# Patient Record
Sex: FEMALE | Race: WHITE | NOT HISPANIC OR LATINO | Employment: OTHER | URBAN - METROPOLITAN AREA
[De-identification: names, ages, dates, MRNs, and addresses within clinical notes are randomized per-mention and may not be internally consistent; named-entity substitution may affect disease eponyms.]

---

## 2017-08-25 DIAGNOSIS — I12.9 PARENCHYMAL RENAL HYPERTENSION: Primary | ICD-10-CM

## 2017-08-31 ENCOUNTER — HOSPITAL ENCOUNTER (OUTPATIENT)
Dept: RADIOLOGY | Facility: HOSPITAL | Age: 67
Discharge: HOME | End: 2017-08-31
Attending: INTERNAL MEDICINE
Payer: MEDICARE

## 2017-08-31 DIAGNOSIS — I12.9 PARENCHYMAL RENAL HYPERTENSION: ICD-10-CM

## 2017-08-31 PROCEDURE — 76770 US EXAM ABDO BACK WALL COMP: CPT | Mod: TC

## 2017-08-31 PROCEDURE — 76770 US EXAM ABDO BACK WALL COMP: CPT | Mod: 26,,, | Performed by: RADIOLOGY

## 2019-04-11 ENCOUNTER — HOSPITAL ENCOUNTER (INPATIENT)
Facility: HOSPITAL | Age: 69
LOS: 7 days | Discharge: NURSING, REHAB OR LONG-TERM CARE | DRG: 305 | End: 2019-04-18
Attending: EMERGENCY MEDICINE | Admitting: HOSPITALIST
Payer: MEDICARE

## 2019-04-11 DIAGNOSIS — N18.3 ACUTE RENAL FAILURE SUPERIMPOSED ON STAGE 3 CHRONIC KIDNEY DISEASE, UNSPECIFIED ACUTE RENAL FAILURE TYPE: Primary | ICD-10-CM

## 2019-04-11 DIAGNOSIS — I16.1 HYPERTENSIVE EMERGENCY: ICD-10-CM

## 2019-04-11 DIAGNOSIS — R06.02 SHORTNESS OF BREATH: ICD-10-CM

## 2019-04-11 DIAGNOSIS — N17.9 ACUTE RENAL FAILURE SUPERIMPOSED ON STAGE 3 CHRONIC KIDNEY DISEASE, UNSPECIFIED ACUTE RENAL FAILURE TYPE: Primary | ICD-10-CM

## 2019-04-11 DIAGNOSIS — N04.9 NEPHROTIC SYNDROME: ICD-10-CM

## 2019-04-11 PROBLEM — F32.A DEPRESSION: Chronic | Status: ACTIVE | Noted: 2019-04-11

## 2019-04-11 PROBLEM — E87.6 HYPOKALEMIA: Status: ACTIVE | Noted: 2019-04-11

## 2019-04-11 PROBLEM — E78.5 HYPERLIPIDEMIA: Chronic | Status: ACTIVE | Noted: 2019-04-11

## 2019-04-11 PROBLEM — I10 ESSENTIAL HYPERTENSION: Chronic | Status: ACTIVE | Noted: 2019-04-11

## 2019-04-11 PROBLEM — D63.8 ANEMIA OF CHRONIC DISEASE: Chronic | Status: ACTIVE | Noted: 2019-04-11

## 2019-04-11 PROBLEM — E11.9 TYPE 2 DIABETES MELLITUS: Chronic | Status: ACTIVE | Noted: 2019-04-11

## 2019-04-11 LAB
ALBUMIN SERPL BCP-MCNC: 2.4 G/DL (ref 3.5–5.2)
ALBUMIN SERPL BCP-MCNC: 3.1 G/DL (ref 3.5–5.2)
ALP SERPL-CCNC: 84 U/L (ref 55–135)
ALT SERPL W/O P-5'-P-CCNC: 27 U/L (ref 10–44)
ANION GAP SERPL CALC-SCNC: 7 MMOL/L (ref 8–16)
ANION GAP SERPL CALC-SCNC: 9 MMOL/L (ref 8–16)
APTT BLDCRRT: 31 SEC (ref 21–32)
AST SERPL-CCNC: 32 U/L (ref 10–40)
BACTERIA #/AREA URNS HPF: NORMAL /HPF
BASOPHILS # BLD AUTO: 0.01 K/UL (ref 0–0.2)
BASOPHILS NFR BLD: 0.2 % (ref 0–1.9)
BILIRUB SERPL-MCNC: 0.3 MG/DL (ref 0.1–1)
BILIRUB UR QL STRIP: NEGATIVE
BNP SERPL-MCNC: 59 PG/ML (ref 0–99)
BUN SERPL-MCNC: 36 MG/DL (ref 8–23)
BUN SERPL-MCNC: 42 MG/DL (ref 8–23)
CALCIUM SERPL-MCNC: 8.3 MG/DL (ref 8.7–10.5)
CALCIUM SERPL-MCNC: 8.8 MG/DL (ref 8.7–10.5)
CHLORIDE SERPL-SCNC: 93 MMOL/L (ref 95–110)
CHLORIDE SERPL-SCNC: 97 MMOL/L (ref 95–110)
CLARITY UR: CLEAR
CO2 SERPL-SCNC: 24 MMOL/L (ref 23–29)
CO2 SERPL-SCNC: 27 MMOL/L (ref 23–29)
COLOR UR: ABNORMAL
CREAT SERPL-MCNC: 1.8 MG/DL (ref 0.5–1.4)
CREAT SERPL-MCNC: 1.9 MG/DL (ref 0.5–1.4)
CREAT UR-MCNC: 20.7 MG/DL (ref 15–325)
CREAT UR-MCNC: 21.5 MG/DL (ref 15–325)
CREAT UR-MCNC: 21.5 MG/DL (ref 15–325)
DIFFERENTIAL METHOD: ABNORMAL
EOSINOPHIL # BLD AUTO: 0.1 K/UL (ref 0–0.5)
EOSINOPHIL NFR BLD: 0.9 % (ref 0–8)
EOSINOPHIL URNS QL WRIGHT STN: NORMAL
ERYTHROCYTE [DISTWIDTH] IN BLOOD BY AUTOMATED COUNT: 12.9 % (ref 11.5–14.5)
EST. GFR  (AFRICAN AMERICAN): 31 ML/MIN/1.73 M^2
EST. GFR  (AFRICAN AMERICAN): 33 ML/MIN/1.73 M^2
EST. GFR  (NON AFRICAN AMERICAN): 27 ML/MIN/1.73 M^2
EST. GFR  (NON AFRICAN AMERICAN): 28 ML/MIN/1.73 M^2
GLUCOSE SERPL-MCNC: 165 MG/DL (ref 70–110)
GLUCOSE SERPL-MCNC: 201 MG/DL (ref 70–110)
GLUCOSE UR QL STRIP: ABNORMAL
HCT VFR BLD AUTO: 29.5 % (ref 37–48.5)
HGB BLD-MCNC: 10.3 G/DL (ref 12–16)
HGB UR QL STRIP: ABNORMAL
HYALINE CASTS #/AREA URNS LPF: 0 /LPF
INR PPP: 1 (ref 0.8–1.2)
INR PPP: 1 (ref 0.8–1.2)
KETONES UR QL STRIP: NEGATIVE
LEUKOCYTE ESTERASE UR QL STRIP: NEGATIVE
LYMPHOCYTES # BLD AUTO: 1 K/UL (ref 1–4.8)
LYMPHOCYTES NFR BLD: 15.4 % (ref 18–48)
MCH RBC QN AUTO: 30.9 PG (ref 27–31)
MCHC RBC AUTO-ENTMCNC: 34.9 G/DL (ref 32–36)
MCV RBC AUTO: 89 FL (ref 82–98)
MICROSCOPIC COMMENT: NORMAL
MONOCYTES # BLD AUTO: 0.6 K/UL (ref 0.3–1)
MONOCYTES NFR BLD: 8.5 % (ref 4–15)
NEUTROPHILS # BLD AUTO: 5 K/UL (ref 1.8–7.7)
NEUTROPHILS NFR BLD: 75 % (ref 38–73)
NITRITE UR QL STRIP: NEGATIVE
PH UR STRIP: 7 [PH] (ref 5–8)
PHOSPHATE SERPL-MCNC: 3.4 MG/DL (ref 2.7–4.5)
PLATELET # BLD AUTO: 246 K/UL (ref 150–350)
PMV BLD AUTO: 9.9 FL (ref 9.2–12.9)
POCT GLUCOSE: 149 MG/DL (ref 70–110)
POCT GLUCOSE: 160 MG/DL (ref 70–110)
POCT GLUCOSE: 207 MG/DL (ref 70–110)
POTASSIUM SERPL-SCNC: 3.1 MMOL/L (ref 3.5–5.1)
POTASSIUM SERPL-SCNC: 3.4 MMOL/L (ref 3.5–5.1)
PROT SERPL-MCNC: 6.3 G/DL (ref 6–8.4)
PROT UR QL STRIP: ABNORMAL
PROT UR-MCNC: 371 MG/DL
PROT UR-MCNC: 376 MG/DL
PROT/CREAT UR: 17.49 MG/G{CREAT} (ref 0–0.2)
PROT/CREAT UR: 17.92 MG/G{CREAT} (ref 0–0.2)
PROTHROMBIN TIME: 10 SEC (ref 9–12.5)
PROTHROMBIN TIME: 10 SEC (ref 9–12.5)
RBC # BLD AUTO: 3.33 M/UL (ref 4–5.4)
RBC #/AREA URNS HPF: 2 /HPF (ref 0–4)
SODIUM SERPL-SCNC: 126 MMOL/L (ref 136–145)
SODIUM SERPL-SCNC: 131 MMOL/L (ref 136–145)
SODIUM UR-SCNC: 56 MMOL/L (ref 20–250)
SP GR UR STRIP: 1 (ref 1–1.03)
TROPONIN I SERPL DL<=0.01 NG/ML-MCNC: 0.02 NG/ML (ref 0–0.03)
URN SPEC COLLECT METH UR: ABNORMAL
UROBILINOGEN UR STRIP-ACNC: NEGATIVE EU/DL
UUN UR-MCNC: 199 MG/DL (ref 140–1050)
WBC # BLD AUTO: 6.62 K/UL (ref 3.9–12.7)
WBC #/AREA URNS HPF: 1 /HPF (ref 0–5)

## 2019-04-11 PROCEDURE — 63600175 PHARM REV CODE 636 W HCPCS: Performed by: EMERGENCY MEDICINE

## 2019-04-11 PROCEDURE — 84156 ASSAY OF PROTEIN URINE: CPT | Mod: 91

## 2019-04-11 PROCEDURE — 80069 RENAL FUNCTION PANEL: CPT

## 2019-04-11 PROCEDURE — 83880 ASSAY OF NATRIURETIC PEPTIDE: CPT

## 2019-04-11 PROCEDURE — 96374 THER/PROPH/DIAG INJ IV PUSH: CPT

## 2019-04-11 PROCEDURE — S5571 INSULIN DISPOS PEN 3 ML: HCPCS | Performed by: INTERNAL MEDICINE

## 2019-04-11 PROCEDURE — 84300 ASSAY OF URINE SODIUM: CPT

## 2019-04-11 PROCEDURE — 84540 ASSAY OF URINE/UREA-N: CPT

## 2019-04-11 PROCEDURE — 93010 ELECTROCARDIOGRAM REPORT: CPT | Mod: ,,, | Performed by: INTERNAL MEDICINE

## 2019-04-11 PROCEDURE — 85610 PROTHROMBIN TIME: CPT

## 2019-04-11 PROCEDURE — 21400001 HC TELEMETRY ROOM

## 2019-04-11 PROCEDURE — 93010 EKG 12-LEAD: ICD-10-PCS | Mod: ,,, | Performed by: INTERNAL MEDICINE

## 2019-04-11 PROCEDURE — 63600175 PHARM REV CODE 636 W HCPCS: Performed by: INTERNAL MEDICINE

## 2019-04-11 PROCEDURE — 25000003 PHARM REV CODE 250: Performed by: INTERNAL MEDICINE

## 2019-04-11 PROCEDURE — 25000003 PHARM REV CODE 250: Performed by: EMERGENCY MEDICINE

## 2019-04-11 PROCEDURE — 81000 URINALYSIS NONAUTO W/SCOPE: CPT

## 2019-04-11 PROCEDURE — 85730 THROMBOPLASTIN TIME PARTIAL: CPT

## 2019-04-11 PROCEDURE — 27000221 HC OXYGEN, UP TO 24 HOURS

## 2019-04-11 PROCEDURE — 80053 COMPREHEN METABOLIC PANEL: CPT

## 2019-04-11 PROCEDURE — 84156 ASSAY OF PROTEIN URINE: CPT

## 2019-04-11 PROCEDURE — 96375 TX/PRO/DX INJ NEW DRUG ADDON: CPT

## 2019-04-11 PROCEDURE — 93005 ELECTROCARDIOGRAM TRACING: CPT

## 2019-04-11 PROCEDURE — 83036 HEMOGLOBIN GLYCOSYLATED A1C: CPT

## 2019-04-11 PROCEDURE — 84484 ASSAY OF TROPONIN QUANT: CPT

## 2019-04-11 PROCEDURE — 85025 COMPLETE CBC W/AUTO DIFF WBC: CPT

## 2019-04-11 PROCEDURE — 87205 SMEAR GRAM STAIN: CPT

## 2019-04-11 PROCEDURE — 99285 EMERGENCY DEPT VISIT HI MDM: CPT | Mod: 25

## 2019-04-11 RX ORDER — HEPARIN SODIUM 5000 [USP'U]/ML
5000 INJECTION, SOLUTION INTRAVENOUS; SUBCUTANEOUS EVERY 12 HOURS
Status: DISCONTINUED | OUTPATIENT
Start: 2019-04-11 | End: 2019-04-16

## 2019-04-11 RX ORDER — HYDRALAZINE HYDROCHLORIDE 25 MG/1
25 TABLET, FILM COATED ORAL EVERY 8 HOURS
Status: DISCONTINUED | OUTPATIENT
Start: 2019-04-11 | End: 2019-04-11

## 2019-04-11 RX ORDER — PRAVASTATIN SODIUM 10 MG/1
10 TABLET ORAL DAILY
Status: DISCONTINUED | OUTPATIENT
Start: 2019-04-11 | End: 2019-04-18 | Stop reason: HOSPADM

## 2019-04-11 RX ORDER — POTASSIUM CHLORIDE 20 MEQ/1
40 TABLET, EXTENDED RELEASE ORAL ONCE
Status: COMPLETED | OUTPATIENT
Start: 2019-04-11 | End: 2019-04-11

## 2019-04-11 RX ORDER — SODIUM CHLORIDE 0.9 % (FLUSH) 0.9 %
10 SYRINGE (ML) INJECTION
Status: CANCELLED | OUTPATIENT
Start: 2019-04-11

## 2019-04-11 RX ORDER — GLUCAGON 1 MG
1 KIT INJECTION
Status: DISCONTINUED | OUTPATIENT
Start: 2019-04-11 | End: 2019-04-18 | Stop reason: HOSPADM

## 2019-04-11 RX ORDER — ESCITALOPRAM OXALATE 10 MG/1
10 TABLET ORAL DAILY
COMMUNITY
End: 2022-12-31

## 2019-04-11 RX ORDER — HYDRALAZINE HYDROCHLORIDE 25 MG/1
25 TABLET, FILM COATED ORAL EVERY 8 HOURS
Status: DISCONTINUED | OUTPATIENT
Start: 2019-04-11 | End: 2019-04-15

## 2019-04-11 RX ORDER — ESCITALOPRAM OXALATE 10 MG/1
10 TABLET ORAL DAILY
Status: DISCONTINUED | OUTPATIENT
Start: 2019-04-11 | End: 2019-04-18 | Stop reason: HOSPADM

## 2019-04-11 RX ORDER — IBUPROFEN 200 MG
16 TABLET ORAL
Status: DISCONTINUED | OUTPATIENT
Start: 2019-04-11 | End: 2019-04-18 | Stop reason: HOSPADM

## 2019-04-11 RX ORDER — FUROSEMIDE 10 MG/ML
40 INJECTION INTRAMUSCULAR; INTRAVENOUS
Status: COMPLETED | OUTPATIENT
Start: 2019-04-11 | End: 2019-04-11

## 2019-04-11 RX ORDER — FUROSEMIDE 10 MG/ML
40 INJECTION INTRAMUSCULAR; INTRAVENOUS ONCE
Status: COMPLETED | OUTPATIENT
Start: 2019-04-11 | End: 2019-04-11

## 2019-04-11 RX ORDER — IBUPROFEN 200 MG
24 TABLET ORAL
Status: DISCONTINUED | OUTPATIENT
Start: 2019-04-11 | End: 2019-04-18 | Stop reason: HOSPADM

## 2019-04-11 RX ORDER — NICARDIPINE HYDROCHLORIDE 0.2 MG/ML
5 INJECTION INTRAVENOUS CONTINUOUS
Status: DISCONTINUED | OUTPATIENT
Start: 2019-04-11 | End: 2019-04-11

## 2019-04-11 RX ORDER — NICARDIPINE HYDROCHLORIDE 0.2 MG/ML
2.5 INJECTION INTRAVENOUS CONTINUOUS
Status: DISCONTINUED | OUTPATIENT
Start: 2019-04-11 | End: 2019-04-11

## 2019-04-11 RX ORDER — RAMELTEON 8 MG/1
8 TABLET ORAL NIGHTLY PRN
Status: DISCONTINUED | OUTPATIENT
Start: 2019-04-11 | End: 2019-04-18 | Stop reason: HOSPADM

## 2019-04-11 RX ORDER — INSULIN ASPART 100 [IU]/ML
0-5 INJECTION, SOLUTION INTRAVENOUS; SUBCUTANEOUS
Status: DISCONTINUED | OUTPATIENT
Start: 2019-04-11 | End: 2019-04-18 | Stop reason: HOSPADM

## 2019-04-11 RX ORDER — LORAZEPAM 0.5 MG/1
1 TABLET ORAL
Status: COMPLETED | OUTPATIENT
Start: 2019-04-11 | End: 2019-04-11

## 2019-04-11 RX ORDER — AMLODIPINE BESYLATE 5 MG/1
5 TABLET ORAL DAILY
Status: DISCONTINUED | OUTPATIENT
Start: 2019-04-11 | End: 2019-04-15

## 2019-04-11 RX ORDER — AMOXICILLIN 250 MG
1 CAPSULE ORAL 2 TIMES DAILY PRN
Status: DISCONTINUED | OUTPATIENT
Start: 2019-04-11 | End: 2019-04-18 | Stop reason: HOSPADM

## 2019-04-11 RX ORDER — ALPRAZOLAM 0.5 MG/1
1 TABLET ORAL EVERY 6 HOURS PRN
Status: DISCONTINUED | OUTPATIENT
Start: 2019-04-11 | End: 2019-04-18 | Stop reason: HOSPADM

## 2019-04-11 RX ORDER — INSULIN ASPART 100 [IU]/ML
3 INJECTION, SOLUTION INTRAVENOUS; SUBCUTANEOUS
Status: DISCONTINUED | OUTPATIENT
Start: 2019-04-11 | End: 2019-04-18 | Stop reason: HOSPADM

## 2019-04-11 RX ORDER — ACETAMINOPHEN 500 MG
500 TABLET ORAL EVERY 6 HOURS PRN
Status: DISCONTINUED | OUTPATIENT
Start: 2019-04-11 | End: 2019-04-18 | Stop reason: HOSPADM

## 2019-04-11 RX ADMIN — HYDRALAZINE HYDROCHLORIDE 25 MG: 25 TABLET, FILM COATED ORAL at 09:04

## 2019-04-11 RX ADMIN — INSULIN ASPART 3 UNITS: 100 INJECTION, SOLUTION INTRAVENOUS; SUBCUTANEOUS at 07:04

## 2019-04-11 RX ADMIN — LORAZEPAM 1 MG: 0.5 TABLET ORAL at 01:04

## 2019-04-11 RX ADMIN — FUROSEMIDE 40 MG: 10 INJECTION, SOLUTION INTRAVENOUS at 06:04

## 2019-04-11 RX ADMIN — HEPARIN SODIUM 5000 UNITS: 5000 INJECTION, SOLUTION INTRAVENOUS; SUBCUTANEOUS at 08:04

## 2019-04-11 RX ADMIN — INSULIN ASPART 3 UNITS: 100 INJECTION, SOLUTION INTRAVENOUS; SUBCUTANEOUS at 05:04

## 2019-04-11 RX ADMIN — FUROSEMIDE 40 MG: 10 INJECTION, SOLUTION INTRAVENOUS at 01:04

## 2019-04-11 RX ADMIN — HYDRALAZINE HYDROCHLORIDE 25 MG: 25 TABLET, FILM COATED ORAL at 08:04

## 2019-04-11 RX ADMIN — HYDRALAZINE HYDROCHLORIDE 25 MG: 25 TABLET, FILM COATED ORAL at 03:04

## 2019-04-11 RX ADMIN — PRAVASTATIN SODIUM 10 MG: 10 TABLET ORAL at 08:04

## 2019-04-11 RX ADMIN — POTASSIUM CHLORIDE 40 MEQ: 1500 TABLET, EXTENDED RELEASE ORAL at 06:04

## 2019-04-11 RX ADMIN — ALPRAZOLAM 1 MG: 0.5 TABLET ORAL at 08:04

## 2019-04-11 RX ADMIN — INSULIN ASPART 2 UNITS: 100 INJECTION, SOLUTION INTRAVENOUS; SUBCUTANEOUS at 07:04

## 2019-04-11 RX ADMIN — ALPRAZOLAM 1 MG: 0.5 TABLET ORAL at 10:04

## 2019-04-11 RX ADMIN — NICARDIPINE HYDROCHLORIDE 5 MG/HR: 0.2 INJECTION, SOLUTION INTRAVENOUS at 02:04

## 2019-04-11 RX ADMIN — INSULIN DETEMIR 10 UNITS: 100 INJECTION, SOLUTION SUBCUTANEOUS at 09:04

## 2019-04-11 RX ADMIN — NICARDIPINE HYDROCHLORIDE 5 MG/HR: 0.2 INJECTION, SOLUTION INTRAVENOUS at 05:04

## 2019-04-11 RX ADMIN — AMLODIPINE BESYLATE 5 MG: 5 TABLET ORAL at 03:04

## 2019-04-11 RX ADMIN — HEPARIN SODIUM 5000 UNITS: 5000 INJECTION, SOLUTION INTRAVENOUS; SUBCUTANEOUS at 09:04

## 2019-04-11 RX ADMIN — ESCITALOPRAM OXALATE 10 MG: 10 TABLET ORAL at 08:04

## 2019-04-11 RX ADMIN — INSULIN ASPART 3 UNITS: 100 INJECTION, SOLUTION INTRAVENOUS; SUBCUTANEOUS at 11:04

## 2019-04-11 NOTE — ED PROVIDER NOTES
Encounter Date: 4/11/2019    SCRIBE #1 NOTE: I, Nicolasa Kerr, am scribing for, and in the presence of,  Lior Jimenes MD. I have scribed the following portions of the note - Other sections scribed: ROS and HPI.       History     Chief Complaint   Patient presents with    Anxiety     pt c/o anxiety from home and + 3 pedal edema      CC: Anxiety      HPI: This 68 y.o. female with a past medical history of Arthritis and Diabetes mellitus, presents to the ED complaining of BLE swelling and feeling anxious the started acutely today. Denies any new or worsening abdominal distension. Denies /GI sx. She reports subjective fever. Denies any worsening SOB than she she gets with Denies chills, cough, dizziness, headache or any other related sx. She also thinks her BP is elevated. She lives alone at home. She has a f/u appointment with her PCP scheduled for tomorrow. She takes Lexapro 10 MG once daily. Reports compliance with her prescribed insulin. Former smoker for 1 yr (10-15 yrs ago). Denies alcohol and/or recreational drug use. Takes Tylenol for her arthritis.     The history is provided by the patient. No  was used.     Review of patient's allergies indicates:   Allergen Reactions    Ciprofloxacin Anaphylaxis    Codeine Anaphylaxis    Neosporin [benzalkonium chloride] Anaphylaxis     Past Medical History:   Diagnosis Date    Arthritis     Diabetes mellitus      Past Surgical History:   Procedure Laterality Date    APPENDECTOMY       History reviewed. No pertinent family history.  Social History     Tobacco Use    Smoking status: Former Smoker   Substance Use Topics    Alcohol use: Yes    Drug use: No     Review of Systems   Constitutional: Negative for appetite change and fever.   HENT: Negative for rhinorrhea and sore throat.    Eyes: Negative for visual disturbance.   Respiratory: Negative for cough and shortness of breath.    Cardiovascular: Positive for leg swelling (BLE). Negative for  chest pain.   Gastrointestinal: Negative for abdominal pain.   Genitourinary: Negative for dysuria.   Musculoskeletal: Negative for gait problem.   Skin: Negative for rash.   Neurological: Negative for syncope.   Psychiatric/Behavioral: The patient is nervous/anxious.        Physical Exam     Initial Vitals [04/11/19 0056]   BP Pulse Resp Temp SpO2   (!) 180/69 71 18 97.8 °F (36.6 °C) 95 %      MAP       --         Physical Exam    Nursing note and vitals reviewed.  Constitutional: She appears well-developed and well-nourished. She is not diaphoretic.   HENT:   Head: Normocephalic and atraumatic.   Mouth/Throat: Oropharynx is clear and moist.   Eyes: Conjunctivae and EOM are normal. Pupils are equal, round, and reactive to light.   Neck: Normal range of motion. Neck supple.   Cardiovascular: Normal rate, regular rhythm, normal heart sounds and intact distal pulses.   No murmur heard.  Pulmonary/Chest: Breath sounds normal. No respiratory distress. She has no wheezes. She has no rhonchi. She has no rales.   Abdominal: Soft. Bowel sounds are normal. She exhibits no distension. There is no tenderness. There is no rebound and no guarding.   Musculoskeletal: Normal range of motion. She exhibits edema. She exhibits no tenderness.   2+ pitting edema to BLE   Neurological: She is alert and oriented to person, place, and time. She has normal strength.   Moving all extremities.   Skin: Skin is warm and dry. No erythema. No pallor.   Psychiatric: Her mood appears anxious.         ED Course   Procedures  Labs Reviewed   CBC W/ AUTO DIFFERENTIAL - Abnormal; Notable for the following components:       Result Value    RBC 3.33 (*)     Hemoglobin 10.3 (*)     Hematocrit 29.5 (*)     Gran% 75.0 (*)     Lymph% 15.4 (*)     All other components within normal limits   COMPREHENSIVE METABOLIC PANEL - Abnormal; Notable for the following components:    Sodium 126 (*)     Potassium 3.4 (*)     Chloride 93 (*)     Glucose 165 (*)     BUN,  Bld 42 (*)     Creatinine 1.9 (*)     Albumin 3.1 (*)     eGFR if  31 (*)     eGFR if non  27 (*)     All other components within normal limits   URINALYSIS, REFLEX TO URINE CULTURE - Abnormal; Notable for the following components:    Protein, UA 2+ (*)     Glucose, UA 1+ (*)     Occult Blood UA 1+ (*)     All other components within normal limits    Narrative:     Preferred Collection Type->Urine, Clean Catch   TROPONIN I   B-TYPE NATRIURETIC PEPTIDE   PROTIME-INR   APTT   PROTIME-INR   URINALYSIS MICROSCOPIC    Narrative:     Preferred Collection Type->Urine, Clean Catch     EKG Readings: (Independently Interpreted)   Rhythm: Normal Sinus Rhythm. Heart Rate: 68.   Non specific ST abnormality. Flat T-waves. Prolonged QT.       Imaging Results          X-Ray Chest AP Portable (Final result)  Result time 04/11/19 02:03:25    Final result by Deloris Boone MD (04/11/19 02:03:25)                 Impression:      No acute cardiopulmonary process identified.      Electronically signed by: Deloris Boone MD  Date:    04/11/2019  Time:    02:03             Narrative:    EXAMINATION:  XR CHEST AP PORTABLE    CLINICAL HISTORY:  CHF;    TECHNIQUE:  Single frontal view of the chest was performed.    COMPARISON:  October 2008.    FINDINGS:  Cardiac silhouette is normal in size.  Lungs are hypoinflated.  No evidence of focal consolidative process, pneumothorax, or significant effusion.  No acute osseous abnormality identified.                                 Medical Decision Making:   Initial Assessment:   68-year-old female presenting secondary to acute leg swelling and patient is markedly hypertensive.  If will get labs evaluating for hypertensive emergency.  This all could be peripheral pitting edema. CHF is also consideration in the patient.  Patient is very anxious while she is here and gave patient a p.o. Ativan to help calm her.  I was going to evaluate to see whether not this lowered  her blood pressure.  Her blood pressure could just be related to her anxiety. Will look for any major electrolyte abnormalities or signs of infection.    Even after Ativan patient's blood pressure is markedly elevated with him at 1:45 a.m..  Her creatinine has increased from 0.7-1.9.  However there has been a 10 year different some unsure if this is her baseline or acutely new.  Due the fact that she says this bilateral leg swelling is acutely new last 24 hr with elevated creatinine and protein in her urine chief I must treat this as hypertensive emergency at this time.  I started patient on nicardipine drip.  Met goals not drop below 108 on the 1st 24 hr.  I instructed this to the nursing staff placed in a nursing communication.  I spoke to hospitalist admitted the patient to the ICU.  Patient was updated is agreement plan.    Please put in 35 minutes of critical care due to patient having a high risk of renal failure.   Separate from teaching and exclusive of procedure and ekg time  Includes:  Time at bedside  Time reviewing test results  Time discussing case with staff  Time documenting the medical record  Time spent with family members  Time spent with consults  Management    Clinical Tests:   Lab Tests: Ordered and Reviewed  Radiological Study: Reviewed and Ordered  Medical Tests: Ordered and Reviewed            Scribe Attestation:   Scribe #1: I performed the above scribed service and the documentation accurately describes the services I performed. I attest to the accuracy of the note.    Attending Attestation:           Physician Attestation for Scribe:  Physician Attestation Statement for Scribe #1: I, Lior Jimenes MD, reviewed documentation, as scribed by Nicolasa Kerr in my presence, and it is both accurate and complete.                    Clinical Impression:       ICD-10-CM ICD-9-CM   1. Hypertensive emergency I16.1 401.9   2. Shortness of breath R06.02 786.05                                Lior Jimenes,  MD  04/11/19 0304

## 2019-04-11 NOTE — ED TRIAGE NOTES
"Pt presents to ED with c/o bilateral leg swelling, anxiety and abdominal pain that started today. Pt states her legs were not swollen yesterday but that she has had leg swelling in the past. Pt states she lives alone and felt her BP was elevated and didn't think she'd "make it through the night." Pt in no acute distress at this time. MD at bedside.  "

## 2019-04-11 NOTE — ASSESSMENT & PLAN NOTE
She presented with initial blood pressure 180/69 () but went up as high as 213/100 ().  She is only on benazepril at home for high blood pressure control.  Of note, her creatinine is 1.9 though it is unclear whether this is chronic or acute--the last lab work we have for her was back in 2008.  Her troponins are normal.  I have personally reviewed the EKG and it was significant for normal sinus rhythm without any evidence of acute ischemia.  I also reviewed the chest radiograph which was also benign.  She was started on a nicardipine infusion with goal MAP reduction of 25% of highest MAP within the first 6 hours.  Will hold her home regimen of benazepril due to renal failure of unclear chronicity.

## 2019-04-11 NOTE — PLAN OF CARE
Problem: Adult Inpatient Plan of Care  Goal: Plan of Care Review  Outcome: Ongoing (interventions implemented as appropriate)  Pt admitted to ICU this morning for blood pressure management on a Cardene gtt. Goal MAP less than 120mmHg. Moderate BLLE swelling. Pt reporting pain to abdomen with palpation upon physical exam. Improved with urination. Nephrology consulted. Plan for renal ultrasound today.

## 2019-04-11 NOTE — H&P
[facility] Medicine  History & Physical    Patient Name: Magaly Epstein  MRN: [id number]  Admission Date: 4/11/2019  Attending Physician: Pal Ulloa MD   Primary Care Provider: Tong Jones MD         Patient information was obtained from patient.     Subjective:     Principal Problem:Hypertensive emergency    Chief Complaint: Leg swelling today.    HPI: Ms. Magaly Epstein is a 68 y.o. female with essential hypertension, type 2 diabetes mellitus (HbA1c unknown), hyperlipidemia (LDL under), anemia of chronic disease, and depression who presents to [facility] ED with complaints of bilateral lower extremity edema today.  She had a sense that her blood pressure was high for the past five days which she describes as a sensation of anxiety--she has had similar symptoms in the past.  Today however she noticed that her legs were swollen decided to come to the ED for further evaluation.  She has been feeling ill and has had some nausea with occasional nonbilious, nonbloody vomiting.  She denies any fevers, chills, chest pain, shortness of breath, palpitations, diaphoresis, nor hemoptysis.  She denies any lightheaded nor any dizziness, and did not have any falls or loss of consciousness.  Her appetite has been very poor but she denies any hematochezia, melena, vaginal bleeding, hematuria, hemoptysis, hematemesis, nor any coffee-ground emesis.  She has not been able to take her medications in the last five days due to nausea.  She does say that she is otherwise compliant with her medications and follows up regularly with her PCP.  She has not been started on any new medications.  She does not have a regular kidney doctor.    Chart Review:  Patient has not had any recent hospitalizations or outpatient clinic visits within the system.    Past Medical History:   Diagnosis Date    Arthritis     Diabetes mellitus        Past Surgical History:   Procedure Laterality Date    APPENDECTOMY          Review of patient's allergies indicates:   Allergen Reactions    Ciprofloxacin Anaphylaxis    Codeine Anaphylaxis    Neosporin [benzalkonium chloride] Anaphylaxis       No current facility-administered medications on file prior to encounter.      Current Outpatient Medications on File Prior to Encounter   Medication Sig    escitalopram oxalate (LEXAPRO) 10 MG tablet Take 10 mg by mouth once daily.    insulin NPH-insulin regular, 70/30, (NOVOLIN 70/30) 100 unit/mL (70-30) injection Inject into the skin 2 (two) times daily.    benazepril (LOTENSIN) 20 MG tablet Take 20 mg by mouth once daily.    pravastatin (PRAVACHOL) 10 MG tablet Take 10 mg by mouth once daily.     Family History     None        Tobacco Use    Smoking status: Former Smoker   Substance and Sexual Activity    Alcohol use: Yes    Drug use: No    Sexual activity: Not on file     Review of Systems   Constitutional: Positive for activity change, appetite change and fatigue. Negative for chills, diaphoresis, fever and unexpected weight change.   HENT: Negative.    Eyes: Negative.    Respiratory: Negative for cough, chest tightness, shortness of breath and wheezing.    Cardiovascular: Positive for leg swelling. Negative for chest pain and palpitations.   Gastrointestinal: Positive for nausea and vomiting. Negative for abdominal distention, abdominal pain, blood in stool, constipation and diarrhea.   Genitourinary: Negative for dysuria, hematuria and vaginal bleeding.   Musculoskeletal: Negative.    Skin: Negative.    Neurological: Positive for weakness. Negative for dizziness, seizures, syncope and light-headedness.   Psychiatric/Behavioral: Negative.      Objective:     Vital Signs (Most Recent):  Temp: 97.8 °F (36.6 °C) (04/11/19 0530)  Pulse: 83 (04/11/19 0530)  Resp: (!) 30 (04/11/19 0530)  BP: (!) 176/72 (04/11/19 0530)  SpO2: 97 % (04/11/19 0530) Vital Signs (24h Range):  Temp:  [97.8 °F (36.6 °C)-98 °F (36.7 °C)] 97.8 °F (36.6  °C)  Pulse:  [66-86] 83  Resp:  [15-30] 30  SpO2:  [90 %-100 %] 97 %  BP: (133-215)/() 176/72     Weight: 114.3 kg (251 lb 15.8 oz)  Body mass index is 35.14 kg/m².    Physical Exam   Constitutional: She is oriented to person, place, and time. She appears well-developed and well-nourished. No distress.   HENT:   Head: Normocephalic and atraumatic.   Right Ear: External ear normal.   Left Ear: External ear normal.   Nose: Nose normal.   Eyes: Left eye exhibits no discharge.   Neck: Normal range of motion.   Cardiovascular:   Regular rate and rhythm with a Grade IV/VI holosystolic murmur with an S4 gallop.   Pulmonary/Chest:   Mildly increased respiratory effort with bibasilar crackles bilaterally.   Abdominal: Soft. Bowel sounds are normal. She exhibits no distension. There is no tenderness. There is no rebound and no guarding.   Musculoskeletal: Normal range of motion. She exhibits edema (There is significant 2+ pitting edema to the legs bilaterally to the level of the knees.).   Neurological: She is alert and oriented to person, place, and time.   Skin: Skin is warm and dry. She is not diaphoretic. No erythema.   Psychiatric: She has a normal mood and affect. Her behavior is normal. Judgment and thought content normal.   Nursing note and vitals reviewed.          Significant Labs: All pertinent labs within the past 24 hours have been reviewed.    Significant Imaging: I have reviewed and interpreted all pertinent imaging results/findings within the past 24 hours.    Assessment/Plan:     * Hypertensive emergency  She presented with initial blood pressure 180/69 () but went up as high as 213/100 ().  She is only on benazepril at home for high blood pressure control.  Of note, her creatinine is 1.9 though it is unclear whether this is chronic or acute--the last lab work we have for her was back in 2008.  Her troponins are normal.  I have personally reviewed the EKG and it was significant for normal  sinus rhythm without any evidence of acute ischemia.  I also reviewed the chest radiograph which was also benign.  She was started on a nicardipine infusion with goal MAP reduction of 25% of highest MAP within the first 6 hours.  Will hold her home regimen of benazepril due to renal failure of unclear chronicity.    Acute renal failure  We do not have any recent lab work to which to compare the chronicity of her renal failure.  Her creatinine today was 1.9.  Patient's urinalysis is significant for a specific gravity of 1.005, 2+ protein, 1+ glucose, and 1+ occult blood.  Urine output has been fair.  Will obtain additional urine studies; provide aggressive IV fluid hydration; monitor the urine output; recheck the renal function in the morning; and avoid nephrotoxins.  Will also obtain an iPTH, vitamin-D, and renal ultrasound.  Will obtain a urine protein to creatinine ratio.  Will consult Nephrology for further recommendations.    Hypokalemia  Given her renal function, will closely monitor her potassium level.  Will deferred repletion at this time.    Essential hypertension  As addressed above.    Type 2 diabetes mellitus  She is on home regimen of mixed insulin therapy; will provide basal-prandial insulin therapy along with insulin sliding scale.    Hyperlipidemia  Will continue her home regimen pravastatin.    Anemia of chronic disease  Unfortunately, we do not recent lab work to which to compare the chronicity of her anemia.  She is without any acute bleeding; there is no indication for transfusion.  Will continue to monitor.    Depression  Stable; will continue her home regimen of escitalopram.    VTE Risk Mitigation (From admission, onward)        Ordered     heparin (porcine) injection 5,000 Units  Every 12 hours      04/11/19 0523     IP VTE HIGH RISK PATIENT  Once      04/11/19 0523             Critical care time spent on the evaluation and treatment of severe organ dysfunction, review of pertinent labs and  imaging studies, discussions with consulting providers and discussions with patient/family: 60 minutes.            Shelby De La Paz M.D.  Staff [facility]  Department of [facility]  [facility]  Pager: [phone]              N.B.: Portions of this note was dictated using M*Modal Fluency Direct--there may be phonetic errors occasionally missed on review.

## 2019-04-11 NOTE — ASSESSMENT & PLAN NOTE
Given her renal function, will closely monitor her potassium level.  Will deferred repletion at this time.

## 2019-04-11 NOTE — ASSESSMENT & PLAN NOTE
She is on home regimen of mixed insulin therapy; will provide basal-prandial insulin therapy along with insulin sliding scale.

## 2019-04-11 NOTE — ASSESSMENT & PLAN NOTE
We do not have any recent lab work to which to compare the chronicity of her renal failure.  Her creatinine today was 1.9.  Patient's urinalysis is significant for a specific gravity of 1.005, 2+ protein, 1+ glucose, and 1+ occult blood.  Urine output has been fair.  Will obtain additional urine studies; provide aggressive IV fluid hydration; monitor the urine output; recheck the renal function in the morning; and avoid nephrotoxins.  Will also obtain an iPTH, vitamin-D, and renal ultrasound.  Will obtain a urine protein to creatinine ratio.  Will consult Nephrology for further recommendations.

## 2019-04-11 NOTE — CARE UPDATE
Reviewed H and P by my colleague and agree with A and P. Patient presenting with hypertensive emergency with SBP of 213 at one point. Started on Cardene drip and sent to the ICU. Hydralazine po ordered. Will attempt to wean off Cardene.     Addendum 1:41pm. Patient weaned of Cardene and started on BP meds. Transferred to floor same day of admission.

## 2019-04-11 NOTE — HPI
Ms. Magaly Epstein is a 68 y.o. female with essential hypertension, type 2 diabetes mellitus (HbA1c unknown), hyperlipidemia (LDL under), anemia of chronic disease, and depression who presents to [facility] ED with complaints of bilateral lower extremity edema today.  She had a sense that her blood pressure was high for the past five days which she describes as a sensation of anxiety--she has had similar symptoms in the past.  Today however she noticed that her legs were swollen decided to come to the ED for further evaluation.  She has been feeling ill and has had some nausea with occasional nonbilious, nonbloody vomiting.  She denies any fevers, chills, chest pain, shortness of breath, palpitations, diaphoresis, nor hemoptysis.  She denies any lightheaded nor any dizziness, and did not have any falls or loss of consciousness.  Her appetite has been very poor but she denies any hematochezia, melena, vaginal bleeding, hematuria, hemoptysis, hematemesis, nor any coffee-ground emesis.  She has not been able to take her medications in the last five days due to nausea.  She does say that she is otherwise compliant with her medications and follows up regularly with her PCP.  She has not been started on any new medications.  She does not have a regular kidney doctor.

## 2019-04-11 NOTE — ASSESSMENT & PLAN NOTE
Unfortunately, we do not recent lab work to which to compare the chronicity of her anemia.  She is without any acute bleeding; there is no indication for transfusion.  Will continue to monitor.

## 2019-04-11 NOTE — SUBJECTIVE & OBJECTIVE
Past Medical History:   Diagnosis Date    Arthritis     Diabetes mellitus        Past Surgical History:   Procedure Laterality Date    APPENDECTOMY         Review of patient's allergies indicates:   Allergen Reactions    Ciprofloxacin Anaphylaxis    Codeine Anaphylaxis    Neosporin [benzalkonium chloride] Anaphylaxis       No current facility-administered medications on file prior to encounter.      Current Outpatient Medications on File Prior to Encounter   Medication Sig    escitalopram oxalate (LEXAPRO) 10 MG tablet Take 10 mg by mouth once daily.    insulin NPH-insulin regular, 70/30, (NOVOLIN 70/30) 100 unit/mL (70-30) injection Inject into the skin 2 (two) times daily.    benazepril (LOTENSIN) 20 MG tablet Take 20 mg by mouth once daily.    pravastatin (PRAVACHOL) 10 MG tablet Take 10 mg by mouth once daily.     Family History     None        Tobacco Use    Smoking status: Former Smoker   Substance and Sexual Activity    Alcohol use: Yes    Drug use: No    Sexual activity: Not on file     Review of Systems   Constitutional: Positive for activity change, appetite change and fatigue. Negative for chills, diaphoresis, fever and unexpected weight change.   HENT: Negative.    Eyes: Negative.    Respiratory: Negative for cough, chest tightness, shortness of breath and wheezing.    Cardiovascular: Positive for leg swelling. Negative for chest pain and palpitations.   Gastrointestinal: Positive for nausea and vomiting. Negative for abdominal distention, abdominal pain, blood in stool, constipation and diarrhea.   Genitourinary: Negative for dysuria, hematuria and vaginal bleeding.   Musculoskeletal: Negative.    Skin: Negative.    Neurological: Positive for weakness. Negative for dizziness, seizures, syncope and light-headedness.   Psychiatric/Behavioral: Negative.      Objective:     Vital Signs (Most Recent):  Temp: 97.8 °F (36.6 °C) (04/11/19 0530)  Pulse: 83 (04/11/19 0530)  Resp: (!) 30 (04/11/19  0530)  BP: (!) 176/72 (04/11/19 0530)  SpO2: 97 % (04/11/19 0530) Vital Signs (24h Range):  Temp:  [97.8 °F (36.6 °C)-98 °F (36.7 °C)] 97.8 °F (36.6 °C)  Pulse:  [66-86] 83  Resp:  [15-30] 30  SpO2:  [90 %-100 %] 97 %  BP: (133-215)/() 176/72     Weight: 114.3 kg (251 lb 15.8 oz)  Body mass index is 35.14 kg/m².    Physical Exam   Constitutional: She is oriented to person, place, and time. She appears well-developed and well-nourished. No distress.   HENT:   Head: Normocephalic and atraumatic.   Right Ear: External ear normal.   Left Ear: External ear normal.   Nose: Nose normal.   Eyes: Left eye exhibits no discharge.   Neck: Normal range of motion.   Cardiovascular:   Regular rate and rhythm with a Grade IV/VI holosystolic murmur with an S4 gallop.   Pulmonary/Chest:   Mildly increased respiratory effort with bibasilar crackles bilaterally.   Abdominal: Soft. Bowel sounds are normal. She exhibits no distension. There is no tenderness. There is no rebound and no guarding.   Musculoskeletal: Normal range of motion. She exhibits edema (There is significant 2+ pitting edema to the legs bilaterally to the level of the knees.).   Neurological: She is alert and oriented to person, place, and time.   Skin: Skin is warm and dry. She is not diaphoretic. No erythema.   Psychiatric: She has a normal mood and affect. Her behavior is normal. Judgment and thought content normal.   Nursing note and vitals reviewed.          Significant Labs: All pertinent labs within the past 24 hours have been reviewed.    Significant Imaging: I have reviewed and interpreted all pertinent imaging results/findings within the past 24 hours.

## 2019-04-11 NOTE — CONSULTS
"[facility]  Nephrology  Consult Note    Patient Name: Magaly Epstein  MRN: [id number]  Admission Date: 4/11/2019  Hospital Length of Stay: 0 days  Attending Provider: Pal Ulloa MD   Primary Care Physician: Tong Jones MD  Principal Problem:Hypertensive emergency  Date of service 4/11/2019    Inpatient consult to Nephrology  Consult performed by: Aixa Narvaez MD  Consult ordered by: Pal Ulloa MD  Reason for consult: renal insuficiency        Subjective:     HPI: 67yo F with PMH DM, HTN, CKD2-3 followed by Dr Vela who presented to our facility with BLE edema that began yesterday, suddenly and rapidly progressive, large in size, doesn't notice significant improvement since admission, receivied IV lasix dose x1.  She reports high anxiety level which caused her appetite to worsen with associated nausea and poor PO intake.  She denies any nausea or vomiting now since admission, appetite improving.  She reports suddenly having high BP at home that began a few days ago, associated with HAs and vision changes.  She is always sob at baseline and reports this is unchanged.  She has had DM >14 yrs and HTN "for a long time" and she reports that they have not always been well controlled.  She denies any difficulty urinating, dysuria, hematuria.  She denies h/o renal biopsy.  Denies h/o cardiac disease, fevers, rash.  She bruises easy.    Past Medical History:   Diagnosis Date    Arthritis     Diabetes mellitus        Past Surgical History:   Procedure Laterality Date    APPENDECTOMY         Review of patient's allergies indicates:   Allergen Reactions    Ciprofloxacin Anaphylaxis    Codeine Anaphylaxis    Neosporin [benzalkonium chloride] Anaphylaxis     Current Facility-Administered Medications   Medication Frequency    acetaminophen tablet 500 mg Q6H PRN    ALPRAZolam tablet 1 mg Q6H PRN    amLODIPine tablet 5 mg Daily    dextrose 50% injection 12.5 g PRN    " dextrose 50% injection 25 g PRN    escitalopram oxalate tablet 10 mg Daily    glucagon (human recombinant) injection 1 mg PRN    glucose chewable tablet 16 g PRN    glucose chewable tablet 24 g PRN    heparin (porcine) injection 5,000 Units Q12H    hydrALAZINE tablet 25 mg Q8H    insulin aspart U-100 pen 0-5 Units PRN    insulin aspart U-100 pen 3 Units TIDWM    insulin detemir U-100 pen 10 Units QHS    pravastatin tablet 10 mg Daily    promethazine (PHENERGAN) 6.25 mg in dextrose 5 % 50 mL IVPB Q6H PRN    ramelteon tablet 8 mg Nightly PRN    senna-docusate 8.6-50 mg per tablet 1 tablet BID PRN     Family History     None        Tobacco Use    Smoking status: Former Smoker   Substance and Sexual Activity    Alcohol use: Yes    Drug use: No    Sexual activity: Not on file     Review of Systems   Constitutional: Negative for chills and fever.   Eyes: Negative for visual disturbance.   Respiratory: Positive for shortness of breath. Negative for cough.    Cardiovascular: Positive for leg swelling. Negative for chest pain.   Gastrointestinal: Negative for diarrhea, nausea and vomiting.   Endocrine: Negative for polyuria.   Genitourinary: Negative for decreased urine volume, difficulty urinating, dysuria and hematuria.   Skin: Positive for wound. Negative for rash.   Neurological: Positive for tremors. Negative for headaches.   Hematological: Bruises/bleeds easily.   Psychiatric/Behavioral: Negative for confusion.   All other systems reviewed and are negative.    Objective:     Vital Signs (Most Recent):  Temp: 97.4 °F (36.3 °C) (04/11/19 1610)  Pulse: 64 (04/11/19 1610)  Resp: 17 (04/11/19 1610)  BP: (!) 187/85 (04/11/19 1610)  SpO2: 100 % (04/11/19 1610)  O2 Device (Oxygen Therapy): nasal cannula (04/11/19 0838) Vital Signs (24h Range):  Temp:  [97.4 °F (36.3 °C)-98.3 °F (36.8 °C)] 97.4 °F (36.3 °C)  Pulse:  [64-88] 64  Resp:  [10-40] 17  SpO2:  [90 %-100 %] 100 %  BP: (133-215)/() 187/85      Weight: 114.3 kg (251 lb 15.8 oz) (04/11/19 0530)  Body mass index is 35.14 kg/m².  Body surface area is 2.39 meters squared.    I/O last 3 completed shifts:  In: 77.1 [I.V.:77.1]  Out: 250 [Urine:250]    Physical Exam   Constitutional: She is oriented to person, place, and time. She appears well-developed and well-nourished. No distress.   HENT:   Head: Normocephalic and atraumatic.   Eyes: EOM are normal. No scleral icterus.   Cardiovascular: Normal rate, regular rhythm and normal heart sounds. Exam reveals no friction rub.   No murmur heard.  Pulmonary/Chest: She has no wheezes. She has rales.   Dyspneic with speech  O2 NC   Abdominal: Soft. She exhibits no distension. There is no tenderness.   Musculoskeletal: She exhibits edema (2-3+ BLE edema). She exhibits no deformity.   Neurological: She is alert and oriented to person, place, and time.   Skin: Skin is warm and dry. Rash (erythema to BLE calves) noted. She is not diaphoretic.   Psychiatric: She has a normal mood and affect. Her behavior is normal.   Nursing note and vitals reviewed.      Significant Labs:  CBC:   Recent Labs   Lab 04/11/19  0110   WBC 6.62   RBC 3.33*   HGB 10.3*   HCT 29.5*      MCV 89   MCH 30.9   MCHC 34.9     CMP:   Recent Labs   Lab 04/11/19  0110   *   CALCIUM 8.8   ALBUMIN 3.1*   PROT 6.3   *   K 3.4*   CO2 24   CL 93*   BUN 42*   CREATININE 1.9*   ALKPHOS 84   ALT 27   AST 32   BILITOT 0.3     Recent Labs   Lab 04/11/19  0140   COLORU Straw   SPECGRAV 1.005   PHUR 7.0   PROTEINUA 2+*   BACTERIA None   NITRITE Negative   LEUKOCYTESUR Negative   UROBILINOGEN Negative   HYALINECASTS 0     All labs within the past 24 hours have been reviewed.    Significant Imaging:  X-Ray: Reviewed  US: Reviewed     U/S: Right: Measures 13.0 cm.  Increased cortical echogenicity.  No focal lesions.  No hydronephrosis.  Left: Measures 12.9 cm.  Increased cortical echogenicity.  No focal lesions.  No hydronephrosis.  Urinary  bladder: Unremarkable.    CXR: Cardiac silhouette is normal in size.  Lungs are hypoinflated.  No evidence of focal consolidative process, pneumothorax, or significant effusion.  No acute osseous abnormality identified.    Assessment/Plan:     Active Diagnoses:    Diagnosis Date Noted POA    PRINCIPAL PROBLEM:  Hypertensive emergency [I16.1] 04/11/2019 Yes    Acute renal failure [N17.9] 04/11/2019 Yes    Hypokalemia [E87.6] 04/11/2019 Yes    Essential hypertension [I10] 04/11/2019 Yes     Chronic    Type 2 diabetes mellitus [E11.9] 04/11/2019 Yes     Chronic    Hyperlipidemia [E78.5] 04/11/2019 Yes     Chronic    Anemia of chronic disease [D63.8] 04/11/2019 Yes     Chronic    Depression [F32.9] 04/11/2019 Yes     Chronic      Problems Resolved During this Admission:       ANIA on CKD3  - unknown baseline Cr, last Cr 0.7 in 2008 for our system although she reports having h/o CKD2-3  - she reports outpatient Nephrologist is Dr Nye?? with [facility] although no encounters in our system   - she has nephrotic range proteinuria which is significant - 17.5gm  - will check serologies  - continue diuresis as tolerated by renal function  - repeat BMP this PM  - monitor daily weights, strict I&Os  - avoid nephrotoxic agents including NSAIDs, renally dose medications  - suspect underlying CKD to be related to DM and HTN, pending A1c  - avoid ACE/ARB for now until renal function returns to baseline or stabilizes for new baseline    Hyponatremia, hypervolemic  - continue diuresis as tolerated by renal function - will give additional IV lasix this PM  - recommend to convert IVPB to NS when possible  - consider echo    Nephrotic range Proteinuria, nephrotic syndrome  - UP/C 17.5gm, check 24hr urine protein  - serologies as above  - continue diuresis as tolerated  - avoid ACE/ARB for now until renal function returns to baseline or stabilizes for new baseline    Edema  - re-dose IV lasix this PM, monitor I&Os, daily weights,  BMP    Anemia of CKD  - check iron studies, monitor CBC    Hypokalemia  - replace PO, monitor levels with IV lasix    Hypertensive emergency - s/p cardene gtt.  Consider check secondary HTN w/u  DM  HLD  Depression    Thank you for allowing me to participate in the care of your patient.  Please call with any questions.    Date of service: 4/11/2019  Aixa Narvaez MD   Nephrology  [facility]  Office [phone]   [facility]

## 2019-04-12 LAB
ALBUMIN SERPL BCP-MCNC: 2.5 G/DL (ref 3.5–5.2)
ALBUMIN SERPL BCP-MCNC: 2.6 G/DL (ref 3.5–5.2)
ALP SERPL-CCNC: 78 U/L (ref 55–135)
ALT SERPL W/O P-5'-P-CCNC: 22 U/L (ref 10–44)
ANION GAP SERPL CALC-SCNC: 5 MMOL/L (ref 8–16)
ANION GAP SERPL CALC-SCNC: 7 MMOL/L (ref 8–16)
AST SERPL-CCNC: 23 U/L (ref 10–40)
BASOPHILS # BLD AUTO: 0.02 K/UL (ref 0–0.2)
BASOPHILS NFR BLD: 0.3 % (ref 0–1.9)
BILIRUB SERPL-MCNC: 0.2 MG/DL (ref 0.1–1)
BUN SERPL-MCNC: 36 MG/DL (ref 8–23)
BUN SERPL-MCNC: 36 MG/DL (ref 8–23)
C3 SERPL-MCNC: 117 MG/DL (ref 50–180)
C4 SERPL-MCNC: 30 MG/DL (ref 11–44)
CALCIUM SERPL-MCNC: 8.2 MG/DL (ref 8.7–10.5)
CALCIUM SERPL-MCNC: 8.4 MG/DL (ref 8.7–10.5)
CHLORIDE SERPL-SCNC: 99 MMOL/L (ref 95–110)
CHLORIDE SERPL-SCNC: 99 MMOL/L (ref 95–110)
CO2 SERPL-SCNC: 27 MMOL/L (ref 23–29)
CO2 SERPL-SCNC: 28 MMOL/L (ref 23–29)
CREAT SERPL-MCNC: 1.8 MG/DL (ref 0.5–1.4)
CREAT SERPL-MCNC: 1.8 MG/DL (ref 0.5–1.4)
DIFFERENTIAL METHOD: ABNORMAL
EOSINOPHIL # BLD AUTO: 0.2 K/UL (ref 0–0.5)
EOSINOPHIL NFR BLD: 2.8 % (ref 0–8)
ERYTHROCYTE [DISTWIDTH] IN BLOOD BY AUTOMATED COUNT: 13.1 % (ref 11.5–14.5)
EST. GFR  (AFRICAN AMERICAN): 33 ML/MIN/1.73 M^2
EST. GFR  (AFRICAN AMERICAN): 33 ML/MIN/1.73 M^2
EST. GFR  (NON AFRICAN AMERICAN): 28 ML/MIN/1.73 M^2
EST. GFR  (NON AFRICAN AMERICAN): 28 ML/MIN/1.73 M^2
ESTIMATED AVG GLUCOSE: 157 MG/DL (ref 68–131)
FERRITIN SERPL-MCNC: 216 NG/ML (ref 20–300)
GLUCOSE SERPL-MCNC: 141 MG/DL (ref 70–110)
GLUCOSE SERPL-MCNC: 142 MG/DL (ref 70–110)
HBA1C MFR BLD HPLC: 7.1 % (ref 4–5.6)
HCT VFR BLD AUTO: 30.6 % (ref 37–48.5)
HGB BLD-MCNC: 10.1 G/DL (ref 12–16)
HIV 1+2 AB+HIV1 P24 AG SERPL QL IA: NEGATIVE
IRON SERPL-MCNC: 34 UG/DL (ref 30–160)
LYMPHOCYTES # BLD AUTO: 1.3 K/UL (ref 1–4.8)
LYMPHOCYTES NFR BLD: 18.8 % (ref 18–48)
MAGNESIUM SERPL-MCNC: 2.1 MG/DL (ref 1.6–2.6)
MCH RBC QN AUTO: 30.7 PG (ref 27–31)
MCHC RBC AUTO-ENTMCNC: 33 G/DL (ref 32–36)
MCV RBC AUTO: 93 FL (ref 82–98)
MONOCYTES # BLD AUTO: 0.4 K/UL (ref 0.3–1)
MONOCYTES NFR BLD: 5.8 % (ref 4–15)
NEUTROPHILS # BLD AUTO: 5.1 K/UL (ref 1.8–7.7)
NEUTROPHILS NFR BLD: 72.3 % (ref 38–73)
PHOSPHATE SERPL-MCNC: 3.5 MG/DL (ref 2.7–4.5)
PHOSPHATE SERPL-MCNC: 3.7 MG/DL (ref 2.7–4.5)
PLATELET # BLD AUTO: 274 K/UL (ref 150–350)
PMV BLD AUTO: 9.6 FL (ref 9.2–12.9)
POCT GLUCOSE: 143 MG/DL (ref 70–110)
POCT GLUCOSE: 159 MG/DL (ref 70–110)
POCT GLUCOSE: 178 MG/DL (ref 70–110)
POCT GLUCOSE: 219 MG/DL (ref 70–110)
POTASSIUM SERPL-SCNC: 3.4 MMOL/L (ref 3.5–5.1)
POTASSIUM SERPL-SCNC: 3.6 MMOL/L (ref 3.5–5.1)
PROCALCITONIN SERPL IA-MCNC: 0.07 NG/ML
PROT SERPL-MCNC: 5.3 G/DL (ref 6–8.4)
RBC # BLD AUTO: 3.29 M/UL (ref 4–5.4)
RHEUMATOID FACT SERPL-ACNC: <10 IU/ML (ref 0–15)
RPR SER QL: NORMAL
SATURATED IRON: 14 % (ref 20–50)
SODIUM SERPL-SCNC: 132 MMOL/L (ref 136–145)
SODIUM SERPL-SCNC: 133 MMOL/L (ref 136–145)
TOTAL IRON BINDING CAPACITY: 238 UG/DL (ref 250–450)
TRANSFERRIN SERPL-MCNC: 161 MG/DL (ref 200–375)
URATE SERPL-MCNC: 8.3 MG/DL (ref 2.4–5.7)
WBC # BLD AUTO: 7.08 K/UL (ref 3.9–12.7)

## 2019-04-12 PROCEDURE — 84145 PROCALCITONIN (PCT): CPT

## 2019-04-12 PROCEDURE — 84550 ASSAY OF BLOOD/URIC ACID: CPT

## 2019-04-12 PROCEDURE — 97161 PT EVAL LOW COMPLEX 20 MIN: CPT

## 2019-04-12 PROCEDURE — 36415 COLL VENOUS BLD VENIPUNCTURE: CPT

## 2019-04-12 PROCEDURE — 84165 PATHOLOGIST INTERPRETATION SPE: ICD-10-PCS | Mod: 26,,, | Performed by: PATHOLOGY

## 2019-04-12 PROCEDURE — 85025 COMPLETE CBC W/AUTO DIFF WBC: CPT

## 2019-04-12 PROCEDURE — 83520 IMMUNOASSAY QUANT NOS NONAB: CPT

## 2019-04-12 PROCEDURE — 63600175 PHARM REV CODE 636 W HCPCS: Performed by: HOSPITALIST

## 2019-04-12 PROCEDURE — 97165 OT EVAL LOW COMPLEX 30 MIN: CPT

## 2019-04-12 PROCEDURE — 94761 N-INVAS EAR/PLS OXIMETRY MLT: CPT

## 2019-04-12 PROCEDURE — 83735 ASSAY OF MAGNESIUM: CPT

## 2019-04-12 PROCEDURE — 86038 ANTINUCLEAR ANTIBODIES: CPT

## 2019-04-12 PROCEDURE — 86160 COMPLEMENT ANTIGEN: CPT | Mod: 59

## 2019-04-12 PROCEDURE — 25000003 PHARM REV CODE 250: Performed by: HOSPITALIST

## 2019-04-12 PROCEDURE — 27000221 HC OXYGEN, UP TO 24 HOURS

## 2019-04-12 PROCEDURE — 63600175 PHARM REV CODE 636 W HCPCS: Performed by: INTERNAL MEDICINE

## 2019-04-12 PROCEDURE — 80053 COMPREHEN METABOLIC PANEL: CPT

## 2019-04-12 PROCEDURE — 86592 SYPHILIS TEST NON-TREP QUAL: CPT

## 2019-04-12 PROCEDURE — 82728 ASSAY OF FERRITIN: CPT

## 2019-04-12 PROCEDURE — 80069 RENAL FUNCTION PANEL: CPT

## 2019-04-12 PROCEDURE — 86225 DNA ANTIBODY NATIVE: CPT

## 2019-04-12 PROCEDURE — 84165 PROTEIN E-PHORESIS SERUM: CPT

## 2019-04-12 PROCEDURE — 86060 ANTISTREPTOLYSIN O TITER: CPT

## 2019-04-12 PROCEDURE — 84165 PROTEIN E-PHORESIS SERUM: CPT | Mod: 26,,, | Performed by: PATHOLOGY

## 2019-04-12 PROCEDURE — 82595 ASSAY OF CRYOGLOBULIN: CPT

## 2019-04-12 PROCEDURE — 25000003 PHARM REV CODE 250: Performed by: INTERNAL MEDICINE

## 2019-04-12 PROCEDURE — 86160 COMPLEMENT ANTIGEN: CPT

## 2019-04-12 PROCEDURE — 83540 ASSAY OF IRON: CPT

## 2019-04-12 PROCEDURE — 86703 HIV-1/HIV-2 1 RESULT ANTBDY: CPT

## 2019-04-12 PROCEDURE — 86255 FLUORESCENT ANTIBODY SCREEN: CPT | Mod: 91

## 2019-04-12 PROCEDURE — 87040 BLOOD CULTURE FOR BACTERIA: CPT

## 2019-04-12 PROCEDURE — 84100 ASSAY OF PHOSPHORUS: CPT

## 2019-04-12 PROCEDURE — 83520 IMMUNOASSAY QUANT NOS NONAB: CPT | Mod: 59

## 2019-04-12 PROCEDURE — 21400001 HC TELEMETRY ROOM

## 2019-04-12 PROCEDURE — 86431 RHEUMATOID FACTOR QUANT: CPT

## 2019-04-12 RX ORDER — FUROSEMIDE 10 MG/ML
40 INJECTION INTRAMUSCULAR; INTRAVENOUS ONCE
Status: COMPLETED | OUTPATIENT
Start: 2019-04-12 | End: 2019-04-12

## 2019-04-12 RX ORDER — POTASSIUM CHLORIDE 20 MEQ/1
20 TABLET, EXTENDED RELEASE ORAL ONCE
Status: COMPLETED | OUTPATIENT
Start: 2019-04-12 | End: 2019-04-12

## 2019-04-12 RX ORDER — FUROSEMIDE 10 MG/ML
40 INJECTION INTRAMUSCULAR; INTRAVENOUS 2 TIMES DAILY
Status: DISCONTINUED | OUTPATIENT
Start: 2019-04-12 | End: 2019-04-15

## 2019-04-12 RX ADMIN — AMLODIPINE BESYLATE 5 MG: 5 TABLET ORAL at 09:04

## 2019-04-12 RX ADMIN — PRAVASTATIN SODIUM 10 MG: 10 TABLET ORAL at 09:04

## 2019-04-12 RX ADMIN — POTASSIUM CHLORIDE 20 MEQ: 1500 TABLET, EXTENDED RELEASE ORAL at 09:04

## 2019-04-12 RX ADMIN — INSULIN ASPART 3 UNITS: 100 INJECTION, SOLUTION INTRAVENOUS; SUBCUTANEOUS at 09:04

## 2019-04-12 RX ADMIN — INSULIN ASPART 1 UNITS: 100 INJECTION, SOLUTION INTRAVENOUS; SUBCUTANEOUS at 09:04

## 2019-04-12 RX ADMIN — ALPRAZOLAM 1 MG: 0.5 TABLET ORAL at 10:04

## 2019-04-12 RX ADMIN — HYDRALAZINE HYDROCHLORIDE 25 MG: 25 TABLET, FILM COATED ORAL at 09:04

## 2019-04-12 RX ADMIN — INSULIN DETEMIR 10 UNITS: 100 INJECTION, SOLUTION SUBCUTANEOUS at 09:04

## 2019-04-12 RX ADMIN — ALPRAZOLAM 1 MG: 0.5 TABLET ORAL at 09:04

## 2019-04-12 RX ADMIN — HYDRALAZINE HYDROCHLORIDE 25 MG: 25 TABLET, FILM COATED ORAL at 01:04

## 2019-04-12 RX ADMIN — INSULIN ASPART 3 UNITS: 100 INJECTION, SOLUTION INTRAVENOUS; SUBCUTANEOUS at 01:04

## 2019-04-12 RX ADMIN — HYDRALAZINE HYDROCHLORIDE 25 MG: 25 TABLET, FILM COATED ORAL at 05:04

## 2019-04-12 RX ADMIN — FUROSEMIDE 40 MG: 10 INJECTION, SOLUTION INTRAVENOUS at 09:04

## 2019-04-12 RX ADMIN — HEPARIN SODIUM 5000 UNITS: 5000 INJECTION, SOLUTION INTRAVENOUS; SUBCUTANEOUS at 09:04

## 2019-04-12 RX ADMIN — FUROSEMIDE 40 MG: 10 INJECTION, SOLUTION INTRAVENOUS at 05:04

## 2019-04-12 RX ADMIN — ESCITALOPRAM OXALATE 10 MG: 10 TABLET ORAL at 09:04

## 2019-04-12 RX ADMIN — INSULIN ASPART 3 UNITS: 100 INJECTION, SOLUTION INTRAVENOUS; SUBCUTANEOUS at 05:04

## 2019-04-12 NOTE — PLAN OF CARE
Problem: Occupational Therapy Goal  Goal: Occupational Therapy Goal  Goals to be met by: 4/26/2019     Patient will increase functional independence with ADLs by performing:    UE Dressing with Modified [place].  LE Dressing with Modified [place].  Grooming while standing at sink with Contact Guard Assistance.  Toileting from toilet with Contact Guard Assistance for hygiene and clothing management.   Supine to sit with Stand-by Assistance.  Step transfer with Stand-by Assistance  Toilet transfer to toilet with Contact Guard Assistance.  Upper extremity exercise program with assistance as needed.    Outcome: Ongoing (interventions implemented as appropriate)  Patient tolerated evaluation fair, good participation.  Patient will benefit from skilled OT services to address the above mentioned goals. ALEAH Gan, MS

## 2019-04-12 NOTE — PLAN OF CARE
Problem: Physical Therapy Goal  Goal: Physical Therapy Goal  Goals to be met by: 2019     Patient will increase functional independence with mobility by performin. Supine to sit with Modified [place]  2. Sit to stand transfer with Modified [place]  3. Gait  x 100 feet with Modified [place] using Rolling Walker.   4. Lower extremity exercise program x10 reps per handout, with independence    Outcome: Ongoing (interventions implemented as appropriate)  Initial PT evaluation performed.  Pt could benefit from skilled PT services 6x/wk in order to maximize function prior to D/C.  SNFrecommended

## 2019-04-12 NOTE — PLAN OF CARE
Problem: Hypertension Acute  Goal: Blood Pressure Within Desired Range    Intervention: Normalize Blood Pressure     04/12/19 0349   Manage Acute Allergic Reaction   Medication Review/Management medications reviewed   Prevent or Manage Pain   Sensory Stimulation Regulation quiet environment promoted;music/television provided for relaxation;care clustered;lighting decreased     Intervention: Provide Multisystem Surveillance and Support     04/12/19 0349   Manage Acute Allergic Reaction   Stabilization Measures legs elevated         Comments: Blood pressure elevated. Scheduled hydralazine administered. Will continue to follow up.

## 2019-04-12 NOTE — NURSING
Report given to RN. Pt resting comfortably. 24-hr urine collection in progress. No acute distress noted. Denies pain at this time. Safety precautions maintained.     Chart check completed.

## 2019-04-12 NOTE — PLAN OF CARE
Problem: Fall Injury Risk  Goal: Absence of Fall and Fall-Related Injury    Intervention: Promote Injury-Free Environment     04/11/19 1942   Optimize [place] and Functional Mobility   Environmental Safety Modification assistive device/personal items within reach;clutter free environment maintained;lighting adjusted   Optimize Balance and Safe Activity   Safety Promotion/Fall Prevention assistive device/personal item within reach;bed alarm set;side rails raised x 2;instructed to call staff for mobility         Problem: Adult Inpatient Plan of Care  Goal: Plan of Care Review  Outcome: Ongoing (interventions implemented as appropriate)     04/11/19 1942   Plan of Care Review   Plan of Care Reviewed With patient   Progress improving     Goal: Optimal Comfort and Wellbeing    Intervention: Provide Person-Centered Care     04/11/19 1942   Support Dyspnea Relief   Trust Relationship/Rapport care explained;choices provided;empathic listening provided;emotional support provided;reassurance provided;thoughts/feelings acknowledged;questions encouraged;questions answered

## 2019-04-12 NOTE — PT/OT/SLP EVAL
Physical Therapy Evaluation    Patient Name:  Magaly Epstein   MRN:  [id number]    Recommendations:     Discharge Recommendations:  nursing facility, skilled   Discharge Equipment Recommendations: (ongoing assessement Pending pt progress)   Barriers to discharge: Decreased caregiver support    Assessment:     Magaly Epstein is a 68 y.o. female admitted with a medical diagnosis of Hypertensive emergency.  She presents with the following impairments/functional limitations:  weakness, impaired functional mobilty, decreased upper extremity function, decreased safety awareness, impaired coordination, impaired cardiopulmonary response to activity, impaired endurance, impaired self care skills, gait instability, decreased coordination, decreased lower extremity function .    Rehab Prognosis: Good; patient would benefit from acute skilled PT services to address these deficits and reach maximum level of function.    Recent Surgery: * No surgery found *      Plan:     During this hospitalization, patient to be seen 6 x/week to address the identified rehab impairments via gait training, therapeutic activities, therapeutic exercises and progress toward the following goals:    · Plan of Care Expires:  04/26/19    Subjective     Chief Complaint: weakness, fatigue  Patient/Family Comments/goals: none stated  Pain/Comfort:  · Pain Rating 1: 0/10    Patients cultural, spiritual, Gnosticist conflicts given the current situation: no    Living Environment:  Pt lives alone in an apartment  Prior to admission, patients level of function was Mod I with ambulation and ADL's.  Equipment used at home: rollator.  DME owned (not currently used): none.  Upon discharge, patient will have assistance from ?.    Objective:     Communicated with nsg prior to session.  Patient found HOB elevated with peripheral IV, telemetry, oxygen  upon PT entry to room.    General Precautions: Standard, fall   Orthopedic Precautions:N/A   Braces: N/A      Exams:  · Cognitive Exam:  Patient is oriented to Person, Place, Time and Situation  · Gross Motor Coordination:  mildl;y impaired 2/2 weakness and deconditoning  · Postural Exam:  Patient presented with the following abnormalities:    · -       Rounded shoulders  · -       Forward head  · -       Abnormal trunk flexion  · -       Kyphosis  · Sensation:    · -       Intact  light/touch B LE's  · Skin Integrity/Edema:      · -       Skin integrity: Visible skin intact  · RLE ROM: WFL  · RLE Strength: WFL  · LLE ROM: WFL  · LLE Strength: WFL    Functional Mobility:  · Bed Mobility:     · Scooting: stand by assistance  · Supine to Sit: stand by assistance  · Sit to Supine: stand by assistance  · Transfers:     · Sit to Stand:  contact guard assistance with rollator  · Gait: 50' with Rollator and CGA  · Balance: Fair+ sit, fair stand      Therapeutic Activities and Exercises:   eval only    AM-PAC 6 CLICK MOBILITY  Total Score:18     Patient left HOB elevated with all lines intact, call button in reach, bed alarm on and nsg notified.    GOALS:   Multidisciplinary Problems     Physical Therapy Goals        Problem: Physical Therapy Goal    Goal Priority Disciplines Outcome Goal Variances Interventions   Physical Therapy Goal     PT, PT/OT Ongoing (interventions implemented as appropriate)     Description:  Goals to be met by: 2019     Patient will increase functional independence with mobility by performin. Supine to sit with Modified [place]  2. Sit to stand transfer with Modified [place]  3. Gait  x 100 feet with Modified [place] using Rolling Walker.   4. Lower extremity exercise program x10 reps per handout, with independence                      History:     Past Medical History:   Diagnosis Date    Arthritis     Diabetes mellitus        Past Surgical History:   Procedure Laterality Date    APPENDECTOMY         Time Tracking:     PT Received On: 19  PT Start Time: 1446     PT  Stop Time: 1505  PT Total Time (min): 19 min     Billable Minutes: Evaluation 19 OT present      Raquel Taylor, PT  04/12/2019

## 2019-04-12 NOTE — PROGRESS NOTES
[facility]  Nephrology  Progress Note    Patient Name: Magaly Epstein  MRN: [id number]  Admission Date: 4/11/2019  Hospital Length of Stay: 1 days  Attending Provider: Shaniat Pettit MD   Primary Care Physician: Tong Jones MD  Principal Problem:Hypertensive emergency  Date of service 4/12/2019  Consults   Inpatient consult to Nephrology  Consult performed by: Aixa Narvaez MD  Consult ordered by: Pal Ulloa MD  Reason for consult: renal insuficiency    Subjective:     Interval History: She feels like sob is improving, edema slightly improved.  UOP 1550+4 unmeasured per 24 hrs.  Weight is down    Review of patient's allergies indicates:   Allergen Reactions    Ciprofloxacin Anaphylaxis    Codeine Anaphylaxis    Neosporin [benzalkonium chloride] Anaphylaxis     Current Facility-Administered Medications   Medication Frequency    acetaminophen tablet 500 mg Q6H PRN    ALPRAZolam tablet 1 mg Q6H PRN    amLODIPine tablet 5 mg Daily    dextrose 50% injection 12.5 g PRN    dextrose 50% injection 25 g PRN    escitalopram oxalate tablet 10 mg Daily    glucagon (human recombinant) injection 1 mg PRN    glucose chewable tablet 16 g PRN    glucose chewable tablet 24 g PRN    heparin (porcine) injection 5,000 Units Q12H    hydrALAZINE tablet 25 mg Q8H    insulin aspart U-100 pen 0-5 Units PRN    insulin aspart U-100 pen 3 Units TIDWM    insulin detemir U-100 pen 10 Units QHS    pravastatin tablet 10 mg Daily    promethazine (PHENERGAN) 6.25 mg in dextrose 5 % 50 mL IVPB Q6H PRN    ramelteon tablet 8 mg Nightly PRN    senna-docusate 8.6-50 mg per tablet 1 tablet BID PRN       Objective:     Vital Signs (Most Recent):  Temp: 98 °F (36.7 °C) (04/12/19 1151)  Pulse: 63 (04/12/19 1151)  Resp: 18 (04/12/19 1151)  BP: (!) 183/77 (04/12/19 1151)  SpO2: 100 % (04/12/19 1151)  O2 Device (Oxygen Therapy): nasal cannula (04/12/19 0740) Vital Signs (24h Range):  Temp:  [97.4 °F  (36.3 °C)-98 °F (36.7 °C)] 98 °F (36.7 °C)  Pulse:  [61-70] 63  Resp:  [17-20] 18  SpO2:  [98 %-100 %] 100 %  BP: (141-187)/(63-85) 183/77     Weight: 110 kg (242 lb 8.1 oz) (04/12/19 0508)  Body mass index is 33.82 kg/m².  Body surface area is 2.35 meters squared.    I/O last 3 completed shifts:  In: 197.1 [P.O.:120; I.V.:77.1]  Out: 1800 [Urine:1800]    Physical Exam   Constitutional: She is oriented to person, place, and time. She appears well-developed and well-nourished. No distress.   HENT:   Head: Normocephalic and atraumatic.   Eyes: EOM are normal. No scleral icterus.   Cardiovascular: Normal rate, regular rhythm and normal heart sounds. Exam reveals no friction rub.   No murmur heard.  Pulmonary/Chest: Effort normal. No respiratory distress. She has no wheezes. She has rales.   O2 NC   Abdominal: Soft. She exhibits no distension. There is no tenderness.   Musculoskeletal: She exhibits edema (1+). She exhibits no deformity.   Neurological: She is alert and oriented to person, place, and time.   Skin: Skin is warm and dry. Rash (erythema to BLE calves) noted. She is not diaphoretic.   Psychiatric: She has a normal mood and affect. Her behavior is normal.   Nursing note and vitals reviewed.      Significant Labs:  CBC:   Recent Labs   Lab 04/11/19  0110   WBC 6.62   RBC 3.33*   HGB 10.3*   HCT 29.5*      MCV 89   MCH 30.9   MCHC 34.9     CMP:   Recent Labs   Lab 04/12/19  0610   *   CALCIUM 8.4*   ALBUMIN 2.5*   PROT 5.3*   *   K 3.4*   CO2 28   CL 99   BUN 36*   CREATININE 1.8*   ALKPHOS 78   ALT 22   AST 23   BILITOT 0.2       Recent Labs   Lab 04/11/19  0140   COLORU Straw   SPECGRAV 1.005   PHUR 7.0   PROTEINUA 2+*   BACTERIA None   NITRITE Negative   LEUKOCYTESUR Negative   UROBILINOGEN Negative   HYALINECASTS 0     All labs within the past 24 hours have been reviewed.    Significant Imaging:  X-Ray: Reviewed  US: Reviewed    Assessment/Plan:     Active Diagnoses:    Diagnosis Date  Noted POA    PRINCIPAL PROBLEM:  Hypertensive emergency [I16.1] 04/11/2019 Yes    Acute renal failure [N17.9] 04/11/2019 Yes    Hypokalemia [E87.6] 04/11/2019 Yes    Essential hypertension [I10] 04/11/2019 Yes     Chronic    Type 2 diabetes mellitus [E11.9] 04/11/2019 Yes     Chronic    Hyperlipidemia [E78.5] 04/11/2019 Yes     Chronic    Anemia of chronic disease [D63.8] 04/11/2019 Yes     Chronic    Depression [F32.9] 04/11/2019 Yes     Chronic      Problems Resolved During this Admission:         ANIA on CKD3  - unknown baseline Cr, last Cr 0.7 in 2008 for our system although she reports having h/o CKD2-3  - she reports outpatient Nephrologist is Dr Nye?? with [facility] although no encounters in our system - upon further clarification she states that it is Dr Walsh  - will consult Dr Walsh to transfer care  - she has nephrotic range proteinuria which is significant - 17.5gm  - pending serologies  - continue diuresis as tolerated by renal function - schedule BID Lasix IV and titrate PRN, likely can reduce dose tomorrow  - repeat BMP this PM  - monitor daily weights, strict I&Os  - avoid nephrotoxic agents including NSAIDs, renally dose medications  - suspect underlying CKD to be related to DM and HTN, pending A1c  - avoid ACE/ARB for now until renal function returns to baseline or stabilizes for new baseline     Hyponatremia, hypervolemic  - Na trending up  - continue diuresis as tolerated by renal function -  schedule BID Lasix IV and titrate PRN as tolerated by renal function  - recommend to convert IVPB to NS when possible  - consider echo     Nephrotic range Proteinuria, nephrotic syndrome  - UP/C 17.5gm, 24hr urine protein  - serologies as above  - continue diuresis as tolerated  - avoid ACE/ARB for now until renal function returns to baseline or stabilizes for new baseline     Edema  -  schedule BID Lasix IV and titrate PRN  - monitor I&Os, daily weights, BMP     Anemia of CKD  - check iron  studies, monitor CBC     Hypokalemia  - replace PO, monitor levels BID with IV lasix     Hypertensive emergency - s/p cardene gtt.  Consider check secondary HTN w/u  DM  HLD  Depression    Thank you for allowing me to participate in the care of your patient.  Please call with any questions.    Date of service: 4/12/2019  Aixa Narvaez MD   Nephrology  [facility]  Office [phone]   [facility]

## 2019-04-12 NOTE — NURSING
Basin ordered from [employer]. 24-hr Urine collection started at 9pm. Will continue to follow up.

## 2019-04-12 NOTE — NURSING
Bedside shift report received from CHATO Naylor. Communication board has been updated. NAD noted. Will continue to monitor.

## 2019-04-12 NOTE — PLAN OF CARE
"To patient's room to discuss patient managing her care at home.      TN Role Explained.  Patient identified by using 2 identifiers:  Name and date of birth    Patient stated that she does not have anyone to HELP AT HOME WITH her RECOVERY.  She states she has no family or friends only a few neighbors that will provide minimal support.     TN reviewed with patient contents of "[employer]".      TN name and contact info placed on the communication board           04/12/19 0615   Discharge Assessment   Assessment Type Discharge Planning Assessment   Confirmed/corrected address and phone number on facesheet? Yes   Assessment information obtained from? Patient   Expected Length of Stay (days) 3   Communicated expected length of stay with patient/caregiver yes   Prior to hospitilization cognitive status: Alert/Oriented   Prior to hospitalization functional status: Assistive Equipment   Current cognitive status: Alert/Oriented   Current Functional Status: Assistive Equipment;Needs Assistance   Lives With alone   Able to Return to Prior Arrangements other (see comments)   Is patient able to care for self after discharge? Unable to determine at this time (comments)   Who are your caregiver(s) and their phone number(s)? none   Patient's perception of discharge disposition home or selfcare   Readmission Within the Last 30 Days no previous admission in last 30 days   Patient currently being followed by outpatient case management? No   Patient currently receives any other outside agency services? No   Equipment Currently Used at Home rollator   Do you have any problems affording any of your prescribed medications? No   Is the patient taking medications as prescribed? yes   Does the patient have transportation home?   (unsure at this time)   Does the patient receive services at the [facility]? No   Discharge Plan A Home Health   Discharge Plan B   (tbd)   DME Needed Upon Discharge    (tbd)   Patient/Family in " Agreement with Plan yes   Does the patient have transportation to healthcare appointments? Yes

## 2019-04-12 NOTE — PT/OT/SLP EVAL
"Occupational Therapy   Evaluation    Name: Magaly Epstein  MRN: [id number]  Admitting Diagnosis:  Hypertensive emergency      Recommendations:     Discharge Recommendations: nursing facility, skilled  Discharge Equipment Recommendations:  other (see comments)(TBD)  Barriers to discharge:  Decreased caregiver support    Assessment:     Magaly Epstein is a 68 y.o. female with a medical diagnosis of Hypertensive emergency.  She presents with  independence for self-care and functional transfers. Performance deficits affecting function: weakness, impaired endurance, impaired self care skills, impaired functional mobilty, impaired balance, decreased upper extremity function, decreased lower extremity function, decreased safety awareness, pain, impaired cardiopulmonary response to activity.      Rehab Prognosis: Good; patient would benefit from acute skilled OT services to address these deficits and reach maximum level of function.       Plan:     Patient to be seen 5 x/week(M-F) to address the above listed problems via self-care/home management, therapeutic activities, therapeutic exercises  · Plan of Care Expires: 19  · Plan of Care Reviewed with: patient    Subjective     Chief Complaint: "I need to use the toilet."  Patient/Family Comments/goals: to get better and return home    Occupational Profile:  Living Environment: lives alone in an apartment with no steps/stairs  Previous level of function: modified independent  Roles and Routines: doesn't drive  Equipment Used at Home:  walker, rolling  Assistance upon Discharge: from friends (if discharged home will need home health) OT recs SNF    Pain/Comfort:  · Pain Rating 1: other (see comments)(Patient stated that she wasn't in pain but moaned with movement)    Patients cultural, spiritual, Mandaen conflicts given the current situation: no    Objective:     Communicated with: nurse Sung prior to session.  Patient found supine with peripheral IV, " telemetry, oxygen upon OT entry to room.    General Precautions: Standard, fall   Orthopedic Precautions:N/A   Braces: N/A     Occupational Performance:    Bed Mobility:    · Patient completed Rolling/Turning to Left with  minimum assistance  · Patient completed Rolling/Turning to Right with minimum assistance  · Patient completed Scooting/Bridging with stand by assistance  · Patient completed Supine to Sit with contact guard assistance  · Patient completed Sit to Supine with contact guard assistance    Functional Mobility/Transfers:  · Patient completed Sit <> Stand Transfer with minimum assistance  with  4 wheeled walker   · Patient completed Toilet Transfer Squat Pivot technique with minimum assistance with  bedside commode  · Functional Mobility: ambulated into the [place] with her rollator and minimal assistance    Activities of Daily Living:  · Feeding:  independence    · Grooming: stand by assistance    · Upper Body Dressing: minimum assistance    · Lower Body Dressing: maximal assistance    · Toileting: maximal assistance      Cognitive/Visual Perceptual:  Cognitive/Psychosocial Skills:     -       Oriented to: Person, Place and Situation   -       Follows Commands/attention:Follows one-step commands  -       Communication: clear/fluent and delayed answers  -       Memory: Poor immediate recall  -       Safety awareness/insight to disability: impaired   -       Mood/Affect/Coping skills/emotional control: Blunted, Flat affect and Lethargic    Physical Exam:  Balance:    -       sit balance fair plus; standing balanace fair   Postural examination/scapula alignment:    -       Rounded shoulders  Skin integrity: Visible skin intact  Upper Extremity Range of Motion:     -       Right Upper Extremity: WFL  -       Left Upper Extremity: WFL  Upper Extremity Strength:    -       Right Upper Extremity: WFL  -       Left Upper Extremity: WFL    AMPAC 6 Click ADL:  AMPAC Total Score: 16    Treatment &  Education:  Evaluation   Education:    Patient left supine with all lines intact, call button in reach and nurse Ana Lilia notified    GOALS:   Multidisciplinary Problems     Occupational Therapy Goals        Problem: Occupational Therapy Goal    Goal Priority Disciplines Outcome Interventions   Occupational Therapy Goal     OT, PT/OT Ongoing (interventions implemented as appropriate)    Description:  Goals to be met by: 4/26/2019     Patient will increase functional independence with ADLs by performing:    UE Dressing with Modified [place].  LE Dressing with Modified [place].  Grooming while standing at sink with Contact Guard Assistance.  Toileting from toilet with Contact Guard Assistance for hygiene and clothing management.   Supine to sit with Stand-by Assistance.  Step transfer with Stand-by Assistance  Toilet transfer to toilet with Contact Guard Assistance.  Upper extremity exercise program with assistance as needed.                      History:     Past Medical History:   Diagnosis Date    Arthritis     Diabetes mellitus        Past Surgical History:   Procedure Laterality Date    APPENDECTOMY         Time Tracking:     OT Date of Treatment: 04/12/19  OT Start Time: 1450  OT Stop Time: 1507  OT Total Time (min): 17 min    Billable Minutes:Evaluation 17 minutes with PT    ALEAH Gan, MS  4/12/2019

## 2019-04-13 LAB
ANION GAP SERPL CALC-SCNC: 8 MMOL/L (ref 8–16)
ASO AB SERPL-ACNC: <14 IU/ML
BM IGG SER-ACNC: <0.2 U
BUN SERPL-MCNC: 41 MG/DL (ref 8–23)
CALCIUM SERPL-MCNC: 8.3 MG/DL (ref 8.7–10.5)
CHLORIDE SERPL-SCNC: 102 MMOL/L (ref 95–110)
CO2 SERPL-SCNC: 27 MMOL/L (ref 23–29)
CREAT 24H UR-MRATE: 44.3 MG/HR (ref 40–75)
CREAT SERPL-MCNC: 1.9 MG/DL (ref 0.5–1.4)
CREAT UR-MCNC: 48.3 MG/DL (ref 15–325)
CREATININE, URINE (MG/SPEC): 1062.6 MG/SPEC
EST. GFR  (AFRICAN AMERICAN): 31 ML/MIN/1.73 M^2
EST. GFR  (NON AFRICAN AMERICAN): 27 ML/MIN/1.73 M^2
GLUCOSE SERPL-MCNC: 140 MG/DL (ref 70–110)
MAGNESIUM SERPL-MCNC: 2 MG/DL (ref 1.6–2.6)
PHOSPHATE SERPL-MCNC: 3.5 MG/DL (ref 2.7–4.5)
POCT GLUCOSE: 148 MG/DL (ref 70–110)
POCT GLUCOSE: 176 MG/DL (ref 70–110)
POCT GLUCOSE: 183 MG/DL (ref 70–110)
POCT GLUCOSE: 187 MG/DL (ref 70–110)
POCT GLUCOSE: 191 MG/DL (ref 70–110)
POTASSIUM SERPL-SCNC: 3.5 MMOL/L (ref 3.5–5.1)
SODIUM SERPL-SCNC: 137 MMOL/L (ref 136–145)
URINE COLLECTION DURATION: 24 HR
URINE VOLUME: 2200 ML

## 2019-04-13 PROCEDURE — 86335 IMMUNFIX E-PHORSIS/URINE/CSF: CPT | Mod: 26,,, | Performed by: PATHOLOGY

## 2019-04-13 PROCEDURE — 80074 ACUTE HEPATITIS PANEL: CPT

## 2019-04-13 PROCEDURE — 82570 ASSAY OF URINE CREATININE: CPT

## 2019-04-13 PROCEDURE — 86335 PATHOLOGIST INTERPRETATION UIFE: ICD-10-PCS | Mod: 26,,, | Performed by: PATHOLOGY

## 2019-04-13 PROCEDURE — 86335 IMMUNFIX E-PHORSIS/URINE/CSF: CPT

## 2019-04-13 PROCEDURE — 21400001 HC TELEMETRY ROOM

## 2019-04-13 PROCEDURE — 25000003 PHARM REV CODE 250: Performed by: INTERNAL MEDICINE

## 2019-04-13 PROCEDURE — 83735 ASSAY OF MAGNESIUM: CPT

## 2019-04-13 PROCEDURE — 63600175 PHARM REV CODE 636 W HCPCS: Performed by: INTERNAL MEDICINE

## 2019-04-13 PROCEDURE — 84100 ASSAY OF PHOSPHORUS: CPT

## 2019-04-13 PROCEDURE — 94761 N-INVAS EAR/PLS OXIMETRY MLT: CPT

## 2019-04-13 PROCEDURE — 97530 THERAPEUTIC ACTIVITIES: CPT

## 2019-04-13 PROCEDURE — 36415 COLL VENOUS BLD VENIPUNCTURE: CPT

## 2019-04-13 PROCEDURE — 80048 BASIC METABOLIC PNL TOTAL CA: CPT

## 2019-04-13 PROCEDURE — 27000221 HC OXYGEN, UP TO 24 HOURS

## 2019-04-13 PROCEDURE — 84156 ASSAY OF PROTEIN URINE: CPT | Mod: 91

## 2019-04-13 PROCEDURE — 84156 ASSAY OF PROTEIN URINE: CPT

## 2019-04-13 RX ADMIN — HYDRALAZINE HYDROCHLORIDE 25 MG: 25 TABLET, FILM COATED ORAL at 09:04

## 2019-04-13 RX ADMIN — HYDRALAZINE HYDROCHLORIDE 25 MG: 25 TABLET, FILM COATED ORAL at 01:04

## 2019-04-13 RX ADMIN — FUROSEMIDE 40 MG: 10 INJECTION, SOLUTION INTRAVENOUS at 05:04

## 2019-04-13 RX ADMIN — INSULIN ASPART 3 UNITS: 100 INJECTION, SOLUTION INTRAVENOUS; SUBCUTANEOUS at 06:04

## 2019-04-13 RX ADMIN — RAMELTEON 8 MG: 8 TABLET, FILM COATED ORAL at 02:04

## 2019-04-13 RX ADMIN — PRAVASTATIN SODIUM 10 MG: 10 TABLET ORAL at 08:04

## 2019-04-13 RX ADMIN — INSULIN ASPART 3 UNITS: 100 INJECTION, SOLUTION INTRAVENOUS; SUBCUTANEOUS at 08:04

## 2019-04-13 RX ADMIN — ESCITALOPRAM OXALATE 10 MG: 10 TABLET ORAL at 08:04

## 2019-04-13 RX ADMIN — HEPARIN SODIUM 5000 UNITS: 5000 INJECTION, SOLUTION INTRAVENOUS; SUBCUTANEOUS at 08:04

## 2019-04-13 RX ADMIN — HEPARIN SODIUM 5000 UNITS: 5000 INJECTION, SOLUTION INTRAVENOUS; SUBCUTANEOUS at 09:04

## 2019-04-13 RX ADMIN — INSULIN ASPART 3 UNITS: 100 INJECTION, SOLUTION INTRAVENOUS; SUBCUTANEOUS at 01:04

## 2019-04-13 RX ADMIN — HYDRALAZINE HYDROCHLORIDE 25 MG: 25 TABLET, FILM COATED ORAL at 06:04

## 2019-04-13 RX ADMIN — FUROSEMIDE 40 MG: 10 INJECTION, SOLUTION INTRAVENOUS at 08:04

## 2019-04-13 RX ADMIN — INSULIN DETEMIR 10 UNITS: 100 INJECTION, SOLUTION SUBCUTANEOUS at 09:04

## 2019-04-13 RX ADMIN — AMLODIPINE BESYLATE 5 MG: 5 TABLET ORAL at 08:04

## 2019-04-13 RX ADMIN — ACETAMINOPHEN 500 MG: 500 TABLET, FILM COATED ORAL at 02:04

## 2019-04-13 NOTE — PLAN OF CARE
Problem: Fall Injury Risk  Goal: Absence of Fall and Fall-Related Injury  Outcome: Ongoing (interventions implemented as appropriate)  Intervention: Identify and Manage Contributors to Fall Injury Risk     04/13/19 1845   Manage Acute Allergic Reaction   Medication Review/Management medications reviewed   Identify and Manage Contributors to Fall Injury Risk   Self-Care Promotion independence encouraged;BADL personal objects within reach;BADL personal routines maintained         Problem: Adult Inpatient Plan of Care  Goal: Plan of Care Review  Outcome: Ongoing (interventions implemented as appropriate)     04/13/19 1845   Plan of Care Review   Plan of Care Reviewed With patient       Problem: Hypertension Acute  Goal: Blood Pressure Within Desired Range  Outcome: Ongoing (interventions implemented as appropriate)  Intervention: Normalize Blood Pressure     04/13/19 1845   Manage Acute Allergic Reaction   Medication Review/Management medications reviewed   Prevent or Manage Pain   Sensory Stimulation Regulation quiet environment promoted

## 2019-04-13 NOTE — PT/OT/SLP PROGRESS
"Physical Therapy Treatment    Patient Name:  Magaly Epstein   MRN:  [id number]    Recommendations:     Discharge Recommendations:  nursing facility, skilled   Discharge Equipment Recommendations: (ongoing pending pt progress.)   Barriers to discharge: Decreased caregiver support    Assessment:     Magaly Epstein is a 68 y.o. female admitted with a medical diagnosis of Hypertensive emergency.  She presents with the following impairments/functional limitations:  weakness, impaired functional mobilty, decreased upper extremity function, decreased safety awareness, decreased coordination, impaired cardiopulmonary response to activity, impaired endurance, impaired self care skills, gait instability, decreased lower extremity function, impaired coordination.  Pt ambulated 10 ft., with rollator, CGA requiring verbal cueing on proper hand placement when pushing up from the bed to stand. Ambulation was cut short per patient request due to her feeling fatigued.     Rehab Prognosis: Good; patient would benefit from acute skilled PT services to address these deficits and reach maximum level of function.    Recent Surgery: * No surgery found *      Plan:     During this hospitalization, patient to be seen 6 x/week to address the identified rehab impairments via gait training, therapeutic activities, therapeutic exercises and progress toward the following goals:    · Plan of Care Expires:  04/26/19    Subjective     Chief Complaint: Pt with no complaints at this time.   Patient/Family Comments/goals: Pt state's " I'd like to get up and move a little bit".   Pain/Comfort:  · Pain Rating 1: 0/10      Objective:     Communicated with MICAH Munoz prior to session.  Patient found left sidelying with peripheral IV, telemetry, oxygen upon PT entry to room.     General Precautions: Standard, fall   Orthopedic Precautions:N/A   Braces: N/A     Functional Mobility:  · Bed Mobility:     · Supine to Sit: stand by assistance  · Sit to Supine: " stand by assistance  · Transfers:     · Sit to Stand:  contact guard assistance with rollator  · Gait:  Pt ambulated 10 ft., with rollator, CGA requiring verbal cueing on proper hand placement when pushing up from the bed to stand. Ambulation was cut short per patient request due to her feeling fatigued.  · Balance: Pt with good sitting and fair standing balance.       AM-PAC 6 CLICK MOBILITY  Turning over in bed (including adjusting bedclothes, sheets and blankets)?: 3  Sitting down on and standing up from a chair with arms (e.g., wheelchair, bedside commode, etc.): 3  Moving from lying on back to sitting on the side of the bed?: 3  Moving to and from a bed to a chair (including a wheelchair)?: 3  Need to walk in hospital room?: 3  Climbing 3-5 steps with a railing?: 3  Basic Mobility Total Score: 18           Patient left supine with all lines intact and call button in reach..    GOALS:   Multidisciplinary Problems     Physical Therapy Goals        Problem: Physical Therapy Goal    Goal Priority Disciplines Outcome Goal Variances Interventions   Physical Therapy Goal     PT, PT/OT Ongoing (interventions implemented as appropriate)     Description:  Goals to be met by: 2019     Patient will increase functional independence with mobility by performin. Supine to sit with Modified [place]  2. Sit to stand transfer with Modified [place]  3. Gait  x 100 feet with Modified [place] using Rolling Walker.   4. Lower extremity exercise program x10 reps per handout, with independence                      Time Tracking:     PT Received On: 19  PT Start Time: 0945     PT Stop Time: 1001  PT Total Time (min): 16 min     Billable Minutes: Therapeutic Activity 16    Treatment Type: Treatment  PT/PTA: PTA     PTA Visit Number: 1     Mayank Montilla PTA  2019

## 2019-04-13 NOTE — PLAN OF CARE
Problem: Physical Therapy Goal  Goal: Physical Therapy Goal  Goals to be met by: 2019     Patient will increase functional independence with mobility by performin. Supine to sit with Modified [place]  2. Sit to stand transfer with Modified [place]  3. Gait  x 100 feet with Modified [place] using Rolling Walker.   4. Lower extremity exercise program x10 reps per handout, with independence     Outcome: Ongoing (interventions implemented as appropriate)   Pt ambulated 10 ft., with rollator, CGA requiring verbal cueing on proper hand placement when pushing up from the bed to stand. Ambulation was cut short per patient request due to her feeling fatigued.

## 2019-04-13 NOTE — PLAN OF CARE
Problem: Fall Injury Risk  Goal: Absence of Fall and Fall-Related Injury    Intervention: Identify and Manage Contributors to Fall Injury Risk     04/12/19 1946   Manage Acute Allergic Reaction   Medication Review/Management medications reviewed   Identify and Manage Contributors to Fall Injury Risk   Self-Care Promotion independence encouraged;BADL personal objects within reach         Problem: Adult Inpatient Plan of Care  Goal: Plan of Care Review  Outcome: Ongoing (interventions implemented as appropriate)     04/12/19 1946   Plan of Care Review   Plan of Care Reviewed With patient   Progress improving       Problem: Diabetes Comorbidity  Goal: Blood Glucose Level Within Desired Range    Intervention: Maintain Glycemic Control     04/12/19 1946   Monitor and Manage Ketoacidosis   Glycemic Management blood glucose monitoring;insulin dose matched to carbohydrate intake

## 2019-04-13 NOTE — NURSING
Patient came back from [facility]. Patient alert and oriented. No pain. No sign of distress. Used commode bedside - urinated and at the same time had bowel movement. In view of bowel movement mixed with urine , lab suggested to re collect 24 hour urine collection. Fresh bottle provided.      Urine collection re started 9:30pm 4/12/2019.

## 2019-04-13 NOTE — PLAN OF CARE
Problem: Fall Injury Risk  Goal: Absence of Fall and Fall-Related Injury  Outcome: Ongoing (interventions implemented as appropriate)  Intervention: Identify and Manage Contributors to Fall Injury Risk     04/13/19 0246   Manage Acute Allergic Reaction   Medication Review/Management medications reviewed   Identify and Manage Contributors to Fall Injury Risk   Self-Care Promotion independence encouraged;BADL personal objects within reach     Intervention: Promote Injury-Free Environment     04/13/19 0246   Optimize [place] and Functional Mobility   Environmental Safety Modification assistive device/personal items within reach;clutter free environment maintained;lighting adjusted;mobility aid in reach;mattress on floor;room near unit station;room organization consistent   Optimize Balance and Safe Activity   Safety Promotion/Fall Prevention assistive device/personal item within reach;bed alarm set;commode/urinal/bedpan at bedside;nonskid shoes/socks when out of bed;side rails raised x 2;instructed to call staff for mobility

## 2019-04-14 LAB
POCT GLUCOSE: 137 MG/DL (ref 70–110)
POCT GLUCOSE: 172 MG/DL (ref 70–110)
POCT GLUCOSE: 177 MG/DL (ref 70–110)
POCT GLUCOSE: 202 MG/DL (ref 70–110)
PROT 24H UR-MRATE: ABNORMAL MG/SPEC (ref 0–100)
PROT UR-MCNC: 476 MG/DL (ref 0–15)
PROT UR-MCNC: 484 MG/DL
URINE COLLECTION DURATION: 24 HR
URINE VOLUME: 2200 ML

## 2019-04-14 PROCEDURE — 63600175 PHARM REV CODE 636 W HCPCS: Performed by: INTERNAL MEDICINE

## 2019-04-14 PROCEDURE — 25000003 PHARM REV CODE 250: Performed by: INTERNAL MEDICINE

## 2019-04-14 PROCEDURE — 27000221 HC OXYGEN, UP TO 24 HOURS

## 2019-04-14 PROCEDURE — 94761 N-INVAS EAR/PLS OXIMETRY MLT: CPT

## 2019-04-14 PROCEDURE — 21400001 HC TELEMETRY ROOM

## 2019-04-14 RX ADMIN — PROMETHAZINE HYDROCHLORIDE 6.25 MG: 25 INJECTION INTRAMUSCULAR; INTRAVENOUS at 05:04

## 2019-04-14 RX ADMIN — HYDRALAZINE HYDROCHLORIDE 25 MG: 25 TABLET, FILM COATED ORAL at 09:04

## 2019-04-14 RX ADMIN — ALPRAZOLAM 1 MG: 0.5 TABLET ORAL at 05:04

## 2019-04-14 RX ADMIN — ALPRAZOLAM 1 MG: 0.5 TABLET ORAL at 09:04

## 2019-04-14 RX ADMIN — INSULIN ASPART 3 UNITS: 100 INJECTION, SOLUTION INTRAVENOUS; SUBCUTANEOUS at 07:04

## 2019-04-14 RX ADMIN — INSULIN DETEMIR 10 UNITS: 100 INJECTION, SOLUTION SUBCUTANEOUS at 09:04

## 2019-04-14 RX ADMIN — FUROSEMIDE 40 MG: 10 INJECTION, SOLUTION INTRAVENOUS at 06:04

## 2019-04-14 RX ADMIN — HEPARIN SODIUM 5000 UNITS: 5000 INJECTION, SOLUTION INTRAVENOUS; SUBCUTANEOUS at 09:04

## 2019-04-14 RX ADMIN — PRAVASTATIN SODIUM 10 MG: 10 TABLET ORAL at 09:04

## 2019-04-14 RX ADMIN — FUROSEMIDE 40 MG: 10 INJECTION, SOLUTION INTRAVENOUS at 09:04

## 2019-04-14 RX ADMIN — AMLODIPINE BESYLATE 5 MG: 5 TABLET ORAL at 09:04

## 2019-04-14 RX ADMIN — INSULIN ASPART 3 UNITS: 100 INJECTION, SOLUTION INTRAVENOUS; SUBCUTANEOUS at 11:04

## 2019-04-14 RX ADMIN — HYDRALAZINE HYDROCHLORIDE 25 MG: 25 TABLET, FILM COATED ORAL at 02:04

## 2019-04-14 RX ADMIN — ESCITALOPRAM OXALATE 10 MG: 10 TABLET ORAL at 09:04

## 2019-04-14 RX ADMIN — HYDRALAZINE HYDROCHLORIDE 25 MG: 25 TABLET, FILM COATED ORAL at 05:04

## 2019-04-14 RX ADMIN — INSULIN ASPART 3 UNITS: 100 INJECTION, SOLUTION INTRAVENOUS; SUBCUTANEOUS at 04:04

## 2019-04-14 NOTE — NURSING
Received report from CHATO Munoz. Patient alert and oriented. Comfortably lying in bed. No pain. No sign of distress. On Oxygen at 3L. IV line intact over Rt AC- saline locked. Call bell given on her reach, instructed to call for assistance all the time. Bed alarm on. Bed on lowest position. Safety maintained.

## 2019-04-15 PROBLEM — N04.9 NEPHROTIC SYNDROME: Status: ACTIVE | Noted: 2019-04-15

## 2019-04-15 PROBLEM — R53.81 DEBILITY: Status: ACTIVE | Noted: 2019-04-15

## 2019-04-15 LAB
ALBUMIN SERPL ELPH-MCNC: 2.72 G/DL (ref 3.35–5.55)
ALPHA1 GLOB SERPL ELPH-MCNC: 0.28 G/DL (ref 0.17–0.41)
ALPHA2 GLOB SERPL ELPH-MCNC: 0.82 G/DL (ref 0.43–0.99)
ANA SER QL IF: NORMAL
ANCA AB TITR SER IF: NORMAL TITER
ANION GAP SERPL CALC-SCNC: 8 MMOL/L (ref 8–16)
B-GLOBULIN SERPL ELPH-MCNC: 0.59 G/DL (ref 0.5–1.1)
BUN SERPL-MCNC: 34 MG/DL (ref 8–23)
CALCIUM SERPL-MCNC: 9 MG/DL (ref 8.7–10.5)
CHLORIDE SERPL-SCNC: 102 MMOL/L (ref 95–110)
CO2 SERPL-SCNC: 30 MMOL/L (ref 23–29)
CREAT SERPL-MCNC: 1.7 MG/DL (ref 0.5–1.4)
DSDNA AB SER-ACNC: NORMAL [IU]/ML
EST. GFR  (AFRICAN AMERICAN): 35 ML/MIN/1.73 M^2
EST. GFR  (NON AFRICAN AMERICAN): 30 ML/MIN/1.73 M^2
GAMMA GLOB SERPL ELPH-MCNC: 0.48 G/DL (ref 0.67–1.58)
GLUCOSE SERPL-MCNC: 141 MG/DL (ref 70–110)
HAV IGM SERPL QL IA: NEGATIVE
HBV CORE IGM SERPL QL IA: NEGATIVE
HBV SURFACE AG SERPL QL IA: NEGATIVE
HCV AB SERPL QL IA: NEGATIVE
INTERPRETATION UR IFE-IMP: NORMAL
MAGNESIUM SERPL-MCNC: 1.7 MG/DL (ref 1.6–2.6)
P-ANCA TITR SER IF: NORMAL TITER
PATHOLOGIST INTERPRETATION SPE: NORMAL
PATHOLOGIST INTERPRETATION UIFE: NORMAL
PHOSPHATE SERPL-MCNC: 3.8 MG/DL (ref 2.7–4.5)
POCT GLUCOSE: 150 MG/DL (ref 70–110)
POCT GLUCOSE: 176 MG/DL (ref 70–110)
POCT GLUCOSE: 188 MG/DL (ref 70–110)
POCT GLUCOSE: 194 MG/DL (ref 70–110)
POTASSIUM SERPL-SCNC: 3.4 MMOL/L (ref 3.5–5.1)
PROT SERPL-MCNC: 4.9 G/DL (ref 6–8.4)
SODIUM SERPL-SCNC: 140 MMOL/L (ref 136–145)

## 2019-04-15 PROCEDURE — 27000221 HC OXYGEN, UP TO 24 HOURS

## 2019-04-15 PROCEDURE — 63600175 PHARM REV CODE 636 W HCPCS: Performed by: INTERNAL MEDICINE

## 2019-04-15 PROCEDURE — 86580 TB INTRADERMAL TEST: CPT | Performed by: HOSPITALIST

## 2019-04-15 PROCEDURE — 21400001 HC TELEMETRY ROOM

## 2019-04-15 PROCEDURE — 25000003 PHARM REV CODE 250: Performed by: INTERNAL MEDICINE

## 2019-04-15 PROCEDURE — 63600175 PHARM REV CODE 636 W HCPCS: Performed by: HOSPITALIST

## 2019-04-15 PROCEDURE — 36415 COLL VENOUS BLD VENIPUNCTURE: CPT

## 2019-04-15 PROCEDURE — 25000003 PHARM REV CODE 250: Performed by: HOSPITALIST

## 2019-04-15 PROCEDURE — 84100 ASSAY OF PHOSPHORUS: CPT

## 2019-04-15 PROCEDURE — 83735 ASSAY OF MAGNESIUM: CPT

## 2019-04-15 PROCEDURE — 80048 BASIC METABOLIC PNL TOTAL CA: CPT

## 2019-04-15 PROCEDURE — 97110 THERAPEUTIC EXERCISES: CPT

## 2019-04-15 PROCEDURE — 94761 N-INVAS EAR/PLS OXIMETRY MLT: CPT

## 2019-04-15 PROCEDURE — 97530 THERAPEUTIC ACTIVITIES: CPT

## 2019-04-15 RX ORDER — POTASSIUM CHLORIDE 20 MEQ/1
40 TABLET, EXTENDED RELEASE ORAL ONCE
Status: COMPLETED | OUTPATIENT
Start: 2019-04-15 | End: 2019-04-15

## 2019-04-15 RX ORDER — FUROSEMIDE 40 MG/1
40 TABLET ORAL 2 TIMES DAILY
Status: DISCONTINUED | OUTPATIENT
Start: 2019-04-15 | End: 2019-04-16

## 2019-04-15 RX ORDER — AMLODIPINE BESYLATE 5 MG/1
10 TABLET ORAL DAILY
Status: DISCONTINUED | OUTPATIENT
Start: 2019-04-15 | End: 2019-04-18 | Stop reason: HOSPADM

## 2019-04-15 RX ORDER — HYDRALAZINE HYDROCHLORIDE 25 MG/1
50 TABLET, FILM COATED ORAL EVERY 8 HOURS
Status: DISCONTINUED | OUTPATIENT
Start: 2019-04-15 | End: 2019-04-16

## 2019-04-15 RX ADMIN — INSULIN ASPART 3 UNITS: 100 INJECTION, SOLUTION INTRAVENOUS; SUBCUTANEOUS at 11:04

## 2019-04-15 RX ADMIN — HYDRALAZINE HYDROCHLORIDE 50 MG: 25 TABLET ORAL at 09:04

## 2019-04-15 RX ADMIN — HEPARIN SODIUM 5000 UNITS: 5000 INJECTION, SOLUTION INTRAVENOUS; SUBCUTANEOUS at 08:04

## 2019-04-15 RX ADMIN — PRAVASTATIN SODIUM 10 MG: 10 TABLET ORAL at 08:04

## 2019-04-15 RX ADMIN — HYDRALAZINE HYDROCHLORIDE 50 MG: 25 TABLET ORAL at 01:04

## 2019-04-15 RX ADMIN — INSULIN DETEMIR 10 UNITS: 100 INJECTION, SOLUTION SUBCUTANEOUS at 09:04

## 2019-04-15 RX ADMIN — AMLODIPINE BESYLATE 10 MG: 5 TABLET ORAL at 11:04

## 2019-04-15 RX ADMIN — FUROSEMIDE 40 MG: 10 INJECTION, SOLUTION INTRAVENOUS at 08:04

## 2019-04-15 RX ADMIN — ACETAMINOPHEN 500 MG: 500 TABLET, FILM COATED ORAL at 08:04

## 2019-04-15 RX ADMIN — POTASSIUM CHLORIDE 40 MEQ: 1500 TABLET, EXTENDED RELEASE ORAL at 11:04

## 2019-04-15 RX ADMIN — ALPRAZOLAM 1 MG: 0.5 TABLET ORAL at 11:04

## 2019-04-15 RX ADMIN — HYDRALAZINE HYDROCHLORIDE 25 MG: 25 TABLET, FILM COATED ORAL at 06:04

## 2019-04-15 RX ADMIN — INSULIN ASPART 3 UNITS: 100 INJECTION, SOLUTION INTRAVENOUS; SUBCUTANEOUS at 05:04

## 2019-04-15 RX ADMIN — FUROSEMIDE 40 MG: 40 TABLET ORAL at 05:04

## 2019-04-15 RX ADMIN — TUBERCULIN PURIFIED PROTEIN DERIVATIVE 5 UNITS: 5 INJECTION, SOLUTION INTRADERMAL at 11:04

## 2019-04-15 RX ADMIN — AMLODIPINE BESYLATE 5 MG: 5 TABLET ORAL at 08:04

## 2019-04-15 RX ADMIN — ESCITALOPRAM OXALATE 10 MG: 10 TABLET ORAL at 08:04

## 2019-04-15 RX ADMIN — INSULIN ASPART 3 UNITS: 100 INJECTION, SOLUTION INTRAVENOUS; SUBCUTANEOUS at 08:04

## 2019-04-15 RX ADMIN — RAMELTEON 8 MG: 8 TABLET, FILM COATED ORAL at 11:04

## 2019-04-15 NOTE — PLAN OF CARE
"Problem: Adult Inpatient Plan of Care  Goal: Plan of Care Review  Outcome: Ongoing (interventions implemented as appropriate)     04/15/19 7774   Plan of Care Review   Plan of Care Reviewed With patient       Comments: Plan of care reviewed with patient. She is alert and oriented, able to make needs known, remains free of injury during shift. bp elevated, medications given with good relief. Afebrile, o2 at 2l, patient wearing intermittently. She only complains of sob upon exertion. Patient up to bedside commode. She denies pain during shift. Patient does complain of feeling "restless" and anxious, prn given with good relief. Ppd was placed as ordered. Trace edema noted to bilat legs. accucheck done as ordered, insulin given per schedule and sliding scale. Bed in low locked position, call light in reach. Will continue to monitor for safety.  "

## 2019-04-15 NOTE — PLAN OF CARE
Problem: Fall Injury Risk  Goal: Absence of Fall and Fall-Related Injury  Outcome: Ongoing (interventions implemented as appropriate)  Intervention: Identify and Manage Contributors to Fall Injury Risk     04/15/19 0134   Manage Acute Allergic Reaction   Medication Review/Management medications reviewed;high risk medications identified   Identify and Manage Contributors to Fall Injury Risk   Self-Care Promotion BADL personal objects within reach     Intervention: Promote Injury-Free Environment     04/15/19 0134   Optimize [place] and Functional Mobility   Environmental Safety Modification assistive device/personal items within reach;clutter free environment maintained;lighting adjusted;mobility aid in reach;room near unit station;mattress on floor;room organization consistent   Optimize Balance and Safe Activity   Safety Promotion/Fall Prevention bed alarm set;assistive device/personal item within reach;commode/urinal/bedpan at bedside;side rails raised x 2;instructed to call staff for mobility;nonskid shoes/socks when out of bed      04/15/19 0134   Optimize [place] and Functional Mobility   Environmental Safety Modification assistive device/personal items within reach;clutter free environment maintained;lighting adjusted;mobility aid in reach;room near unit station;mattress on floor;room organization consistent   Optimize Balance and Safe Activity   Safety Promotion/Fall Prevention bed alarm set;assistive device/personal item within reach;commode/urinal/bedpan at bedside;side rails raised x 2;instructed to call staff for mobility;nonskid shoes/socks when out of bed

## 2019-04-15 NOTE — ASSESSMENT & PLAN NOTE
We do not have any recent lab work to which to compare the chronicity of her renal failure  Appreciate Nephrology input  Likely with chronic kidney disease stage 3  Will continue to monitor

## 2019-04-15 NOTE — PLAN OF CARE
04/15/19 1453   Post-Acute Status   Post-Acute Authorization Placement   Post-Acute Placement Status Referrals Sent   Consult received for SNF.  Discussed with patient who agreed.  Patient stated that she has no help at home.  She agreed to send referral to all facilities that accept her insurance within the area.  TN send referrals via [facility] to:  [facility], [facility], [facility], [facility] and [facility].    1508:  Pasrr completed.  [place]et completed at [phone].  PASSR faxed to hospitals at:  [phone].   Awaiting 142.      1518:  Fax conformation received.    1519:  Per [facility] clinicals under review at [facility]    1615:  Call received from Elly at [facility], patient accepted for SNF.  Since patient has no family to go sign paperwork, Elly will come tomorrow to have patient sign paperwork.  Elly is submitting for [employer] auth now.

## 2019-04-15 NOTE — HOSPITAL COURSE
Ms. Epstein presented with ascities, and was admitted for hypertensive emergency with acute renal failure.  Patient was initially treated in the ICU with a Cardene drip.  Nephrology consulted.  She was noted to have hypervolemic hyponatremia initiate treatment with diuresis.  Patient was able to weaned off Cardene drip and transitioned to p.o. medications. She has nephrotic syndrome ad nephrology was following, She was stepped down to the floor on 04/11.  Patient was severely deconditioned as well, and PT and OT were ordered with recommendation for SNF placement.  peripheral edema is much better,changed IV lasix to bumex.she had still elevated CRT,but stable kidney function.  Nephrology consulted IR and kidney biopsy was done,  Patient was accepted to SNF and was discharged with plan with PCP and nephrology follow up.

## 2019-04-15 NOTE — PROGRESS NOTES
[facility] Hospital Medicine  Progress Note    Patient Name: Magaly Epstein  MRN: [id number]  Patient Class: IP- Inpatient   Admission Date: 4/11/2019  Attending Physician: Shanita Pettit MD  Primary Care Provider: Tong Jones MD        Subjective:     Principal Problem:Hypertensive emergency    HPI:  Ms. Magaly Epstein is a 68 y.o. female with essential hypertension, type 2 diabetes mellitus (HbA1c unknown), hyperlipidemia (LDL under), anemia of chronic disease, and depression who presents to [facility] ED with complaints of bilateral lower extremity edema today.  She had a sense that her blood pressure was high for the past five days which she describes as a sensation of anxiety--she has had similar symptoms in the past.  Today however she noticed that her legs were swollen decided to come to the ED for further evaluation.  She has been feeling ill and has had some nausea with occasional nonbilious, nonbloody vomiting.  She denies any fevers, chills, chest pain, shortness of breath, palpitations, diaphoresis, nor hemoptysis.  She denies any lightheaded nor any dizziness, and did not have any falls or loss of consciousness.  Her appetite has been very poor but she denies any hematochezia, melena, vaginal bleeding, hematuria, hemoptysis, hematemesis, nor any coffee-ground emesis.  She has not been able to take her medications in the last five days due to nausea.  She does say that she is otherwise compliant with her medications and follows up regularly with her PCP.  She has not been started on any new medications.  She does not have a regular kidney doctor.    Hospital Course:  Ms. Epstein presented with swelling and was admitted for hypertensive emergency with acute renal failure.  Patient was initially treated in the ICU with a Cardene drip.  Nephrology consulted.  She was noted to have hypovolemic hyponatremia initiate treatment with diuresis.  Patient was able to weaned off Cardene  drip and transitioned to p.o. medications.  She was stepped down to the floor on 04/11.  Patient is severely deconditioned as well, and PT and OT were ordered with recommendation for SNF placement.    Interval History:  Patient reports feeling better with swelling down.    Review of Systems   Constitutional: Negative for chills and fever.   Respiratory: Negative for shortness of breath.    Cardiovascular: Negative for chest pain.     Objective:     Vital Signs (Most Recent):  Temp: 97.7 °F (36.5 °C) (04/14/19 2326)  Pulse: 72 (04/14/19 2326)  Resp: 18 (04/15/19 0007)  BP: (!) 156/70 (04/14/19 2326)  SpO2: 97 % (04/14/19 2326) Vital Signs (24h Range):  Temp:  [97.7 °F (36.5 °C)-98.5 °F (36.9 °C)] 97.7 °F (36.5 °C)  Pulse:  [72] 72  Resp:  [18-22] 18  SpO2:  [97 %-98 %] 97 %  BP: (156-169)/(70-74) 156/70     Weight: 110 kg (242 lb 8.1 oz)  Body mass index is 33.82 kg/m².    Intake/Output Summary (Last 24 hours) at 4/15/2019 0432  Last data filed at 4/14/2019 2000  Gross per 24 hour   Intake --   Output 300 ml   Net -300 ml      Physical Exam   Constitutional: She is oriented to person, place, and time. She appears well-developed and well-nourished. No distress.   HENT:   Head: Normocephalic and atraumatic.   Eyes: Left eye exhibits no discharge.   Neck: Normal range of motion.   Cardiovascular:   Regular rate and rhythm with a Grade IV/VI holosystolic murmur   Pulmonary/Chest:   Mildly increased respiratory effort with bibasilar crackles bilaterally.   Abdominal: Soft. Bowel sounds are normal. She exhibits no distension. There is no tenderness. There is no rebound and no guarding.   Musculoskeletal: Normal range of motion. She exhibits edema (There is significant 1+ pitting edema to the legs bilaterally to the level of the knees., improved).   Neurological: She is alert and oriented to person, place, and time.   Skin: Skin is warm and dry. She is not diaphoretic. No erythema.   Psychiatric: She has a normal mood and  affect. Her behavior is normal. Judgment and thought content normal.   Nursing note and vitals reviewed.      Significant Labs: All pertinent labs within the past 24 hours have been reviewed.    Significant Imaging: I have reviewed and interpreted all pertinent imaging results/findings within the past 24 hours.    Assessment/Plan:      * Hypertensive emergency  She presented with initial blood pressure 180/69 () but went up as high as 213/100 ()  She is only on benazepril at home for high blood pressure control  Of note, her creatinine is 1.9 though it is unclear whether this is chronic or acute  Last lab work we have for her was back in 2008.    s/p nicardipine infusion which was quickly weaned off  Blood pressure markedly improved on amlodipine and hydralazine along with diuresis  Will further titrate medications as needed    Acute renal failure  We do not have any recent lab work to which to compare the chronicity of her renal failure  Appreciate Nephrology input  Likely with chronic kidney disease stage 3  Will continue to monitor    Debility  Continue PT and OT  Will need SNF placement    Depression  Continue her home regimen of escitalopram.    Anemia of chronic disease  Unfortunately, we do not recent lab work to which to compare the chronicity of her anemia.   H&H has been stable and without bleeding  No further monitoring needed    Hyperlipidemia  Continue pravastatin.    Type 2 diabetes mellitus  Continue basal-prandial insulin therapy along with insulin sliding scale.  Goal glucose during hospital stay is 140-180  Hemoglobin A1C   Date Value Ref Range Status   04/11/2019 7.1 (H) 4.0 - 5.6 % Final       Essential hypertension  As addressed above.    Hypokalemia  Will replete as needed and continue monitor      VTE Risk Mitigation (From admission, onward)        Ordered     heparin (porcine) injection 5,000 Units  Every 12 hours      04/11/19 0523     IP VTE HIGH RISK PATIENT  Once       04/11/19 0523              Shanita Pettit MD  Department of Hospital Medicine   [facility]

## 2019-04-15 NOTE — ASSESSMENT & PLAN NOTE
Unfortunately, we do not recent lab work to which to compare the chronicity of her anemia.   H&H has been stable and without bleeding  No further monitoring needed

## 2019-04-15 NOTE — SUBJECTIVE & OBJECTIVE
Interval History:  Patient reports feeling better with swelling down.    Review of Systems   Constitutional: Negative for chills and fever.   Respiratory: Negative for shortness of breath.    Cardiovascular: Negative for chest pain.     Objective:     Vital Signs (Most Recent):  Temp: 98.2 °F (36.8 °C) (04/15/19 0747)  Pulse: 64 (04/15/19 0747)  Resp: 16 (04/15/19 0747)  BP: (!) 177/72 (04/15/19 0747)  SpO2: (!) 92 % (04/15/19 0747) Vital Signs (24h Range):  Temp:  [97.7 °F (36.5 °C)-98.5 °F (36.9 °C)] 98.2 °F (36.8 °C)  Pulse:  [64-82] 64  Resp:  [16-22] 16  SpO2:  [92 %-98 %] 92 %  BP: (156-177)/(70-82) 177/72     Weight: 110 kg (242 lb 8.1 oz)  Body mass index is 33.82 kg/m².    Intake/Output Summary (Last 24 hours) at 4/15/2019 1053  Last data filed at 4/15/2019 0600  Gross per 24 hour   Intake --   Output 700 ml   Net -700 ml      Physical Exam   Constitutional: She is oriented to person, place, and time. She appears well-developed and well-nourished. No distress.   HENT:   Head: Normocephalic and atraumatic.   Eyes: Left eye exhibits no discharge.   Neck: Normal range of motion.   Cardiovascular:   Regular rate and rhythm with a Grade IV/VI holosystolic murmur   Pulmonary/Chest:   Mildly increased respiratory effort with bibasilar crackles bilaterally.   Abdominal: Soft. Bowel sounds are normal. She exhibits no distension. There is no tenderness. There is no rebound and no guarding.   Musculoskeletal: Normal range of motion. She exhibits edema (There is significant 1+ pitting edema to the legs bilaterally to the level of the knees., improved).   Neurological: She is alert and oriented to person, place, and time.   Skin: Skin is warm and dry. She is not diaphoretic. No erythema.   Psychiatric: She has a normal mood and affect. Her behavior is normal. Judgment and thought content normal.   Nursing note and vitals reviewed.      Significant Labs: All pertinent labs within the past 24 hours have been  reviewed.    Significant Imaging: I have reviewed and interpreted all pertinent imaging results/findings within the past 24 hours.

## 2019-04-15 NOTE — PROGRESS NOTES
Patient awake, alert, oriented resting comfortably. No signs of distress observed. bed low and locked. Call light in reach. Report given to oncoming nurse, MICAH Feliciano. 12hour chart check complete.

## 2019-04-15 NOTE — ASSESSMENT & PLAN NOTE
Continue basal-prandial insulin therapy along with insulin sliding scale.  Goal glucose during hospital stay is 140-180  Hemoglobin A1C   Date Value Ref Range Status   04/11/2019 7.1 (H) 4.0 - 5.6 % Final

## 2019-04-15 NOTE — PT/OT/SLP PROGRESS
Physical Therapy Treatment    Patient Name:  Magaly Epstein   MRN:  [id number]    Recommendations:     Discharge Recommendations:  nursing facility, skilled   Discharge Equipment Recommendations: (ongoing pending pt progress.)   Barriers to discharge: Decreased caregiver support and pt with decreased mobility     Assessment:     Magaly Epstein is a 68 y.o. female admitted with a medical diagnosis of Hypertensive emergency.  She presents with the following impairments/functional limitations:  weakness, impaired endurance, impaired self care skills, gait instability, impaired balance, decreased upper extremity function, decreased lower extremity function, decreased ROM, impaired functional mobilty, decreased safety awareness, impaired cardiopulmonary response to activity, decreased coordination . Pt required frequent rest breaks throughout therapy today 2* fatigue and weakness. Pt will benefit from further skilled therapy in order to get back to PLOF.     Rehab Prognosis: Good; patient would benefit from acute skilled PT services to address these deficits and reach maximum level of function.    Recent Surgery: * No surgery found *      Plan:     During this hospitalization, patient to be seen 6 x/week to address the identified rehab impairments via gait training, therapeutic activities, therapeutic exercises and progress toward the following goals:    · Plan of Care Expires:  04/26/19    Subjective     Chief Complaint: weakness and fatigue   Patient/Family Comments/goals: to get stronger   Pain/Comfort:  · Pain Rating 1: 0/10  · Pain Rating Post-Intervention 1: 0/10      Objective:     Communicated with nurse Akins  prior to session.  Patient found HOB elevated with telemetry upon PT entry to room.     General Precautions: Standard, fall   Orthopedic Precautions:N/A   Braces: N/A     Functional Mobility:  · Bed Mobility:     · Rolling Right: stand by assistance  · Scooting: stand by assistance and contact guard  assistance  · Supine to Sit: contact guard assistance, HOB elevated, bedside rail.   · Transfers:     · Sit to Stand: x 2 trials from bed  moderate assistance ( first attempt ) , MIN A ( second attempt )  with 4 wheeled walker. V/T cues for safety technique and rollator management.   · Gait: pt ambulated ~ 3-4 side steps and ~ 8 ft with rollator, CGA/ MIN A (chair followed). Noted with decreased hi, decreased step length, decreased swing to stance phase, fwd head posture. V/T cues for safety technique and walker management,         AM-PAC 6 CLICK MOBILITY  Turning over in bed (including adjusting bedclothes, sheets and blankets)?: 4  Sitting down on and standing up from a chair with arms (e.g., wheelchair, bedside commode, etc.): 2  Moving from lying on back to sitting on the side of the bed?: 3  Moving to and from a bed to a chair (including a wheelchair)?: 3  Need to walk in hospital room?: 3  Climbing 3-5 steps with a railing?: 3  Basic Mobility Total Score: 18       Therapeutic Activities and Exercises:   pt performed bed mobility, transfer and gait training .   Pt performed seated BLE x 15 reps : ankle DF/PF, LAQ, HS, hip flexion and pillow squeezes. V/T cues for safety technique and sequence.   Pt O2 SATS at rest on RA : 98% and 95% with activities .     Patient left up in chair lunch table set up  with all lines intact, call button in reach and nurse RAGINI  notified..    GOALS:   Multidisciplinary Problems     Physical Therapy Goals        Problem: Physical Therapy Goal    Goal Priority Disciplines Outcome Goal Variances Interventions   Physical Therapy Goal     PT, PT/OT Ongoing (interventions implemented as appropriate)     Description:  Goals to be met by: 2019     Patient will increase functional independence with mobility by performin. Supine to sit with Modified [place]  2. Sit to stand transfer with Modified [place]  3. Gait  x 100 feet with Modified [place] using  Rolling Walker.   4. Lower extremity exercise program x10 reps per handout, with independence                      Time Tracking:     PT Received On: 04/15/19  PT Start Time: 1154     PT Stop Time: 1219  PT Total Time (min): 25 min     Billable Minutes: Therapeutic Activity 14 and Therapeutic Exercise 11    Treatment Type: Treatment  PT/PTA: PTA     PTA Visit Number: 2     Anastacia Roper, PTA  04/15/2019

## 2019-04-15 NOTE — PROGRESS NOTES
"[facility]  Adult Nutrition  Progress Note    SUMMARY       Recommendations    1. Current intake adequate to meet estimated needs   2. RD to monitor    Goals: Meet > 85% EEN daily  Nutrition Goal Status: new  Communication of RD Recs: (POC)    Reason for Assessment    Reason For Assessment: identified at risk by screening criteria  Diagnosis: (HTV emergency )  Relevant Medical History: CKD, DM  Interdisciplinary Rounds: did not attend    General Information Comments: Pt reports good appetite and intake with meals (100%). Denies chewing/swallowing/n/v/ weight or appetite changes. NFPE 4/15: adequate/excess fat mass, age appropriate LBM.    Nutrition Discharge Planning: Cardiac diet to meet estimated needs.    Nutrition Risk Screen    Nutrition Risk Screen: no indicators present    Nutrition/Diet History    Spiritual, Cultural Beliefs, Cheondoism Practices, Values that Affect Care: no  Factors Affecting Nutritional Intake: None identified at this time    Anthropometrics    Temp: 98.2 °F (36.8 °C)  Height Method: Stated  Height: 5' 11" (180.3 cm)  Height (inches): 71 in  Weight Method: Bed Scale  Weight: 110 kg (242 lb 8.1 oz)  Weight (lb): 242.51 lb  Ideal Body Weight (IBW), Female: 155 lb  % Ideal Body Weight, Female (lb): 156.46 lb  BMI (Calculated): 33.9  BMI Grade: 30 - 34.9- obesity - grade I       Lab/Procedures/Meds    Pertinent Labs Reviewed: reviewed  Pertinent Labs Comments: A1c 7.1%  Pertinent Medications Reviewed: reviewed  Pertinent Medications Comments: furosemide, hydralazine    Estimated/Assessed Needs    Weight Used For Calorie Calculations: 110 kg (242 lb 8.1 oz)  Energy Calorie Requirements (kcal): 1750 kcal  Energy Need Method: [place]  Protein Requirements: 88-110g (0.8-1g/kg with obesity)  Weight Used For Protein Calculations: 110 kg (242 lb 8.1 oz)     Estimated Fluid Requirement Method: RDA Method  RDA Method (mL): 1750  CHO Requirement: 200g      Nutrition " Prescription Ordered    Current Diet Order: Cardiac    Evaluation of Received Nutrient/Fluid Intake    I/O: reviewed  Energy Calories Required: meeting needs  Protein Required: meeting needs  Fluid Required: (per MD)  Tolerance: tolerating  % Intake of Estimated Energy Needs: 75 - 100 %  % Meal Intake: 75 - 100 %    Nutrition Risk    Level of Risk/Frequency of Follow-up: (1 x week)     Assessment and Plan     Nutrition Problem  Decreased need for sodium    Related to (etiology):   HTN    Signs and Symptoms (as evidenced by):   HTN emergency    Interventions  Sodium reduced Diet    Nutrition Diagnosis Status:   New        Monitor and Evaluation    Food and Nutrient Intake: energy intake, food and beverage intake  Food and Nutrient Adminstration: diet order  Anthropometric Measurements: weight, weight change  Biochemical Data, Medical Tests and Procedures: electrolyte and renal panel, glucose/endocrine profile  Nutrition-Focused Physical Findings: overall appearance     Malnutrition Assessment       Subcutaneous Fat Loss (Final Summary): well nourished  Muscle Loss Evaluation (Final Summary): well nourished         Nutrition Follow-Up    RD Follow-up?: Yes

## 2019-04-15 NOTE — ASSESSMENT & PLAN NOTE
She presented with initial blood pressure 180/69 () but went up as high as 213/100 ()  She is only on benazepril at home for high blood pressure control  Of note, her creatinine is 1.9 though it is unclear whether this is chronic or acute  Last lab work we have for her was back in 2008.    s/p nicardipine infusion which was quickly weaned off  Blood pressure markedly improved on amlodipine and hydralazine along with diuresis  Will further titrate medications as needed

## 2019-04-15 NOTE — PROGRESS NOTES
[facility] Hospital Medicine  Progress Note    Patient Name: Magaly Epstein  MRN: [id number]  Patient Class: IP- Inpatient   Admission Date: 4/11/2019  Length of Stay: 4 days  Attending Physician: Phylicia Gastelum MD  Primary Care Provider: Tong Jones MD        Subjective:     Principal Problem:Hypertensive emergency    HPI:  Ms. Magaly Epstein is a 68 y.o. female with essential hypertension, type 2 diabetes mellitus (HbA1c unknown), hyperlipidemia (LDL under), anemia of chronic disease, and depression who presents to [facility] ED with complaints of bilateral lower extremity edema today.  She had a sense that her blood pressure was high for the past five days which she describes as a sensation of anxiety--she has had similar symptoms in the past.  Today however she noticed that her legs were swollen decided to come to the ED for further evaluation.  She has been feeling ill and has had some nausea with occasional nonbilious, nonbloody vomiting.  She denies any fevers, chills, chest pain, shortness of breath, palpitations, diaphoresis, nor hemoptysis.  She denies any lightheaded nor any dizziness, and did not have any falls or loss of consciousness.  Her appetite has been very poor but she denies any hematochezia, melena, vaginal bleeding, hematuria, hemoptysis, hematemesis, nor any coffee-ground emesis.  She has not been able to take her medications in the last five days due to nausea.  She does say that she is otherwise compliant with her medications and follows up regularly with her PCP.  She has not been started on any new medications.  She does not have a regular kidney doctor.    Hospital Course:  Ms. Epstein presented with swelling and was admitted for hypertensive emergency with acute renal failure.  Patient was initially treated in the ICU with a Cardene drip.  Nephrology consulted.  She was noted to have hypovolemic hyponatremia initiate treatment with diuresis.  Patient  was able to weaned off Cardene drip and transitioned to p.o. medications.  She was stepped down to the floor on 04/11.  Patient is severely deconditioned as well, and PT and OT were ordered with recommendation for SNF placement.  peripheral edema is much better,changed IV lasix to PO lasix.    Interval History:  Patient reports feeling better with swelling down.    Review of Systems   Constitutional: Negative for chills and fever.   Respiratory: Negative for shortness of breath.    Cardiovascular: Negative for chest pain.     Objective:     Vital Signs (Most Recent):  Temp: 98.2 °F (36.8 °C) (04/15/19 0747)  Pulse: 64 (04/15/19 0747)  Resp: 16 (04/15/19 0747)  BP: (!) 177/72 (04/15/19 0747)  SpO2: (!) 92 % (04/15/19 0747) Vital Signs (24h Range):  Temp:  [97.7 °F (36.5 °C)-98.5 °F (36.9 °C)] 98.2 °F (36.8 °C)  Pulse:  [64-82] 64  Resp:  [16-22] 16  SpO2:  [92 %-98 %] 92 %  BP: (156-177)/(70-82) 177/72     Weight: 110 kg (242 lb 8.1 oz)  Body mass index is 33.82 kg/m².    Intake/Output Summary (Last 24 hours) at 4/15/2019 1053  Last data filed at 4/15/2019 0600  Gross per 24 hour   Intake --   Output 700 ml   Net -700 ml      Physical Exam   Constitutional: She is oriented to person, place, and time. She appears well-developed and well-nourished. No distress.   HENT:   Head: Normocephalic and atraumatic.   Eyes: Left eye exhibits no discharge.   Neck: Normal range of motion.   Cardiovascular:   Regular rate and rhythm with a Grade IV/VI holosystolic murmur   Pulmonary/Chest:   Mildly increased respiratory effort with bibasilar crackles bilaterally.   Abdominal: Soft. Bowel sounds are normal. She exhibits no distension. There is no tenderness. There is no rebound and no guarding.   Musculoskeletal: Normal range of motion. She exhibits edema (There is significant 1+ pitting edema to the legs bilaterally to the level of the knees., improved).   Neurological: She is alert and oriented to person, place, and time.   Skin:  Skin is warm and dry. She is not diaphoretic. No erythema.   Psychiatric: She has a normal mood and affect. Her behavior is normal. Judgment and thought content normal.   Nursing note and vitals reviewed.      Significant Labs: All pertinent labs within the past 24 hours have been reviewed.    Significant Imaging: I have reviewed and interpreted all pertinent imaging results/findings within the past 24 hours.    Assessment/Plan:      * Hypertensive emergency  She presented with initial blood pressure 180/69 () but went up as high as 213/100 ()  She is only on benazepril at home for high blood pressure control  Of note, her creatinine is 1.9 though it is unclear whether this is chronic or acute  Last lab work we have for her was back in 2008.    s/p nicardipine infusion which was quickly weaned off  Blood pressure markedly improved on amlodipine and hydralazine along with diuresis  Will further titrate medications as needed    Nephrotic syndrome  peripheral edema is much better,changed IV lasix to PO lasix.      Debility  Continue PT and OT  Will need SNF placement    Depression  Continue her home regimen of escitalopram.    Anemia of chronic disease  Unfortunately, we do not recent lab work to which to compare the chronicity of her anemia.   H&H has been stable and without bleeding  No further monitoring needed    Hyperlipidemia  Continue pravastatin.    Type 2 diabetes mellitus  Continue basal-prandial insulin therapy along with insulin sliding scale.  Goal glucose during hospital stay is 140-180  Hemoglobin A1C   Date Value Ref Range Status   04/11/2019 7.1 (H) 4.0 - 5.6 % Final       Essential hypertension  As addressed above.    Hypokalemia  Will replete as needed and continue monitor    Acute renal failure  We do not have any recent lab work to which to compare the chronicity of her renal failure  Appreciate Nephrology input  Likely with chronic kidney disease stage 3  Will continue to  monitor      VTE Risk Mitigation (From admission, onward)        Ordered     heparin (porcine) injection 5,000 Units  Every 12 hours      04/11/19 0523     IP VTE HIGH RISK PATIENT  Once      04/11/19 0523              Phylicia Gastelum MD  Department of Hospital Medicine   [facility]

## 2019-04-15 NOTE — PLAN OF CARE
Problem: Physical Therapy Goal  Goal: Physical Therapy Goal  Goals to be met by: 2019     Patient will increase functional independence with mobility by performin. Supine to sit with Modified [place]  2. Sit to stand transfer with Modified [place]  3. Gait  x 100 feet with Modified [place] using Rolling Walker.   4. Lower extremity exercise program x10 reps per handout, with independence     Outcome: Ongoing (interventions implemented as appropriate)  Pt will benefit from further skilled therapy in order to get back to PLOF.

## 2019-04-15 NOTE — SUBJECTIVE & OBJECTIVE
Interval History:  Patient reports feeling better with swelling down.    Review of Systems   Constitutional: Negative for chills and fever.   Respiratory: Negative for shortness of breath.    Cardiovascular: Negative for chest pain.     Objective:     Vital Signs (Most Recent):  Temp: 97.7 °F (36.5 °C) (04/14/19 2326)  Pulse: 72 (04/14/19 2326)  Resp: 18 (04/15/19 0007)  BP: (!) 156/70 (04/14/19 2326)  SpO2: 97 % (04/14/19 2326) Vital Signs (24h Range):  Temp:  [97.7 °F (36.5 °C)-98.5 °F (36.9 °C)] 97.7 °F (36.5 °C)  Pulse:  [72] 72  Resp:  [18-22] 18  SpO2:  [97 %-98 %] 97 %  BP: (156-169)/(70-74) 156/70     Weight: 110 kg (242 lb 8.1 oz)  Body mass index is 33.82 kg/m².    Intake/Output Summary (Last 24 hours) at 4/15/2019 0432  Last data filed at 4/14/2019 2000  Gross per 24 hour   Intake --   Output 300 ml   Net -300 ml      Physical Exam   Constitutional: She is oriented to person, place, and time. She appears well-developed and well-nourished. No distress.   HENT:   Head: Normocephalic and atraumatic.   Eyes: Left eye exhibits no discharge.   Neck: Normal range of motion.   Cardiovascular:   Regular rate and rhythm with a Grade IV/VI holosystolic murmur   Pulmonary/Chest:   Mildly increased respiratory effort with bibasilar crackles bilaterally.   Abdominal: Soft. Bowel sounds are normal. She exhibits no distension. There is no tenderness. There is no rebound and no guarding.   Musculoskeletal: Normal range of motion. She exhibits edema (There is significant 1+ pitting edema to the legs bilaterally to the level of the knees., improved).   Neurological: She is alert and oriented to person, place, and time.   Skin: Skin is warm and dry. She is not diaphoretic. No erythema.   Psychiatric: She has a normal mood and affect. Her behavior is normal. Judgment and thought content normal.   Nursing note and vitals reviewed.      Significant Labs: All pertinent labs within the past 24 hours have been  reviewed.    Significant Imaging: I have reviewed and interpreted all pertinent imaging results/findings within the past 24 hours.

## 2019-04-16 PROBLEM — N18.30 ACUTE RENAL FAILURE SUPERIMPOSED ON STAGE 3 CHRONIC KIDNEY DISEASE: Status: ACTIVE | Noted: 2019-04-11

## 2019-04-16 PROBLEM — E11.29 DM (DIABETES MELLITUS) TYPE II CONTROLLED WITH RENAL MANIFESTATION: Status: ACTIVE | Noted: 2019-04-11

## 2019-04-16 PROBLEM — E11.9 TYPE 2 DIABETES MELLITUS: Status: ACTIVE | Noted: 2019-04-11

## 2019-04-16 LAB
ANION GAP SERPL CALC-SCNC: 8 MMOL/L (ref 8–16)
BUN SERPL-MCNC: 36 MG/DL (ref 8–23)
CALCIUM SERPL-MCNC: 9.3 MG/DL (ref 8.7–10.5)
CHLORIDE SERPL-SCNC: 102 MMOL/L (ref 95–110)
CO2 SERPL-SCNC: 29 MMOL/L (ref 23–29)
CREAT SERPL-MCNC: 1.8 MG/DL (ref 0.5–1.4)
EST. GFR  (AFRICAN AMERICAN): 33 ML/MIN/1.73 M^2
EST. GFR  (NON AFRICAN AMERICAN): 28 ML/MIN/1.73 M^2
GLUCOSE SERPL-MCNC: 161 MG/DL (ref 70–110)
INR PPP: 0.9 (ref 0.8–1.2)
KAPPA LC SER QL IA: 4.22 MG/DL (ref 0.33–1.94)
KAPPA LC/LAMBDA SER IA: 1.64 (ref 0.26–1.65)
LAMBDA LC SER QL IA: 2.58 MG/DL (ref 0.57–2.63)
MAGNESIUM SERPL-MCNC: 1.7 MG/DL (ref 1.6–2.6)
PHOSPHATE SERPL-MCNC: 3.4 MG/DL (ref 2.7–4.5)
POCT GLUCOSE: 176 MG/DL (ref 70–110)
POCT GLUCOSE: 189 MG/DL (ref 70–110)
POCT GLUCOSE: 189 MG/DL (ref 70–110)
POCT GLUCOSE: 202 MG/DL (ref 70–110)
POTASSIUM SERPL-SCNC: 3.9 MMOL/L (ref 3.5–5.1)
PROTHROMBIN TIME: 9.6 SEC (ref 9–12.5)
SODIUM SERPL-SCNC: 139 MMOL/L (ref 136–145)

## 2019-04-16 PROCEDURE — 25000003 PHARM REV CODE 250: Performed by: INTERNAL MEDICINE

## 2019-04-16 PROCEDURE — 97535 SELF CARE MNGMENT TRAINING: CPT

## 2019-04-16 PROCEDURE — 25000003 PHARM REV CODE 250: Performed by: HOSPITALIST

## 2019-04-16 PROCEDURE — 97116 GAIT TRAINING THERAPY: CPT

## 2019-04-16 PROCEDURE — 85610 PROTHROMBIN TIME: CPT

## 2019-04-16 PROCEDURE — 36415 COLL VENOUS BLD VENIPUNCTURE: CPT

## 2019-04-16 PROCEDURE — 63600175 PHARM REV CODE 636 W HCPCS: Performed by: INTERNAL MEDICINE

## 2019-04-16 PROCEDURE — 84100 ASSAY OF PHOSPHORUS: CPT

## 2019-04-16 PROCEDURE — 21400001 HC TELEMETRY ROOM

## 2019-04-16 PROCEDURE — 80048 BASIC METABOLIC PNL TOTAL CA: CPT

## 2019-04-16 PROCEDURE — 83735 ASSAY OF MAGNESIUM: CPT

## 2019-04-16 RX ORDER — BUMETANIDE 1 MG/1
2 TABLET ORAL DAILY
Status: DISCONTINUED | OUTPATIENT
Start: 2019-04-16 | End: 2019-04-18 | Stop reason: HOSPADM

## 2019-04-16 RX ORDER — FERROUS GLUCONATE 324(38)MG
324 TABLET ORAL 2 TIMES DAILY WITH MEALS
Status: DISCONTINUED | OUTPATIENT
Start: 2019-04-16 | End: 2019-04-18 | Stop reason: HOSPADM

## 2019-04-16 RX ORDER — HYDRALAZINE HYDROCHLORIDE 25 MG/1
100 TABLET, FILM COATED ORAL EVERY 8 HOURS
Status: DISCONTINUED | OUTPATIENT
Start: 2019-04-16 | End: 2019-04-18 | Stop reason: HOSPADM

## 2019-04-16 RX ADMIN — ESCITALOPRAM OXALATE 10 MG: 10 TABLET ORAL at 08:04

## 2019-04-16 RX ADMIN — DOCUSATE SODIUM AND SENNOSIDES 1 TABLET: 8.6; 5 TABLET, FILM COATED ORAL at 09:04

## 2019-04-16 RX ADMIN — PRAVASTATIN SODIUM 10 MG: 10 TABLET ORAL at 08:04

## 2019-04-16 RX ADMIN — FUROSEMIDE 40 MG: 40 TABLET ORAL at 08:04

## 2019-04-16 RX ADMIN — INSULIN ASPART 3 UNITS: 100 INJECTION, SOLUTION INTRAVENOUS; SUBCUTANEOUS at 05:04

## 2019-04-16 RX ADMIN — INSULIN DETEMIR 10 UNITS: 100 INJECTION, SOLUTION SUBCUTANEOUS at 09:04

## 2019-04-16 RX ADMIN — FERROUS GLUCONATE TAB 324 MG (37.5 MG ELEMENTAL IRON) 324 MG: 324 (37.5 FE) TAB at 05:04

## 2019-04-16 RX ADMIN — INSULIN ASPART 3 UNITS: 100 INJECTION, SOLUTION INTRAVENOUS; SUBCUTANEOUS at 11:04

## 2019-04-16 RX ADMIN — INSULIN ASPART 3 UNITS: 100 INJECTION, SOLUTION INTRAVENOUS; SUBCUTANEOUS at 08:04

## 2019-04-16 RX ADMIN — HEPARIN SODIUM 5000 UNITS: 5000 INJECTION, SOLUTION INTRAVENOUS; SUBCUTANEOUS at 08:04

## 2019-04-16 RX ADMIN — RAMELTEON 8 MG: 8 TABLET, FILM COATED ORAL at 09:04

## 2019-04-16 RX ADMIN — INSULIN ASPART 2 UNITS: 100 INJECTION, SOLUTION INTRAVENOUS; SUBCUTANEOUS at 05:04

## 2019-04-16 RX ADMIN — BUMETANIDE 2 MG: 1 TABLET ORAL at 03:04

## 2019-04-16 RX ADMIN — HYDRALAZINE HYDROCHLORIDE 50 MG: 25 TABLET ORAL at 07:04

## 2019-04-16 RX ADMIN — HYDRALAZINE HYDROCHLORIDE 100 MG: 25 TABLET ORAL at 03:04

## 2019-04-16 RX ADMIN — ALPRAZOLAM 1 MG: 0.5 TABLET ORAL at 11:04

## 2019-04-16 RX ADMIN — HYDRALAZINE HYDROCHLORIDE 100 MG: 25 TABLET ORAL at 09:04

## 2019-04-16 RX ADMIN — AMLODIPINE BESYLATE 10 MG: 5 TABLET ORAL at 08:04

## 2019-04-16 NOTE — PLAN OF CARE
04/16/19 1029   Medicare Message   Important Message from Medicare regarding Discharge Appeal Rights Given to patient/caregiver;Explained to patient/caregiver;Signed/date by patient/caregiver

## 2019-04-16 NOTE — PLAN OF CARE
Problem: Mobility Impairment  Goal: Optimal Mobility    Intervention: Optimize Mobility     04/16/19 0448   Identify and Manage Contributors to Fall Injury Risk   Self-Care Promotion independence encouraged;BADL personal objects within reach;BADL personal routines maintained;meal setup provided;safe use of adaptive equipment encouraged   Optimize Functional Ability   Positioning/Transfer Devices pillows

## 2019-04-16 NOTE — PROGRESS NOTES
Received bedside report from MICAH Akins. Patient awake & resting quietly in bed, NC 2L O2 in place, no distress noted. Safety maintained, call light in reach.

## 2019-04-16 NOTE — PROGRESS NOTES
Patient up to bedside commode w/ 1-2 person assistance. Patient unable to get out of bed without moderate assistance. Transferring to bedside commode patient needs 1-2 person for moderate assistance with balance & redirection with footing for positioning to sit on bedside commode. While sitting on the bedside commode patient needs 1-2 person assistance to maintain balance.

## 2019-04-16 NOTE — PROGRESS NOTES
Per patient admit diagnosis of hypertensive emergency & noted hypokalemia- Request for cardiac monitoring initiated, MD notified. New orders placed for cardiac monitoring. Patient informed of new orders & verbalized understanding. Safety maintained, call light in reach.

## 2019-04-16 NOTE — PROGRESS NOTES
Patient verbalized concerns regarding her cardiac diet and would like to also have a diabetic diet. I explained the ordered cardiac diet & requests adding diabetic diet due to her concerns with elevated glucose levels.

## 2019-04-16 NOTE — PLAN OF CARE
Problem: Physical Therapy Goal  Goal: Physical Therapy Goal  Goals to be met by: 2019     Patient will increase functional independence with mobility by performin. Supine to sit with Modified [place]  2. Sit to stand transfer with Modified [place]  3. Gait  x 100 feet with Modified [place] using Rolling Walker.   4. Lower extremity exercise program x10 reps per handout, with independence     Outcome: Ongoing (interventions implemented as appropriate)  Pt limited by lethargy.  Continue with POC

## 2019-04-16 NOTE — PLAN OF CARE
04/16/19 1430   Discharge Reassessment   Assessment Type Discharge Planning Reassessment   TN spoke with patient at bedside.  She agreed to go to [facility] if accepted.  TN notified Elly at [facility] who is applying for auth for placement in SNF.

## 2019-04-16 NOTE — PLAN OF CARE
04/16/19 1621   Post-Acute Status   Post-Acute Authorization Placement   142 received and uploaded into [facility].  Awaiting auth for transfer.

## 2019-04-16 NOTE — PT/OT/SLP PROGRESS
Physical Therapy Treatment    Patient Name:  Magaly Epstein   MRN:  [id number]    Recommendations:     Discharge Recommendations:  nursing facility, skilled   Discharge Equipment Recommendations: (ongoing assessment)   Barriers to discharge: Decreased caregiver support    Assessment:     Magaly Epstein is a 68 y.o. female admitted with a medical diagnosis of Hypertensive emergency.  She presents with the following impairments/functional limitations:  impaired functional mobilty, weakness, decreased safety awareness, impaired endurance, gait instability, decreased coordination, impaired balance, impaired self care skills pt limited by lethargy.    Rehab Prognosis: Good; patient would benefit from acute skilled PT services to address these deficits and reach maximum level of function.    Recent Surgery: * No surgery found *      Plan:     During this hospitalization, patient to be seen 6 x/week to address the identified rehab impairments via gait training, therapeutic activities, therapeutic exercises and progress toward the following goals:    · Plan of Care Expires:  04/26/19    Subjective     Chief Complaint: pain  Patient/Family Comments/goals: none stated  Pain/Comfort:  Pain Rating 1: (not rated, back and knees)      Objective:     Communicated with nsg prior to session.  Patient found HOB elevated with telemetry, oxygen, bed alarm upon PT entry to room.     General Precautions: Standard, fall   Orthopedic Precautions:N/A   Braces: N/A     Functional Mobility:  · Bed Mobility:     · Scooting: stand by assistance  · Supine to Sit: stand by assistance  · Transfers:     · Sit to Stand:  stand by assistance with VC for hand placement/safety.  Pt required 2 attempts to perform.  · Gait: 60' with Rw and CGA with Max Vc for increased trunk ext and walker management/safety.  Decreased hi  · Balance: Fair+ sit, Fair stand      AM-PAC 6 CLICK MOBILITY  Turning over in bed (including adjusting bedclothes, sheets and  blankets)?: 4  Sitting down on and standing up from a chair with arms (e.g., wheelchair, bedside commode, etc.): 3  Moving from lying on back to sitting on the side of the bed?: 3  Moving to and from a bed to a chair (including a wheelchair)?: 3  Need to walk in hospital room?: 3  Climbing 3-5 steps with a railing?: 3  Basic Mobility Total Score: 19       Therapeutic Activities and Exercises:   T/F and gait training as above.  Pt sat at EOB and performed B AP's and FAQ's x 10 reps sitting at EOB with Supervision     Patient left up in chair with all lines intact, call button in reach and nsg notified..    GOALS:   Multidisciplinary Problems     Physical Therapy Goals        Problem: Physical Therapy Goal    Goal Priority Disciplines Outcome Goal Variances Interventions   Physical Therapy Goal     PT, PT/OT Ongoing (interventions implemented as appropriate)     Description:  Goals to be met by: 2019     Patient will increase functional independence with mobility by performin. Supine to sit with Modified [place]  2. Sit to stand transfer with Modified [place]  3. Gait  x 100 feet with Modified [place] using Rolling Walker.   4. Lower extremity exercise program x10 reps per handout, with independence                      Time Tracking:     PT Received On: 19  PT Start Time: 1110     PT Stop Time: 1134  PT Total Time (min): 24 min     Billable Minutes: Gait Training 12 OT present    Treatment Type: Treatment  PT/PTA: PT     PTA Visit Number: 0     Raquel Taylor, PT  2019

## 2019-04-16 NOTE — CONSULTS
Inpatient Radiology Pre-procedure Note    History of Present Illness:  Magaly Epstein is a 68 y.o. female with PMHx of essential HTN, type 2 DM (HbA1c unknown), HPLD (LDL under) and anemia of chronic disease, and depression who presents to [facility] ED and admitted for hypertensive emergency with renal failure of unknown acuity (Cr 1.9; last 0.7 10 years prior) and unknown etiology.    New inpatient IR consult placed to evaluate for random renal cortical biopsy.    Admission H&P reviewed.  Past Medical History:   Diagnosis Date    Arthritis     Diabetes mellitus      Past Surgical History:   Procedure Laterality Date    APPENDECTOMY         Review of Systems:   As documented in primary team H&P    Home Meds:   Prior to Admission medications    Medication Sig Start Date End Date Taking? Authorizing Provider   escitalopram oxalate (LEXAPRO) 10 MG tablet Take 10 mg by mouth once daily.   Yes Historical Provider, MD   insulin NPH-insulin regular, 70/30, (NOVOLIN 70/30) 100 unit/mL (70-30) injection Inject into the skin 2 (two) times daily.   Yes Historical Provider, MD   benazepril (LOTENSIN) 20 MG tablet Take 20 mg by mouth once daily.    Historical Provider, MD   pravastatin (PRAVACHOL) 10 MG tablet Take 10 mg by mouth once daily.    Historical Provider, MD     Scheduled Meds:    amLODIPine  10 mg Oral Daily    bumetanide  2 mg Oral Daily    escitalopram oxalate  10 mg Oral Daily    ferrous gluconate  324 mg Oral BID WM    hydrALAZINE  100 mg Oral Q8H    insulin aspart U-100  3 Units Subcutaneous TIDWM    insulin detemir U-100  10 Units Subcutaneous QHS    pravastatin  10 mg Oral Daily     Continuous Infusions:   PRN Meds:acetaminophen, ALPRAZolam, dextrose 50%, dextrose 50%, glucagon (human recombinant), glucose, glucose, insulin aspart U-100, promethazine (PHENERGAN) IVPB, ramelteon, senna-docusate 8.6-50 mg  Anticoagulants/Antiplatelets: no anticoagulation    Allergies:   Review of patient's allergies  indicates:   Allergen Reactions    Ciprofloxacin Anaphylaxis    Codeine Anaphylaxis    Neosporin [benzalkonium chloride] Anaphylaxis     Sedation Hx: have not been any systemic reactions    Labs:  Recent Labs   Lab 04/11/19  0110   INR 1.0  1.0       Recent Labs   Lab 04/12/19  1959   WBC 7.08   HGB 10.1*   HCT 30.6*   MCV 93         Recent Labs   Lab 04/12/19  0610  04/16/19  0451   *  142*   < > 161*   *  132*   < > 139   K 3.6  3.4*   < > 3.9   CL 99  99   < > 102   CO2 27  28   < > 29   BUN 36*  36*   < > 36*   CREATININE 1.8*  1.8*   < > 1.8*   CALCIUM 8.2*  8.4*   < > 9.3   MG 2.1   < > 1.7   ALT 22  --   --    AST 23  --   --    ALBUMIN 2.6*  2.5*  --   --    BILITOT 0.2  --   --     < > = values in this interval not displayed.         Vitals:  Temp: 98.2 °F (36.8 °C) (04/16/19 1138)  Pulse: 81 (04/16/19 1138)  Resp: 16 (04/16/19 1138)  BP: 120/61 (04/16/19 1138)  SpO2: 98 % (04/16/19 1138)     Physical Exam:  ASA: III  Mallampati: I    General: no acute distress  Mental Status: alert and oriented to person, place and time  HEENT: normocephalic, atraumatic  Chest: unlabored breathing  Heart: regular heart rate  Abdomen: nondistended  Extremity: moves all extremities    A/P:   67 y/o F with ANIA with nephrotic range proteinuria of unknown etiology.    1. Will attempt random renal cortical biopsy under moderate conscious sedation on 4/17/19. Please keep NPO past midnight, hold all AC and recent (within 72 hours) INR.     Risks (including, but not limited to, pain, bleeding, infection, damage to nearby structures, failure to obtain sufficient material for a diagnosis, the need for additional procedures, and death), benefits, and alternatives were discussed with the patient. All questions were answered to the best of my abilities. The patient wishes to proceed with the procedure. Written informed consent was obtained.    Thank you for considering IR for the care of your patient.      Kavon Carson MD  Interventional Radiology

## 2019-04-16 NOTE — PLAN OF CARE
Problem: Balance Impairment (Functional Deficit)  Goal: Improved Balance and Postural Control    Intervention: Optimize Balance and Safe Activity     04/16/19 0449   Optimize Balance and Safe Activity   Safety Promotion/Fall Prevention assistive device/personal item within reach;bed alarm set;bedside commode chair;commode/urinal/bedpan at bedside;Fall Risk reviewed with patient/family;high risk medications identified;lighting adjusted;medications reviewed;nonskid shoes/socks when out of bed;room near unit station;side rails raised x 3;instructed to call staff for mobility         Problem: Cognitive Impairment  Goal: Optimal Functional [place]    Intervention: Optimize Cognitive Function     04/16/19 0448 04/16/19 0449   Prevent or Manage Pain   Sensory Stimulation Regulation  --  care clustered;lighting decreased;quiet environment promoted   Identify and Manage Contributors to Fall Injury Risk   Self-Care Promotion independence encouraged;BADL personal objects within reach;BADL personal routines maintained;meal setup provided;safe use of adaptive equipment encouraged  --    Manage Environment and Stimulation   Environment Familiarity/Consistency  --  daily routine followed;familiar objects from home provided   Optimize Cognitive and Communication Skills   Reorientation Measures  --  clock in view;reorientation provided         Problem: Coordination Impairment (Functional Deficit)  Goal: Optimal Coordination    Intervention: Optimize Motor Coordination and Functional Ability     04/16/19 0448   Identify and Manage Contributors to Fall Injury Risk   Self-Care Promotion independence encouraged;BADL personal objects within reach;BADL personal routines maintained;meal setup provided;safe use of adaptive equipment encouraged         Problem: Muscle Strength Impairment  Goal: Improved Muscle Strength    Intervention: Optimize Muscle Strength     04/16/19 0448   Identify and Manage Contributors to Fall Injury Risk    Self-Care Promotion independence encouraged;BADL personal objects within reach;BADL personal routines maintained;meal setup provided;safe use of adaptive equipment encouraged         Problem: Muscle Tone Impairment  Goal: Improved Muscle Tone    Intervention: Optimize Muscle Tone     04/16/19 0449   Optimize Muscle Tone   Spasticity Management spastic muscles stretched         Problem: Range of Motion Impairment (Functional Deficit)  Goal: Optimal Range of Motion    Intervention: Maintain Functional Joint Range and Position     04/16/19 0448 04/16/19 0449   Optimize Functional Ability   Positioning/Transfer Devices pillows  --    Range of Motion  --  active ROM (range of motion) encouraged;ROM (range of motion) performed         Problem: Sensory Impairment (Functional Deficit)  Goal: Compensation for Sensory Deficit    Intervention: Prevent Injury Related to Sensory Impairment     04/16/19 0449   Prevent Additional Skin Injury   Pressure Reduction Devices positioning supports utilized;pressure-redistributing mattress utilized   Prevent Injury Related to Sensory Impairment   Sensation Impairment Protection external pressure sources minimized   Monitor and Manage Hypervolemia   Skin Protection incontinence pads utilized;skin sealant/moisture barrier applied;tubing/devices free from skin contact

## 2019-04-16 NOTE — NURSING
Bedside report received from MICAH Feliciano. Patient lying in bed with eyes open. NAD noted at this time. All safety precautions in place. Will continue to monitor

## 2019-04-16 NOTE — ASSESSMENT & PLAN NOTE
We do not have any recent lab work to which to compare the chronicity of her renal failure  Appreciate Nephrology input  Likely with chronic kidney disease stage 3  Will continue to monitor  Nephrology has been  notified

## 2019-04-16 NOTE — PLAN OF CARE
Problem: Occupational Therapy Goal  Goal: Occupational Therapy Goal  Goals to be met by: 4/26/2019     Patient will increase functional independence with ADLs by performing:    UE Dressing with Modified [place].  LE Dressing with Modified [place].  Grooming while standing at sink with Contact Guard Assistance.  Toileting from toilet with Contact Guard Assistance for hygiene and clothing management.   Supine to sit with Stand-by Assistance.  Step transfer with Stand-by Assistance  Toilet transfer to toilet with Contact Guard Assistance.  Upper extremity exercise program with assistance as needed.     Outcome: Ongoing (interventions implemented as appropriate)  Patient tolerated treatment well, good participation.  Will benefit from a stay at SNF following discharge from [facility] OT services. ALEAH Gan, MS

## 2019-04-16 NOTE — PROGRESS NOTES
[facility] Hospital Medicine  Progress Note    Patient Name: Magaly Epstein  MRN: [id number]  Patient Class: IP- Inpatient   Admission Date: 4/11/2019  Length of Stay: 5 days  Attending Physician: Phylicia Gastelum MD  Primary Care Provider: Tong Jones MD        Subjective:     Principal Problem:Hypertensive emergency    HPI:  Ms. Magaly Epstein is a 68 y.o. female with essential hypertension, type 2 diabetes mellitus (HbA1c unknown), hyperlipidemia (LDL under), anemia of chronic disease, and depression who presents to [facility] ED with complaints of bilateral lower extremity edema today.  She had a sense that her blood pressure was high for the past five days which she describes as a sensation of anxiety--she has had similar symptoms in the past.  Today however she noticed that her legs were swollen decided to come to the ED for further evaluation.  She has been feeling ill and has had some nausea with occasional nonbilious, nonbloody vomiting.  She denies any fevers, chills, chest pain, shortness of breath, palpitations, diaphoresis, nor hemoptysis.  She denies any lightheaded nor any dizziness, and did not have any falls or loss of consciousness.  Her appetite has been very poor but she denies any hematochezia, melena, vaginal bleeding, hematuria, hemoptysis, hematemesis, nor any coffee-ground emesis.  She has not been able to take her medications in the last five days due to nausea.  She does say that she is otherwise compliant with her medications and follows up regularly with her PCP.  She has not been started on any new medications.  She does not have a regular kidney doctor.    Hospital Course:  Ms. Epstein presented with swelling and was admitted for hypertensive emergency with acute renal failure.  Patient was initially treated in the ICU with a Cardene drip.  Nephrology consulted.  She was noted to have hypovolemic hyponatremia initiate treatment with diuresis.  Patient  was able to weaned off Cardene drip and transitioned to p.o. medications.  She was stepped down to the floor on 04/11.  Patient is severely deconditioned as well, and PT and OT were ordered with recommendation for SNF placement.  peripheral edema is much better,changed IV lasix to PO lasix.  Nephrology has been  notified,    Interval History:  Patient reports feeling better with swelling down.    Review of Systems   Constitutional: Negative for chills and fever.   Respiratory: Negative for shortness of breath.    Cardiovascular: Negative for chest pain.     Objective:     Vital Signs (Most Recent):  Temp: 98.1 °F (36.7 °C) (04/16/19 0742)  Pulse: 71 (04/16/19 0742)  Resp: 18 (04/16/19 0742)  BP: (!) 171/77 (04/16/19 0742)  SpO2: (!) 94 % (04/16/19 0742) Vital Signs (24h Range):  Temp:  [97.7 °F (36.5 °C)-99.2 °F (37.3 °C)] 98.1 °F (36.7 °C)  Pulse:  [71-80] 71  Resp:  [18-19] 18  SpO2:  [93 %-98 %] 94 %  BP: (131-186)/(65-84) 171/77     Weight: 110 kg (242 lb 8.1 oz)  Body mass index is 33.82 kg/m².    Intake/Output Summary (Last 24 hours) at 4/16/2019 1044  Last data filed at 4/16/2019 0300  Gross per 24 hour   Intake 300 ml   Output 625 ml   Net -325 ml      Physical Exam   Constitutional: She is oriented to person, place, and time. She appears well-developed and well-nourished. No distress.   HENT:   Head: Normocephalic and atraumatic.   Eyes: Left eye exhibits no discharge.   Neck: Normal range of motion.   Cardiovascular:   Regular rate and rhythm with a Grade IV/VI holosystolic murmur   Pulmonary/Chest:   Mildly increased respiratory effort with bibasilar crackles bilaterally.   Abdominal: Soft. Bowel sounds are normal. She exhibits no distension. There is no tenderness. There is no rebound and no guarding.   Musculoskeletal: Normal range of motion. She exhibits edema (There is significant 1+ pitting edema to the legs bilaterally to the level of the knees., improved).   Neurological: She is alert and oriented  to person, place, and time.   Skin: Skin is warm and dry. She is not diaphoretic. No erythema.   Psychiatric: She has a normal mood and affect. Her behavior is normal. Judgment and thought content normal.   Nursing note and vitals reviewed.      Significant Labs: All pertinent labs within the past 24 hours have been reviewed.    Significant Imaging: I have reviewed and interpreted all pertinent imaging results/findings within the past 24 hours.    Assessment/Plan:      * Hypertensive emergency  She presented with initial blood pressure 180/69 () but went up as high as 213/100 ()  She is only on benazepril at home for high blood pressure control  Of note, her creatinine is 1.9 though it is unclear whether this is chronic or acute  Last lab work we have for her was back in 2008.    s/p nicardipine infusion which was quickly weaned off  Blood pressure markedly improved on amlodipine and hydralazine along with diuresis  Will further titrate medications as needed    Nephrotic syndrome  peripheral edema is much better,changed IV lasix to PO lasix.      Debility  Duo to may nephrotic syndrome,Continue PT and OT  Will need SNF placement    Depression  Continue her home regimen of escitalopram.    Anemia of chronic disease  Unfortunately, we do not recent lab work to which to compare the chronicity of her anemia.   H&H has been stable and without bleeding  No further monitoring needed    Hyperlipidemia  Continue pravastatin.    DM (diabetes mellitus) type II controlled with renal manifestation  Continue basal-prandial insulin therapy along with insulin sliding scale.  Goal glucose during hospital stay is 140-180  Hemoglobin A1C   Date Value Ref Range Status   04/11/2019 7.1 (H) 4.0 - 5.6 % Final       Essential hypertension  As addressed above.    Hypokalemia  Will replete as needed and continue monitor    Acute renal failure superimposed on stage 3 chronic kidney disease  We do not have any recent lab work to  which to compare the chronicity of her renal failure  Appreciate Nephrology input  Likely with chronic kidney disease stage 3  Will continue to monitor  Nephrology has been  notified      VTE Risk Mitigation (From admission, onward)        Ordered     heparin (porcine) injection 5,000 Units  Every 12 hours      04/11/19 0523     IP VTE HIGH RISK PATIENT  Once      04/11/19 0523              Phylicia Gastelum MD  Department of Hospital Medicine   [facility]

## 2019-04-16 NOTE — SUBJECTIVE & OBJECTIVE
Interval History:  Patient reports feeling better with swelling down.    Review of Systems   Constitutional: Negative for chills and fever.   Respiratory: Negative for shortness of breath.    Cardiovascular: Negative for chest pain.     Objective:     Vital Signs (Most Recent):  Temp: 98.1 °F (36.7 °C) (04/16/19 0742)  Pulse: 71 (04/16/19 0742)  Resp: 18 (04/16/19 0742)  BP: (!) 171/77 (04/16/19 0742)  SpO2: (!) 94 % (04/16/19 0742) Vital Signs (24h Range):  Temp:  [97.7 °F (36.5 °C)-99.2 °F (37.3 °C)] 98.1 °F (36.7 °C)  Pulse:  [71-80] 71  Resp:  [18-19] 18  SpO2:  [93 %-98 %] 94 %  BP: (131-186)/(65-84) 171/77     Weight: 110 kg (242 lb 8.1 oz)  Body mass index is 33.82 kg/m².    Intake/Output Summary (Last 24 hours) at 4/16/2019 1044  Last data filed at 4/16/2019 0300  Gross per 24 hour   Intake 300 ml   Output 625 ml   Net -325 ml      Physical Exam   Constitutional: She is oriented to person, place, and time. She appears well-developed and well-nourished. No distress.   HENT:   Head: Normocephalic and atraumatic.   Eyes: Left eye exhibits no discharge.   Neck: Normal range of motion.   Cardiovascular:   Regular rate and rhythm with a Grade IV/VI holosystolic murmur   Pulmonary/Chest:   Mildly increased respiratory effort with bibasilar crackles bilaterally.   Abdominal: Soft. Bowel sounds are normal. She exhibits no distension. There is no tenderness. There is no rebound and no guarding.   Musculoskeletal: Normal range of motion. She exhibits edema (There is significant 1+ pitting edema to the legs bilaterally to the level of the knees., improved).   Neurological: She is alert and oriented to person, place, and time.   Skin: Skin is warm and dry. She is not diaphoretic. No erythema.   Psychiatric: She has a normal mood and affect. Her behavior is normal. Judgment and thought content normal.   Nursing note and vitals reviewed.      Significant Labs: All pertinent labs within the past 24 hours have been  reviewed.    Significant Imaging: I have reviewed and interpreted all pertinent imaging results/findings within the past 24 hours.

## 2019-04-16 NOTE — PT/OT/SLP PROGRESS
Occupational Therapy   Treatment    Name: Magaly Epstein  MRN: [id number]  Admitting Diagnosis:  Hypertensive emergency       Recommendations:     Discharge Recommendations: nursing facility, skilled  Discharge Equipment Recommendations:  (TBD)  Barriers to discharge:  Decreased caregiver support    Assessment:     Magaly pEstein is a 68 y.o. female with a medical diagnosis of Hypertensive emergency.  She presents with  independence for self-care and functional transfers. Performance deficits affecting function are weakness, impaired endurance, impaired self care skills, impaired functional mobilty, impaired balance, decreased coordination, decreased upper extremity function, decreased safety awareness, pain, impaired cardiopulmonary response to activity.     Rehab Prognosis:  Good; patient would benefit from acute skilled OT services to address these deficits and reach maximum level of function.       Plan:     Patient to be seen 5 x/week(M-F) to address the above listed problems via self-care/home management, therapeutic activities, therapeutic exercises  · Plan of Care Expires: 19  · Plan of Care Reviewed with: patient    Subjective     Pain/Comfort:  · Pain Rating 1: (unable to rate pain)  · Location 1: back(and knees)  · Pain Addressed 1: Reposition, Distraction, Cessation of Activity, Nurse notified    Objective:     Communicated with: nurse Reyes prior to session.  Patient found supine with telemetry, peripheral IV, oxygen, bed alarm upon OT entry to room.    General Precautions: Standard, fall   Orthopedic Precautions:N/A   Braces: N/A     Occupational Performance:     Bed Mobility:    · Patient completed Rolling/Turning to Right with stand by assistance  · Patient completed Scooting/Bridging with stand by assistance  · Patient completed Supine to Sit with contact guard assistance     Functional Mobility/Transfers:  · Patient completed Sit <> Stand Transfer with minimum assistance  with  4  wheeled walker   · Functional Mobility: ambulated to the nursing station and back to her room with the rollator and minimal assistance for balance    Activities of Daily Living:  · Upper Body Dressing: contact guard assistance seated EOB  · Lower Body Dressing: stand by assistance to adjust socks seated EOB  · Toileting: modified independence on BSC       [facility] 6 Click ADL: 19    Treatment & Education:  Self care; educated and reinforced safety with transfers    Patient left up in chair with all lines intact, call button in reach, nurse Amy notified and tray table in front of herEducation:      GOALS:   Multidisciplinary Problems     Occupational Therapy Goals        Problem: Occupational Therapy Goal    Goal Priority Disciplines Outcome Interventions   Occupational Therapy Goal     OT, PT/OT Ongoing (interventions implemented as appropriate)    Description:  Goals to be met by: 4/26/2019     Patient will increase functional independence with ADLs by performing:    UE Dressing with Modified [place].  LE Dressing with Modified [place].  Grooming while standing at sink with Contact Guard Assistance.  Toileting from toilet with Contact Guard Assistance for hygiene and clothing management.   Supine to sit with Stand-by Assistance.  Step transfer with Stand-by Assistance  Toilet transfer to toilet with Contact Guard Assistance.  Upper extremity exercise program with assistance as needed.                      Time Tracking:     OT Date of Treatment: 04/16/19  OT Start Time: 1110  OT Stop Time: 1134  OT Total Time (min): 24 min    Billable Minutes:Self Care/Home Management 12 minutes with PT    ALEAH Gan, MS  4/16/2019

## 2019-04-16 NOTE — PLAN OF CARE
Problem: Fall Injury Risk  Goal: Absence of Fall and Fall-Related Injury    Intervention: Promote Injury-Free Environment     04/16/19 1553   Optimize Avery and Functional Mobility   Environmental Safety Modification assistive device/personal items within reach;clutter free environment maintained;lighting adjusted;room near unit station;room organization consistent   Optimize Balance and Safe Activity   Safety Promotion/Fall Prevention assistive device/personal item within reach;bed alarm set;Fall Risk reviewed with patient/family;medications reviewed;lighting adjusted;nonskid shoes/socks when out of bed;room near unit station;side rails raised x 2;instructed to call staff for mobility         Problem: Adult Inpatient Plan of Care  Goal: Optimal Comfort and Wellbeing    Intervention: Provide Person-Centered Care     04/16/19 1553   Support Dyspnea Relief   Trust Relationship/Rapport care explained;choices provided;emotional support provided;empathic listening provided;questions answered;questions encouraged;reassurance provided;thoughts/feelings acknowledged

## 2019-04-17 LAB
ANION GAP SERPL CALC-SCNC: 9 MMOL/L (ref 8–16)
BACTERIA BLD CULT: NORMAL
BACTERIA BLD CULT: NORMAL
BUN SERPL-MCNC: 41 MG/DL (ref 8–23)
CALCIUM SERPL-MCNC: 9.1 MG/DL (ref 8.7–10.5)
CHLORIDE SERPL-SCNC: 102 MMOL/L (ref 95–110)
CO2 SERPL-SCNC: 28 MMOL/L (ref 23–29)
CREAT SERPL-MCNC: 1.9 MG/DL (ref 0.5–1.4)
EST. GFR  (AFRICAN AMERICAN): 31 ML/MIN/1.73 M^2
EST. GFR  (NON AFRICAN AMERICAN): 27 ML/MIN/1.73 M^2
GLUCOSE SERPL-MCNC: 177 MG/DL (ref 70–110)
MAGNESIUM SERPL-MCNC: 1.6 MG/DL (ref 1.6–2.6)
PHOSPHATE SERPL-MCNC: 4.1 MG/DL (ref 2.7–4.5)
POCT GLUCOSE: 162 MG/DL (ref 70–110)
POCT GLUCOSE: 167 MG/DL (ref 70–110)
POCT GLUCOSE: 183 MG/DL (ref 70–110)
POCT GLUCOSE: 188 MG/DL (ref 70–110)
POTASSIUM SERPL-SCNC: 3.7 MMOL/L (ref 3.5–5.1)
SODIUM SERPL-SCNC: 139 MMOL/L (ref 136–145)

## 2019-04-17 PROCEDURE — 97530 THERAPEUTIC ACTIVITIES: CPT

## 2019-04-17 PROCEDURE — 25000003 PHARM REV CODE 250: Performed by: HOSPITALIST

## 2019-04-17 PROCEDURE — 80048 BASIC METABOLIC PNL TOTAL CA: CPT

## 2019-04-17 PROCEDURE — 25000003 PHARM REV CODE 250: Performed by: INTERNAL MEDICINE

## 2019-04-17 PROCEDURE — 83735 ASSAY OF MAGNESIUM: CPT

## 2019-04-17 PROCEDURE — 94761 N-INVAS EAR/PLS OXIMETRY MLT: CPT

## 2019-04-17 PROCEDURE — 84100 ASSAY OF PHOSPHORUS: CPT

## 2019-04-17 PROCEDURE — 63600175 PHARM REV CODE 636 W HCPCS: Performed by: RADIOLOGY

## 2019-04-17 PROCEDURE — 36415 COLL VENOUS BLD VENIPUNCTURE: CPT

## 2019-04-17 PROCEDURE — 21400001 HC TELEMETRY ROOM

## 2019-04-17 RX ORDER — FENTANYL CITRATE 50 UG/ML
INJECTION, SOLUTION INTRAMUSCULAR; INTRAVENOUS CODE/TRAUMA/SEDATION MEDICATION
Status: COMPLETED | OUTPATIENT
Start: 2019-04-17 | End: 2019-04-17

## 2019-04-17 RX ORDER — MIDAZOLAM HYDROCHLORIDE 1 MG/ML
INJECTION INTRAMUSCULAR; INTRAVENOUS CODE/TRAUMA/SEDATION MEDICATION
Status: COMPLETED | OUTPATIENT
Start: 2019-04-17 | End: 2019-04-17

## 2019-04-17 RX ADMIN — RAMELTEON 8 MG: 8 TABLET, FILM COATED ORAL at 08:04

## 2019-04-17 RX ADMIN — DOCUSATE SODIUM AND SENNOSIDES 1 TABLET: 8.6; 5 TABLET, FILM COATED ORAL at 08:04

## 2019-04-17 RX ADMIN — AMLODIPINE BESYLATE 10 MG: 5 TABLET ORAL at 09:04

## 2019-04-17 RX ADMIN — MIDAZOLAM HYDROCHLORIDE 1 MG: 1 INJECTION, SOLUTION INTRAMUSCULAR; INTRAVENOUS at 11:04

## 2019-04-17 RX ADMIN — ESCITALOPRAM OXALATE 10 MG: 10 TABLET ORAL at 09:04

## 2019-04-17 RX ADMIN — PRAVASTATIN SODIUM 10 MG: 10 TABLET ORAL at 09:04

## 2019-04-17 RX ADMIN — FERROUS GLUCONATE TAB 324 MG (37.5 MG ELEMENTAL IRON) 324 MG: 324 (37.5 FE) TAB at 06:04

## 2019-04-17 RX ADMIN — FENTANYL CITRATE 50 MCG: 50 INJECTION, SOLUTION INTRAMUSCULAR; INTRAVENOUS at 11:04

## 2019-04-17 RX ADMIN — INSULIN ASPART 3 UNITS: 100 INJECTION, SOLUTION INTRAVENOUS; SUBCUTANEOUS at 09:04

## 2019-04-17 RX ADMIN — HYDRALAZINE HYDROCHLORIDE 100 MG: 25 TABLET ORAL at 06:04

## 2019-04-17 RX ADMIN — INSULIN ASPART 3 UNITS: 100 INJECTION, SOLUTION INTRAVENOUS; SUBCUTANEOUS at 06:04

## 2019-04-17 RX ADMIN — BUMETANIDE 2 MG: 1 TABLET ORAL at 09:04

## 2019-04-17 RX ADMIN — HYDRALAZINE HYDROCHLORIDE 100 MG: 25 TABLET ORAL at 03:04

## 2019-04-17 RX ADMIN — INSULIN DETEMIR 10 UNITS: 100 INJECTION, SOLUTION SUBCUTANEOUS at 08:04

## 2019-04-17 NOTE — PLAN OF CARE
Problem: Physical Therapy Goal  Goal: Physical Therapy Goal  Goals to be met by: 2019     Patient will increase functional independence with mobility by performin. Supine to sit with Modified [place]  2. Sit to stand transfer with Modified [place]  3. Gait  x 100 feet with Modified [place] using Rolling Walker.   4. Lower extremity exercise program x10 reps per handout, with independence     Outcome: Ongoing (interventions implemented as appropriate)  Pt will benefit from further skilled therapy in order to get back to PLOF.

## 2019-04-17 NOTE — ASSESSMENT & PLAN NOTE
peripheral edema is much better,changed IV lasix to PO Bumex     Bladder non-tender and non-distended. Urine clear yellow.

## 2019-04-17 NOTE — PROGRESS NOTES
Patient returned from IR procedure.  Patient laying flat in bed.  Will monitor site for pain and bleeding.

## 2019-04-17 NOTE — ASSESSMENT & PLAN NOTE
We do not have any recent lab work to which to compare the chronicity of her renal failure  Appreciate Nephrology input  Likely with chronic kidney disease stage 3  Will continue to monitor  Nephrology has been  Notified,will have kidney biopsy today.

## 2019-04-17 NOTE — PT/OT/SLP PROGRESS
Occupational Therapy      Patient Name:  Magaly Epstein   MRN:  [id number]    Patient not seen today secondary to Unavailable (Patient is off unit for procedure). Will follow-up as able.    2nd attempt 1335: Per patient's nurse, Leigh Ann, patient is being monitored post procedure and is on bedrest at this time. Will follow-up as able.     Luann Tello, RADHA  4/17/2019

## 2019-04-17 NOTE — PROGRESS NOTES
Awake alert oriented NAD    Had a K bx earlier today    Denies CNS ENT CP GI  RHEUM OR DERM SX  Past Medical History:   Diagnosis Date    Arthritis     Diabetes mellitus      Review of patient's allergies indicates:   Allergen Reactions    Ciprofloxacin Anaphylaxis    Codeine Anaphylaxis    Neosporin [benzalkonium chloride] Anaphylaxis       Current Facility-Administered Medications   Medication    acetaminophen tablet 500 mg    ALPRAZolam tablet 1 mg    amLODIPine tablet 10 mg    bumetanide tablet 2 mg    dextrose 50% injection 12.5 g    dextrose 50% injection 25 g    escitalopram oxalate tablet 10 mg    ferrous gluconate tablet 324 mg    glucagon (human recombinant) injection 1 mg    glucose chewable tablet 16 g    glucose chewable tablet 24 g    hydrALAZINE tablet 100 mg    insulin aspart U-100 pen 0-5 Units    insulin aspart U-100 pen 3 Units    insulin detemir U-100 pen 10 Units    pravastatin tablet 10 mg    promethazine (PHENERGAN) 6.25 mg in dextrose 5 % 50 mL IVPB    ramelteon tablet 8 mg    senna-docusate 8.6-50 mg per tablet 1 tablet       LABS    Recent Results (from the past 24 hour(s))   POCT glucose    Collection Time: 04/16/19  7:54 PM   Result Value Ref Range    POCT Glucose 189 (H) 70 - 110 mg/dL   Basic metabolic panel    Collection Time: 04/17/19  5:17 AM   Result Value Ref Range    Sodium 139 136 - 145 mmol/L    Potassium 3.7 3.5 - 5.1 mmol/L    Chloride 102 95 - 110 mmol/L    CO2 28 23 - 29 mmol/L    Glucose 177 (H) 70 - 110 mg/dL    BUN, Bld 41 (H) 8 - 23 mg/dL    Creatinine 1.9 (H) 0.5 - 1.4 mg/dL    Calcium 9.1 8.7 - 10.5 mg/dL    Anion Gap 9 8 - 16 mmol/L    eGFR if African American 31 (A) >60 mL/min/1.73 m^2    eGFR if non African American 27 (A) >60 mL/min/1.73 m^2   Phosphorus    Collection Time: 04/17/19  5:17 AM   Result Value Ref Range    Phosphorus 4.1 2.7 - 4.5 mg/dL   Magnesium    Collection Time: 04/17/19  5:17 AM   Result Value Ref Range    Magnesium 1.6  1.6 - 2.6 mg/dL   POCT glucose    Collection Time: 04/17/19  7:45 AM   Result Value Ref Range    POCT Glucose 188 (H) 70 - 110 mg/dL   POCT glucose    Collection Time: 04/17/19  9:11 AM   Result Value Ref Range    POCT Glucose 183 (H) 70 - 110 mg/dL   POCT glucose    Collection Time: 04/17/19  3:46 PM   Result Value Ref Range    POCT Glucose 167 (H) 70 - 110 mg/dL   ]    I/O last 3 completed shifts:  In: 1140 [P.O.:1140]  Out: 2825 [Urine:2825]    Vitals:    04/17/19 1200 04/17/19 1205 04/17/19 1210 04/17/19 1544   BP: (!) 141/63 (!) 143/64 (!) 151/68 (!) 191/92   Pulse: 78 79 79 88   Resp: 16 14 16 18   Temp:    97.4 °F (36.3 °C)   TempSrc:    Oral   SpO2: 98% 98% 98% (!) 93%   Weight:       Height:           No Jvd, Thyromegaly or Lymphadenopathy  Lungs: Fairly clear anteriorly and laterally  Cor: RRR no G or rubs  Abd: Soft benign good bowel sounds non tender  Ext: No E C C    A)    ANIA on ckd3-4  Nephrotic snd  DM  HTN  Iron def anemia  No Para Poretin in SPEP but Pos Kappa light chains     Ceex Haci Free Light Chains 0.33 - 1.94 mg/dL 4.22High     Lambda Free Light Chains 0.57 - 2.63 mg/dL 2.58    Kappa/Lambda FLC Ratio 0.26 - 1.65 1.64      P)    SP K bx today No NSAIDS or anticoag x 1 week post bx  Should be seen by Heme Onc  Renal diet  Steroid vs Calcineurins pending bx  Avoid nephrotoxic meds

## 2019-04-17 NOTE — PROGRESS NOTES
[facility] Hospital Medicine  Progress Note    Patient Name: Magaly Epstein  MRN: [id number]  Patient Class: IP- Inpatient   Admission Date: 4/11/2019  Length of Stay: 6 days  Attending Physician: Phylicia Gastelum MD  Primary Care Provider: Tong Jones MD        Subjective:     Principal Problem:Hypertensive emergency    HPI:  Ms. Magaly Epstein is a 68 y.o. female with essential hypertension, type 2 diabetes mellitus (HbA1c unknown), hyperlipidemia (LDL under), anemia of chronic disease, and depression who presents to [facility] ED with complaints of bilateral lower extremity edema today.  She had a sense that her blood pressure was high for the past five days which she describes as a sensation of anxiety--she has had similar symptoms in the past.  Today however she noticed that her legs were swollen decided to come to the ED for further evaluation.  She has been feeling ill and has had some nausea with occasional nonbilious, nonbloody vomiting.  She denies any fevers, chills, chest pain, shortness of breath, palpitations, diaphoresis, nor hemoptysis.  She denies any lightheaded nor any dizziness, and did not have any falls or loss of consciousness.  Her appetite has been very poor but she denies any hematochezia, melena, vaginal bleeding, hematuria, hemoptysis, hematemesis, nor any coffee-ground emesis.  She has not been able to take her medications in the last five days due to nausea.  She does say that she is otherwise compliant with her medications and follows up regularly with her PCP.  She has not been started on any new medications.  She does not have a regular kidney doctor.    Hospital Course:  Ms. Epstein presented with swelling and was admitted for hypertensive emergency with acute renal failure.  Patient was initially treated in the ICU with a Cardene drip.  Nephrology consulted.  She was noted to have hypovolemic hyponatremia initiate treatment with diuresis.  Patient  was able to weaned off Cardene drip and transitioned to p.o. medications.  She was stepped down to the floor on 04/11.  Patient is severely deconditioned as well, and PT and OT were ordered with recommendation for SNF placement.  peripheral edema is much better,changed IV lasix to bumex.  Nephrology has been  Notified,will have kidney biopsy today.    Interval History:  Patient reports feeling better with swelling down.    Review of Systems   Constitutional: Negative for chills and fever.   Respiratory: Negative for shortness of breath.    Cardiovascular: Negative for chest pain.     Objective:     Vital Signs (Most Recent):  Temp: 97.8 °F (36.6 °C) (04/17/19 0748)  Pulse: 78 (04/17/19 0748)  Resp: 16 (04/17/19 0748)  BP: (!) 151/66 (04/17/19 0748)  SpO2: (!) 92 % (04/17/19 0748) Vital Signs (24h Range):  Temp:  [97 °F (36.1 °C)-98.2 °F (36.8 °C)] 97.8 °F (36.6 °C)  Pulse:  [71-81] 78  Resp:  [16-18] 16  SpO2:  [91 %-98 %] 92 %  BP: (120-152)/(61-69) 151/66     Weight: 104.9 kg (231 lb 4.2 oz)  Body mass index is 32.25 kg/m².    Intake/Output Summary (Last 24 hours) at 4/17/2019 1028  Last data filed at 4/17/2019 1000  Gross per 24 hour   Intake 640 ml   Output 2200 ml   Net -1560 ml      Physical Exam   Constitutional: She is oriented to person, place, and time. She appears well-developed and well-nourished. No distress.   HENT:   Head: Normocephalic and atraumatic.   Eyes: Left eye exhibits no discharge.   Neck: Normal range of motion.   Cardiovascular:   Regular rate and rhythm with a Grade IV/VI holosystolic murmur   Pulmonary/Chest:   Mildly increased respiratory effort with bibasilar crackles bilaterally.   Abdominal: Soft. Bowel sounds are normal. She exhibits no distension. There is no tenderness. There is no rebound and no guarding.   Musculoskeletal: Normal range of motion. She exhibits edema (There is significant 1+ pitting edema to the legs bilaterally to the level of the knees., improved).    Neurological: She is alert and oriented to person, place, and time.   Skin: Skin is warm and dry. She is not diaphoretic. No erythema.   Psychiatric: She has a normal mood and affect. Her behavior is normal. Judgment and thought content normal.   Nursing note and vitals reviewed.      Significant Labs: All pertinent labs within the past 24 hours have been reviewed.    Significant Imaging: I have reviewed and interpreted all pertinent imaging results/findings within the past 24 hours.    Assessment/Plan:      * Hypertensive emergency  She presented with initial blood pressure 180/69 () but went up as high as 213/100 ()  She is only on benazepril at home for high blood pressure control  Of note, her creatinine is 1.9 though it is unclear whether this is chronic or acute  Last lab work we have for her was back in 2008.    s/p nicardipine infusion which was quickly weaned off  Blood pressure markedly improved on amlodipine and hydralazine along with diuresis  Will further titrate medications as needed    Nephrotic syndrome  peripheral edema is much better,changed IV lasix to PO Bumex      Debility  Duo to may nephrotic syndrome,Continue PT and OT  Will need SNF placement    Depression  Continue her home regimen of escitalopram.    Anemia of chronic disease  Unfortunately, we do not recent lab work to which to compare the chronicity of her anemia.   H&H has been stable and without bleeding  No further monitoring needed    Hyperlipidemia  Continue pravastatin.    DM (diabetes mellitus) type II controlled with renal manifestation  Continue basal-prandial insulin therapy along with insulin sliding scale.  Goal glucose during hospital stay is 140-180  Hemoglobin A1C   Date Value Ref Range Status   04/11/2019 7.1 (H) 4.0 - 5.6 % Final       Essential hypertension  As addressed above.    Hypokalemia  Will replete as needed and continue monitor    Acute renal failure superimposed on stage 3 chronic kidney  disease  We do not have any recent lab work to which to compare the chronicity of her renal failure  Appreciate Nephrology input  Likely with chronic kidney disease stage 3  Will continue to monitor  Nephrology has been  Notified,will have kidney biopsy today.      VTE Risk Mitigation (From admission, onward)        Ordered     IP VTE HIGH RISK PATIENT  Once      04/11/19 0523              Phylicia Gastelum MD  Department of Hospital Medicine   [facility]

## 2019-04-17 NOTE — PROCEDURES
Radiology Post-Procedure Note    Pre Op Diagnosis: CKD III with nephrotic range proteinuria of unknown etiology  Post Op Diagnosis: Same    Procedure: CT-guided random renal cortical biopsy    Procedure performed by: Kavon Carson MD    Written Informed Consent Obtained: Yes  Specimen Removed: YES, 18-gauge cores x 4  Estimated Blood Loss: Minimal    Findings:   Successful CT-guided random renal cortical core biopsy under moderate conscious sedation. Patient tolerated the procedure well.     Small right rita-nephric hematoma developed during procedure with no evidence of enlargement on 10-min post-procedural scan suggesting no further bleeding. Although unlikely, d/w pts floor nurse and made her aware to monitor VS closely and for potential development of worsening severe right flank pain or any signs of late re-bleeding.      Kavon Carson MD  Interventional Radiology

## 2019-04-17 NOTE — NURSING
In patient's room. Patient stated I just walked to  Bathroom. BP cuff off on the floor side rails still up   By 3./86 HR 81. Will continue to monitor.

## 2019-04-17 NOTE — NURSING
Bedside report given to MICAH Gaytan. Patient lying in bed with eyes open. NAD noted at this time. All safety precautions in place. Will continue to monitor.  12 hr chart check complete

## 2019-04-17 NOTE — PLAN OF CARE
04/17/19 1356   Discharge Reassessment   Assessment Type Discharge Planning Reassessment   Elly from [place] to pt's bedside, paperwork for acceptance to SNF when medically stable.

## 2019-04-17 NOTE — SUBJECTIVE & OBJECTIVE
Interval History:  Patient reports feeling better with swelling down.    Review of Systems   Constitutional: Negative for chills and fever.   Respiratory: Negative for shortness of breath.    Cardiovascular: Negative for chest pain.     Objective:     Vital Signs (Most Recent):  Temp: 97.8 °F (36.6 °C) (04/17/19 0748)  Pulse: 78 (04/17/19 0748)  Resp: 16 (04/17/19 0748)  BP: (!) 151/66 (04/17/19 0748)  SpO2: (!) 92 % (04/17/19 0748) Vital Signs (24h Range):  Temp:  [97 °F (36.1 °C)-98.2 °F (36.8 °C)] 97.8 °F (36.6 °C)  Pulse:  [71-81] 78  Resp:  [16-18] 16  SpO2:  [91 %-98 %] 92 %  BP: (120-152)/(61-69) 151/66     Weight: 104.9 kg (231 lb 4.2 oz)  Body mass index is 32.25 kg/m².    Intake/Output Summary (Last 24 hours) at 4/17/2019 1028  Last data filed at 4/17/2019 1000  Gross per 24 hour   Intake 640 ml   Output 2200 ml   Net -1560 ml      Physical Exam   Constitutional: She is oriented to person, place, and time. She appears well-developed and well-nourished. No distress.   HENT:   Head: Normocephalic and atraumatic.   Eyes: Left eye exhibits no discharge.   Neck: Normal range of motion.   Cardiovascular:   Regular rate and rhythm with a Grade IV/VI holosystolic murmur   Pulmonary/Chest:   Mildly increased respiratory effort with bibasilar crackles bilaterally.   Abdominal: Soft. Bowel sounds are normal. She exhibits no distension. There is no tenderness. There is no rebound and no guarding.   Musculoskeletal: Normal range of motion. She exhibits edema (There is significant 1+ pitting edema to the legs bilaterally to the level of the knees., improved).   Neurological: She is alert and oriented to person, place, and time.   Skin: Skin is warm and dry. She is not diaphoretic. No erythema.   Psychiatric: She has a normal mood and affect. Her behavior is normal. Judgment and thought content normal.   Nursing note and vitals reviewed.      Significant Labs: All pertinent labs within the past 24 hours have been  reviewed.    Significant Imaging: I have reviewed and interpreted all pertinent imaging results/findings within the past 24 hours.

## 2019-04-17 NOTE — PLAN OF CARE
04/17/19 1036   Post-Acute Status   Post-Acute Authorization Placement   [employer] auth obtained for pt to go to [facility].  Elly to come here today to complete paperwork

## 2019-04-17 NOTE — PT/OT/SLP PROGRESS
Physical Therapy Treatment    Patient Name:  Magaly Epstein   MRN:  [id number]    Recommendations:     Discharge Recommendations:  nursing facility, skilled   Discharge Equipment Recommendations: (ongoing assessment)   Barriers to discharge: Decreased caregiver support    Assessment:     Magaly Epstein is a 68 y.o. female admitted with a medical diagnosis of Hypertensive emergency.  She presents with the following impairments/functional limitations:  weakness, impaired endurance, impaired self care skills, decreased lower extremity function, decreased upper extremity function, gait instability, impaired balance, impaired functional mobilty, decreased safety awareness, decreased ROM .    Rehab Prognosis: Good; patient would benefit from acute skilled PT services to address these deficits and reach maximum level of function.    Recent Surgery: * No surgery found *      Plan:     During this hospitalization, patient to be seen 6 x/week to address the identified rehab impairments via gait training, therapeutic activities, therapeutic exercises and progress toward the following goals:    · Plan of Care Expires:  04/26/19    Subjective     Chief Complaint: NONE  Patient/Family Comments/goals: to get stronger   Pain/Comfort:  · Pain Rating 1: 0/10  · Pain Rating Post-Intervention 1: 0/10      Objective:     Communicated with nurse  prior to session.  Patient found HOB elevated with telemetry upon PT entry to room.     General Precautions: Standard, fall   Orthopedic Precautions:N/A   Braces: N/A     Functional Mobility:  · Bed Mobility:     · Rolling Right: stand by assistance  · Scooting: stand by assistance  · Supine to Sit: minimum assistance, HOB elevated , bedside rail   · Sit to Supine: stand by assistance  · Transfers:     · Sit to Stand:  minimum assistance with rolling walker. V/T cues for safety technique and walker management.   · Gait: pt ambulated ~ 4-5 steps to HOB with RW, CGA. Limited with gait training  today 2* transport present to take pt for CT .        AM-PAC 6 CLICK MOBILITY  Turning over in bed (including adjusting bedclothes, sheets and blankets)?: 4  Sitting down on and standing up from a chair with arms (e.g., wheelchair, bedside commode, etc.): 3  Moving from lying on back to sitting on the side of the bed?: 3  Moving to and from a bed to a chair (including a wheelchair)?: 3  Need to walk in hospital room?: 3  Climbing 3-5 steps with a railing?: 2  Basic Mobility Total Score: 18       Therapeutic Activities and Exercises:   pt performed seated BLE x 10 reps : AP     Patient left HOB elevated with all lines intact, call button in reach and nurse and transport  present..    GOALS:   Multidisciplinary Problems     Physical Therapy Goals        Problem: Physical Therapy Goal    Goal Priority Disciplines Outcome Goal Variances Interventions   Physical Therapy Goal     PT, PT/OT Ongoing (interventions implemented as appropriate)     Description:  Goals to be met by: 2019     Patient will increase functional independence with mobility by performin. Supine to sit with Modified Haakon  2. Sit to stand transfer with Modified Haakon  3. Gait  x 100 feet with Modified Haakon using Rolling Walker.   4. Lower extremity exercise program x10 reps per handout, with independence                      Time Tracking:     PT Received On: 19  PT Start Time: 1044     PT Stop Time: 1054  PT Total Time (min): 10 min     Billable Minutes: Therapeutic Activity 10    Treatment Type: Treatment  PT/PTA: PTA     PTA Visit Number: 1     Anastacia Roper PTA  2019

## 2019-04-18 VITALS
BODY MASS INDEX: 32.19 KG/M2 | HEART RATE: 81 BPM | RESPIRATION RATE: 18 BRPM | DIASTOLIC BLOOD PRESSURE: 65 MMHG | TEMPERATURE: 98 F | SYSTOLIC BLOOD PRESSURE: 139 MMHG | WEIGHT: 229.94 LBS | HEIGHT: 71 IN | OXYGEN SATURATION: 94 %

## 2019-04-18 PROBLEM — R76.8 ELEVATED SERUM IMMUNOGLOBULIN FREE LIGHT CHAINS: Status: ACTIVE | Noted: 2019-04-18

## 2019-04-18 LAB
ANION GAP SERPL CALC-SCNC: 11 MMOL/L (ref 8–16)
BUN SERPL-MCNC: 36 MG/DL (ref 8–23)
CALCIUM SERPL-MCNC: 9.7 MG/DL (ref 8.7–10.5)
CHLORIDE SERPL-SCNC: 102 MMOL/L (ref 95–110)
CO2 SERPL-SCNC: 28 MMOL/L (ref 23–29)
CREAT SERPL-MCNC: 1.8 MG/DL (ref 0.5–1.4)
EST. GFR  (AFRICAN AMERICAN): 33 ML/MIN/1.73 M^2
EST. GFR  (NON AFRICAN AMERICAN): 28 ML/MIN/1.73 M^2
GLUCOSE SERPL-MCNC: 155 MG/DL (ref 70–110)
MAGNESIUM SERPL-MCNC: 1.7 MG/DL (ref 1.6–2.6)
PHOSPHATE SERPL-MCNC: 4 MG/DL (ref 2.7–4.5)
POCT GLUCOSE: 160 MG/DL (ref 70–110)
POCT GLUCOSE: 186 MG/DL (ref 70–110)
POTASSIUM SERPL-SCNC: 3.8 MMOL/L (ref 3.5–5.1)
SODIUM SERPL-SCNC: 141 MMOL/L (ref 136–145)

## 2019-04-18 PROCEDURE — 83735 ASSAY OF MAGNESIUM: CPT

## 2019-04-18 PROCEDURE — 25000003 PHARM REV CODE 250: Performed by: INTERNAL MEDICINE

## 2019-04-18 PROCEDURE — 99222 PR INITIAL HOSPITAL CARE,LEVL II: ICD-10-PCS | Mod: ,,, | Performed by: INTERNAL MEDICINE

## 2019-04-18 PROCEDURE — 99222 1ST HOSP IP/OBS MODERATE 55: CPT | Mod: ,,, | Performed by: INTERNAL MEDICINE

## 2019-04-18 PROCEDURE — 84100 ASSAY OF PHOSPHORUS: CPT

## 2019-04-18 PROCEDURE — 36415 COLL VENOUS BLD VENIPUNCTURE: CPT

## 2019-04-18 PROCEDURE — 80048 BASIC METABOLIC PNL TOTAL CA: CPT

## 2019-04-18 PROCEDURE — 25000003 PHARM REV CODE 250: Performed by: HOSPITALIST

## 2019-04-18 RX ORDER — BUMETANIDE 2 MG/1
2 TABLET ORAL DAILY
Qty: 30 TABLET | Refills: 11
Start: 2019-04-19 | End: 2021-09-17

## 2019-04-18 RX ORDER — ACETAMINOPHEN 500 MG
500 TABLET ORAL EVERY 6 HOURS PRN
Refills: 0 | COMMUNITY
Start: 2019-04-18 | End: 2021-05-28

## 2019-04-18 RX ORDER — ALPRAZOLAM 1 MG/1
0.5 TABLET ORAL 2 TIMES DAILY PRN
Qty: 20 TABLET | Refills: 0 | Status: SHIPPED | OUTPATIENT
Start: 2019-04-18 | End: 2019-04-18

## 2019-04-18 RX ORDER — INSULIN ASPART 100 [IU]/ML
3 INJECTION, SOLUTION INTRAVENOUS; SUBCUTANEOUS 3 TIMES DAILY
Refills: 0
Start: 2019-04-18 | End: 2019-10-03

## 2019-04-18 RX ORDER — AMOXICILLIN 250 MG
1 CAPSULE ORAL 2 TIMES DAILY PRN
Start: 2019-04-18 | End: 2021-05-28

## 2019-04-18 RX ORDER — HYDRALAZINE HYDROCHLORIDE 100 MG/1
100 TABLET, FILM COATED ORAL EVERY 8 HOURS
Qty: 90 TABLET | Refills: 0 | Status: ON HOLD
Start: 2019-04-18 | End: 2019-10-17 | Stop reason: HOSPADM

## 2019-04-18 RX ORDER — AMLODIPINE BESYLATE 10 MG/1
10 TABLET ORAL DAILY
Qty: 30 TABLET | Refills: 0 | Status: ON HOLD
Start: 2019-04-19 | End: 2019-10-17 | Stop reason: HOSPADM

## 2019-04-18 RX ORDER — ALPRAZOLAM 1 MG/1
0.5 TABLET ORAL 2 TIMES DAILY PRN
Qty: 20 TABLET | Refills: 0 | Status: ON HOLD | OUTPATIENT
Start: 2019-04-18 | End: 2019-10-09 | Stop reason: HOSPADM

## 2019-04-18 RX ADMIN — ESCITALOPRAM OXALATE 10 MG: 10 TABLET ORAL at 08:04

## 2019-04-18 RX ADMIN — INSULIN ASPART 3 UNITS: 100 INJECTION, SOLUTION INTRAVENOUS; SUBCUTANEOUS at 07:04

## 2019-04-18 RX ADMIN — PRAVASTATIN SODIUM 10 MG: 10 TABLET ORAL at 08:04

## 2019-04-18 RX ADMIN — DOCUSATE SODIUM AND SENNOSIDES 1 TABLET: 8.6; 5 TABLET, FILM COATED ORAL at 08:04

## 2019-04-18 RX ADMIN — HYDRALAZINE HYDROCHLORIDE 100 MG: 25 TABLET ORAL at 12:04

## 2019-04-18 RX ADMIN — INSULIN ASPART 3 UNITS: 100 INJECTION, SOLUTION INTRAVENOUS; SUBCUTANEOUS at 12:04

## 2019-04-18 RX ADMIN — AMLODIPINE BESYLATE 10 MG: 5 TABLET ORAL at 08:04

## 2019-04-18 RX ADMIN — ALPRAZOLAM 1 MG: 0.5 TABLET ORAL at 07:04

## 2019-04-18 RX ADMIN — FERROUS GLUCONATE TAB 324 MG (37.5 MG ELEMENTAL IRON) 324 MG: 324 (37.5 FE) TAB at 07:04

## 2019-04-18 RX ADMIN — HYDRALAZINE HYDROCHLORIDE 100 MG: 25 TABLET ORAL at 06:04

## 2019-04-18 RX ADMIN — BUMETANIDE 2 MG: 1 TABLET ORAL at 08:04

## 2019-04-18 NOTE — PLAN OF CARE
Problem: Mobility Impairment  Goal: Optimal Mobility    Intervention: Optimize Mobility     04/16/19 1700 04/17/19 2039   Identify and Manage Contributors to Fall Injury Risk   Self-Care Promotion  --  independence encouraged;BADL personal objects within reach;BADL personal routines maintained;safe use of adaptive equipment encouraged   Optimize Functional Ability   Positioning/Transfer Devices in use;pillows  --          Comments: Doing well with calling for assistance to get to bedside commode. Benson encouraged. No injury during shift and non skid socks in place.

## 2019-04-18 NOTE — SUBJECTIVE & OBJECTIVE
Oncology Treatment Plan:   [No treatment plan]    Medications:  Continuous Infusions:  Scheduled Meds:   amLODIPine  10 mg Oral Daily    bumetanide  2 mg Oral Daily    escitalopram oxalate  10 mg Oral Daily    ferrous gluconate  324 mg Oral BID WM    hydrALAZINE  100 mg Oral Q8H    insulin aspart U-100  3 Units Subcutaneous TIDWM    insulin detemir U-100  10 Units Subcutaneous QHS    pravastatin  10 mg Oral Daily     PRN Meds:acetaminophen, ALPRAZolam, dextrose 50%, dextrose 50%, glucagon (human recombinant), glucose, glucose, insulin aspart U-100, promethazine (PHENERGAN) IVPB, ramelteon, senna-docusate 8.6-50 mg     Review of patient's allergies indicates:   Allergen Reactions    Ciprofloxacin Anaphylaxis    Codeine Anaphylaxis    Neosporin [benzalkonium chloride] Anaphylaxis        Past Medical History:   Diagnosis Date    Arthritis     Diabetes mellitus      Past Surgical History:   Procedure Laterality Date    APPENDECTOMY       Family History     None        Tobacco Use    Smoking status: Former Smoker   Substance and Sexual Activity    Alcohol use: Yes    Drug use: No    Sexual activity: Not on file       Review of Systems   Constitutional: Positive for appetite change and fatigue. Negative for fever and unexpected weight change.   HENT: Negative for mouth sores.    Eyes: Negative for visual disturbance.   Respiratory: Negative for cough and shortness of breath.    Cardiovascular: Positive for leg swelling. Negative for chest pain.   Gastrointestinal: Negative for abdominal pain and diarrhea.   Genitourinary: Negative for frequency.   Musculoskeletal: Negative for back pain.   Skin: Negative for rash.   Neurological: Positive for weakness. Negative for headaches.   Hematological: Negative for adenopathy.   Psychiatric/Behavioral: The patient is not nervous/anxious.      Objective:     Vital Signs (Most Recent):  Temp: 98.1 °F (36.7 °C) (04/18/19 0517)  Pulse: 80 (04/18/19 0517)  Resp: 18  (04/18/19 0517)  BP: (!) 169/71 (04/18/19 0517)  SpO2: (!) 94 % (04/18/19 0517) Vital Signs (24h Range):  Temp:  [97.4 °F (36.3 °C)-98.2 °F (36.8 °C)] 98.1 °F (36.7 °C)  Pulse:  [] 80  Resp:  [14-18] 18  SpO2:  [92 %-99 %] 94 %  BP: (140-191)/(63-92) 169/71     Weight: 104.3 kg (229 lb 15 oz)  Body mass index is 32.07 kg/m².  Body surface area is 2.29 meters squared.      Intake/Output Summary (Last 24 hours) at 4/18/2019 0858  Last data filed at 4/18/2019 0600  Gross per 24 hour   Intake 200 ml   Output 1400 ml   Net -1200 ml       Physical Exam   Constitutional: She is oriented to person, place, and time. She appears well-developed and well-nourished.   HENT:   Head: Normocephalic.   Mouth/Throat: Oropharynx is clear and moist. No oropharyngeal exudate.   Eyes: Pupils are equal, round, and reactive to light. Conjunctivae and lids are normal. No scleral icterus.   Neck: Normal range of motion. Neck supple. No thyromegaly present.   Cardiovascular: Normal rate and regular rhythm.   Murmur heard.  Pulmonary/Chest: Breath sounds normal. She has no wheezes. She has no rales.   Abdominal: Soft. Bowel sounds are normal. She exhibits no distension and no mass. There is no hepatosplenomegaly. There is no tenderness. There is no rebound and no guarding.   Musculoskeletal: Normal range of motion. She exhibits edema. She exhibits no tenderness.   Neurological: She is alert and oriented to person, place, and time. No cranial nerve deficit. Coordination normal.   Skin: Skin is warm and dry. No ecchymosis, no petechiae and no rash noted. No erythema.   Psychiatric: She has a normal mood and affect.       Significant Labs:   All pertinent labs within the past 24 hours have been reviewed    Diagnostic Results:  I have reviewed and interpreted all pertinent imaging results/findings within the past 24 hours

## 2019-04-18 NOTE — PROGRESS NOTES
The patient has the following appointments:    Follow-up Information     Veena Ivey MD On 5/1/2019.    Specialties:  Hematology and Oncology, Hematology  Why:  @1:00pm, for Hospital Follow up  Contact information:  [address]  [address]  [place] [id number] [phone]             Anibal Walsh MD On 5/9/2019.    Specialty:  Nephrology  Why:  @2:30pm for kidney biopsy results  Contact information:  [address] [place] [address] [address]  [place] [address]  [phone]

## 2019-04-18 NOTE — PLAN OF CARE
04/18/19 1357   Post-Acute Status   Post-Acute Authorization Placement   Patient to transfer to [place] [place] today.      ADT 30 order placed for  Transportation.  ETA:  If transportation does not arrive at ETA time nurse will be instructed to follow protocol for transportation below:  How can I get in touch directly with dispatch, if needed?                 Non-emergent dispatch: [phone]                                    [facility] Needs (PFC Lead): [phone]

## 2019-04-18 NOTE — PROGRESS NOTES
Call received from Dr. Carson stating has more bleeding then normal and a small right perinephric hematoma.  A scan was done and no further bleeding after 30 minutes.  Instructed to monitor patient for severe back pain and changes in vital signs.  Informed patients nurse Leigh Ann of this information and to continue to monitor site and signs of bleeding.

## 2019-04-18 NOTE — PLAN OF CARE
Problem: Hypertension Acute  Goal: Blood Pressure Within Desired Range    Intervention: Provide Multisystem Surveillance and Support     04/18/19 0634   Manage Acute Allergic Reaction   Stabilization Measures legs elevated         Comments: Blood pressure partially maintained and monitored closely.

## 2019-04-18 NOTE — ASSESSMENT & PLAN NOTE
Pt admitted with acute-on-chronic kidney disease  Pt diagnosed with Nephrotic syndrome  S/p kidney biopsy  Pathology pending    Serum FLC reveals  KFLC 4.22mg/dL  LFLC 2.58mg/cL and K/L FLCR 1.64  SPEP negative  Significance of mildly elevated KFLC unclear , ( in settiing of renal failure and nl K/L FLCR) possible inflammatory   Await bx results    Follow-up as outpatient

## 2019-04-18 NOTE — HPI
Patient is a 68 y.o. female with essential HTN, type 2 diabetes mellitus , hyperlipidemia, anemia of chronic disease, and depression who presents to [facility] ED with complaints of bilateral lower extremity edema Pt also with anxiety and nausea No fevers, CP. She was admitted for hypertensive emergency with acute renal failure. She is followed by Nephrology for nephrotic syndrome and s/p kidney biopsy. Pathology pending. Pt also with anxiety and nausea. No fevers. SPEP negative. Serum FLC reveals  KFLC 4.22mg/dL  LFLC 2.58mg/cL and K/L FLCR 1.64. Consulted for elevated kappa light chain.

## 2019-04-18 NOTE — PLAN OF CARE
04/18/19 1502   Final Note   Assessment Type Final Discharge Note   Anticipated Discharge Disposition SNF   Hospital Follow Up  Appt(s) scheduled? Yes   Discharge plans and expectations educations in teach back method with documentation complete? Yes   Right Care Referral Info   Post Acute Recommendation SNF / Sub-Acute Rehab   Referral Type SNF   Facility Name [facility]

## 2019-04-18 NOTE — NURSING
"Note that patient awake, alert and fully oriented c/o,"I need my anxiety pill, I don't know what to do about Destin."    Reviewed progress notes;  Asked patient if she recalls discussion from CM and nurse regarding transition to EB.  She reports that she vaguely remembers discussion;   Additionally reminded patient that Elly from [facility] met with her on yesterday;    PRN antianxiety med given per patient's request;   Will discuss any updates to care plan today to decrease anxiety;    Will continue to f/u;     "

## 2019-04-18 NOTE — CONSULTS
[facility]  Hematology/Oncology  Consult Note    Patient Name: Magaly Epstein  MRN: [id number]  Admission Date: 4/11/2019  Hospital Length of Stay: 7 days  Code Status: Full Code   Attending Provider: Phylicia Gastelum MD  Consulting Provider: Veena Ivey MD  Primary Care Physician: Tong Jones MD  Principal Problem:Hypertensive emergency    Inpatient consult to Hematology/Oncology - [facility]  Consult performed by: Veena Ivey MD  Consult ordered by: Phylicia Gastelum MD        Subjective:   REASON FOR CONSULTATION; Elevated kappa light chain   HPI:  Patient is a 68 y.o. female with essential HTN, type 2 diabetes mellitus , hyperlipidemia, anemia of chronic disease, and depression who presents to [facility] ED with complaints of bilateral lower extremity edema Pt also with anxiety and nausea No fevers, CP. She was admitted for hypertensive emergency with acute renal failure. She is followed by Nephrology for nephrotic syndrome and s/p kidney biopsy. Pathology pending. Pt also with anxiety and nausea. No fevers. SPEP negative. Serum FLC reveals  KFLC 4.22mg/dL  LFLC 2.58mg/cL and K/L FLCR 1.64. Consulted for elevated kappa light chain.           Oncology Treatment Plan:   [No treatment plan]    Medications:  Continuous Infusions:  Scheduled Meds:   amLODIPine  10 mg Oral Daily    bumetanide  2 mg Oral Daily    escitalopram oxalate  10 mg Oral Daily    ferrous gluconate  324 mg Oral BID WM    hydrALAZINE  100 mg Oral Q8H    insulin aspart U-100  3 Units Subcutaneous TIDWM    insulin detemir U-100  10 Units Subcutaneous QHS    pravastatin  10 mg Oral Daily     PRN Meds:acetaminophen, ALPRAZolam, dextrose 50%, dextrose 50%, glucagon (human recombinant), glucose, glucose, insulin aspart U-100, promethazine (PHENERGAN) IVPB, ramelteon, senna-docusate 8.6-50 mg     Review of patient's allergies indicates:   Allergen Reactions    Ciprofloxacin Anaphylaxis    Codeine  Anaphylaxis    Neosporin [benzalkonium chloride] Anaphylaxis        Past Medical History:   Diagnosis Date    Arthritis     Diabetes mellitus      Past Surgical History:   Procedure Laterality Date    APPENDECTOMY       Family History     None        Tobacco Use    Smoking status: Former Smoker   Substance and Sexual Activity    Alcohol use: Yes    Drug use: No    Sexual activity: Not on file       Review of Systems   Constitutional: Positive for appetite change and fatigue. Negative for fever and unexpected weight change.   HENT: Negative for mouth sores.    Eyes: Negative for visual disturbance.   Respiratory: Negative for cough and shortness of breath.    Cardiovascular: Positive for leg swelling. Negative for chest pain.   Gastrointestinal: Negative for abdominal pain and diarrhea.   Genitourinary: Negative for frequency.   Musculoskeletal: Negative for back pain.   Skin: Negative for rash.   Neurological: Positive for weakness. Negative for headaches.   Hematological: Negative for adenopathy.   Psychiatric/Behavioral: The patient is not nervous/anxious.      Objective:     Vital Signs (Most Recent):  Temp: 98.1 °F (36.7 °C) (04/18/19 0517)  Pulse: 80 (04/18/19 0517)  Resp: 18 (04/18/19 0517)  BP: (!) 169/71 (04/18/19 0517)  SpO2: (!) 94 % (04/18/19 0517) Vital Signs (24h Range):  Temp:  [97.4 °F (36.3 °C)-98.2 °F (36.8 °C)] 98.1 °F (36.7 °C)  Pulse:  [] 80  Resp:  [14-18] 18  SpO2:  [92 %-99 %] 94 %  BP: (140-191)/(63-92) 169/71     Weight: 104.3 kg (229 lb 15 oz)  Body mass index is 32.07 kg/m².  Body surface area is 2.29 meters squared.      Intake/Output Summary (Last 24 hours) at 4/18/2019 0858  Last data filed at 4/18/2019 0600  Gross per 24 hour   Intake 200 ml   Output 1400 ml   Net -1200 ml       Physical Exam   Constitutional: She is oriented to person, place, and time. She appears well-developed and well-nourished.   HENT:   Head: Normocephalic.   Mouth/Throat: Oropharynx is clear and  moist. No oropharyngeal exudate.   Eyes: Pupils are equal, round, and reactive to light. Conjunctivae and lids are normal. No scleral icterus.   Neck: Normal range of motion. Neck supple. No thyromegaly present.   Cardiovascular: Normal rate and regular rhythm.   Murmur heard.  Pulmonary/Chest: Breath sounds normal. She has no wheezes. She has no rales.   Abdominal: Soft. Bowel sounds are normal. She exhibits no distension and no mass. There is no hepatosplenomegaly. There is no tenderness. There is no rebound and no guarding.   Musculoskeletal: Normal range of motion. She exhibits edema. She exhibits no tenderness.   Neurological: She is alert and oriented to person, place, and time. No cranial nerve deficit. Coordination normal.   Skin: Skin is warm and dry. No ecchymosis, no petechiae and no rash noted. No erythema.   Psychiatric: She has a normal mood and affect.       Significant Labs:   All pertinent labs within the past 24 hours have been reviewed    Diagnostic Results:  I have reviewed and interpreted all pertinent imaging results/findings within the past 24 hours    Assessment/Plan:     Elevated serum immunoglobulin free light chains  Pt admitted with acute-on-chronic kidney disease  Pt diagnosed with Nephrotic syndrome  S/p kidney biopsy  Pathology pending    Serum FLC reveals  KFLC 4.22mg/dL  LFLC 2.58mg/cL and K/L FLCR 1.64  SPEP negative  Significance of mildly elevated KFLC unclear , ( in settiing of renal failure and nl K/L FLCR) possible inflammatory   Await bx results    Follow-up as outpatient    Nephrotic syndrome  Followed by Nephrology   S/p Kidney Biopsy   Pathology pending       Follow-up as outpatient in 2 weeks     Thank you for your consult.    Veena Ivey MD  Hematology/Oncology  [facility]

## 2019-04-18 NOTE — PLAN OF CARE
[facility]     Department of Hospital Medicine     [address] [place] [address]     [phone] after hours  [phone] fax       NURSING HOME ORDERS    04/18/2019    Admit to Nursing Home:  Skilled Bed                                             Diagnoses:  Active Hospital Problems    Diagnosis  POA    *Hypertensive emergency [I16.1]  Yes    Debility [R53.81]  Yes    Nephrotic syndrome [N04.9]  Yes    Acute renal failure superimposed on stage 3 chronic kidney disease [N17.9, N18.3]  Yes    Hypokalemia [E87.6]  Yes    Essential hypertension [I10]  Yes     Chronic    DM (diabetes mellitus) type II controlled with renal manifestation [E11.29]  Yes    Hyperlipidemia [E78.5]  Yes     Chronic    Anemia of chronic disease [D63.8]  Yes     Chronic    Depression [F32.9]  Yes     Chronic      Resolved Hospital Problems   No resolved problems to display.       Patient is homebound due to:  Hypertensive emergency    Allergies:  Review of patient's allergies indicates:   Allergen Reactions    Ciprofloxacin Anaphylaxis    Codeine Anaphylaxis    Neosporin [benzalkonium chloride] Anaphylaxis       Vitals:       Every shift (Skilled Nursing patients)    Diet: ADA 1800 jaquan,low salt     Acitivities:      - Up in a chair each morning as tolerated   - Ambulate with assistance to bathroom     - May use walker, cane, or self-propelled wheelchair   - Weight bearing: as tolerated.    LABS:  Per facility protocol    Nursing Precautions:    - Aspiration precautions:                     -  Upright 90 degrees befor during and after meals              - Fall precautions per nursing home protocol     - Decubitus precautions:        -  for positioning   - Pressure reducing foam mattress   - Turn patient every two hours. Use wedge pillows to anchor patient    CONSULTS:     Physical Therapy to evaluate and treat     Occupational Therapy to evaluate and  treat         MISCELLANEOUS CARE:                  Routine Skin for Bedridden Patients:  Apply moisture barrier cream to all    skin folds and wet areas in perineal area daily and after baths and                           all bowel movements.                                  DIABETES CARE:       Check blood sugar:   Fingerstick blood sugar a.m and p.m.    Fingerstick blood sugar AC and HS   Fingerstick blood sugar every 6 hours if unable to eat      Report CBG < 60 or > 400 to physician.                                          Insulin Sliding Scale          Glucose  Novolog Insulin Subcutaneous        0 - 60   Orange juice or glucose tablet, hold insulin      No insulin   201-250  2 units   251-300  4 units   301-350  6 units   351-400  8 units   >400   10 units then call physician      Medications: Discontinue all previous medication orders, if any. See new list below.     Magaly Epstein   Home Medication Instructions CARMENCITA:[id number]    Printed on:04/18/19 0909   Medication Information                      acetaminophen (TYLENOL) 500 MG tablet  Take 1 tablet (500 mg total) by mouth every 6 (six) hours as needed.for pain             ALPRAZolam (XANAX) 1 MG tablet  Take 0.5 tablets (0.5 mg total) by mouth 2 (two) times daily as needed for Anxiety.             amLODIPine (NORVASC) 10 MG tablet  Take 1 tablet (10 mg total) by mouth once daily.             bumetanide (BUMEX) 2 MG tablet  Take 1 tablet (2 mg total) by mouth once daily.             escitalopram oxalate (LEXAPRO) 10 MG tablet  Take 10 mg by mouth once daily.             hydrALAZINE (APRESOLINE) 100 MG tablet  Take 1 tablet (100 mg total) by mouth every 8 (eight) hours.             insulin aspart U-100 (NOVOLOG) 100 unit/mL InPn pen  Inject 3 Units into the skin 3 (three) times daily.before meals.             insulin detemir U-100 (LEVEMIR FLEXTOUCH) 100 unit/mL (3 mL) SubQ InPn pen  Inject 10 Units into the skin every evening.              pravastatin (PRAVACHOL) 10 MG tablet  Take 10 mg by mouth once daily.             senna-docusate 8.6-50 mg (PERICOLACE) 8.6-50 mg per tablet  Take 1 tablet by mouth 2 (two) times daily as needed for Constipation.                       _________________________________  Phylicia Gastelum MD  04/18/2019

## 2019-04-18 NOTE — DISCHARGE SUMMARY
[facility] Hospital Medicine  Discharge Summary      Patient Name: Magaly Epstein  MRN: [id number]  Admission Date: 4/11/2019  Hospital Length of Stay: 7 days  Discharge Date and Time:  04/18/2019 12:43 PM  Attending Physician: Phylicia Gastelum MD   Discharging Provider: Phylicia Gastelum MD  Primary Care Provider: Tong Jones MD      HPI:   Ms. Magaly Epstein is a 68 y.o. female with essential hypertension, type 2 diabetes mellitus (HbA1c unknown), hyperlipidemia (LDL under), anemia of chronic disease, and depression who presents to [facility] ED with complaints of bilateral lower extremity edema today.  She had a sense that her blood pressure was high for the past five days which she describes as a sensation of anxiety--she has had similar symptoms in the past.  Today however she noticed that her legs were swollen decided to come to the ED for further evaluation.  She has been feeling ill and has had some nausea with occasional nonbilious, nonbloody vomiting.  She denies any fevers, chills, chest pain, shortness of breath, palpitations, diaphoresis, nor hemoptysis.  She denies any lightheaded nor any dizziness, and did not have any falls or loss of consciousness.  Her appetite has been very poor but she denies any hematochezia, melena, vaginal bleeding, hematuria, hemoptysis, hematemesis, nor any coffee-ground emesis.  She has not been able to take her medications in the last five days due to nausea.  She does say that she is otherwise compliant with her medications and follows up regularly with her PCP.  She has not been started on any new medications.  She does not have a regular kidney doctor.    * No surgery found *      Hospital Course:   Ms. Epstein presented with ascities, and was admitted for hypertensive emergency with acute renal failure.  Patient was initially treated in the ICU with a Cardene drip.  Nephrology consulted.  She was noted to have hypervolemic  hyponatremia initiate treatment with diuresis.  Patient was able to weaned off Cardene drip and transitioned to p.o. medications. She has nephrotic syndrome ad nephrology was following, She was stepped down to the floor on 04/11.  Patient was severely deconditioned as well, and PT and OT were ordered with recommendation for SNF placement.  peripheral edema is much better,changed IV lasix to bumex.she had still elevated CRT,but stable kidney function.  Nephrology consulted IR and kidney biopsy was done,  Patient was accepted to SNF and was discharged with plan with PCP and nephrology follow up.     Consults:   Consults (From admission, onward)        Status Ordering Provider     [facility]  [facility]     Provider:  Veena Ivey MD    Completed ESSIE NERI     Inpatient consult to Interventional Radiology  Once     Provider:  (Not yet assigned)    Completed RADHA GEORGES     Inpatient consult to Nephrology  Once     Provider:  Nhan Narvaez MD    Completed BELINDA FOLEY     Inpatient consult to Nephrology  Once     Provider:  Anibal Walsh MD    Acknowledged NHAN NARVAEZ     Inpatient consult to Nephrology  Once     Provider:  Anibal Walsh MD    Completed ESSIE NERI     Inpatient consult to Social Work  Once     Provider:  (Not yet assigned)    Acknowledged ESSIE NERI     IP consult to case management  Once     Provider:  (Not yet assigned)    Completed BELINDA FOLEY            Final Active Diagnoses:    Diagnosis Date Noted POA    PRINCIPAL PROBLEM:  Hypertensive emergency [I16.1] 04/11/2019 Yes    Elevated serum immunoglobulin free light chains [R76.8] 04/18/2019 Unknown    Debility [R53.81] 04/15/2019 Yes    Nephrotic syndrome [N04.9] 04/15/2019 Yes    Acute renal failure superimposed on stage 3 chronic kidney disease [N17.9, N18.3] 04/11/2019 Yes    Hypokalemia [E87.6] 04/11/2019 Yes     Essential hypertension [I10] 04/11/2019 Yes     Chronic    DM (diabetes mellitus) type II controlled with renal manifestation [E11.29] 04/11/2019 Yes    Hyperlipidemia [E78.5] 04/11/2019 Yes     Chronic    Anemia of chronic disease [D63.8] 04/11/2019 Yes     Chronic    Depression [F32.9] 04/11/2019 Yes     Chronic      Problems Resolved During this Admission:       Discharged Condition: stable    Disposition: Skilled Nursing Facility    Follow Up:  Follow-up Information     Veena Ivey MD On 5/1/2019.    Specialties:  Hematology and Oncology, Hematology  Why:  @1:00pm, for Hospital Follow up  Contact information:  [address]  [address]  [place] [address]  [phone]             Anibal Walsh MD On 5/9/2019.    Specialty:  Nephrology  Why:  @2:30pm for kidney biopsy results  Contact information:  [address] [place] [address] [address]  [place] [address]  [phone]             [facility]    Specialties:  Nursing Home Agency, SNF Agency, LTAC  Why:  SNF  Contact information:  [address] [place] [address]  [phone]                 Patient Instructions:      Activity as tolerated       Significant Diagnostic Studies: Labs:   BMP:   Recent Labs   Lab 04/17/19  0517 04/18/19  0709   * 155*    141   K 3.7 3.8    102   CO2 28 28   BUN 41* 36*   CREATININE 1.9* 1.8*   CALCIUM 9.1 9.7   MG 1.6 1.7    and CBC No results for input(s): WBC, HGB, HCT, PLT in the last 48 hours.  Radiology: X-Ray: CXR: X-Ray Chest 1 View (CXR): No results found for this visit on 04/11/19. and X-Ray Chest PA and Lateral (CXR): No results found for this visit on 04/11/19.  Cardiac Graphics: Echocardiogram: 2D echo with color flow doppler: No results found for this or any previous visit. and Transthoracic echo (TTE) complete (Cupid Only): No results found for this or any previous visit.    Pending Diagnostic Studies:     Procedure Component Value Units Date/Time    CT Guided  Needle Placement [[id number]] Resulted:  04/17/19 1115    Order Status:  Sent Lab Status:  In process Updated:  04/17/19 1214    Cryoglobulin [[id number]] Collected:  04/12/19 0610    Order Status:  Sent Lab Status:  In process Updated:  04/12/19 0639    Specimen:  Blood     Cytology Specimen-FNA-Radiology Assisted with Path Adequacy-Core BX [[id number]] Collected:  04/17/19 1204    Order Status:  Sent Lab Status:  In process Updated:  04/17/19 1248    Specimen:  Kidney, right from Other specimen location (radiology assisted with on site path adequacy)     IR Biopsy Kidney [[id number]] Resulted:  04/17/19 1114    Order Status:  Sent Lab Status:  In process Updated:  04/17/19 1210         Medications:  Reconciled Home Medications:      Medication List      START taking these medications    acetaminophen 500 MG tablet  Commonly known as:  TYLENOL  Take 1 tablet (500 mg total) by mouth every 6 (six) hours as needed.     ALPRAZolam 1 MG tablet  Commonly known as:  XANAX  Take 0.5 tablets (0.5 mg total) by mouth 2 (two) times daily as needed for Anxiety.     amLODIPine 10 MG tablet  Commonly known as:  NORVASC  Take 1 tablet (10 mg total) by mouth once daily.  Start taking on:  4/19/2019     bumetanide 2 MG tablet  Commonly known as:  BUMEX  Take 1 tablet (2 mg total) by mouth once daily.  Start taking on:  4/19/2019     hydrALAZINE 100 MG tablet  Commonly known as:  APRESOLINE  Take 1 tablet (100 mg total) by mouth every 8 (eight) hours.     insulin aspart U-100 100 unit/mL Inpn pen  Commonly known as:  NovoLOG  Inject 3 Units into the skin 3 (three) times daily.     insulin detemir U-100 100 unit/mL (3 mL) Inpn pen  Commonly known as:  LEVEMIR FLEXTOUCH  Inject 10 Units into the skin every evening.     senna-docusate 8.6-50 mg 8.6-50 mg per tablet  Commonly known as:  PERICOLACE  Take 1 tablet by mouth 2 (two) times daily as needed for Constipation.        CONTINUE taking these medications    escitalopram oxalate 10 MG  tablet  Commonly known as:  LEXAPRO  Take 10 mg by mouth once daily.     pravastatin 10 MG tablet  Commonly known as:  PRAVACHOL  Take 10 mg by mouth once daily.        STOP taking these medications    benazepril 20 MG tablet  Commonly known as:  LOTENSIN     NovoLIN 70/30 U-100 Insulin 100 unit/mL (70-30) injection  Generic drug:  insulin NPH-insulin regular (70/30)            Indwelling Lines/Drains at time of discharge:   Lines/Drains/Airways          None          Time spent on the discharge of patient: over 30  minutes  Patient was seen and examined on the date of discharge and determined to be suitable for discharge.         Phylicia Gastelum MD  Department of Hospital Medicine  [facility]

## 2019-04-18 NOTE — NURSING
Discharge Preparation:     Note that patient awake, alert and oriented; Patient aware that she is transitioning to [facility];     Case Management coordination complete;     IV and telemetry monitor removed with no bleeding observed;   Patient denies pain;     Called Nurse Sudeep at [facility]  [phone]) and report given;     Awaiting transportation for transition to nursing home;     Will continue to f/u;

## 2019-04-22 LAB — CRYOGLOB SER QL: NORMAL

## 2019-05-01 ENCOUNTER — TELEPHONE (OUTPATIENT)
Dept: HEMATOLOGY/ONCOLOGY | Facility: CLINIC | Age: 69
End: 2019-05-01

## 2019-05-16 NOTE — SUBJECTIVE & OBJECTIVE
Interval History:  Patient reports feeling better with swelling down and feeling little stronger.    Review of Systems   Constitutional: Negative for chills and fever.   Respiratory: Negative for shortness of breath.    Cardiovascular: Negative for chest pain.     Objective:     Vital signs: Reviewed  Weight: 104.3 kg (229 lb 15 oz)  Body mass index is 32.07 kg/m².  Intake or output data in the 24 hours: Reviewed    Physical Exam   Constitutional: She is oriented to person, place, and time. She appears well-developed and well-nourished. No distress.   HENT:   Head: Normocephalic and atraumatic.   Eyes: Left eye exhibits no discharge.   Neck: Normal range of motion.   Cardiovascular:   Regular rate and rhythm with a Grade IV/VI holosystolic murmur   Pulmonary/Chest:   Mildly increased respiratory effort with bibasilar crackles bilaterally.   Abdominal: Soft. Bowel sounds are normal. She exhibits no distension. There is no tenderness. There is no rebound and no guarding.   Musculoskeletal: Normal range of motion. She exhibits edema (There is significant 1+ pitting edema to the legs bilaterally to the level of the knees., improved).   Neurological: She is alert and oriented to person, place, and time.   Skin: Skin is warm and dry. She is not diaphoretic. No erythema.   Psychiatric: She has a normal mood and affect. Her behavior is normal. Judgment and thought content normal.   Nursing note and vitals reviewed.      Significant Labs: All pertinent labs within the past 24 hours have been reviewed.    Significant Imaging: I have reviewed and interpreted all pertinent imaging results/findings within the past 24 hours.

## 2019-05-16 NOTE — PROGRESS NOTES
[facility] Hospital Medicine  Progress Note    Patient Name: Magaly Epstein  MRN: [id number]  Patient Class: IP- Inpatient   Admission Date: 4/11/2019  Attending Physician: Shanita Pettit MD  Primary Care Provider: Tong Jones MD      Subjective:     Principal Problem:Hypertensive emergency    HPI:  Ms. Magaly Epstein is a 68 y.o. female with essential hypertension, type 2 diabetes mellitus (HbA1c unknown), hyperlipidemia (LDL under), anemia of chronic disease, and depression who presents to [facility] ED with complaints of bilateral lower extremity edema today.  She had a sense that her blood pressure was high for the past five days which she describes as a sensation of anxiety--she has had similar symptoms in the past.  Today however she noticed that her legs were swollen decided to come to the ED for further evaluation.  She has been feeling ill and has had some nausea with occasional nonbilious, nonbloody vomiting.  She denies any fevers, chills, chest pain, shortness of breath, palpitations, diaphoresis, nor hemoptysis.  She denies any lightheaded nor any dizziness, and did not have any falls or loss of consciousness.  Her appetite has been very poor but she denies any hematochezia, melena, vaginal bleeding, hematuria, hemoptysis, hematemesis, nor any coffee-ground emesis.  She has not been able to take her medications in the last five days due to nausea.  She does say that she is otherwise compliant with her medications and follows up regularly with her PCP.  She has not been started on any new medications.  She does not have a regular kidney doctor.    Hospital Course:  Ms. Epstein presented with ascities, and was admitted for hypertensive emergency with acute renal failure.  Patient was initially treated in the ICU with a Cardene drip.  Nephrology consulted.  She was noted to have hypervolemic hyponatremia initiate treatment with diuresis.  Patient was able to weaned off Cardene  drip and transitioned to p.o. medications. She has nephrotic syndrome ad nephrology was following, She was stepped down to the floor on 04/11.      Interval History:  Patient reports feeling better a little bit but very weak and tired.    Review of Systems   Constitutional: Negative for chills and fever.   Respiratory: Negative for shortness of breath.    Cardiovascular: Negative for chest pain.     Objective:     Vital signs: Reviewed  Weight: 104.3 kg (229 lb 15 oz)  Body mass index is 32.07 kg/m².  Intake or output data in the 24 hours: Reviewed    Physical Exam   Constitutional: She is oriented to person, place, and time. She appears well-developed and well-nourished. No distress.   HENT:   Head: Normocephalic and atraumatic.   Eyes: Left eye exhibits no discharge.   Neck: Normal range of motion.   Cardiovascular:   Regular rate and rhythm with a Grade IV/VI holosystolic murmur   Pulmonary/Chest:   Mildly increased respiratory effort with bibasilar crackles bilaterally.   Abdominal: Soft. Bowel sounds are normal. She exhibits no distension. There is no tenderness. There is no rebound and no guarding.   Musculoskeletal: Normal range of motion. She exhibits edema (There is significant 1+ pitting edema to the legs bilaterally to the level of the knees)   Neurological: She is alert and oriented to person, place, and time.   Skin: Skin is warm and dry. She is not diaphoretic. No erythema.   Psychiatric: She has a normal mood and affect. Her behavior is normal. Judgment and thought content normal.   Nursing note and vitals reviewed.      Significant Labs: All pertinent labs within the past 24 hours have been reviewed.    Significant Imaging: I have reviewed and interpreted all pertinent imaging results/findings within the past 24 hours.    Assessment/Plan:      * Hypertensive emergency  She presented with initial blood pressure 180/69 () but went up as high as 213/100 ()  She is only on benazepril at  home for high blood pressure control  Of note, her creatinine is 1.9 though it is unclear whether this is chronic or acute  Last lab work we have for her was back in 2008.    s/p nicardipine infusion which was quickly weaned off and stepped down on 4/11.  Blood pressure markedly improved on amlodipine and hydralazine along with diuresis  Will further titrate medications as needed    Acute renal failure superimposed on stage 3 chronic kidney disease  We do not have any recent lab work to which to compare the chronicity of her renal failure  Appreciate Nephrology input  Likely with chronic kidney disease stage 3  Will continue to monitor  As per Nephrology    Nephrotic syndrome  Peripheral edema is slowly improving  As per Nephrology    Debility  Consulted PT and OT and patient will need a lot of support  Will need SNF placement    Depression  Continue her home regimen of escitalopram.    Anemia of chronic disease  Unfortunately, we do not recent lab work to which to compare the chronicity of her anemia.   H&H has been stable and without bleeding  No further monitoring needed    Hyperlipidemia  Continue pravastatin.    DM (diabetes mellitus) type II controlled with renal manifestation  Continue basal-prandial insulin therapy along with insulin sliding scale.  Goal glucose during hospital stay is 140-180  Hemoglobin A1C   Date Value Ref Range Status   04/11/2019 7.1 (H) 4.0 - 5.6 % Final       Essential hypertension  As addressed above.    Hypokalemia  Will replete as needed and continue monitor      VTE Risk Mitigation (From admission, onward)        Ordered     IP VTE HIGH RISK PATIENT  Once      04/11/19 0550              Shanita Pettit MD  Department of Hospital Medicine   [facility]

## 2019-05-16 NOTE — PROGRESS NOTES
[facility] Hospital Medicine  Progress Note    Patient Name: Magaly Epstein  MRN: [id number]  Patient Class: IP- Inpatient   Admission Date: 4/11/2019  Attending Physician: Shanita Pettit MD  Primary Care Provider: Tong Jones MD      Subjective:     Principal Problem:Hypertensive emergency    HPI:  Ms. Magaly Epstein is a 68 y.o. female with essential hypertension, type 2 diabetes mellitus (HbA1c unknown), hyperlipidemia (LDL under), anemia of chronic disease, and depression who presents to [facility] ED with complaints of bilateral lower extremity edema today.  She had a sense that her blood pressure was high for the past five days which she describes as a sensation of anxiety--she has had similar symptoms in the past.  Today however she noticed that her legs were swollen decided to come to the ED for further evaluation.  She has been feeling ill and has had some nausea with occasional nonbilious, nonbloody vomiting.  She denies any fevers, chills, chest pain, shortness of breath, palpitations, diaphoresis, nor hemoptysis.  She denies any lightheaded nor any dizziness, and did not have any falls or loss of consciousness.  Her appetite has been very poor but she denies any hematochezia, melena, vaginal bleeding, hematuria, hemoptysis, hematemesis, nor any coffee-ground emesis.  She has not been able to take her medications in the last five days due to nausea.  She does say that she is otherwise compliant with her medications and follows up regularly with her PCP.  She has not been started on any new medications.  She does not have a regular kidney doctor.    Hospital Course:  Ms. Epstein presented with ascities, and was admitted for hypertensive emergency with acute renal failure.  Patient was initially treated in the ICU with a Cardene drip.  Nephrology consulted.  She was noted to have hypervolemic hyponatremia initiate treatment with diuresis.  Patient was able to weaned off Cardene  drip and transitioned to p.o. medications. She has nephrotic syndrome ad nephrology was following, She was stepped down to the floor on 04/11.  Patient was severely deconditioned as well, and PT and OT were ordered with recommendation for SNF placement.    Interval History:  Patient reports feeling better with swelling down and feeling little stronger.    Review of Systems   Constitutional: Negative for chills and fever.   Respiratory: Negative for shortness of breath.    Cardiovascular: Negative for chest pain.     Objective:     Vital signs: Reviewed  Weight: 104.3 kg (229 lb 15 oz)  Body mass index is 32.07 kg/m².  Intake or output data in the 24 hours: Reviewed    Physical Exam   Constitutional: She is oriented to person, place, and time. She appears well-developed and well-nourished. No distress.   HENT:   Head: Normocephalic and atraumatic.   Eyes: Left eye exhibits no discharge.   Neck: Normal range of motion.   Cardiovascular:   Regular rate and rhythm with a Grade IV/VI holosystolic murmur   Pulmonary/Chest:   Mildly increased respiratory effort with bibasilar crackles bilaterally.   Abdominal: Soft. Bowel sounds are normal. She exhibits no distension. There is no tenderness. There is no rebound and no guarding.   Musculoskeletal: Normal range of motion. She exhibits edema (There is significant 1+ pitting edema to the legs bilaterally to the level of the knees, improving).   Neurological: She is alert and oriented to person, place, and time.   Skin: Skin is warm and dry. She is not diaphoretic. No erythema.   Psychiatric: She has a normal mood and affect. Her behavior is normal. Judgment and thought content normal.   Nursing note and vitals reviewed.      Significant Labs: All pertinent labs within the past 24 hours have been reviewed.    Significant Imaging: I have reviewed and interpreted all pertinent imaging results/findings within the past 24 hours.    Assessment/Plan:      * Hypertensive  emergency  She presented with initial blood pressure 180/69 () but went up as high as 213/100 ()  She is only on benazepril at home for high blood pressure control  Of note, her creatinine is 1.9 though it is unclear whether this is chronic or acute  Last lab work we have for her was back in 2008.    s/p nicardipine infusion which was quickly weaned off  Blood pressure markedly improved on amlodipine and hydralazine along with diuresis  Will further titrate medications as needed    Acute renal failure superimposed on stage 3 chronic kidney disease  We do not have any recent lab work to which to compare the chronicity of her renal failure  Appreciate Nephrology input  Likely with chronic kidney disease stage 3  Will continue to monitor  As per Nephrology    Debility  Continue PT and OT  Will need SNF placement    Depression  Continue her home regimen of escitalopram.    Anemia of chronic disease  Unfortunately, we do not recent lab work to which to compare the chronicity of her anemia.   H&H has been stable and without bleeding  No further monitoring needed    Hyperlipidemia  Continue pravastatin.    DM (diabetes mellitus) type II controlled with renal manifestation  Continue basal-prandial insulin therapy along with insulin sliding scale.  Goal glucose during hospital stay is 140-180  Hemoglobin A1C   Date Value Ref Range Status   04/11/2019 7.1 (H) 4.0 - 5.6 % Final       Essential hypertension  As addressed above.    Hypokalemia  Will replete as needed and continue monitor      VTE Risk Mitigation (From admission, onward)        Ordered     IP VTE HIGH RISK PATIENT  Once      04/11/19 0523              Shanita Pettit MD  Department of Hospital Medicine   [facility]

## 2019-05-16 NOTE — SUBJECTIVE & OBJECTIVE
Interval History:  Patient reports feeling better with swelling down.    Review of Systems   Constitutional: Negative for chills and fever.   Respiratory: Negative for shortness of breath.    Cardiovascular: Negative for chest pain.     Objective:     Vital signs: Reviewed  Weight: 104.3 kg (229 lb 15 oz)  Body mass index is 32.07 kg/m².  Intake or output data in the 24 hours: Reviewed    Physical Exam   Constitutional: She is oriented to person, place, and time. She appears well-developed and well-nourished. No distress.   HENT:   Head: Normocephalic and atraumatic.   Eyes: Left eye exhibits no discharge.   Neck: Normal range of motion.   Cardiovascular:   Regular rate and rhythm with a Grade IV/VI holosystolic murmur   Pulmonary/Chest:   Mildly increased respiratory effort with bibasilar crackles bilaterally.   Abdominal: Soft. Bowel sounds are normal. She exhibits no distension. There is no tenderness. There is no rebound and no guarding.   Musculoskeletal: Normal range of motion. She exhibits edema (There is significant 1+ pitting edema to the legs bilaterally to the level of the knees., improved).   Neurological: She is alert and oriented to person, place, and time.   Skin: Skin is warm and dry. She is not diaphoretic. No erythema.   Psychiatric: She has a normal mood and affect. Her behavior is normal. Judgment and thought content normal.   Nursing note and vitals reviewed.      Significant Labs: All pertinent labs within the past 24 hours have been reviewed.    Significant Imaging: I have reviewed and interpreted all pertinent imaging results/findings within the past 24 hours.

## 2019-09-25 DIAGNOSIS — R06.02 SHORTNESS OF BREATH: Primary | ICD-10-CM

## 2019-10-03 ENCOUNTER — HOSPITAL ENCOUNTER (INPATIENT)
Facility: HOSPITAL | Age: 69
LOS: 14 days | Discharge: NURSING, REHAB OR LONG-TERM CARE | DRG: 304 | End: 2019-10-17
Attending: EMERGENCY MEDICINE | Admitting: EMERGENCY MEDICINE
Payer: MEDICARE

## 2019-10-03 DIAGNOSIS — E78.5 HYPERLIPIDEMIA: Chronic | ICD-10-CM

## 2019-10-03 DIAGNOSIS — N18.30 ACUTE RENAL FAILURE SUPERIMPOSED ON STAGE 3 CHRONIC KIDNEY DISEASE: ICD-10-CM

## 2019-10-03 DIAGNOSIS — R53.81 DEBILITY: ICD-10-CM

## 2019-10-03 DIAGNOSIS — R06.02 SHORTNESS OF BREATH: ICD-10-CM

## 2019-10-03 DIAGNOSIS — N17.9 ACUTE KIDNEY INJURY: ICD-10-CM

## 2019-10-03 DIAGNOSIS — N17.9 ACUTE RENAL FAILURE SUPERIMPOSED ON STAGE 3 CHRONIC KIDNEY DISEASE, UNSPECIFIED ACUTE RENAL FAILURE TYPE: ICD-10-CM

## 2019-10-03 DIAGNOSIS — N18.3 ACUTE RENAL FAILURE SUPERIMPOSED ON STAGE 3 CHRONIC KIDNEY DISEASE, UNSPECIFIED ACUTE RENAL FAILURE TYPE: ICD-10-CM

## 2019-10-03 DIAGNOSIS — R79.89 TROPONIN LEVEL ELEVATED: ICD-10-CM

## 2019-10-03 DIAGNOSIS — E11.29 TYPE 2 DIABETES MELLITUS WITH RENAL COMPLICATION: Chronic | ICD-10-CM

## 2019-10-03 DIAGNOSIS — D63.8 ANEMIA OF CHRONIC DISEASE: Chronic | ICD-10-CM

## 2019-10-03 DIAGNOSIS — N17.9 ACUTE RENAL FAILURE SUPERIMPOSED ON STAGE 3 CHRONIC KIDNEY DISEASE: ICD-10-CM

## 2019-10-03 DIAGNOSIS — E78.2 MIXED HYPERLIPIDEMIA: Chronic | ICD-10-CM

## 2019-10-03 DIAGNOSIS — I10 ESSENTIAL HYPERTENSION: Chronic | ICD-10-CM

## 2019-10-03 DIAGNOSIS — R09.02 HYPOXEMIA: ICD-10-CM

## 2019-10-03 DIAGNOSIS — R79.89 ELEVATED TROPONIN: ICD-10-CM

## 2019-10-03 DIAGNOSIS — E87.70 HYPERVOLEMIA, UNSPECIFIED HYPERVOLEMIA TYPE: ICD-10-CM

## 2019-10-03 DIAGNOSIS — I16.1 HYPERTENSIVE EMERGENCY: Primary | ICD-10-CM

## 2019-10-03 DIAGNOSIS — I50.9 CONGESTIVE HEART FAILURE, UNSPECIFIED HF CHRONICITY, UNSPECIFIED HEART FAILURE TYPE: ICD-10-CM

## 2019-10-03 DIAGNOSIS — E11.22 TYPE 2 DIABETES MELLITUS WITH DIABETIC CHRONIC KIDNEY DISEASE, UNSPECIFIED CKD STAGE, UNSPECIFIED WHETHER LONG TERM INSULIN USE: Chronic | ICD-10-CM

## 2019-10-03 DIAGNOSIS — I50.9 CHF (CONGESTIVE HEART FAILURE): ICD-10-CM

## 2019-10-03 DIAGNOSIS — N04.9 NEPHROTIC SYNDROME: ICD-10-CM

## 2019-10-03 PROBLEM — E87.6 HYPOKALEMIA: Status: RESOLVED | Noted: 2019-04-11 | Resolved: 2019-10-03

## 2019-10-03 LAB
ALBUMIN SERPL BCP-MCNC: 2.7 G/DL (ref 3.5–5.2)
ALLENS TEST: ABNORMAL
ALP SERPL-CCNC: 193 U/L (ref 55–135)
ALT SERPL W/O P-5'-P-CCNC: 40 U/L (ref 10–44)
ANION GAP SERPL CALC-SCNC: 9 MMOL/L (ref 8–16)
AORTIC ROOT ANNULUS: 2.78 CM
AORTIC VALVE CUSP SEPERATION: 2.05 CM
AST SERPL-CCNC: 64 U/L (ref 10–40)
AV INDEX (PROSTH): 0.48
AV MEAN GRADIENT: 6 MMHG
AV PEAK GRADIENT: 9 MMHG
AV VALVE AREA: 2.11 CM2
AV VELOCITY RATIO: 0.58
BASOPHILS # BLD AUTO: 0.03 K/UL (ref 0–0.2)
BASOPHILS NFR BLD: 0.3 % (ref 0–1.9)
BILIRUB SERPL-MCNC: 0.2 MG/DL (ref 0.1–1)
BNP SERPL-MCNC: 349 PG/ML (ref 0–99)
BSA FOR ECHO PROCEDURE: 2.4 M2
BUN SERPL-MCNC: 37 MG/DL (ref 8–23)
CALCIUM SERPL-MCNC: 8.1 MG/DL (ref 8.7–10.5)
CHLORIDE SERPL-SCNC: 105 MMOL/L (ref 95–110)
CO2 SERPL-SCNC: 23 MMOL/L (ref 23–29)
CREAT SERPL-MCNC: 3.5 MG/DL (ref 0.5–1.4)
CV ECHO LV RWT: 0.47 CM
DELSYS: ABNORMAL
DIFFERENTIAL METHOD: ABNORMAL
DOP CALC AO PEAK VEL: 1.54 M/S
DOP CALC AO VTI: 37.11 CM
DOP CALC LVOT AREA: 4.4 CM2
DOP CALC LVOT DIAMETER: 2.36 CM
DOP CALC LVOT PEAK VEL: 0.9 M/S
DOP CALC LVOT STROKE VOLUME: 78.13 CM3
DOP CALCLVOT PEAK VEL VTI: 17.87 CM
E WAVE DECELERATION TIME: 151.43 MSEC
E/A RATIO: 1.23
ECHO LV POSTERIOR WALL: 1.21 CM (ref 0.6–1.1)
EOSINOPHIL # BLD AUTO: 0.2 K/UL (ref 0–0.5)
EOSINOPHIL NFR BLD: 2.1 % (ref 0–8)
EP: 6
ERYTHROCYTE [DISTWIDTH] IN BLOOD BY AUTOMATED COUNT: 13.6 % (ref 11.5–14.5)
ERYTHROCYTE [SEDIMENTATION RATE] IN BLOOD BY WESTERGREN METHOD: 10 MM/H
EST. GFR  (AFRICAN AMERICAN): 15 ML/MIN/1.73 M^2
EST. GFR  (NON AFRICAN AMERICAN): 13 ML/MIN/1.73 M^2
ESTIMATED AVG GLUCOSE: 169 MG/DL (ref 68–131)
FIO2: 100
FRACTIONAL SHORTENING: 22 % (ref 28–44)
GLUCOSE SERPL-MCNC: 314 MG/DL (ref 70–110)
HBA1C MFR BLD HPLC: 7.5 % (ref 4–5.6)
HCO3 UR-SCNC: 22.4 MMOL/L (ref 24–28)
HCT VFR BLD AUTO: 29.6 % (ref 37–48.5)
HGB BLD-MCNC: 9.5 G/DL (ref 12–16)
INR PPP: 0.9 (ref 0.8–1.2)
INTERVENTRICULAR SEPTUM: 1.32 CM (ref 0.6–1.1)
IP: 15
IVRT: 0.11 MSEC
LA MAJOR: 6.27 CM
LA MINOR: 4.38 CM
LEFT ATRIUM SIZE: 3.77 CM
LEFT INTERNAL DIMENSION IN SYSTOLE: 4 CM (ref 2.1–4)
LEFT VENTRICLE DIASTOLIC VOLUME INDEX: 54.31 ML/M2
LEFT VENTRICLE DIASTOLIC VOLUME: 125.43 ML
LEFT VENTRICLE MASS INDEX: 114 G/M2
LEFT VENTRICLE SYSTOLIC VOLUME INDEX: 30.3 ML/M2
LEFT VENTRICLE SYSTOLIC VOLUME: 69.9 ML
LEFT VENTRICULAR INTERNAL DIMENSION IN DIASTOLE: 5.13 CM (ref 3.5–6)
LEFT VENTRICULAR MASS: 262.22 G
LYMPHOCYTES # BLD AUTO: 3.7 K/UL (ref 1–4.8)
LYMPHOCYTES NFR BLD: 36.2 % (ref 18–48)
MCH RBC QN AUTO: 29.7 PG (ref 27–31)
MCHC RBC AUTO-ENTMCNC: 32.1 G/DL (ref 32–36)
MCV RBC AUTO: 93 FL (ref 82–98)
MIN VOL: 19.5
MODE: ABNORMAL
MONOCYTES # BLD AUTO: 0.7 K/UL (ref 0.3–1)
MONOCYTES NFR BLD: 6.8 % (ref 4–15)
MV PEAK A VEL: 0.98 M/S
MV PEAK E VEL: 1.21 M/S
NEUTROPHILS # BLD AUTO: 5.6 K/UL (ref 1.8–7.7)
NEUTROPHILS NFR BLD: 54.6 % (ref 38–73)
PCO2 BLDA: 42.3 MMHG (ref 35–45)
PH SMN: 7.33 [PH] (ref 7.35–7.45)
PISA TR MAX VEL: 2.27 M/S
PLATELET # BLD AUTO: 328 K/UL (ref 150–350)
PMV BLD AUTO: 9.6 FL (ref 9.2–12.9)
PO2 BLDA: 365 MMHG (ref 80–100)
POC BE: -3 MMOL/L
POC SATURATED O2: 100 % (ref 95–100)
POC TCO2: 24 MMOL/L (ref 23–27)
POCT GLUCOSE: 164 MG/DL (ref 70–110)
POCT GLUCOSE: 211 MG/DL (ref 70–110)
POCT GLUCOSE: 239 MG/DL (ref 70–110)
POCT GLUCOSE: 268 MG/DL (ref 70–110)
POTASSIUM SERPL-SCNC: 3.9 MMOL/L (ref 3.5–5.1)
PROT SERPL-MCNC: 6.1 G/DL (ref 6–8.4)
PROTHROMBIN TIME: 9.7 SEC (ref 9–12.5)
PV PEAK VELOCITY: 0.95 CM/S
RA MAJOR: 5.45 CM
RA PRESSURE: 8 MMHG
RBC # BLD AUTO: 3.2 M/UL (ref 4–5.4)
RIGHT VENTRICULAR END-DIASTOLIC DIMENSION: 3.43 CM
SAMPLE: ABNORMAL
SINUS: 3.12 CM
SITE: ABNORMAL
SODIUM SERPL-SCNC: 137 MMOL/L (ref 136–145)
SP02: 100
SPONT RATE: 29
STJ: 2.6 CM
TR MAX PG: 21 MMHG
TRICUSPID ANNULAR PLANE SYSTOLIC EXCURSION: 2.66 CM
TROPONIN I SERPL DL<=0.01 NG/ML-MCNC: 0.05 NG/ML (ref 0–0.03)
TROPONIN I SERPL DL<=0.01 NG/ML-MCNC: 0.21 NG/ML (ref 0–0.03)
TROPONIN I SERPL DL<=0.01 NG/ML-MCNC: 0.26 NG/ML (ref 0–0.03)
TV REST PULMONARY ARTERY PRESSURE: 29 MMHG
WBC # BLD AUTO: 10.24 K/UL (ref 3.9–12.7)

## 2019-10-03 PROCEDURE — 63600175 PHARM REV CODE 636 W HCPCS: Performed by: EMERGENCY MEDICINE

## 2019-10-03 PROCEDURE — 36415 COLL VENOUS BLD VENIPUNCTURE: CPT

## 2019-10-03 PROCEDURE — 83880 ASSAY OF NATRIURETIC PEPTIDE: CPT

## 2019-10-03 PROCEDURE — 36600 WITHDRAWAL OF ARTERIAL BLOOD: CPT

## 2019-10-03 PROCEDURE — 85610 PROTHROMBIN TIME: CPT

## 2019-10-03 PROCEDURE — S0171 BUMETANIDE 0.5 MG: HCPCS | Performed by: INTERNAL MEDICINE

## 2019-10-03 PROCEDURE — 83036 HEMOGLOBIN GLYCOSYLATED A1C: CPT

## 2019-10-03 PROCEDURE — 27000221 HC OXYGEN, UP TO 24 HOURS

## 2019-10-03 PROCEDURE — 85025 COMPLETE CBC W/AUTO DIFF WBC: CPT

## 2019-10-03 PROCEDURE — 80053 COMPREHEN METABOLIC PANEL: CPT

## 2019-10-03 PROCEDURE — 25000242 PHARM REV CODE 250 ALT 637 W/ HCPCS: Performed by: EMERGENCY MEDICINE

## 2019-10-03 PROCEDURE — 93010 EKG 12-LEAD: ICD-10-PCS | Mod: ,,, | Performed by: INTERNAL MEDICINE

## 2019-10-03 PROCEDURE — 96374 THER/PROPH/DIAG INJ IV PUSH: CPT

## 2019-10-03 PROCEDURE — 96375 TX/PRO/DX INJ NEW DRUG ADDON: CPT

## 2019-10-03 PROCEDURE — 93005 ELECTROCARDIOGRAM TRACING: CPT

## 2019-10-03 PROCEDURE — 25000003 PHARM REV CODE 250: Performed by: EMERGENCY MEDICINE

## 2019-10-03 PROCEDURE — 25000003 PHARM REV CODE 250: Performed by: HOSPITALIST

## 2019-10-03 PROCEDURE — 84484 ASSAY OF TROPONIN QUANT: CPT | Mod: 91

## 2019-10-03 PROCEDURE — 20000000 HC ICU ROOM

## 2019-10-03 PROCEDURE — 99285 EMERGENCY DEPT VISIT HI MDM: CPT | Mod: 25

## 2019-10-03 PROCEDURE — 63600175 PHARM REV CODE 636 W HCPCS: Performed by: HOSPITALIST

## 2019-10-03 PROCEDURE — 94660 CPAP INITIATION&MGMT: CPT

## 2019-10-03 PROCEDURE — 27000190 HC CPAP FULL FACE MASK W/VALVE

## 2019-10-03 PROCEDURE — 99900035 HC TECH TIME PER 15 MIN (STAT)

## 2019-10-03 PROCEDURE — 93010 ELECTROCARDIOGRAM REPORT: CPT | Mod: 76,,, | Performed by: INTERNAL MEDICINE

## 2019-10-03 PROCEDURE — S0171 BUMETANIDE 0.5 MG: HCPCS | Performed by: EMERGENCY MEDICINE

## 2019-10-03 PROCEDURE — 93010 ELECTROCARDIOGRAM REPORT: CPT | Mod: ,,, | Performed by: INTERNAL MEDICINE

## 2019-10-03 PROCEDURE — 25000242 PHARM REV CODE 250 ALT 637 W/ HCPCS: Performed by: HOSPITALIST

## 2019-10-03 PROCEDURE — 25000003 PHARM REV CODE 250: Performed by: INTERNAL MEDICINE

## 2019-10-03 PROCEDURE — 82803 BLOOD GASES ANY COMBINATION: CPT

## 2019-10-03 PROCEDURE — 94761 N-INVAS EAR/PLS OXIMETRY MLT: CPT

## 2019-10-03 PROCEDURE — S0028 INJECTION, FAMOTIDINE, 20 MG: HCPCS | Performed by: EMERGENCY MEDICINE

## 2019-10-03 PROCEDURE — 94640 AIRWAY INHALATION TREATMENT: CPT

## 2019-10-03 RX ORDER — BUMETANIDE 0.25 MG/ML
1 INJECTION INTRAMUSCULAR; INTRAVENOUS
Status: COMPLETED | OUTPATIENT
Start: 2019-10-03 | End: 2019-10-03

## 2019-10-03 RX ORDER — HYDRALAZINE HYDROCHLORIDE 25 MG/1
100 TABLET, FILM COATED ORAL EVERY 8 HOURS
Status: DISCONTINUED | OUTPATIENT
Start: 2019-10-03 | End: 2019-10-15

## 2019-10-03 RX ORDER — AMLODIPINE BESYLATE 5 MG/1
10 TABLET ORAL DAILY
Status: DISCONTINUED | OUTPATIENT
Start: 2019-10-03 | End: 2019-10-16

## 2019-10-03 RX ORDER — INSULIN ASPART 100 [IU]/ML
15 INJECTION, SUSPENSION SUBCUTANEOUS 2 TIMES DAILY WITH MEALS
Status: DISCONTINUED | OUTPATIENT
Start: 2019-10-03 | End: 2019-10-07

## 2019-10-03 RX ORDER — NITROGLYCERIN 20 MG/100ML
5 INJECTION INTRAVENOUS CONTINUOUS
Status: DISCONTINUED | OUTPATIENT
Start: 2019-10-03 | End: 2019-10-04

## 2019-10-03 RX ORDER — IPRATROPIUM BROMIDE AND ALBUTEROL SULFATE 2.5; .5 MG/3ML; MG/3ML
3 SOLUTION RESPIRATORY (INHALATION) EVERY 4 HOURS
Status: DISCONTINUED | OUTPATIENT
Start: 2019-10-03 | End: 2019-10-03

## 2019-10-03 RX ORDER — IBUPROFEN 200 MG
16 TABLET ORAL
Status: DISCONTINUED | OUTPATIENT
Start: 2019-10-03 | End: 2019-10-17 | Stop reason: HOSPADM

## 2019-10-03 RX ORDER — SODIUM CHLORIDE 0.9 % (FLUSH) 0.9 %
10 SYRINGE (ML) INJECTION
Status: DISCONTINUED | OUTPATIENT
Start: 2019-10-03 | End: 2019-10-17 | Stop reason: HOSPADM

## 2019-10-03 RX ORDER — ACETAMINOPHEN 325 MG/1
650 TABLET ORAL EVERY 4 HOURS PRN
Status: DISCONTINUED | OUTPATIENT
Start: 2019-10-03 | End: 2019-10-17 | Stop reason: HOSPADM

## 2019-10-03 RX ORDER — HYDRALAZINE HYDROCHLORIDE 20 MG/ML
10 INJECTION INTRAMUSCULAR; INTRAVENOUS
Status: COMPLETED | OUTPATIENT
Start: 2019-10-03 | End: 2019-10-03

## 2019-10-03 RX ORDER — BUMETANIDE 0.25 MG/ML
2 INJECTION INTRAMUSCULAR; INTRAVENOUS 2 TIMES DAILY
Status: COMPLETED | OUTPATIENT
Start: 2019-10-03 | End: 2019-10-05

## 2019-10-03 RX ORDER — IBUPROFEN 200 MG
24 TABLET ORAL
Status: DISCONTINUED | OUTPATIENT
Start: 2019-10-03 | End: 2019-10-17 | Stop reason: HOSPADM

## 2019-10-03 RX ORDER — INSULIN ASPART 100 [IU]/ML
1-10 INJECTION, SOLUTION INTRAVENOUS; SUBCUTANEOUS
Status: DISCONTINUED | OUTPATIENT
Start: 2019-10-03 | End: 2019-10-17 | Stop reason: HOSPADM

## 2019-10-03 RX ORDER — ONDANSETRON 2 MG/ML
4 INJECTION INTRAMUSCULAR; INTRAVENOUS EVERY 8 HOURS PRN
Status: DISCONTINUED | OUTPATIENT
Start: 2019-10-03 | End: 2019-10-17 | Stop reason: HOSPADM

## 2019-10-03 RX ORDER — NITROGLYCERIN 20 MG/100ML
5 INJECTION INTRAVENOUS CONTINUOUS
Status: DISCONTINUED | OUTPATIENT
Start: 2019-10-03 | End: 2019-10-03

## 2019-10-03 RX ORDER — ALPRAZOLAM 0.5 MG/1
0.5 TABLET ORAL 2 TIMES DAILY PRN
Status: DISCONTINUED | OUTPATIENT
Start: 2019-10-04 | End: 2019-10-06

## 2019-10-03 RX ORDER — POLYETHYLENE GLYCOL 3350 17 G/17G
17 POWDER, FOR SOLUTION ORAL 2 TIMES DAILY PRN
Status: DISCONTINUED | OUTPATIENT
Start: 2019-10-03 | End: 2019-10-10

## 2019-10-03 RX ORDER — IPRATROPIUM BROMIDE AND ALBUTEROL SULFATE 2.5; .5 MG/3ML; MG/3ML
3 SOLUTION RESPIRATORY (INHALATION)
Status: COMPLETED | OUTPATIENT
Start: 2019-10-03 | End: 2019-10-03

## 2019-10-03 RX ORDER — GLUCAGON 1 MG
1 KIT INJECTION
Status: DISCONTINUED | OUTPATIENT
Start: 2019-10-03 | End: 2019-10-17 | Stop reason: HOSPADM

## 2019-10-03 RX ORDER — ENOXAPARIN SODIUM 100 MG/ML
30 INJECTION SUBCUTANEOUS EVERY 24 HOURS
Status: DISCONTINUED | OUTPATIENT
Start: 2019-10-03 | End: 2019-10-15

## 2019-10-03 RX ORDER — ASPIRIN 325 MG
325 TABLET, DELAYED RELEASE (ENTERIC COATED) ORAL
Status: COMPLETED | OUTPATIENT
Start: 2019-10-03 | End: 2019-10-03

## 2019-10-03 RX ORDER — IPRATROPIUM BROMIDE AND ALBUTEROL SULFATE 2.5; .5 MG/3ML; MG/3ML
3 SOLUTION RESPIRATORY (INHALATION)
Status: DISCONTINUED | OUTPATIENT
Start: 2019-10-03 | End: 2019-10-04

## 2019-10-03 RX ORDER — ENOXAPARIN SODIUM 100 MG/ML
40 INJECTION SUBCUTANEOUS EVERY 24 HOURS
Status: DISCONTINUED | OUTPATIENT
Start: 2019-10-03 | End: 2019-10-03

## 2019-10-03 RX ORDER — FAMOTIDINE 10 MG/ML
20 INJECTION INTRAVENOUS DAILY
Status: DISCONTINUED | OUTPATIENT
Start: 2019-10-03 | End: 2019-10-04

## 2019-10-03 RX ADMIN — BUMETANIDE 2 MG: 0.25 INJECTION INTRAMUSCULAR; INTRAVENOUS at 05:10

## 2019-10-03 RX ADMIN — ENOXAPARIN SODIUM 30 MG: 100 INJECTION SUBCUTANEOUS at 05:10

## 2019-10-03 RX ADMIN — INSULIN ASPART 4 UNITS: 100 INJECTION, SOLUTION INTRAVENOUS; SUBCUTANEOUS at 11:10

## 2019-10-03 RX ADMIN — HYDRALAZINE HYDROCHLORIDE 100 MG: 25 TABLET ORAL at 09:10

## 2019-10-03 RX ADMIN — INSULIN ASPART 2 UNITS: 100 INJECTION, SOLUTION INTRAVENOUS; SUBCUTANEOUS at 09:10

## 2019-10-03 RX ADMIN — IPRATROPIUM BROMIDE AND ALBUTEROL SULFATE 3 ML: .5; 3 SOLUTION RESPIRATORY (INHALATION) at 02:10

## 2019-10-03 RX ADMIN — HYDRALAZINE HYDROCHLORIDE 100 MG: 25 TABLET ORAL at 03:10

## 2019-10-03 RX ADMIN — IPRATROPIUM BROMIDE AND ALBUTEROL SULFATE 3 ML: .5; 3 SOLUTION RESPIRATORY (INHALATION) at 07:10

## 2019-10-03 RX ADMIN — BUMETANIDE 1 MG: 0.25 INJECTION INTRAMUSCULAR; INTRAVENOUS at 02:10

## 2019-10-03 RX ADMIN — IPRATROPIUM BROMIDE AND ALBUTEROL SULFATE 3 ML: .5; 3 SOLUTION RESPIRATORY (INHALATION) at 11:10

## 2019-10-03 RX ADMIN — FAMOTIDINE 20 MG: 10 INJECTION INTRAVENOUS at 10:10

## 2019-10-03 RX ADMIN — ASPIRIN 325 MG: 325 TABLET, DELAYED RELEASE ORAL at 04:10

## 2019-10-03 RX ADMIN — AMLODIPINE BESYLATE 10 MG: 5 TABLET ORAL at 10:10

## 2019-10-03 RX ADMIN — ALPRAZOLAM 0.5 MG: 0.5 TABLET ORAL at 11:10

## 2019-10-03 RX ADMIN — INSULIN ASPART 2 UNITS: 100 INJECTION, SOLUTION INTRAVENOUS; SUBCUTANEOUS at 05:10

## 2019-10-03 RX ADMIN — INSULIN ASPART 15 UNITS: 100 INJECTION, SUSPENSION SUBCUTANEOUS at 05:10

## 2019-10-03 RX ADMIN — IPRATROPIUM BROMIDE AND ALBUTEROL SULFATE 3 ML: .5; 3 SOLUTION RESPIRATORY (INHALATION) at 04:10

## 2019-10-03 RX ADMIN — HYDRALAZINE HYDROCHLORIDE 10 MG: 20 INJECTION INTRAMUSCULAR; INTRAVENOUS at 03:10

## 2019-10-03 RX ADMIN — NITROGLYCERIN 5 MCG/MIN: 20 INJECTION INTRAVENOUS at 02:10

## 2019-10-03 RX ADMIN — HYDRALAZINE HYDROCHLORIDE 10 MG: 20 INJECTION INTRAMUSCULAR; INTRAVENOUS at 02:10

## 2019-10-03 NOTE — CARE UPDATE
[facility]  ICU Multidisciplinary Bedside Rounds     UPDATE     Date: 10/3/2019      Plan of care reviewed with the following, Nurse, Charge Nurse, Physician, Resp. Therapist, Infection Prevention and Dietician.       Needs/ Goals for the day: wean nitro drip, monitor urine output, maintain nasal cannula, accuchecks? Insulin?      Level of Care: Critical Care

## 2019-10-03 NOTE — ED PROVIDER NOTES
"Encounter Date: 10/3/2019       History     Chief Complaint   Patient presents with    Shortness of Breath     Pt called complaining of SOB. On room air pt O2 was 68%, on NC pt was 70-74%. Pt was put on an non rebreather O2 was 83%. After pt was put on CPAP her sats reached 93%     Patient presents for evaluation of acute onset of shortness of breath that started tonight.  History is limited due to patient being respiratory distress.  History is taken from EMS and old medical records.  Patient lives in assisted living.  She was admitted here in April for shortness of breath due to hypertensive crisis and fluid overload.  Patient has history of nephrotic syndrome and anasarca.  Chest has a history of chronic hypertension.  Patient denies chest pain. She states she is just very short of breath and repeatedly asked for help.  No reported nausea or vomiting.  No abdominal pain. She has leg edema that she states is "always there."  Patient was placed on CPAP for an O2 sat of 60% on room air.  Blood pressure was reportedly elevated at 270 systolic.        Review of patient's allergies indicates:   Allergen Reactions    Ciprofloxacin Anaphylaxis    Codeine Anaphylaxis    Neosporin [benzalkonium chloride] Anaphylaxis     Past Medical History:   Diagnosis Date    Arthritis     CHF (congestive heart failure)     Diabetes mellitus      Past Surgical History:   Procedure Laterality Date    APPENDECTOMY       History reviewed. No pertinent family history.  Social History     Tobacco Use    Smoking status: Former Smoker   Substance Use Topics    Alcohol use: Not Currently    Drug use: No     Review of Systems   Unable to perform ROS: Acuity of condition   Constitutional: Negative for fever.   Respiratory: Positive for shortness of breath.    Cardiovascular: Negative for chest pain.   Gastrointestinal: Negative for abdominal pain and vomiting.   Musculoskeletal: Negative for back pain.   Neurological: Negative for " headaches.       Physical Exam     Initial Vitals [10/03/19 0219]   BP Pulse Resp Temp SpO2   (!) 260/110 (!) 117 (!) 25 -- (!) 93 %      MAP       --         Physical Exam    Nursing note and vitals reviewed.  Constitutional: She appears well-developed and well-nourished. She is not diaphoretic. She appears distressed.   Patient uncomfortable due to respiratory distress.   HENT:   Head: Normocephalic and atraumatic.   Nose: Nose normal.   Mouth/Throat: Oropharynx is clear and moist.   Eyes: Conjunctivae and EOM are normal. Pupils are equal, round, and reactive to light. Right eye exhibits no discharge. Left eye exhibits no discharge. No scleral icterus.   Neck: Normal range of motion. Neck supple. No thyromegaly present. JVD present.   Cardiovascular: Regular rhythm and normal heart sounds.   No murmur heard.  Tachycardia   Pulmonary/Chest: No stridor. She is in respiratory distress. She has no wheezes. She has no rhonchi. She has rales.   Poor air movement.  Patient is uncomfortable lying back.  Retractions.   Abdominal: Soft. She exhibits no distension. There is no tenderness. There is no rebound and no guarding.   Musculoskeletal: Normal range of motion. She exhibits edema (3+ pitting edema bilateral lower extremities to knees.). She exhibits no tenderness.   Neurological: She is alert and oriented to person, place, and time. GCS score is 15. GCS eye subscore is 4. GCS verbal subscore is 5. GCS motor subscore is 6.   Patient moves all 4 extremities equally.  Patient unable to cooperate with complete neurologic exam due to respiratory distress. No obvious facial droop.  Extraocular motions appear to be intact.   Skin: Skin is warm and dry. No rash noted. No erythema. There is pallor.   Psychiatric: Her behavior is normal. Judgment and thought content normal.   Anxious.         ED Course   Procedures  Labs Reviewed   CBC W/ AUTO DIFFERENTIAL - Abnormal; Notable for the following components:       Result Value     "RBC 3.20 (*)     Hemoglobin 9.5 (*)     Hematocrit 29.6 (*)     All other components within normal limits   COMPREHENSIVE METABOLIC PANEL - Abnormal; Notable for the following components:    Glucose 314 (*)     BUN, Bld 37 (*)     Creatinine 3.5 (*)     Calcium 8.1 (*)     Albumin 2.7 (*)     Alkaline Phosphatase 193 (*)     AST 64 (*)     eGFR if  15 (*)     eGFR if non  13 (*)     All other components within normal limits   TROPONIN I - Abnormal; Notable for the following components:    Troponin I 0.052 (*)     All other components within normal limits   B-TYPE NATRIURETIC PEPTIDE - Abnormal; Notable for the following components:     (*)     All other components within normal limits   POCT GLUCOSE - Abnormal; Notable for the following components:    POCT Glucose 268 (*)     All other components within normal limits   ISTAT PROCEDURE - Abnormal; Notable for the following components:    POC PH 7.332 (*)     POC PO2 365 (*)     POC HCO3 22.4 (*)     All other components within normal limits   PROTIME-INR     EKG Readings: (Independently Interpreted)   Initial Reading: No STEMI. Rhythm: Normal Sinus Rhythm. Heart Rate: 96. Ectopy: No Ectopy. Conduction: Normal. ST Segments: Normal ST Segments. T Waves: Normal. Axis: Normal.   No ischemic changes.       Imaging Results          X-Ray Chest AP Portable (Final result)  Result time 10/03/19 03:03:04    Final result by Frankie Mendoza MD (10/03/19 03:03:04)                 Impression:      Mild interstitial edema and bilateral pleural effusions suggestive of CHF exacerbation/volume overload.      Electronically signed by: Frankie Mendoza MD  Date:    10/03/2019  Time:    03:03             Narrative:    EXAMINATION:  XR CHEST AP PORTABLE    CLINICAL HISTORY:  Provided history is "CHF;  ".    TECHNIQUE:  One view of the chest.    COMPARISON:  04/11/2019.    FINDINGS:  Cardiac wires and other support devices overlie the chest.  " Cardiac silhouette is mildly enlarged.  Atherosclerotic calcifications overlie the aortic arch.  Lungs demonstrate coarsened perihilar lung markings and moderate bilateral pleural effusions.  No pneumothorax.  Stable sclerotic focus in the left proximal humerus.                                 Medical Decision Making:   Initial Assessment:   Patient presents in acute respiratory distress. Patient is markedly hypertensive.  Scattered crackles on pulmonary exam.  Symptoms most likely due to flash pulmonary edema due to hypertensive emergency.  Will give hydralazine IV.  Will start nitroglycerin infusion.  Given Bumex IV.  Patient placed on BiPAP.  None will give DuoNeb for poor air movement.  Patient has history of smoking.  Will reassess.  Plan admit ICU.  Labs to be sent to evaluate for worsening renal failure and acute coronary syndrome.  No recent ECHO available and the computer.  Last creatinine was 1.9 in April.  Differential Diagnosis:   Hypertensive crisis, acute CHF, acute renal failure, acute coronary syndrome  Clinical Tests:   Lab Tests: Ordered and Reviewed  The following lab test(s) were unremarkable: CBC, CMP, BNP and Troponin  Radiological Study: Ordered and Reviewed  Medical Tests: Ordered and Reviewed  ED Management:  0340:  Work of breathing greatly improved.  Mild crackles still present on exam.  Blood pressure improved after hydralazine and nitroglycerin infusion.  Able to wean nitroglycerin to 5 mics per minute. Patient is diuresed approximately 250 mL fluid.  Review of labs reveal the patient's renal function has declined since previous admission.  BNP is elevated compared to previous admission.  There is no significant change EKG.  No ischemic changes.  Troponin is mildly elevated.  This is likely due to pulmonary edema and hypertensive crisis.  Will admit to ICU.  Patient is still requiring BiPAP.  Will do serial cardiac enzymes.  Will order echocardiogram for the morning.               Attending Attestation:         Attending Critical Care:   Critical Care Times:   Direct Patient Care (initial evaluation, reassessments, and time considering the case)................................................................15 minutes.   Additional History from reviewing old medical records or taking additional history from the family, EMS, PCP, etc.......................10 minutes.   Ordering, Reviewing, and Interpreting Diagnostic Studies...............................................................................................................10 minutes.   Documentation..................................................................................................................................................................................10 minutes.   ==============================================================  · Total Critical Care Time - exclusive of procedural time: 45 minutes.  ==============================================================  Critical care was necessary to treat or prevent imminent or life-threatening deterioration of the following conditions: congestive heart failure.   The following critical care procedures were done by me (see procedure notes): pulse oximetry.   Critical care was time spent personally by me on the following activities: examination of patient, obtaining history from patient or relative, review of x-rays / CT sent with the patient, review of old charts, ordering lab, x-rays, and/or EKG, development of treatment plan with patient or relative, ordering and performing treatments and interventions, evaluation of patient's response to treatment, discussion with consultants and re-evaluation of patient's conition.   Critical Care Condition: potentially life-threatening                  Clinical Impression:       ICD-10-CM ICD-9-CM   1. Hypertensive emergency I16.1 401.9   2. Shortness of breath R06.02 786.05   3. Congestive heart failure, unspecified HF  chronicity, unspecified heart failure type I50.9 428.0   4. Hypervolemia, unspecified hypervolemia type E87.70 276.69   5. Acute kidney injury N17.9 584.9   6. Hypoxemia R09.02 799.02                                Ant Aviles MD  10/03/19 0344

## 2019-10-03 NOTE — CARE UPDATE
[facility]  ICU Multidisciplinary Bedside Rounds   SUMMARY     Date: 10/3/2019    Prehospitalization: Home  Admit Date / LOS : 10/3/2019/ 0 days    Diagnosis: <principal problem not specified>    Consults:        Active: N/A       Needed: Cardio, Nephro, Pulm CC and SW     Code Status: Full Code   Advanced Directive: <no information>    LDA: Plaza and PIV       Central Lines/Site/Justification:Patient Does Not Have Central Line       Urinary Cath/Order/Justification:Critically Ill in ICU    Vasopressors/Infusions:    nitroGLYCERIN 17.5 mcg/min (10/03/19 0510)          GOALS: Volume/ Hemodynamic: MAP<80                     RASS: N/A    CAM ICU: Negative  Pain Management: none       Pain Controlled: yes     Rhythm: NSR    Respiratory Device: Venti Mask    Oxygen Concentration (%):  [] 45             Most Recent SBT/ SAT: N/A      VTE Prophylaxis: Mechanical  Mobility: Bedrest  Stress Ulcer Prophylaxis: No    Dietary: PO  Tolerance: not applicable  /  Advancement: no    Isolation: No active isolations    Restraints: No    Significant Dates:  Post Op Date: N/A  Rescue Date: N/A  Imaging/ Diagnostics: CXR    Noteworthy Labs:  none    CBC/Anemia Labs: Coags:    Recent Labs   Lab 10/03/19  0224   WBC 10.24   HGB 9.5*   HCT 29.6*      MCV 93   RDW 13.6    Recent Labs   Lab 10/03/19  0224   INR 0.9        Chemistries:   Recent Labs   Lab 10/03/19  0224      K 3.9      CO2 23   BUN 37*   CREATININE 3.5*   CALCIUM 8.1*   PROT 6.1   BILITOT 0.2   ALKPHOS 193*   ALT 40   AST 64*        Cardiac Enzymes: Ejection Fractions:    Recent Labs     10/03/19  0224   TROPONINI 0.052*    No results found for: EF     POCT Glucose: HbA1c:    Recent Labs   Lab 10/03/19  0225   POCTGLUCOSE 268*    Hemoglobin A1C   Date Value Ref Range Status   04/11/2019 7.1 (H) 4.0 - 5.6 % Final     Comment:     ADA Screening Guidelines:  5.7-6.4%  Consistent with prediabetes  >or=6.5%  Consistent with  diabetes  High levels of fetal hemoglobin interfere with the HbA1C  assay. Heterozygous hemoglobin variants (HbS, HgC, etc)do  not significantly interfere with this assay.   However, presence of multiple variants may affect accuracy.          Needs from Care Team: BIPAP orders. Accuchecks and sliding scale insulin.     ICU LOS 1h  Level of Care: Critical Care

## 2019-10-03 NOTE — HPI
"68 y/o female patient presents for evaluation of acute onset of shortness of breath that started last night.  Patient lives in assisted living.  She was admitted here in April for shortness of breath due to hypertensive crisis and fluid overload.  Patient has history of nephrotic syndrome and anasarca and had a kidney biopsy at that time.  She also has a history of chronic hypertension.  Patient states compliance with medications.  She denies chest pain. She states she was just very short of breath and repeatedly asked for help.  She also complains of a dry cough. She has leg edema that she states is "always there."  Patient was placed on CPAP for an O2 sat of 60% on room air.  Blood pressure was reportedly elevated at 270 systolic.  She presented severely hypertensive and placed on Nitro infusion.  Patient is now feeling better.  No other complaints.  "

## 2019-10-03 NOTE — PLAN OF CARE
Pt  admitted to ICU from ER on Venti mask and NTG gtt. AAOx4.  Patient did not wish to have family notified. No skin breakdown noted. Safety measures maintained.

## 2019-10-03 NOTE — PLAN OF CARE
"SW met with patient to complete discharge planning assessment. Prior to beginning the discharge planning assessment, patient verified name and date of birth. SENG educated patient on the discharge process and explained that SW reviewed "Help at home", "Managing your health", and "Your preferences". Patient stated that she does not have any family or help at home. Patient stated that she has been taking care of herself for 50 years.      10/03/19 1600   Discharge Assessment   Assessment Type Discharge Planning Assessment   Confirmed/corrected address and phone number on facesheet? Yes   Assessment information obtained from? Patient   Expected Length of Stay (days) 3   Communicated expected length of stay with patient/caregiver yes   Prior to hospitilization cognitive status: Alert/Oriented   Prior to hospitalization functional status: Independent   Current cognitive status: Alert/Oriented   Current Functional Status: Needs Assistance   Facility Arrived From: Home   Lives With alone   Able to Return to Prior Arrangements yes   Is patient able to care for self after discharge? Yes   Who are your caregiver(s) and their phone number(s)?   (Patient does not have any help or caregivers. )   Patient's perception of discharge disposition home or selfcare   Readmission Within the Last 30 Days no previous admission in last 30 days   Patient currently being followed by outpatient case management? No   Patient currently receives any other outside agency services? No   Equipment Currently Used at Home rollator   Do you have any problems affording any of your prescribed medications? No   Is the patient taking medications as prescribed? yes   Does the patient have transportation home? Yes   Transportation Anticipated health plan transportation   Does the patient receive services at the [facility]? No   Discharge Plan A Home;Home Health  (PCP)   Discharge Plan B Home  (PCP)   DME Needed Upon Discharge  other (see comments)  (TBD) "   Patient/Family in Agreement with Plan yes   Anahy Bradley DO    [facility] - [place], [place] - [address]  [address]  [place] [address]  Phone: [phone] Fax: [phone]

## 2019-10-03 NOTE — CARE UPDATE
Pt received from ED in 40% Venti mask. NARN at this time, Pt remains in Venti mask, wi continue to monitor and wean as tolerated.

## 2019-10-03 NOTE — ASSESSMENT & PLAN NOTE
Patient presents with dyspnea and significant hypertension.  CXR shows mild interstitial edema and bilateral pleural effusions.  Patient was placed on Nitro drip and given dose of Bumex.  Improving BP and symptoms.  Will resume home oral medications and wean off Tridil gtt.

## 2019-10-03 NOTE — CARE UPDATE
Results for ANTONINA KINNEY (MRN [id number]) as of 10/3/2019 04:57   Ref. Range 10/3/2019 02:32   POC PH Latest Ref Range: 7.35 - 7.45  7.332 (L)   POC PCO2 Latest Ref Range: 35 - 45 mmHg 42.3   POC PO2 Latest Ref Range: 80 - 100 mmHg 365 (H)   POC BE Latest Ref Range: -2 to 2 mmol/L -3   POC HCO3 Latest Ref Range: 24 - 28 mmol/L 22.4 (L)   POC SATURATED O2 Latest Ref Range: 95 - 100 % 100   POC TCO2 Latest Ref Range: 23 - 27 mmol/L 24   FiO2 Unknown 100   Sample Unknown ARTERIAL   DelSys Unknown CPAP/BiPAP   Allens Test Unknown Pass   Site Unknown LR   Mode Unknown BiPAP   Rate Unknown 10     Results reported to MD Aviles

## 2019-10-03 NOTE — SUBJECTIVE & OBJECTIVE
Past Medical History:   Diagnosis Date    Arthritis     CHF (congestive heart failure)     Diabetes mellitus        Past Surgical History:   Procedure Laterality Date    APPENDECTOMY         Review of patient's allergies indicates:   Allergen Reactions    Ciprofloxacin Anaphylaxis    Codeine Anaphylaxis    Neosporin [benzalkonium chloride] Anaphylaxis       No current facility-administered medications on file prior to encounter.      Current Outpatient Medications on File Prior to Encounter   Medication Sig    insulin NPH-insulin regular, 70/30, (NOVOLIN 70/30) 100 unit/mL (70-30) injection Inject into the skin 2 (two) times daily.    acetaminophen (TYLENOL) 500 MG tablet Take 1 tablet (500 mg total) by mouth every 6 (six) hours as needed.    ALPRAZolam (XANAX) 1 MG tablet Take 0.5 tablets (0.5 mg total) by mouth 2 (two) times daily as needed for Anxiety.    amLODIPine (NORVASC) 10 MG tablet Take 1 tablet (10 mg total) by mouth once daily.    bumetanide (BUMEX) 2 MG tablet Take 1 tablet (2 mg total) by mouth once daily.    escitalopram oxalate (LEXAPRO) 10 MG tablet Take 10 mg by mouth once daily.    hydrALAZINE (APRESOLINE) 100 MG tablet Take 1 tablet (100 mg total) by mouth every 8 (eight) hours.    pravastatin (PRAVACHOL) 10 MG tablet Take 10 mg by mouth once daily.    senna-docusate 8.6-50 mg (PERICOLACE) 8.6-50 mg per tablet Take 1 tablet by mouth 2 (two) times daily as needed for Constipation.    [DISCONTINUED] insulin aspart U-100 (NOVOLOG) 100 unit/mL InPn pen Inject 3 Units into the skin 3 (three) times daily.    [DISCONTINUED] insulin detemir U-100 (LEVEMIR FLEXTOUCH) 100 unit/mL (3 mL) SubQ InPn pen Inject 10 Units into the skin every evening.     Family History     None        Tobacco Use    Smoking status: Former Smoker   Substance and Sexual Activity    Alcohol use: Not Currently    Drug use: No    Sexual activity: Not on file     Review of Systems   Constitutional: Negative for  chills and fever.   HENT: Negative for ear discharge and ear pain.    Eyes: Negative for pain and itching.   Respiratory: Positive for cough and shortness of breath.    Cardiovascular: Negative for chest pain and palpitations.   Gastrointestinal: Negative for abdominal distention and abdominal pain.   Endocrine: Negative for polyphagia and polyuria.   Genitourinary: Negative for difficulty urinating and dysuria.   Musculoskeletal: Negative for neck pain and neck stiffness.   Skin: Negative for rash and wound.   Neurological: Negative for seizures and syncope.   Psychiatric/Behavioral: Negative for agitation and hallucinations.     Objective:     Vital Signs (Most Recent):  Temp: 97.6 °F (36.4 °C) (10/03/19 0700)  Pulse: 84 (10/03/19 0900)  Resp: (!) 21 (10/03/19 0900)  BP: (!) 145/69 (10/03/19 0900)  SpO2: (!) 94 % (10/03/19 0900) Vital Signs (24h Range):  Temp:  [97.6 °F (36.4 °C)-98.9 °F (37.2 °C)] 97.6 °F (36.4 °C)  Pulse:  [] 84  Resp:  [16-43] 21  SpO2:  [93 %-100 %] 94 %  BP: (117-260)/() 145/69     Weight: 118 kg (260 lb 2.3 oz)  Body mass index is 38.42 kg/m².    Physical Exam   Constitutional: She is oriented to person, place, and time. She appears well-developed. No distress.   HENT:   Head: Normocephalic and atraumatic.   Eyes: Conjunctivae are normal. Right eye exhibits no discharge. Left eye exhibits no discharge.   Neck: Neck supple. No tracheal deviation present.   Cardiovascular: Normal rate and regular rhythm.   Pulmonary/Chest: She has no wheezes.   Slight tachypnea  Decreased BS bilateral bases   Abdominal: Soft. Bowel sounds are normal.   Musculoskeletal: She exhibits edema.   Neurological: She is alert and oriented to person, place, and time.   Skin: Skin is warm and dry.           Significant Labs:   BMP:   Recent Labs   Lab 10/03/19  0224   *      K 3.9      CO2 23   BUN 37*   CREATININE 3.5*   CALCIUM 8.1*     CBC:   Recent Labs   Lab 10/03/19  0224   WBC 10.24    HGB 9.5*   HCT 29.6*          Significant Imaging: I have reviewed all pertinent imaging results/findings within the past 24 hours.

## 2019-10-03 NOTE — CARE UPDATE
Pt. Placed on BIPAP 15/+6/100 as per order MD Aviles.  Pt. Tolerated BIPAP placement well, NARN.  Will continue to monitor.

## 2019-10-03 NOTE — PROGRESS NOTES
Pharmacist Renal Dose Adjustment Note    Magaly Epstein is a 69 y.o. female being treated with the medication lovenox    Patient Data:    Vital Signs (Most Recent):  Temp: 98.5 °F (36.9 °C) (10/03/19 0539)  Pulse: 91 (10/03/19 0600)  Resp: (!) 23 (10/03/19 0600)  BP: (!) 166/71 (10/03/19 0600)  SpO2: 98 % (10/03/19 0600)   Vital Signs (72h Range):  Temp:  [98.5 °F (36.9 °C)-98.9 °F (37.2 °C)]   Pulse:  []   Resp:  [16-43]   BP: (117-260)/()   SpO2:  [93 %-100 %]      Recent Labs   Lab 10/03/19  0224   CREATININE 3.5*     Serum creatinine: 3.5 mg/dL (H) 10/03/19 0224  Estimated creatinine clearance: 20.5 mL/min (A)    Medication:lovenox 40mg SQ daily will be changed to medication Lovenox 30mg SQ daily    Pharmacist's Name: Lyly Riojas  Pharmacist's Extension: [phone]

## 2019-10-03 NOTE — ASSESSMENT & PLAN NOTE
Probably secondary to demand ischemia from uncontrolled HTN and renal failure.  Continue to monitor.

## 2019-10-03 NOTE — PLAN OF CARE
Pt weaned off nitro drip successfully, as well as bipap. On 3L NC. Bumex started and pt is diuresing adequately. Accuchecks, ssi, and scheduled 70/30 initiated. Nephrology consulted and saw pt No skin breakdown, no falls.

## 2019-10-03 NOTE — ASSESSMENT & PLAN NOTE
Uncontrolled with hyperglycemia.  Diabetic diet and insulin sliding scale.  Will start home 70/30 at lower dose to prevent hypoglycemia.  Increase as needed.

## 2019-10-03 NOTE — H&P
"[facility] Hospital Medicine  History & Physical    Patient Name: Magaly Epstein  MRN: [id number]  Admission Date: 10/3/2019  Attending Physician: López Mckinnon MD   Primary Care Provider: Tong Jones MD         Patient information was obtained from patient and ER records.     Subjective:     Principal Problem:Hypertensive emergency    Chief Complaint:   Chief Complaint   Patient presents with    Shortness of Breath     Pt called complaining of SOB. On room air pt O2 was 68%, on NC pt was 70-74%. Pt was put on an non rebreather O2 was 83%. After pt was put on CPAP her sats reached 93%        HPI: 70 y/o female patient presents for evaluation of acute onset of shortness of breath that started last night.  Patient lives in assisted living.  She was admitted here in April for shortness of breath due to hypertensive crisis and fluid overload.  Patient has history of nephrotic syndrome and anasarca and had a kidney biopsy at that time.   She also has a history of chronic hypertension.   Patient states compliance with medications.  She denies chest pain. She states she was just very short of breath and repeatedly asked for help.  She also complains of a dry cough. She has leg edema that she states is "always there."  Patient was placed on CPAP for an O2 sat of 60% on room air.  Blood pressure was reportedly elevated at 270 systolic.  She presented severely hypertensive and placed on Nitro infusion.  Patient is now feeling better.  No other complaints.    Past Medical History:   Diagnosis Date    Arthritis     CHF (congestive heart failure)     Diabetes mellitus        Past Surgical History:   Procedure Laterality Date    APPENDECTOMY         Review of patient's allergies indicates:   Allergen Reactions    Ciprofloxacin Anaphylaxis    Codeine Anaphylaxis    Neosporin [benzalkonium chloride] Anaphylaxis       No current facility-administered medications on file prior to encounter.  "     Current Outpatient Medications on File Prior to Encounter   Medication Sig    insulin NPH-insulin regular, 70/30, (NOVOLIN 70/30) 100 unit/mL (70-30) injection Inject into the skin 2 (two) times daily.    acetaminophen (TYLENOL) 500 MG tablet Take 1 tablet (500 mg total) by mouth every 6 (six) hours as needed.    ALPRAZolam (XANAX) 1 MG tablet Take 0.5 tablets (0.5 mg total) by mouth 2 (two) times daily as needed for Anxiety.    amLODIPine (NORVASC) 10 MG tablet Take 1 tablet (10 mg total) by mouth once daily.    bumetanide (BUMEX) 2 MG tablet Take 1 tablet (2 mg total) by mouth once daily.    escitalopram oxalate (LEXAPRO) 10 MG tablet Take 10 mg by mouth once daily.    hydrALAZINE (APRESOLINE) 100 MG tablet Take 1 tablet (100 mg total) by mouth every 8 (eight) hours.    pravastatin (PRAVACHOL) 10 MG tablet Take 10 mg by mouth once daily.    senna-docusate 8.6-50 mg (PERICOLACE) 8.6-50 mg per tablet Take 1 tablet by mouth 2 (two) times daily as needed for Constipation.    [DISCONTINUED] insulin aspart U-100 (NOVOLOG) 100 unit/mL InPn pen Inject 3 Units into the skin 3 (three) times daily.    [DISCONTINUED] insulin detemir U-100 (LEVEMIR FLEXTOUCH) 100 unit/mL (3 mL) SubQ InPn pen Inject 10 Units into the skin every evening.     Family History     None        Tobacco Use    Smoking status: Former Smoker   Substance and Sexual Activity    Alcohol use: Not Currently    Drug use: No    Sexual activity: Not on file     Review of Systems   Constitutional: Negative for chills and fever.   HENT: Negative for ear discharge and ear pain.    Eyes: Negative for pain and itching.   Respiratory: Positive for cough and shortness of breath.    Cardiovascular: Negative for chest pain and palpitations.   Gastrointestinal: Negative for abdominal distention and abdominal pain.   Endocrine: Negative for polyphagia and polyuria.   Genitourinary: Negative for difficulty urinating and dysuria.   Musculoskeletal:  Negative for neck pain and neck stiffness.   Skin: Negative for rash and wound.   Neurological: Negative for seizures and syncope.   Psychiatric/Behavioral: Negative for agitation and hallucinations.     Objective:     Vital Signs (Most Recent):  Temp: 97.6 °F (36.4 °C) (10/03/19 0700)  Pulse: 84 (10/03/19 0900)  Resp: (!) 21 (10/03/19 0900)  BP: (!) 145/69 (10/03/19 0900)  SpO2: (!) 94 % (10/03/19 0900) Vital Signs (24h Range):  Temp:  [97.6 °F (36.4 °C)-98.9 °F (37.2 °C)] 97.6 °F (36.4 °C)  Pulse:  [] 84  Resp:  [16-43] 21  SpO2:  [93 %-100 %] 94 %  BP: (117-260)/() 145/69     Weight: 118 kg (260 lb 2.3 oz)  Body mass index is 38.42 kg/m².    Physical Exam   Constitutional: She is oriented to person, place, and time. She appears well-developed. No distress.   HENT:   Head: Normocephalic and atraumatic.   Eyes: Conjunctivae are normal. Right eye exhibits no discharge. Left eye exhibits no discharge.   Neck: Neck supple. No tracheal deviation present.   Cardiovascular: Normal rate and regular rhythm.   Pulmonary/Chest: She has no wheezes.   Slight tachypnea  Decreased BS bilateral bases   Abdominal: Soft. Bowel sounds are normal.   Musculoskeletal: She exhibits edema.   Neurological: She is alert and oriented to person, place, and time.   Skin: Skin is warm and dry.           Significant Labs:   BMP:   Recent Labs   Lab 10/03/19  0224   *      K 3.9      CO2 23   BUN 37*   CREATININE 3.5*   CALCIUM 8.1*     CBC:   Recent Labs   Lab 10/03/19  0224   WBC 10.24   HGB 9.5*   HCT 29.6*          Significant Imaging: I have reviewed all pertinent imaging results/findings within the past 24 hours.    Assessment/Plan:     * Hypertensive emergency  Patient presents with dyspnea and significant hypertension.  CXR shows mild interstitial edema and bilateral pleural effusions.  Patient was placed on Nitro drip and given dose of Bumex.  Improving BP and symptoms.  Will resume home oral  medications and wean off Tridil gtt.        Nephrotic syndrome  Patient with recent admission and evaluated by Nephrology.  Creat much higher than baseline.  Patient with kidney biopsy on last admit.  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  Given dose of Bumex in ER.  Will consult Nephrology for further recommendations.        Elevated troponin  Probably secondary to demand ischemia from uncontrolled HTN and renal failure.  Continue to monitor.      Debility        Anemia of chronic disease  Secondary to CKD.  H/H similar to previous labs.  No evidence of bleeding.      Hyperlipidemia  Continue Statin.      Type 2 diabetes mellitus with renal complication  Uncontrolled with hyperglycemia.  Diabetic diet and insulin sliding scale.  Will start home 70/30 at lower dose to prevent hypoglycemia.  Increase as needed.      Essential hypertension  As above.      Acute renal failure superimposed on stage 3 chronic kidney disease  Nephrology consult as above.        VTE Risk Mitigation (From admission, onward)         Ordered     enoxaparin injection 30 mg  Daily      10/03/19 0656     IP VTE HIGH RISK PATIENT  Once      10/03/19 0538     Place ZEKE hose  Until discontinued      10/03/19 0538              Critical care time spent on the evaluation and treatment of severe organ dysfunction, review of pertinent labs and imaging studies, discussions with consulting providers and discussions with patient/family: 50 minutes.     López Mckinnon MD  Department of Hospital Medicine   [facility]

## 2019-10-03 NOTE — ASSESSMENT & PLAN NOTE
Patient with recent admission and evaluated by Nephrology.  Creat much higher than baseline.  Patient with kidney biopsy on last admit.  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  Given dose of Bumex in ER.  Will consult Nephrology for further recommendations.

## 2019-10-03 NOTE — CONSULTS
Patient stated that she lives alone and does not have any family or help at home. Patient stated that she utilizes [employer] transportation to get to and from doctor's appointments.

## 2019-10-03 NOTE — CONSULTS
Renal ICU Consult    Date of Admission:  10/3/2019  2:24 AM        Chief Complaint:   Chief Complaint   Patient presents with    Shortness of Breath     Pt called complaining of SOB. On room air pt O2 was 68%, on NC pt was 70-74%. Pt was put on an non rebreather O2 was 83%. After pt was put on CPAP her sats reached 93%       HPI: 68 y/o female assisted living resident and known to me from Clinic with a PMHx. Significant for: DM-2, Nephrotic syndrome (last P/C ratio > 13g/day)  due to severe diabetic-hypertensive Nephrosclerosis; chronic anasarca, CKD-3 (last creatinine in clinic 1.89 back in May) Hx. CHF and HTN (admitted to [facility] in April for shortness of breath due to hypertensive crisis and fluid overload) who presented to the ED with cute onset of shortness of breath that started last night.    In the ED her BP was reported at 260/110  MmHg.  Pte. Was started on IV NTG and admitted to ICU.  Consulted for abnormal creatinine and fluid overload.      PMH:  Past Medical History:   Diagnosis Date    Arthritis     CHF (congestive heart failure)     Diabetes mellitus        PSH:  Past Surgical History:   Procedure Laterality Date    APPENDECTOMY         Allergies:  Review of patient's allergies indicates:   Allergen Reactions    Ciprofloxacin Anaphylaxis    Codeine Anaphylaxis    Neosporin [benzalkonium chloride] Anaphylaxis       No current facility-administered medications on file prior to encounter.      Current Outpatient Medications on File Prior to Encounter   Medication Sig Dispense Refill    insulin NPH-insulin regular, 70/30, (NOVOLIN 70/30) 100 unit/mL (70-30) injection Inject into the skin 2 (two) times daily.      acetaminophen (TYLENOL) 500 MG tablet Take 1 tablet (500 mg total) by mouth every 6 (six) hours as needed.  0    ALPRAZolam (XANAX) 1 MG tablet Take 0.5 tablets (0.5 mg total) by mouth 2 (two) times daily as needed for Anxiety. 20 tablet 0    amLODIPine  (NORVASC) 10 MG tablet Take 1 tablet (10 mg total) by mouth once daily. 30 tablet 0    bumetanide (BUMEX) 2 MG tablet Take 1 tablet (2 mg total) by mouth once daily. 30 tablet 11    escitalopram oxalate (LEXAPRO) 10 MG tablet Take 10 mg by mouth once daily.      hydrALAZINE (APRESOLINE) 100 MG tablet Take 1 tablet (100 mg total) by mouth every 8 (eight) hours. 90 tablet 0    pravastatin (PRAVACHOL) 10 MG tablet Take 10 mg by mouth once daily.      senna-docusate 8.6-50 mg (PERICOLACE) 8.6-50 mg per tablet Take 1 tablet by mouth 2 (two) times daily as needed for Constipation.      [DISCONTINUED] insulin aspart U-100 (NOVOLOG) 100 unit/mL InPn pen Inject 3 Units into the skin 3 (three) times daily.  0    [DISCONTINUED] insulin detemir U-100 (LEVEMIR FLEXTOUCH) 100 unit/mL (3 mL) SubQ InPn pen Inject 10 Units into the skin every evening.  0       Medications:  Current Facility-Administered Medications   Medication Dose Route Frequency Provider Last Rate Last Dose    acetaminophen tablet 650 mg  650 mg Oral Q4H PRN López Mckinnon MD        albuterol-ipratropium 2.5 mg-0.5 mg/3 mL nebulizer solution 3 mL  3 mL Nebulization Q6H WAKE López Mckinnon MD   3 mL at 10/03/19 1131    amLODIPine tablet 10 mg  10 mg Oral Daily López Mckinnon MD   10 mg at 10/03/19 1000    dextrose 50% injection 12.5 g  12.5 g Intravenous PRN López Mckinnon MD        dextrose 50% injection 25 g  25 g Intravenous PRN López Mckinnon MD        enoxaparin injection 30 mg  30 mg Subcutaneous Daily Chandu Gray MD        famotidine (PF) injection 20 mg  20 mg Intravenous Daily Ant Aviles MD   20 mg at 10/03/19 1000    glucagon (human recombinant) injection 1 mg  1 mg Intramuscular PRN López Mckinnon MD        glucose chewable tablet 16 g  16 g Oral PRN López Mckninon MD        glucose chewable tablet 24 g  24 g Oral PRN López Mckinnon MD        hydrALAZINE tablet 100 mg  100 mg Oral Q8H López LARRY  MD Ino        insulin aspart protamine-insulin aspart (NovoLOG 70/30) injection  15 Units Subcutaneous BIDWM López Mckinnon MD        insulin aspart U-100 pen 1-10 Units  1-10 Units Subcutaneous QID (AC + HS) PRN López Mckinnon MD   4 Units at 10/03/19 1151    nitroGLYCERIN 50 mg in dextrose 5 % 250 mL infusion (TITRATING)  5 mcg/min Intravenous Continuous López Mckinnon MD   Stopped at 10/03/19 1235    ondansetron injection 4 mg  4 mg Intravenous Q8H PRN Ant Aviles MD        polyethylene glycol packet 17 g  17 g Oral BID PRN López Mckinnon MD        sodium chloride 0.9% flush 10 mL  10 mL Intravenous PRN Ant Aviles MD           FamHx:  History reviewed. No pertinent family history.    SocHx:  Social History     Socioeconomic History    Marital status: Single     Spouse name: Not on file    Number of children: Not on file    Years of education: Not on file    Highest education level: Not on file   Occupational History    Not on file   Social Needs    Financial resource strain: Not on file    Food insecurity:     Worry: Not on file     Inability: Not on file    Transportation needs:     Medical: Not on file     Non-medical: Not on file   Tobacco Use    Smoking status: Former Smoker   Substance and Sexual Activity    Alcohol use: Not Currently    Drug use: No    Sexual activity: Not on file   Lifestyle    Physical activity:     Days per week: Not on file     Minutes per session: Not on file    Stress: Not on file   Relationships    Social connections:     Talks on phone: Not on file     Gets together: Not on file     Attends Restorationism service: Not on file     Active member of club or organization: Not on file     Attends meetings of clubs or organizations: Not on file     Relationship status: Not on file   Other Topics Concern    Not on file   Social History Narrative    Not on file           Review of Systems: see H&P       Physical Exam:  Vitals:   Vitals:     10/03/19 1230   BP: (!) 140/63   Pulse: 87   Resp: (!) 25   Temp:        I/O last 3 completed shifts:  In: 18.4 [I.V.:18.4]  Out: 300 [Urine:300]  I/O this shift:  In: 639.5 [P.O.:600; I.V.:39.5]  Out: 135 [Urine:135]    General: A&Ox3. No apparent distress.   Neck: supple no JVD  Lungs: CTA bilateral  Heart: RRR, S1-S2  Abdomen: Soft. NT.  : n/a  Ext: chronic LE edema 3+ with some stasis dermatitis  Skin: The skin is warm and dry. No jaundice.  Neurologic: Awake but drowsy, oriented x 3      Laboratories:    Recent Labs   Lab 10/03/19  0224   WBC 10.24   RBC 3.20*   HGB 9.5*   HCT 29.6*      MCV 93   MCH 29.7   MCHC 32.1       Recent Labs   Lab 10/03/19  0224   CALCIUM 8.1*   PROT 6.1      K 3.9   CO2 23      BUN 37*   CREATININE 3.5*   ALKPHOS 193*   ALT 40   AST 64*   BILITOT 0.2       No results for input(s): COLORU, CLARITYU, SPECGRAV, PHUR, PROTEINUA, GLUCOSEU, BLOODU, WBCU, RBCU, BACTERIA, MUCUS in the last 24 hours.    Invalid input(s):  BILIRUBINCON    Microbiology Results (last 7 days)     ** No results found for the last 168 hours. **            Diagnostic Tests:  XR CHEST AP PORTABLE    FINDINGS:  Cardiac wires and other support devices overlie the chest.  Cardiac silhouette is mildly enlarged.  Atherosclerotic calcifications overlie the aortic arch.  Lungs demonstrate coarsened perihilar lung markings and moderate bilateral pleural effusions.  No pneumothorax.  Stable sclerotic focus in the left proximal humerus.   Impression:       Mild interstitial edema and bilateral pleural effusions suggestive of CHF exacerbation/volume overload.      Electronically signed by: Frankie Mendoza MD         Assessment:     70 y/o female pte. Of  mine in clinic admitted with:    - Hypertensive crisis likely due to non-compliance with Meds at home (pte. Reports missing doses)  - ANIA on CKD-3a due to above  - DM-2 with renal manifestations  - Hyperglycemia  - Nephrotic syndrome 2nd.  Diabetic-hypertensive glomerulosclerosis on chronic loop diuretics  - Anemia of chronic illness  - Fluid overload 2nd. CHF exacerbation? Pte.'s last weight in clinic (7/2/19 was 110 Kg) + 7 Kg. now      Plan:    - BP control (home Meds resumed)  - Judicious diuresis  - Glycemic control per admitting  - Check iron stores  - Renal ADA diet w/ fluid restriction  - DVT prophylaxis per admitting  - LE compression device recommended

## 2019-10-04 LAB
ANION GAP SERPL CALC-SCNC: 8 MMOL/L (ref 8–16)
BASOPHILS # BLD AUTO: 0.02 K/UL (ref 0–0.2)
BASOPHILS NFR BLD: 0.3 % (ref 0–1.9)
BUN SERPL-MCNC: 39 MG/DL (ref 8–23)
CALCIUM SERPL-MCNC: 7.8 MG/DL (ref 8.7–10.5)
CHLORIDE SERPL-SCNC: 106 MMOL/L (ref 95–110)
CO2 SERPL-SCNC: 23 MMOL/L (ref 23–29)
CREAT SERPL-MCNC: 3.5 MG/DL (ref 0.5–1.4)
DIFFERENTIAL METHOD: ABNORMAL
EOSINOPHIL # BLD AUTO: 0.1 K/UL (ref 0–0.5)
EOSINOPHIL NFR BLD: 1.8 % (ref 0–8)
ERYTHROCYTE [DISTWIDTH] IN BLOOD BY AUTOMATED COUNT: 13.9 % (ref 11.5–14.5)
EST. GFR  (AFRICAN AMERICAN): 15 ML/MIN/1.73 M^2
EST. GFR  (NON AFRICAN AMERICAN): 13 ML/MIN/1.73 M^2
FERRITIN SERPL-MCNC: 184 NG/ML (ref 20–300)
GLUCOSE SERPL-MCNC: 175 MG/DL (ref 70–110)
HCT VFR BLD AUTO: 24.2 % (ref 37–48.5)
HGB BLD-MCNC: 7.8 G/DL (ref 12–16)
IRON SERPL-MCNC: 27 UG/DL (ref 30–160)
LYMPHOCYTES # BLD AUTO: 1.3 K/UL (ref 1–4.8)
LYMPHOCYTES NFR BLD: 21.4 % (ref 18–48)
MCH RBC QN AUTO: 29.7 PG (ref 27–31)
MCHC RBC AUTO-ENTMCNC: 32.2 G/DL (ref 32–36)
MCV RBC AUTO: 92 FL (ref 82–98)
MONOCYTES # BLD AUTO: 0.5 K/UL (ref 0.3–1)
MONOCYTES NFR BLD: 7.5 % (ref 4–15)
NEUTROPHILS # BLD AUTO: 4.3 K/UL (ref 1.8–7.7)
NEUTROPHILS NFR BLD: 69.2 % (ref 38–73)
PHOSPHATE SERPL-MCNC: 4.6 MG/DL (ref 2.7–4.5)
PLATELET # BLD AUTO: 251 K/UL (ref 150–350)
PMV BLD AUTO: 9.9 FL (ref 9.2–12.9)
POCT GLUCOSE: 165 MG/DL (ref 70–110)
POCT GLUCOSE: 173 MG/DL (ref 70–110)
POCT GLUCOSE: 210 MG/DL (ref 70–110)
POTASSIUM SERPL-SCNC: 3.6 MMOL/L (ref 3.5–5.1)
RBC # BLD AUTO: 2.63 M/UL (ref 4–5.4)
SATURATED IRON: 13 % (ref 20–50)
SODIUM SERPL-SCNC: 137 MMOL/L (ref 136–145)
TOTAL IRON BINDING CAPACITY: 204 UG/DL (ref 250–450)
TRANSFERRIN SERPL-MCNC: 138 MG/DL (ref 200–375)
WBC # BLD AUTO: 6.27 K/UL (ref 3.9–12.7)

## 2019-10-04 PROCEDURE — 25000003 PHARM REV CODE 250: Performed by: INTERNAL MEDICINE

## 2019-10-04 PROCEDURE — 85025 COMPLETE CBC W/AUTO DIFF WBC: CPT

## 2019-10-04 PROCEDURE — 63600175 PHARM REV CODE 636 W HCPCS: Performed by: HOSPITALIST

## 2019-10-04 PROCEDURE — 84100 ASSAY OF PHOSPHORUS: CPT

## 2019-10-04 PROCEDURE — S0171 BUMETANIDE 0.5 MG: HCPCS | Performed by: INTERNAL MEDICINE

## 2019-10-04 PROCEDURE — 82728 ASSAY OF FERRITIN: CPT

## 2019-10-04 PROCEDURE — 36415 COLL VENOUS BLD VENIPUNCTURE: CPT

## 2019-10-04 PROCEDURE — 25000003 PHARM REV CODE 250: Performed by: HOSPITALIST

## 2019-10-04 PROCEDURE — 94761 N-INVAS EAR/PLS OXIMETRY MLT: CPT

## 2019-10-04 PROCEDURE — 25000242 PHARM REV CODE 250 ALT 637 W/ HCPCS: Performed by: HOSPITALIST

## 2019-10-04 PROCEDURE — 80048 BASIC METABOLIC PNL TOTAL CA: CPT

## 2019-10-04 PROCEDURE — 83540 ASSAY OF IRON: CPT

## 2019-10-04 PROCEDURE — 94640 AIRWAY INHALATION TREATMENT: CPT

## 2019-10-04 PROCEDURE — S0028 INJECTION, FAMOTIDINE, 20 MG: HCPCS | Performed by: EMERGENCY MEDICINE

## 2019-10-04 PROCEDURE — 20000000 HC ICU ROOM

## 2019-10-04 PROCEDURE — 25000003 PHARM REV CODE 250: Performed by: EMERGENCY MEDICINE

## 2019-10-04 RX ORDER — IPRATROPIUM BROMIDE AND ALBUTEROL SULFATE 2.5; .5 MG/3ML; MG/3ML
3 SOLUTION RESPIRATORY (INHALATION) EVERY 4 HOURS PRN
Status: DISCONTINUED | OUTPATIENT
Start: 2019-10-04 | End: 2019-10-17 | Stop reason: HOSPADM

## 2019-10-04 RX ORDER — AMOXICILLIN 250 MG
1 CAPSULE ORAL 2 TIMES DAILY PRN
Status: DISCONTINUED | OUTPATIENT
Start: 2019-10-04 | End: 2019-10-17 | Stop reason: HOSPADM

## 2019-10-04 RX ORDER — PRAVASTATIN SODIUM 10 MG/1
10 TABLET ORAL DAILY
Status: DISCONTINUED | OUTPATIENT
Start: 2019-10-05 | End: 2019-10-17 | Stop reason: HOSPADM

## 2019-10-04 RX ORDER — CLONIDINE HYDROCHLORIDE 0.1 MG/1
0.1 TABLET ORAL EVERY 8 HOURS PRN
Status: DISCONTINUED | OUTPATIENT
Start: 2019-10-04 | End: 2019-10-17 | Stop reason: HOSPADM

## 2019-10-04 RX ORDER — FERROUS SULFATE 325(65) MG
325 TABLET, DELAYED RELEASE (ENTERIC COATED) ORAL 2 TIMES DAILY
Status: DISCONTINUED | OUTPATIENT
Start: 2019-10-04 | End: 2019-10-17 | Stop reason: HOSPADM

## 2019-10-04 RX ORDER — METOPROLOL SUCCINATE 50 MG/1
50 TABLET, EXTENDED RELEASE ORAL DAILY
Status: DISCONTINUED | OUTPATIENT
Start: 2019-10-04 | End: 2019-10-07

## 2019-10-04 RX ADMIN — INSULIN ASPART 2 UNITS: 100 INJECTION, SOLUTION INTRAVENOUS; SUBCUTANEOUS at 07:10

## 2019-10-04 RX ADMIN — FERROUS SULFATE TAB EC 325 MG (65 MG FE EQUIVALENT) 325 MG: 325 (65 FE) TABLET DELAYED RESPONSE at 09:10

## 2019-10-04 RX ADMIN — BUMETANIDE 2 MG: 0.25 INJECTION INTRAMUSCULAR; INTRAVENOUS at 05:10

## 2019-10-04 RX ADMIN — METOPROLOL SUCCINATE 50 MG: 50 TABLET, EXTENDED RELEASE ORAL at 12:10

## 2019-10-04 RX ADMIN — ENOXAPARIN SODIUM 30 MG: 100 INJECTION SUBCUTANEOUS at 05:10

## 2019-10-04 RX ADMIN — FAMOTIDINE 20 MG: 10 INJECTION INTRAVENOUS at 08:10

## 2019-10-04 RX ADMIN — INSULIN ASPART 15 UNITS: 100 INJECTION, SUSPENSION SUBCUTANEOUS at 04:10

## 2019-10-04 RX ADMIN — INSULIN ASPART 4 UNITS: 100 INJECTION, SOLUTION INTRAVENOUS; SUBCUTANEOUS at 12:10

## 2019-10-04 RX ADMIN — HYDRALAZINE HYDROCHLORIDE 100 MG: 25 TABLET ORAL at 01:10

## 2019-10-04 RX ADMIN — HYDRALAZINE HYDROCHLORIDE 100 MG: 25 TABLET ORAL at 09:10

## 2019-10-04 RX ADMIN — INSULIN ASPART 15 UNITS: 100 INJECTION, SUSPENSION SUBCUTANEOUS at 08:10

## 2019-10-04 RX ADMIN — IRON SUCROSE 200 MG: 20 INJECTION, SOLUTION INTRAVENOUS at 12:10

## 2019-10-04 RX ADMIN — AMLODIPINE BESYLATE 10 MG: 5 TABLET ORAL at 08:10

## 2019-10-04 RX ADMIN — BUMETANIDE 2 MG: 0.25 INJECTION INTRAMUSCULAR; INTRAVENOUS at 08:10

## 2019-10-04 RX ADMIN — ACETAMINOPHEN 650 MG: 325 TABLET, FILM COATED ORAL at 11:10

## 2019-10-04 RX ADMIN — IPRATROPIUM BROMIDE AND ALBUTEROL SULFATE 3 ML: .5; 3 SOLUTION RESPIRATORY (INHALATION) at 08:10

## 2019-10-04 RX ADMIN — INSULIN ASPART 2 UNITS: 100 INJECTION, SOLUTION INTRAVENOUS; SUBCUTANEOUS at 09:10

## 2019-10-04 RX ADMIN — IPRATROPIUM BROMIDE AND ALBUTEROL SULFATE 3 ML: .5; 3 SOLUTION RESPIRATORY (INHALATION) at 07:10

## 2019-10-04 RX ADMIN — FERROUS SULFATE TAB EC 325 MG (65 MG FE EQUIVALENT) 325 MG: 325 (65 FE) TABLET DELAYED RESPONSE at 12:10

## 2019-10-04 RX ADMIN — IPRATROPIUM BROMIDE AND ALBUTEROL SULFATE 3 ML: .5; 3 SOLUTION RESPIRATORY (INHALATION) at 02:10

## 2019-10-04 RX ADMIN — ALPRAZOLAM 0.5 MG: 0.5 TABLET ORAL at 03:10

## 2019-10-04 RX ADMIN — HYDRALAZINE HYDROCHLORIDE 100 MG: 25 TABLET ORAL at 05:10

## 2019-10-04 NOTE — NURSING
[facility]  ICU Multidisciplinary Bedside Rounds     UPDATE     Date: 10/4/2019      Plan of care reviewed with the following, Nurse, Charge Nurse, Physician, Resp. Therapist, Infection Prevention and Dietician.       Needs/ Goals for the day: n/a      Level of Care: OK to Transfer

## 2019-10-04 NOTE — PHYSICIAN QUERY
"PT Name: Magaly Epstein  MR #: [id number]    Physician Query Form - Heart  Condition Clarification     CDS/:  Corie HERNANDES RN-BC  Contact information: [email or web address]  This form is a permanent document in the medical record.     Query Date: October 4, 2019    By submitting this query, we are merely seeking further clarification of documentation. Please utilize your independent clinical judgment when addressing the question(s) below.    The medical record contains the following   Indicators     Supporting Clinical Findings Location in Medical Record   X  BNP 10/3/19   X EF 45% Echo 10/3/19   X Radiology findings Mild interstitial edema and bilateral pleural effusions suggestive of CHF exacerbation/volume overload. CXR 10/3/19   X Echo Results · Mildly decreased left ventricular systolic function. The estimated ejection fraction is 45%  · Mild concentric left ventricular hypertrophy.  · Grade I (mild) left ventricular diastolic dysfunction consistent with impaired relaxation.  · Normal right ventricular systolic function.  · Mild left atrial enlargement.  · Mild mitral regurgitation.  · Mild tricuspid regurgitation.  · Intermediate central venous pressure (8 mm Hg).  · The estimated PA systolic pressure is 29 mm Hg  · Small pericardial effusion. Echo 10/3/19    "Ascites" documented     X "SOB" or "GUTIERREZ" documented Pt called complaining of SOB. On room air pt O2 was 68%, on NC pt was 70-74%. Pt was put on an non rebreather O2 was 83%. After pt was put on CPAP her sats reached 93% ED provider note, Dr. Aviles, 10/3/19     X "Hypoxia" documented Hypoxemia  ED provider note, Dr. Aviles, 10/3/19     X Heart Failure documented Congestive heart failure, unspecified HF chronicity, unspecified heart failure type ED provider note, Dr. Aviles, 10/3/19     X "Edema" documented 3+ pitting edema bilateral lower extremities to knees ED provider note, Dr. Aviles, 10/3/19     X Diuretics/Meds Bumex 1 mg IV  Bumex " 2 mg IV MAR 10/3  10/3 and 10/4    Treatment:     X Other:  History is limited due to patient being respiratory distress...Patient uncomfortable due to respiratory distress. She has rales. Poor air movement.  Patient is uncomfortable lying back.  Retractions  ED provider note, Dr. Aviles, 10/3/19   Heart failure (HF) can be acute, chronic or both. It is generally further specified as systolic, diastolic, or combined. Lastly, it is important to identify an underlying etiology if known or suspected.     Common clues to acute exacerbation:  Rapidly progressive symptoms (w/in 2 weeks of presentation), using IV diuretics to treat, using supplemental O2, pulmonary edema on Xray, MI w/in 4 weeks, and/or BNP >500    Systolic Heart Failure: is defined as chart documentation of a left ventricular ejection fraction (LVEF) less than 40%     Diastolic Heart Failure: is defined as a left ventricular ejection fraction (LVEF) greater than 40%   +      Evidence of diastolic dysfunction on echocardiography OR    Right heart catheterization wedge pressure above 12 mm Hg OR    Left heart catheterization left ventricular end diastolic pressure 18 mm Hg or above.    References: *American Heart Association    The clinical guidelines noted below are only system guidelines, and do not replace the providers clinical judgment.     Provider, please specify the diagnosis associated with above clinical findings    Provider, please clarify the heart failure condition.  [ x  ] Acute on Chronic Diastolic Heart Failure -    Pre-existing diastolic HF diagnosis.  EF > 40%  and acute HF symptoms documented       [   ] Chronic Diastolic Heart Failure - Pre-existing diastolic HF diagnosis.  EF > 40%  without  acute HF symptoms documented   [   ] Other Type of Heart Failure (please specify type):   [   ] Other (please specify):   [  ] Clinically Undetermined                           Please document in your progress notes daily for the duration of  treatment until resolved and include in your discharge summary.

## 2019-10-04 NOTE — CONSULTS
"Food & Nutrition   Education     Diet Education: Consistent carb, Low sodium, Renal Diet intro   Time Spent: 20 mins   Learners: Pt     Nutrition Education provided with handouts on consistent carbohydrates, low sodium and renal diets.     Comments: Pt reports not adding salt to food at home. Pt is aware of "hidden sodium" in foods. Discussed low sodium options.  Discussed consistent carbohydrate diet including meal planning, carbohydrate choices, portion sizes, and label reading.  Provided pt with introductory renal diet information. Informed pt Phos was 0.1 mg/dL elevated and K was within range.  Pt is unable to drive and therefore cannot F/U with outpt dietitian. Pt appeared motivated to make changes. No further questions at this time.     All questions and concerns answered.     Follow-Up: Please re-consult as needed.      "

## 2019-10-04 NOTE — SUBJECTIVE & OBJECTIVE
Interval History: Able to wean off Nitro drip.  Feeling better.    Review of Systems   HENT: Negative for ear discharge and ear pain.    Eyes: Negative for pain and itching.   Endocrine: Negative for polyphagia and polyuria.   Neurological: Negative for seizures and syncope.     Objective:     Vital Signs (Most Recent):  Temp: 98 °F (36.7 °C) (10/04/19 0700)  Pulse: 89 (10/04/19 0816)  Resp: (!) 28 (10/04/19 0816)  BP: 135/62 (10/04/19 0800)  SpO2: 97 % (10/04/19 0816) Vital Signs (24h Range):  Temp:  [97.8 °F (36.6 °C)-98.7 °F (37.1 °C)] 98 °F (36.7 °C)  Pulse:  [77-98] 89  Resp:  [16-58] 28  SpO2:  [89 %-100 %] 97 %  BP: (126-183)/(57-83) 135/62     Weight: 117.9 kg (260 lb)  Body mass index is 38.4 kg/m².    Intake/Output Summary (Last 24 hours) at 10/4/2019 1024  Last data filed at 10/4/2019 0800  Gross per 24 hour   Intake 254.59 ml   Output 2110 ml   Net -1855.41 ml      Physical Exam   Constitutional: She is oriented to person, place, and time. She appears well-developed. No distress.   HENT:   Head: Normocephalic and atraumatic.   Eyes: Conjunctivae are normal. Right eye exhibits no discharge. Left eye exhibits no discharge.   Neck: Neck supple. No tracheal deviation present.   Cardiovascular: Normal rate and regular rhythm.   Pulmonary/Chest: She has no wheezes.   Slight tachypnea  Decreased BS bilateral bases   Abdominal: Soft. Bowel sounds are normal.   Musculoskeletal: She exhibits edema.   Neurological: She is alert and oriented to person, place, and time.   Skin: Skin is warm and dry.       Significant Labs:   BMP:   Recent Labs   Lab 10/04/19  0300   *      K 3.6      CO2 23   BUN 39*   CREATININE 3.5*   CALCIUM 7.8*     CBC:   Recent Labs   Lab 10/03/19  0224 10/04/19  0259   WBC 10.24 6.27   HGB 9.5* 7.8*   HCT 29.6* 24.2*    251

## 2019-10-04 NOTE — ASSESSMENT & PLAN NOTE
Probably secondary to demand ischemia from uncontrolled HTN and renal failure.  Denies any chest pain.

## 2019-10-04 NOTE — PLAN OF CARE
Patient is awake alert oriented. Able to move on bed with minimal assistance. Cl;ear breath sounds. Occasional shortness of breath noted on exertion.  On Nasal cannula during the night. Maintaining saturation>93% Occasionally appears very anxious. Xanax reordered as her home meds. Given once. Bilateral lower extremities remain edematous. Educated on Fluid restrictions. Plaza with adequate urine output. No fall or injury No skin breakdown was noted

## 2019-10-04 NOTE — PROGRESS NOTES
Renal Progress Note    Date of Admission:  10/3/2019  2:24 AM    Length of Stay: 1  Days    Subjective: feels a little better    Objective:    Current Facility-Administered Medications   Medication    acetaminophen tablet 650 mg    albuterol-ipratropium 2.5 mg-0.5 mg/3 mL nebulizer solution 3 mL    ALPRAZolam tablet 0.5 mg    amLODIPine tablet 10 mg    bumetanide injection 2 mg    cloNIDine tablet 0.1 mg    dextrose 50% injection 12.5 g    dextrose 50% injection 25 g    enoxaparin injection 30 mg    famotidine (PF) injection 20 mg    ferrous sulfate EC tablet 325 mg    glucagon (human recombinant) injection 1 mg    glucose chewable tablet 16 g    glucose chewable tablet 24 g    hydrALAZINE tablet 100 mg    insulin aspart protamine-insulin aspart (NovoLOG 70/30) injection    insulin aspart U-100 pen 1-10 Units    ondansetron injection 4 mg    polyethylene glycol packet 17 g    sodium chloride 0.9% flush 10 mL       Vitals:    10/04/19 0800 10/04/19 0816 10/04/19 0900 10/04/19 1000   BP: 135/62  (!) 199/84 (!) 164/73   Pulse: 78 89 86 89   Resp: (!) 32 (!) 28 20 (!) 29   Temp:       TempSrc:       SpO2: 99% 97% 97% (!) 92%   Weight:       Height:           I/O last 3 completed shifts:  In: 657.9 [P.O.:600; I.V.:57.9]  Out: 2345 [Urine:2345]  I/O this shift:  In: -   Out: 300 [Urine:100; Emesis/NG output:200]      Physical Exam:    NAD  Neck: supple  Heart: RRR   Lungs: Unlabored breathing  Abdomen: n/a  : n/a  Extremities:  n/a  Neurologic: Awake, alert, oriented x 3      Laboratories:    Recent Labs   Lab 10/04/19  0259   WBC 6.27   RBC 2.63*   HGB 7.8*   HCT 24.2*      MCV 92   MCH 29.7   MCHC 32.2       Recent Labs   Lab 10/04/19  0300   CALCIUM 7.8*      K 3.6   CO2 23      BUN 39*   CREATININE 3.5*       No results for input(s): COLORU, CLARITYU, SPECGRAV, PHUR, PROTEINUA, GLUCOSEU, BLOODU, WBCU, RBCU, BACTERIA, MUCUS in the last 24  hours.    Invalid input(s):  BILIRUBINCON    Microbiology Results (last 7 days)     ** No results found for the last 168 hours. **            Diagnostic Tests: n/a      Assessment:       68 y/o female pte. Of  mine in clinic admitted with:     - Hypertensive crisis likely due to non-compliance with Meds at home (pte. Reports missing doses)  - ANIA on CKD-3a due to above  - DM-2 with renal manifestations  - Hyperglycemia  - Nephrotic syndrome 2nd. 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 Anemia   - Fluid overload 2nd. CHF exacerbation? Pte.'s last weight in clinic (7/2/19 was 110 Kg) + 7 Kg. now        Plan:     - BP control: add Toprol XL 50mg/day and titrate according to response  - Judicious diuresis  - Glycemic control per admitting  - IV Iron supplementation and Procrit once Fe++ replenished  - Renal ADA diet w/ fluid restriction  - DVT prophylaxis per admitting

## 2019-10-04 NOTE — ASSESSMENT & PLAN NOTE
Patient presents with dyspnea and significant hypertension.  CXR shows mild interstitial edema and bilateral pleural effusions.  Patient was placed on Nitro drip and given dose of Bumex.  Improving BP and symptoms.  Restarted on home oral medications and able to wean off Nitro drip.  Add prn Clonidine.

## 2019-10-04 NOTE — PROGRESS NOTES
"[facility] Medicine  Progress Note    Patient Name: Magaly Epstein  MRN: [id number]  Patient Class: IP- Inpatient   Admission Date: 10/3/2019  Length of Stay: 1 days  Attending Physician: López Mckinnon MD  Primary Care Provider: Anahy Bradley DO        Subjective:     Principal Problem:Hypertensive emergency        HPI:  70 y/o female patient presents for evaluation of acute onset of shortness of breath that started last night.  Patient lives in assisted living.  She was admitted here in April for shortness of breath due to hypertensive crisis and fluid overload.  Patient has history of nephrotic syndrome and anasarca and had a kidney biopsy at that time.   She also has a history of chronic hypertension.   Patient states compliance with medications.  She denies chest pain. She states she was just very short of breath and repeatedly asked for help.  She also complains of a dry cough. She has leg edema that she states is "always there."  Patient was placed on CPAP for an O2 sat of 60% on room air.  Blood pressure was reportedly elevated at 270 systolic.  She presented severely hypertensive and placed on Nitro infusion.  Patient is now feeling better.  No other complaints.    Overview/Hospital Course:  70 y/o female patient presents for evaluation of acute onset of shortness of breath. Patient lives in assisted living.  She was admitted here in April for shortness of breath due to hypertensive crisis and fluid overload. Patient was placed on CPAP for an O2 sat of 60% on room air.  Blood pressure was reportedly elevated at 270 systolic. She presented severely hypertensive and placed on Nitro infusion.  CKD with increased Creat from baseline.  Hx of nephrotic syndrome with lower extremity edema and fluid overload.  Nephrology consulted and started on IV Bumex.  Restarted home medications with improvement of BP and able to wean off Nitro drip.      Interval History: Able to wean off " Nitro drip.  Feeling better.    Review of Systems   HENT: Negative for ear discharge and ear pain.    Eyes: Negative for pain and itching.   Endocrine: Negative for polyphagia and polyuria.   Neurological: Negative for seizures and syncope.     Objective:     Vital Signs (Most Recent):  Temp: 98 °F (36.7 °C) (10/04/19 0700)  Pulse: 89 (10/04/19 0816)  Resp: (!) 28 (10/04/19 0816)  BP: 135/62 (10/04/19 0800)  SpO2: 97 % (10/04/19 0816) Vital Signs (24h Range):  Temp:  [97.8 °F (36.6 °C)-98.7 °F (37.1 °C)] 98 °F (36.7 °C)  Pulse:  [77-98] 89  Resp:  [16-58] 28  SpO2:  [89 %-100 %] 97 %  BP: (126-183)/(57-83) 135/62     Weight: 117.9 kg (260 lb)  Body mass index is 38.4 kg/m².    Intake/Output Summary (Last 24 hours) at 10/4/2019 1024  Last data filed at 10/4/2019 0800  Gross per 24 hour   Intake 254.59 ml   Output 2110 ml   Net -1855.41 ml      Physical Exam   Constitutional: She is oriented to person, place, and time. She appears well-developed. No distress.   HENT:   Head: Normocephalic and atraumatic.   Eyes: Conjunctivae are normal. Right eye exhibits no discharge. Left eye exhibits no discharge.   Neck: Neck supple. No tracheal deviation present.   Cardiovascular: Normal rate and regular rhythm.   Pulmonary/Chest: She has no wheezes.   Slight tachypnea  Decreased BS bilateral bases   Abdominal: Soft. Bowel sounds are normal.   Musculoskeletal: She exhibits edema.   Neurological: She is alert and oriented to person, place, and time.   Skin: Skin is warm and dry.       Significant Labs:   BMP:   Recent Labs   Lab 10/04/19  0300   *      K 3.6      CO2 23   BUN 39*   CREATININE 3.5*   CALCIUM 7.8*     CBC:   Recent Labs   Lab 10/03/19  0224 10/04/19  0259   WBC 10.24 6.27   HGB 9.5* 7.8*   HCT 29.6* 24.2*    251           Assessment/Plan:      * Hypertensive emergency  Patient presents with dyspnea and significant hypertension.  CXR shows mild interstitial edema and bilateral pleural  effusions.  Patient was placed on Nitro drip and given dose of Bumex.  Improving BP and symptoms.  Restarted on home oral medications and able to wean off Nitro drip.  Add prn Clonidine.        Nephrotic syndrome  Patient with recent admission and evaluated by Nephrology.  Creat much higher than baseline.  Patient with kidney biopsy on last admit.  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  Given dose of Bumex in ER.    Appreciate Nephrology input.  Continue IV Bumex and monitor renal function.        Elevated troponin  Probably secondary to demand ischemia from uncontrolled HTN and renal failure.  Denies any chest pain.      Debility        Anemia of chronic disease  Secondary to CKD.  Noted drop in H/H.  Iron deficiency anemia.    No evidence of active bleeding.  Will check stool for occult blood.  Will not transfuse at this time.  Repeat CBC in Am.      Hyperlipidemia  Continue Statin.      Type 2 diabetes mellitus with renal complication  Uncontrolled with hyperglycemia.  Diabetic diet and insulin sliding scale.  Started on home 70/30 at lower dose to prevent hypoglycemia.  Increase as needed.      Essential hypertension  As above.      Acute renal failure superimposed on stage 3 chronic kidney disease  Nephrology consult as above.        VTE Risk Mitigation (From admission, onward)         Ordered     enoxaparin injection 30 mg  Daily      10/03/19 0656     IP VTE HIGH RISK PATIENT  Once      10/03/19 0538     Place ZEKE hose  Until discontinued      10/03/19 0538                  López Mckinnon MD  Department of Hospital Medicine   [facility]

## 2019-10-04 NOTE — ASSESSMENT & PLAN NOTE
Uncontrolled with hyperglycemia.  Diabetic diet and insulin sliding scale.  Started on home 70/30 at lower dose to prevent hypoglycemia.  Increase as needed.

## 2019-10-04 NOTE — ASSESSMENT & PLAN NOTE
Secondary to CKD.  Noted drop in H/H.  Iron deficiency anemia.    No evidence of active bleeding.  Will check stool for occult blood.  Will not transfuse at this time.  Repeat CBC in Am.

## 2019-10-04 NOTE — HOSPITAL COURSE
Ms. Epstein presented with shortness of breath. She was admitted for hypertensive crisis and volume overload. Blood pressure was elevated at 270 systolic.  Treatment initiated with Nitro infusion and she was placed on CPAP for hypoxia with O2 sat of 60% on room air.  Also noted with acute renal failure on CKD.  Nephrology consulted and started on IV Bumex.  Restarted home medications with improvement of BP and able to wean off Nitro drip. Stepped down to floor in stable condition on 10/5. Metolazone added by nephrology. Weaned off supplemental O2 but remained with anasarca. Unable to use ACE-I due to worsening renal function.  Renal function without significant improvement. Nephrology finally recommended patient to start dialysis mainly for UF.  Tunneled catheter placed by IR on 10/10/19 with 1st dialysis the next day.  Social service arranged outpatient dialysis for every Tuesday, Thursday, Saturday as requested by patient.  PT/OT recommends SNF placement.  Patient was accepted at [facility] for SNF.  Insurance approved.  Patient is to use oxygen as needed.  Activity as tolerated.  Cardiac/renal diet.  PCP follow-up to be arranged once out of SNF

## 2019-10-04 NOTE — PHYSICIAN QUERY
PT Name: Magaly Epstein  MR #: [id number]    Physician Query Form - Respiratory Condition Clarification      CDS/: Corie HERNANDES, RN-BC  Contact information: [email or web address]    This form is a permanent document in the medical record.    Query Date: October 4, 2019    By submitting this query, we are merely seeking further clarification of documentation. Please utilize your independent clinical judgment when addressing the question(s) below.    The Medical record contains the following   Indicators   Supporting Clinical Findings Location in Medical Record   X   SOB, GUTIERREZ, Wheezing, Productive Cough, Use of Accessory Muscles, etc. Pt called complaining of SOB. On room air pt O2 was 68%, on NC pt was 70-74%. Pt was put on an non rebreather O2 was 83%. After pt was put on CPAP her sats reached 93% ED provider note, Dr. Aviles, 10/3/19   X   Acute/Chronic Illness DM-2, Nephrotic syndrome; chronic anasarca, CKD-3,CHF and HTN  Nephrology Consult, Dr. Walsh, 10/3/19   X   Radiology Findings Congestive heart failure, unspecified HF chronicity, unspecified heart failure type CXR 10/3/19   X   Respiratory Distress or Failure History is limited due to patient being respiratory distress...Patient uncomfortable due to respiratory distress. She has rales. Poor air movement.  Patient is uncomfortable lying back.  Retractions    ED provider note, Dr. Aviles, 10/3/19   X   Hypoxia or Hypercapnia Hypoxemia  ED provider note, Dr. Aviles, 10/3/19   X RR         ABGs         O2 sat RR 47-23-23-31-24-22-36    ABGs  POC PH: 7.332 (L)  POC PCO2: 42.3  POC PO2: 365 (H)  POC BE: -3  POC HCO3: 22.4 (L)  POC SATURATED O2: 100  POC TCO2: 24  FiO2: 100  Sample: ARTERIAL  DelSys: CPAP/BiPAP      O2 sats % on BiPAP                % on 3L VS 10/3-10/4      POCT 10/3                        10/3  10/3-10/4     X   BiPAP/Intubation Placed on BIPAP 15/+6/100 as per order MD Aviles Respiratory Therapist Care Update, April  Lee, RRT, 10/3/19      Supplemental O2        Home O2, Oxygen Dependence     X   Treatment albuterol-ipratropium 2.5 mg-0.5 mg/3 mL nebulizer solution 3 mL   MAR 10/3-10/4      Other       Respiratory failure can be acute, chronic or both. It is generally further specified as hypoxic, hypercapnic or both. Lastly, it is important to identify an etiology, if known or suspected.   References::  https://www.acphospitalist.org/archives/2013/10/coding.htm http://[employer]acute-respiratory-failure-know     The clinical guidelines noted below are only system guidelines, and do not replace the providers clinical judgment.    Provider, please specify diagnosis or diagnoses associated with above clinical findings.   [ x  ] Acute Respiratory Failure with Hypoxia - ABG pO2 < 60 mmHg or O2 sat of 88% on RA and respiratory symptoms documented   [   ] Acute Respiratory Distress - Generally describes less severe respiratory symptoms (tachypnea, in respiratory distress, increased work of breathing, unable to speak in complete sentences, labored breathing, use of accessory muscles, RR> 24, cyanosis, dyspnea, wheezing, stridor, lethargy) without sufficient measurements (pO2, SpO2, pH, and pCO2) to meet criteria for respiratory failure    [   ] Other Respiratory Diagnosis (please specify): _________________________________   [   ]  Clinically Undetermined       Please document in your progress notes daily for the duration of treatment until resolved and include in your discharge summary.

## 2019-10-04 NOTE — PLAN OF CARE
"Patient remains in ICU with orders to transfer to telemetry. Due to staffing limitations, patient will stay in ICU room overnight.  Normotensive, sating 95-98% on 2LNC with some GUTIERREZ and increase WOB with repositioning. AOx4, no falls or injuries, weight shifts provided q2, xanex given x 1 for "restless legs" and associated anxiety. Will continue to monitor vital signs and patient status.     "

## 2019-10-05 LAB
ANION GAP SERPL CALC-SCNC: 10 MMOL/L (ref 8–16)
BASOPHILS # BLD AUTO: 0.02 K/UL (ref 0–0.2)
BASOPHILS NFR BLD: 0.2 % (ref 0–1.9)
BUN SERPL-MCNC: 43 MG/DL (ref 8–23)
CALCIUM SERPL-MCNC: 8 MG/DL (ref 8.7–10.5)
CHLORIDE SERPL-SCNC: 105 MMOL/L (ref 95–110)
CO2 SERPL-SCNC: 23 MMOL/L (ref 23–29)
CREAT SERPL-MCNC: 3.7 MG/DL (ref 0.5–1.4)
DIFFERENTIAL METHOD: ABNORMAL
EOSINOPHIL # BLD AUTO: 0.2 K/UL (ref 0–0.5)
EOSINOPHIL NFR BLD: 2.6 % (ref 0–8)
ERYTHROCYTE [DISTWIDTH] IN BLOOD BY AUTOMATED COUNT: 13.9 % (ref 11.5–14.5)
EST. GFR  (AFRICAN AMERICAN): 14 ML/MIN/1.73 M^2
EST. GFR  (NON AFRICAN AMERICAN): 12 ML/MIN/1.73 M^2
GLUCOSE SERPL-MCNC: 170 MG/DL (ref 70–110)
HCT VFR BLD AUTO: 26.7 % (ref 37–48.5)
HGB BLD-MCNC: 8 G/DL (ref 12–16)
LYMPHOCYTES # BLD AUTO: 1 K/UL (ref 1–4.8)
LYMPHOCYTES NFR BLD: 10.9 % (ref 18–48)
MCH RBC QN AUTO: 28.8 PG (ref 27–31)
MCHC RBC AUTO-ENTMCNC: 30 G/DL (ref 32–36)
MCV RBC AUTO: 96 FL (ref 82–98)
MONOCYTES # BLD AUTO: 0.7 K/UL (ref 0.3–1)
MONOCYTES NFR BLD: 7.5 % (ref 4–15)
NEUTROPHILS # BLD AUTO: 6.8 K/UL (ref 1.8–7.7)
NEUTROPHILS NFR BLD: 78.8 % (ref 38–73)
PLATELET # BLD AUTO: 251 K/UL (ref 150–350)
PMV BLD AUTO: 9.8 FL (ref 9.2–12.9)
POCT GLUCOSE: 168 MG/DL (ref 70–110)
POCT GLUCOSE: 182 MG/DL (ref 70–110)
POCT GLUCOSE: 208 MG/DL (ref 70–110)
POCT GLUCOSE: 208 MG/DL (ref 70–110)
POCT GLUCOSE: 244 MG/DL (ref 70–110)
POTASSIUM SERPL-SCNC: 3.8 MMOL/L (ref 3.5–5.1)
RBC # BLD AUTO: 2.78 M/UL (ref 4–5.4)
SODIUM SERPL-SCNC: 138 MMOL/L (ref 136–145)
WBC # BLD AUTO: 8.71 K/UL (ref 3.9–12.7)

## 2019-10-05 PROCEDURE — 94640 AIRWAY INHALATION TREATMENT: CPT

## 2019-10-05 PROCEDURE — 27000221 HC OXYGEN, UP TO 24 HOURS

## 2019-10-05 PROCEDURE — 25000003 PHARM REV CODE 250: Performed by: INTERNAL MEDICINE

## 2019-10-05 PROCEDURE — 25000003 PHARM REV CODE 250: Performed by: HOSPITALIST

## 2019-10-05 PROCEDURE — 80048 BASIC METABOLIC PNL TOTAL CA: CPT

## 2019-10-05 PROCEDURE — 63600175 PHARM REV CODE 636 W HCPCS: Performed by: HOSPITALIST

## 2019-10-05 PROCEDURE — 36415 COLL VENOUS BLD VENIPUNCTURE: CPT

## 2019-10-05 PROCEDURE — 25000242 PHARM REV CODE 250 ALT 637 W/ HCPCS: Performed by: HOSPITALIST

## 2019-10-05 PROCEDURE — 21400001 HC TELEMETRY ROOM

## 2019-10-05 PROCEDURE — 97165 OT EVAL LOW COMPLEX 30 MIN: CPT

## 2019-10-05 PROCEDURE — 85025 COMPLETE CBC W/AUTO DIFF WBC: CPT

## 2019-10-05 PROCEDURE — 63600175 PHARM REV CODE 636 W HCPCS: Performed by: INTERNAL MEDICINE

## 2019-10-05 PROCEDURE — 97161 PT EVAL LOW COMPLEX 20 MIN: CPT

## 2019-10-05 PROCEDURE — 97110 THERAPEUTIC EXERCISES: CPT

## 2019-10-05 PROCEDURE — 94761 N-INVAS EAR/PLS OXIMETRY MLT: CPT

## 2019-10-05 PROCEDURE — S0171 BUMETANIDE 0.5 MG: HCPCS | Performed by: HOSPITALIST

## 2019-10-05 RX ORDER — METOLAZONE 5 MG/1
10 TABLET ORAL DAILY
Status: DISCONTINUED | OUTPATIENT
Start: 2019-10-05 | End: 2019-10-12

## 2019-10-05 RX ORDER — GUAIFENESIN/DEXTROMETHORPHAN 100-10MG/5
5 SYRUP ORAL EVERY 6 HOURS
Status: DISCONTINUED | OUTPATIENT
Start: 2019-10-05 | End: 2019-10-17 | Stop reason: HOSPADM

## 2019-10-05 RX ORDER — OLANZAPINE 5 MG/1
10 TABLET, ORALLY DISINTEGRATING ORAL ONCE AS NEEDED
Status: COMPLETED | OUTPATIENT
Start: 2019-10-06 | End: 2019-10-05

## 2019-10-05 RX ORDER — BENZONATATE 100 MG/1
200 CAPSULE ORAL 3 TIMES DAILY PRN
Status: DISCONTINUED | OUTPATIENT
Start: 2019-10-05 | End: 2019-10-17 | Stop reason: HOSPADM

## 2019-10-05 RX ADMIN — FERROUS SULFATE TAB EC 325 MG (65 MG FE EQUIVALENT) 325 MG: 325 (65 FE) TABLET DELAYED RESPONSE at 09:10

## 2019-10-05 RX ADMIN — IPRATROPIUM BROMIDE AND ALBUTEROL SULFATE 3 ML: .5; 3 SOLUTION RESPIRATORY (INHALATION) at 08:10

## 2019-10-05 RX ADMIN — METOPROLOL SUCCINATE 50 MG: 50 TABLET, EXTENDED RELEASE ORAL at 08:10

## 2019-10-05 RX ADMIN — METOLAZONE 10 MG: 5 TABLET ORAL at 12:10

## 2019-10-05 RX ADMIN — ALPRAZOLAM 0.5 MG: 0.5 TABLET ORAL at 09:10

## 2019-10-05 RX ADMIN — BUMETANIDE 2 MG: 0.25 INJECTION INTRAMUSCULAR; INTRAVENOUS at 08:10

## 2019-10-05 RX ADMIN — ALPRAZOLAM 0.5 MG: 0.5 TABLET ORAL at 01:10

## 2019-10-05 RX ADMIN — INSULIN ASPART 2 UNITS: 100 INJECTION, SOLUTION INTRAVENOUS; SUBCUTANEOUS at 05:10

## 2019-10-05 RX ADMIN — AMLODIPINE BESYLATE 10 MG: 5 TABLET ORAL at 08:10

## 2019-10-05 RX ADMIN — IRON SUCROSE 200 MG: 20 INJECTION, SOLUTION INTRAVENOUS at 01:10

## 2019-10-05 RX ADMIN — GUAIFENESIN AND DEXTROMETHORPHAN 5 ML: 100; 10 SYRUP ORAL at 11:10

## 2019-10-05 RX ADMIN — INSULIN ASPART 15 UNITS: 100 INJECTION, SUSPENSION SUBCUTANEOUS at 04:10

## 2019-10-05 RX ADMIN — OLANZAPINE 10 MG: 5 TABLET, ORALLY DISINTEGRATING ORAL at 11:10

## 2019-10-05 RX ADMIN — BENZONATATE 200 MG: 100 CAPSULE ORAL at 04:10

## 2019-10-05 RX ADMIN — IPRATROPIUM BROMIDE AND ALBUTEROL SULFATE 3 ML: .5; 3 SOLUTION RESPIRATORY (INHALATION) at 09:10

## 2019-10-05 RX ADMIN — INSULIN ASPART 15 UNITS: 100 INJECTION, SUSPENSION SUBCUTANEOUS at 07:10

## 2019-10-05 RX ADMIN — ENOXAPARIN SODIUM 30 MG: 100 INJECTION SUBCUTANEOUS at 06:10

## 2019-10-05 RX ADMIN — HYDRALAZINE HYDROCHLORIDE 100 MG: 25 TABLET ORAL at 01:10

## 2019-10-05 RX ADMIN — INSULIN ASPART 2 UNITS: 100 INJECTION, SOLUTION INTRAVENOUS; SUBCUTANEOUS at 12:10

## 2019-10-05 RX ADMIN — PRAVASTATIN SODIUM 10 MG: 10 TABLET ORAL at 08:10

## 2019-10-05 RX ADMIN — HYDRALAZINE HYDROCHLORIDE 100 MG: 25 TABLET ORAL at 05:10

## 2019-10-05 RX ADMIN — ACETAMINOPHEN 650 MG: 325 TABLET, FILM COATED ORAL at 11:10

## 2019-10-05 RX ADMIN — FERROUS SULFATE TAB EC 325 MG (65 MG FE EQUIVALENT) 325 MG: 325 (65 FE) TABLET DELAYED RESPONSE at 08:10

## 2019-10-05 RX ADMIN — GUAIFENESIN AND DEXTROMETHORPHAN 5 ML: 100; 10 SYRUP ORAL at 06:10

## 2019-10-05 RX ADMIN — HYDRALAZINE HYDROCHLORIDE 100 MG: 25 TABLET ORAL at 09:10

## 2019-10-05 NOTE — NURSING TRANSFER
Nursing Transfer Note      10/5/2019     Transfer To: 326    Transfer via bed    Transfer with 2L to O2, cardiac monitoring    Transported by RN & transport    Medicines sent: 70/30 insulin and novalog pens    Chart send with patient: Yes        Patient reassessed at: 10/05/2019  0915  Upon arrival to floor: cardiac monitor applied, patient oriented to room, call bell in reach and bed in lowest position

## 2019-10-05 NOTE — PROGRESS NOTES
Date of Admission:10/3/2019    SUBJECTIVE: notes not sob    Current Facility-Administered Medications   Medication    acetaminophen tablet 650 mg    albuterol-ipratropium 2.5 mg-0.5 mg/3 mL nebulizer solution 3 mL    ALPRAZolam tablet 0.5 mg    amLODIPine tablet 10 mg    cloNIDine tablet 0.1 mg    dextrose 50% injection 12.5 g    dextrose 50% injection 25 g    enoxaparin injection 30 mg    ferrous sulfate EC tablet 325 mg    glucagon (human recombinant) injection 1 mg    glucose chewable tablet 16 g    glucose chewable tablet 24 g    hydrALAZINE tablet 100 mg    insulin aspart protamine-insulin aspart (NovoLOG 70/30) injection    insulin aspart U-100 pen 1-10 Units    metoprolol succinate (TOPROL-XL) 24 hr tablet 50 mg    ondansetron injection 4 mg    polyethylene glycol packet 17 g    pravastatin tablet 10 mg    senna-docusate 8.6-50 mg per tablet 1 tablet    sodium chloride 0.9% flush 10 mL       Wt Readings from Last 3 Encounters:   10/03/19 117.9 kg (260 lb)   04/18/19 104.3 kg (229 lb 15 oz)   07/22/16 108.9 kg (240 lb)     Temp Readings from Last 3 Encounters:   10/05/19 97.6 °F (36.4 °C) (Oral)   04/18/19 98.3 °F (36.8 °C) (Oral)   07/22/16 97.4 °F (36.3 °C) (Oral)     BP Readings from Last 3 Encounters:   10/05/19 (!) 157/72   04/18/19 139/65   07/22/16 (!) 155/70     Pulse Readings from Last 3 Encounters:   10/05/19 78   04/18/19 81   07/22/16 80       Intake/Output Summary (Last 24 hours) at 10/5/2019 1202  Last data filed at 10/5/2019 0800  Gross per 24 hour   Intake 340 ml   Output 1965 ml   Net -1625 ml       PE:  GEN:wd female in nad  HEENT:ncat,eomi,mm  CVS:s1s2 regular  PULM:no rales  ABD:+bs,soft,nd  EXT:3+leg edema, arm edema  NEURO:awake    Recent Labs   Lab 10/05/19  0241   *      K 3.8      CO2 23   BUN 43*   CREATININE 3.7*   CALCIUM 8.0*       Lab Results   Component Value Date    CALCIUM 8.0 (L) 10/05/2019    PHOS 4.6 (H) 10/04/2019       Recent Labs    Lab 10/05/19  0241   WBC 8.71   RBC 2.78*   HGB 8.0*   HCT 26.7*      MCV 96   MCH 28.8   MCHC 30.0*         A/P:  1.lara. ckd 3 baseline. 2nd to dm/htn. Following creatinine.   2.htn. sbp better. Cont tx.  3.edema. Anasarca. Still lots of fluid. Add metolazone.  Cont bumex.  Daily wts ordered.  4.dm2. Following sugars.  5.nephrotic. No arb today.  6.iron def anemia. Cont iv iron.

## 2019-10-05 NOTE — PROGRESS NOTES
"[facility] Medicine  Progress Note    Patient Name: Magaly Epstein  MRN: [id number]  Patient Class: IP- Inpatient   Admission Date: 10/3/2019  Length of Stay: 2 days  Attending Physician: López Mckinnon MD  Primary Care Provider: Anahy Bradley DO        Subjective:     Principal Problem:Hypertensive emergency        HPI:  70 y/o female patient presents for evaluation of acute onset of shortness of breath that started last night.  Patient lives in assisted living.  She was admitted here in April for shortness of breath due to hypertensive crisis and fluid overload.  Patient has history of nephrotic syndrome and anasarca and had a kidney biopsy at that time.   She also has a history of chronic hypertension.   Patient states compliance with medications.  She denies chest pain. She states she was just very short of breath and repeatedly asked for help.  She also complains of a dry cough. She has leg edema that she states is "always there."  Patient was placed on CPAP for an O2 sat of 60% on room air.  Blood pressure was reportedly elevated at 270 systolic.  She presented severely hypertensive and placed on Nitro infusion.  Patient is now feeling better.  No other complaints.    Overview/Hospital Course:  70 y/o female patient presents for evaluation of acute onset of shortness of breath. Patient lives in assisted living.  She was admitted here in April for shortness of breath due to hypertensive crisis and fluid overload. Patient was placed on CPAP for an O2 sat of 60% on room air.  Blood pressure was reportedly elevated at 270 systolic. She presented severely hypertensive and placed on Nitro infusion.  CKD with increased Creat from baseline.  Hx of nephrotic syndrome with lower extremity edema and fluid overload.  Nephrology consulted and started on IV Bumex.  Restarted home medications with improvement of BP and able to wean off Nitro drip.      Interval History: Some shortness of " breath, but feeling better.    Review of Systems   HENT: Negative for ear discharge and ear pain.    Eyes: Negative for pain and itching.   Endocrine: Negative for polyphagia and polyuria.   Neurological: Negative for seizures and syncope.     Objective:     Vital Signs (Most Recent):  Temp: 98.4 °F (36.9 °C) (10/05/19 0302)  Pulse: 80 (10/05/19 0811)  Resp: (!) 30 (10/05/19 0811)  BP: (!) 156/109 (10/05/19 0800)  SpO2: 97 % (10/05/19 0811) Vital Signs (24h Range):  Temp:  [97.7 °F (36.5 °C)-98.4 °F (36.9 °C)] 98.4 °F (36.9 °C)  Pulse:  [70-89] 80  Resp:  [17-45] 30  SpO2:  [92 %-99 %] 97 %  BP: (113-195)/() 156/109     Weight: 117.9 kg (260 lb)  Body mass index is 38.4 kg/m².    Intake/Output Summary (Last 24 hours) at 10/5/2019 0933  Last data filed at 10/5/2019 0800  Gross per 24 hour   Intake 340 ml   Output 2515 ml   Net -2175 ml      Physical Exam   Constitutional: She is oriented to person, place, and time. She appears well-developed. No distress.   HENT:   Head: Normocephalic and atraumatic.   Eyes: Conjunctivae are normal. Right eye exhibits no discharge. Left eye exhibits no discharge.   Neck: Neck supple. No tracheal deviation present.   Cardiovascular: Normal rate and regular rhythm.   Pulmonary/Chest: She has no wheezes.   Slight tachypnea  Decreased BS bilateral bases   Abdominal: Soft. Bowel sounds are normal.   Musculoskeletal: She exhibits edema.   Neurological: She is alert and oriented to person, place, and time.   Skin: Skin is warm and dry.       Significant Labs:   BMP:   Recent Labs   Lab 10/05/19  0241   *      K 3.8      CO2 23   BUN 43*   CREATININE 3.7*   CALCIUM 8.0*     CBC:   Recent Labs   Lab 10/04/19  0259 10/05/19  0241   WBC 6.27 8.71   HGB 7.8* 8.0*   HCT 24.2* 26.7*    251           Assessment/Plan:      * Hypertensive emergency  Patient presents with dyspnea and significant hypertension.  CXR shows mild interstitial edema and bilateral pleural  effusions.  Patient was placed on Nitro drip and given dose of Bumex.  Improving BP and symptoms.  Restarted on home oral medications and able to wean off Nitro drip.  Added prn Clonidine.        Nephrotic syndrome  Patient with recent admission and evaluated by Nephrology.  Creat much higher than baseline.  Patient with kidney biopsy on last admit.  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  Given dose of Bumex in ER.    Appreciate Nephrology input.  Continue IV Bumex and monitor renal function.        Elevated troponin  Probably secondary to demand ischemia from uncontrolled HTN and renal failure.  Denies any chest pain.      Debility        Anemia of chronic disease  Secondary to CKD.  Noted drop in H/H.  Iron deficiency anemia.    No evidence of active bleeding.  Will check stool for occult blood.  Will not transfuse at this time.  Repeat CBC in Am.      Hyperlipidemia  Continue Statin.      Type 2 diabetes mellitus with renal complication  Uncontrolled with hyperglycemia.  Diabetic diet and insulin sliding scale.  Started on home 70/30 at lower dose to prevent hypoglycemia.  Increase as needed.      Essential hypertension  As above.      Acute renal failure superimposed on stage 3 chronic kidney disease  Nephrology consult as above.        VTE Risk Mitigation (From admission, onward)         Ordered     enoxaparin injection 30 mg  Daily      10/03/19 0656     IP VTE HIGH RISK PATIENT  Once      10/03/19 0568                      López Mckinnon MD  Department of Hospital Medicine   [facility]

## 2019-10-05 NOTE — PLAN OF CARE
Problem: Skin Injury Risk Increased  Goal: Skin Health and Integrity  Outcome: Ongoing, Not Progressing     Problem: Skin Injury Risk Increased  Goal: Skin Health and Integrity  Outcome: Ongoing, Not Progressing     Problem: Skin Injury Risk Increased  Goal: Skin Health and Integrity  Outcome: Ongoing, Not Progressing     Problem: Skin Injury Risk Increased  Goal: Skin Health and Integrity  Outcome: Ongoing, Not Progressing     Problem: Physical Therapy Goal  Goal: Physical Therapy Goal  Description  Goals to be met by: 10/19/2019     Patient will increase functional independence with mobility by performin. Supine to sit with Modified [place]  2. Sit to stand transfer with Modified [place]  3. Gait  x 100 feet with Modified [place] using Rolling Walker.   4. Lower extremity exercise program x10 reps per handout, with independence     Outcome: Ongoing, Progressing   Initial PT evaluation performed.  Pt could benefit from daily skilled PT services in order to maximize function prior to D/C.  SNF recommended as pt lives alone and has no help at home.  Pt was Independent PTA.

## 2019-10-05 NOTE — PLAN OF CARE
Patient is awake alert oriented. Vital signs stable and within limits. Remain edematous to the lower extremities. Adequate urine output note. Still awaiting for telemetry bed

## 2019-10-05 NOTE — PT/OT/SLP EVAL
"Physical Therapy Evaluation    Patient Name:  Magaly Epstein   MRN:  [id number]    Recommendations:     Discharge Recommendations:  nursing facility, skilled   Discharge Equipment Recommendations: (per SNF)   Barriers to discharge: Decreased caregiver support and pt lives alone and has no family    Assessment:     Magaly Epstein is a 69 y.o. female admitted with a medical diagnosis of Hypertensive emergency.  She presents with the following impairments/functional limitations:  weakness, impaired functional mobilty, decreased safety awareness, impaired coordination, impaired cardiopulmonary response to activity, impaired endurance, gait instability, decreased coordination, pain, impaired balance, decreased upper extremity function, impaired self care skills, edema .    Rehab Prognosis: Good; patient would benefit from acute skilled PT services to address these deficits and reach maximum level of function.    Recent Surgery: * No surgery found *      Plan:     During this hospitalization, patient to be seen daily to address the identified rehab impairments via gait training, therapeutic activities, therapeutic exercises and progress toward the following goals:    · Plan of Care Expires:  10/19/19    Subjective     Chief Complaint: pain in feet  Patient/Family Comments/goals: SNF  Pain/Comfort:  · Pain Rating 1: 5/10  · Location 1: (B feet, "everywhere")  · Pain Addressed 1: Pre-medicate for activity, Reposition, Distraction, Cessation of Activity, Nurse notified  · Pain Rating Post-Intervention 1: (not rated)    Patients cultural, spiritual, Sabianist conflicts given the current situation: no    Living Environment:  Pt lives alone in an apartment with an elevator.  Pt uses transportation provided through [employer]   Prior to admission, patients level of function was Mod I.  Equipment used at home: rollator.  DME owned (not currently used): none.  Upon discharge, patient will have assistance from no one    Objective: "     Communicated with nsg prior to session.  Patient found HOB elevated with telemetry, bed alarm, oxygen  upon PT entry to room.    General Precautions: Standard, fall   Orthopedic Precautions:N/A   Braces: N/A     Exams:  · Cognitive Exam:  Patient is oriented to Person, Place, Time and Situation  · Gross Motor Coordination:  impaired 2/2 pain, weakness, and decondioning  · Postural Exam:  Patient presented with the following abnormalities:    · -       Rounded shoulders  · -       Forward head  · Sensation:    · -       Intact  light/touch B LE's  · Skin Integrity/Edema:      · -       Skin integrity: Visible skin intact  · RLE ROM: WFL  · RLE Strength: WFL  · LLE ROM: WFL  · LLE Strength: WFL    Functional Mobility:  · Bed Mobility:     · Scooting: minimum assistance  · Supine to Sit: minimum assistance  · Sit to Supine: minimum assistance  · Transfers:     · Sit to Stand:  moderate assistance and with Vc for forward weight shift over ROSEMARY and hand placement/safety with rolling walker  · Gait: 3 sidesteps at EOb with Mod A and RW with Vc/TC for walker managemetn/safety, increased trunk ext, and distribution ofweight through UE's to walker   · Balance: FAir+ sit, Fair- stand      Therapeutic Activities and Exercises:   Educated pt to perform B AP's, TKE's, GS, and fist pumps with UE's elevated on pillows while in bed.  Pt verbalized/demonstrated understadning  AM-PAC 6 CLICK MOBILITY  Total Score:13     Patient left with bed in chair position with all lines intact, call button in reach, bed alarm on and nsg notified.    GOALS:   Multidisciplinary Problems     Physical Therapy Goals        Problem: Physical Therapy Goal    Goal Priority Disciplines Outcome Goal Variances Interventions   Physical Therapy Goal     PT, PT/OT Ongoing, Progressing     Description:  Goals to be met by: 10/19/2019     Patient will increase functional independence with mobility by performin. Supine to sit with Modified  [place]  2. Sit to stand transfer with Modified [place]  3. Gait  x 100 feet with Modified [place] using Rolling Walker.   4. Lower extremity exercise program x10 reps per handout, with independence                      History:     Past Medical History:   Diagnosis Date    Arthritis     CHF (congestive heart failure)     Diabetes mellitus        Past Surgical History:   Procedure Laterality Date    APPENDECTOMY         Time Tracking:     PT Received On: 10/05/19  PT Start Time: 1230     PT Stop Time: 1254  PT Total Time (min): 24 min     Billable Minutes: Evaluation 15 and Therapeutic Exercise 9 OT present      Raquel Taylor, PT  10/05/2019

## 2019-10-05 NOTE — PLAN OF CARE
Problem: Occupational Therapy Goal  Goal: Occupational Therapy Goal  Description  Goals to be met by: 10/19/19    Patient will increase functional independence with ADLs by performing:    UE Dressing with Set-up Assistance.  LE Dressing with Stand-by Assistance.  Grooming while standing with Stand-by Assistance.  Toileting from bedside commode with Stand-by Assistance for hygiene and clothing management.   Toilet transfer to bedside commode with Stand-by Assistance.  Upper extremity exercise program x 20 reps per handout, with supervision.     Outcome: Ongoing, Progressing   The pt presents with decreased I with ADLs, and functional transfers.  The pt would benefit from skilled occupational therapy in order to increase I with ADLs, and return to her PLOF.

## 2019-10-05 NOTE — ASSESSMENT & PLAN NOTE
Patient with recent admission and evaluated by Nephrology.  Creat much higher than baseline.  Patient with kidney biopsy on last admit.  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  Given dose of Bumex in ER.    Appreciate Nephrology input.  Continue IV Bumex and monitor renal function.

## 2019-10-05 NOTE — ASSESSMENT & PLAN NOTE
Patient presents with dyspnea and significant hypertension.  CXR shows mild interstitial edema and bilateral pleural effusions.  Patient was placed on Nitro drip and given dose of Bumex.  Improving BP and symptoms.  Restarted on home oral medications and able to wean off Nitro drip.  Added prn Clonidine.

## 2019-10-05 NOTE — PLAN OF CARE
0800 VSS, oriented x4, breath sounds clear, complains of SOB with exertion, 96% on 2L NC, tolerating diet well, blood glucose well controlled, 3+ pitting edema, voiding adequate clear yellow urine to Plaza catheter to gravity, turned Q2, bed in low, clocked position, call light within reach, able to make needs known; no needs a this time.

## 2019-10-05 NOTE — SUBJECTIVE & OBJECTIVE
Interval History: Some shortness of breath, but feeling better.    Review of Systems   HENT: Negative for ear discharge and ear pain.    Eyes: Negative for pain and itching.   Endocrine: Negative for polyphagia and polyuria.   Neurological: Negative for seizures and syncope.     Objective:     Vital Signs (Most Recent):  Temp: 98.4 °F (36.9 °C) (10/05/19 0302)  Pulse: 80 (10/05/19 0811)  Resp: (!) 30 (10/05/19 0811)  BP: (!) 156/109 (10/05/19 0800)  SpO2: 97 % (10/05/19 0811) Vital Signs (24h Range):  Temp:  [97.7 °F (36.5 °C)-98.4 °F (36.9 °C)] 98.4 °F (36.9 °C)  Pulse:  [70-89] 80  Resp:  [17-45] 30  SpO2:  [92 %-99 %] 97 %  BP: (113-195)/() 156/109     Weight: 117.9 kg (260 lb)  Body mass index is 38.4 kg/m².    Intake/Output Summary (Last 24 hours) at 10/5/2019 0933  Last data filed at 10/5/2019 0800  Gross per 24 hour   Intake 340 ml   Output 2515 ml   Net -2175 ml      Physical Exam   Constitutional: She is oriented to person, place, and time. She appears well-developed. No distress.   HENT:   Head: Normocephalic and atraumatic.   Eyes: Conjunctivae are normal. Right eye exhibits no discharge. Left eye exhibits no discharge.   Neck: Neck supple. No tracheal deviation present.   Cardiovascular: Normal rate and regular rhythm.   Pulmonary/Chest: She has no wheezes.   Slight tachypnea  Decreased BS bilateral bases   Abdominal: Soft. Bowel sounds are normal.   Musculoskeletal: She exhibits edema.   Neurological: She is alert and oriented to person, place, and time.   Skin: Skin is warm and dry.       Significant Labs:   BMP:   Recent Labs   Lab 10/05/19  0241   *      K 3.8      CO2 23   BUN 43*   CREATININE 3.7*   CALCIUM 8.0*     CBC:   Recent Labs   Lab 10/04/19  0259 10/05/19  0241   WBC 6.27 8.71   HGB 7.8* 8.0*   HCT 24.2* 26.7*    251

## 2019-10-05 NOTE — PT/OT/SLP EVAL
Occupational Therapy   Evaluation    Name: Magaly Epstein  MRN: [id number]  Admitting Diagnosis:  Hypertensive emergency      Recommendations:     Discharge Recommendations: nursing facility, skilled  Discharge Equipment Recommendations:  none  Barriers to discharge:  Decreased caregiver support    Assessment:     Magaly Epstein is a 69 y.o. female with a medical diagnosis of Hypertensive emergency.  She presents with decreased I with ADLs, and functional transfers. Performance deficits affecting function: weakness, impaired endurance, impaired self care skills, impaired functional mobilty, gait instability, impaired balance, decreased upper extremity function, decreased lower extremity function, pain, impaired fine motor.      Rehab Prognosis: Good; patient would benefit from acute skilled OT services to address these deficits and reach maximum level of function.       Plan:     Patient to be seen 5 x/week to address the above listed problems via self-care/home management, therapeutic activities, therapeutic exercises, neuromuscular re-education  · Plan of Care Expires: 10/19/19  · Plan of Care Reviewed with: patient    Subjective     Chief Complaint: Pain in bilateral feet  Patient/Family Comments/goals: To return to PLOF    Occupational Profile:  Living Environment: The pt lives alone in an apartment complex that has an elevator.  Previous level of function: Independent with ADLs  Roles and Routines: Retired  Equipment Used at Home:  rollator  Assistance upon Discharge: The pt will have no assistance when returning home    Pain/Comfort:  Pain Rating 1: 5/10  Location - Side 1: Bilateral  Location 1: foot    Patients cultural, spiritual, Buddhist conflicts given the current situation: no    Objective:     Communicated with: Nursing prior to session.  Patient found HOB elevated with telemetry, bed alarm, oxygen upon OT entry to room.    General Precautions: Standard, fall   Orthopedic Precautions:N/A   Braces:  N/A     Occupational Performance:    Bed Mobility:    · Patient completed Rolling/Turning to Left with  minimum assistance  · Patient completed Rolling/Turning to Right with minimum assistance  · Patient completed Scooting/Bridging with minimum assistance  · Patient completed Supine to Sit with minimum assistance  · Patient completed Sit to Supine with moderate assistance    Functional Mobility/Transfers:  · Patient completed Sit <> Stand Transfer with moderate assistance  with  rolling walker   · Functional Mobility: Mod A to take 4 steps towards the HOB     Activities of Daily Living:  · Feeding:  modified independence with the HOB elevated    Cognitive/Visual Perceptual:  Cognitive/Psychosocial Skills:     -       Oriented to: Person, Place, Time and Situation     Physical Exam:  Upper Extremity Range of Motion:     -       Right Upper Extremity: WFL  -       Left Upper Extremity: WFL  Upper Extremity Strength:    -       Right Upper Extremity: WFL  -       Left Upper Extremity: WFL    AMPAC 6 Click ADL:  AMPAC Total Score: 13    Treatment & Education:  The pt performed the above listed activities, and was educated on the role of OT.  Education:    Patient left HOB elevated with all lines intact, call button in reach and nursing notified    GOALS:   Multidisciplinary Problems     Occupational Therapy Goals        Problem: Occupational Therapy Goal    Goal Priority Disciplines Outcome Interventions   Occupational Therapy Goal     OT, PT/OT Ongoing, Progressing    Description:  Goals to be met by: 10/19/19    Patient will increase functional independence with ADLs by performing:    UE Dressing with Set-up Assistance.  LE Dressing with Stand-by Assistance.  Grooming while standing with Stand-by Assistance.  Toileting from bedside commode with Stand-by Assistance for hygiene and clothing management.   Toilet transfer to bedside commode with Stand-by Assistance.  Upper extremity exercise program x 20 reps per handout,  with supervision.                      History:     Past Medical History:   Diagnosis Date    Arthritis     CHF (congestive heart failure)     Diabetes mellitus          Past Surgical History:   Procedure Laterality Date    APPENDECTOMY         Time Tracking:     OT Date of Treatment: 10/05/19  OT Start Time: 1230  OT Stop Time: 1254  OT Total Time (min): 24 min    Billable Minutes:Evaluation 15 minutes (PT present)    Marichuy Najera OT  10/5/2019

## 2019-10-05 NOTE — CARE UPDATE
[facility]  ICU Multidisciplinary Bedside Rounds   SUMMARY     Date: 10/5/2019    Prehospitalization: otherassisted living facility  Admit Date / LOS : 10/3/2019/ 2 days    Diagnosis: Hypertensive emergency    Consults:        Active: Nephro       Needed: Cardio     Code Status: Full Code   Advanced Directive: <no information>    LDA: Plaza       Central Lines/Site/Justification:Patient Does Not Have Central Line       Urinary Cath/Order/Justification:Critically Ill in ICU    Vasopressors/Infusions:        GOALS: Volume/ Hemodynamic: SBP <                     RASS: 0  alert and calm    CAM ICU: Negative  Pain Management: none       Pain Controlled: not applicable     Rhythm: NSR    Respiratory Device: Nasal Cannula                  Most Recent SBT/ SAT: N/A       MOVE Screen:     VTE Prophylaxis: Mechanical  Mobility: Bedrest  Stress Ulcer Prophylaxis: Yes    Dietary: PO  Tolerance: yes  /  Advancement: @ goal    Isolation: No active isolations    Restraints: No    Significant Dates:  Post Op Date: N/A  Rescue Date: N/A  Imaging/ Diagnostics: N/A    Noteworthy Labs:      CBC/Anemia Labs: Coags:    Recent Labs   Lab 10/03/19  0224 10/04/19  0259 10/04/19  0300 10/04/19  0333 10/05/19  0241   WBC 10.24 6.27  --   --  8.71   HGB 9.5* 7.8*  --   --  8.0*   HCT 29.6* 24.2*  --   --  26.7*    251  --   --  251   MCV 93 92  --   --  96   RDW 13.6 13.9  --   --  13.9   IRON  --   --  27*  --   --    FERRITIN  --   --   --  184  --     Recent Labs   Lab 10/03/19  0224   INR 0.9        Chemistries:   Recent Labs   Lab 10/03/19  0224 10/04/19  0300 10/05/19  0241    137 138   K 3.9 3.6 3.8    106 105   CO2 23 23 23   BUN 37* 39* 43*   CREATININE 3.5* 3.5* 3.7*   CALCIUM 8.1* 7.8* 8.0*   PROT 6.1  --   --    BILITOT 0.2  --   --    ALKPHOS 193*  --   --    ALT 40  --   --    AST 64*  --   --    PHOS  --  4.6*  --         Cardiac Enzymes: Ejection Fractions:    Recent Labs     10/03/19  0226  10/03/19  0742 10/03/19  1334   TROPONINI 0.052* 0.209* 0.259*    No results found for: EF     POCT Glucose: HbA1c:    Recent Labs   Lab 10/03/19  1128 10/03/19  1705 10/03/19  2154 10/04/19  0542 10/04/19  1205 10/04/19  1646   POCTGLUCOSE 239* 164* 211* 173* 210* 165*    Hemoglobin A1C   Date Value Ref Range Status   10/03/2019 7.5 (H) 4.0 - 5.6 % Final     Comment:     ADA Screening Guidelines:  5.7-6.4%  Consistent with prediabetes  >or=6.5%  Consistent with diabetes  High levels of fetal hemoglobin interfere with the HbA1C  assay. Heterozygous hemoglobin variants (HbS, HgC, etc)do  not significantly interfere with this assay.   However, presence of multiple variants may affect accuracy.     04/11/2019 7.1 (H) 4.0 - 5.6 % Final     Comment:     ADA Screening Guidelines:  5.7-6.4%  Consistent with prediabetes  >or=6.5%  Consistent with diabetes  High levels of fetal hemoglobin interfere with the HbA1C  assay. Heterozygous hemoglobin variants (HbS, HgC, etc)do  not significantly interfere with this assay.   However, presence of multiple variants may affect accuracy.          Needs from Care Team: Discharge planning, ?SNF, ?Rehab Facility     ICU LOS 1d 23h  Level of Care: OK to Transfer

## 2019-10-06 LAB
ALLENS TEST: ABNORMAL
ANION GAP SERPL CALC-SCNC: 12 MMOL/L (ref 8–16)
BASOPHILS # BLD AUTO: 0.03 K/UL (ref 0–0.2)
BASOPHILS NFR BLD: 0.4 % (ref 0–1.9)
BUN SERPL-MCNC: 47 MG/DL (ref 8–23)
CALCIUM SERPL-MCNC: 8.4 MG/DL (ref 8.7–10.5)
CHLORIDE SERPL-SCNC: 108 MMOL/L (ref 95–110)
CO2 SERPL-SCNC: 21 MMOL/L (ref 23–29)
CREAT SERPL-MCNC: 4 MG/DL (ref 0.5–1.4)
DELSYS: ABNORMAL
DIFFERENTIAL METHOD: ABNORMAL
EOSINOPHIL # BLD AUTO: 0.1 K/UL (ref 0–0.5)
EOSINOPHIL NFR BLD: 1 % (ref 0–8)
ERYTHROCYTE [DISTWIDTH] IN BLOOD BY AUTOMATED COUNT: 13.9 % (ref 11.5–14.5)
EST. GFR  (AFRICAN AMERICAN): 12 ML/MIN/1.73 M^2
EST. GFR  (NON AFRICAN AMERICAN): 11 ML/MIN/1.73 M^2
FLOW: 3
GLUCOSE SERPL-MCNC: 171 MG/DL (ref 70–110)
HCO3 UR-SCNC: 22.2 MMOL/L (ref 24–28)
HCT VFR BLD AUTO: 29.3 % (ref 37–48.5)
HGB BLD-MCNC: 8.7 G/DL (ref 12–16)
LYMPHOCYTES # BLD AUTO: 1.2 K/UL (ref 1–4.8)
LYMPHOCYTES NFR BLD: 15.4 % (ref 18–48)
MAGNESIUM SERPL-MCNC: 2.4 MG/DL (ref 1.6–2.6)
MCH RBC QN AUTO: 28.5 PG (ref 27–31)
MCHC RBC AUTO-ENTMCNC: 29.7 G/DL (ref 32–36)
MCV RBC AUTO: 96 FL (ref 82–98)
MODE: ABNORMAL
MONOCYTES # BLD AUTO: 0.6 K/UL (ref 0.3–1)
MONOCYTES NFR BLD: 7.3 % (ref 4–15)
NEUTROPHILS # BLD AUTO: 5.9 K/UL (ref 1.8–7.7)
NEUTROPHILS NFR BLD: 75.9 % (ref 38–73)
PCO2 BLDA: 40.5 MMHG (ref 35–45)
PH SMN: 7.35 [PH] (ref 7.35–7.45)
PLATELET # BLD AUTO: 268 K/UL (ref 150–350)
PMV BLD AUTO: 10.3 FL (ref 9.2–12.9)
PO2 BLDA: 88 MMHG (ref 80–100)
POC BE: -3 MMOL/L
POC SATURATED O2: 96 % (ref 95–100)
POC TCO2: 23 MMOL/L (ref 23–27)
POCT GLUCOSE: 104 MG/DL (ref 70–110)
POCT GLUCOSE: 156 MG/DL (ref 70–110)
POCT GLUCOSE: 180 MG/DL (ref 70–110)
POCT GLUCOSE: 207 MG/DL (ref 70–110)
POCT GLUCOSE: 208 MG/DL (ref 70–110)
POTASSIUM SERPL-SCNC: 4.1 MMOL/L (ref 3.5–5.1)
RBC # BLD AUTO: 3.05 M/UL (ref 4–5.4)
SAMPLE: ABNORMAL
SITE: ABNORMAL
SODIUM SERPL-SCNC: 141 MMOL/L (ref 136–145)
SP02: 94
WBC # BLD AUTO: 7.78 K/UL (ref 3.9–12.7)

## 2019-10-06 PROCEDURE — 36600 WITHDRAWAL OF ARTERIAL BLOOD: CPT

## 2019-10-06 PROCEDURE — 36415 COLL VENOUS BLD VENIPUNCTURE: CPT

## 2019-10-06 PROCEDURE — 25000003 PHARM REV CODE 250: Performed by: INTERNAL MEDICINE

## 2019-10-06 PROCEDURE — 83735 ASSAY OF MAGNESIUM: CPT

## 2019-10-06 PROCEDURE — 80048 BASIC METABOLIC PNL TOTAL CA: CPT

## 2019-10-06 PROCEDURE — 63600175 PHARM REV CODE 636 W HCPCS: Performed by: HOSPITALIST

## 2019-10-06 PROCEDURE — 94640 AIRWAY INHALATION TREATMENT: CPT

## 2019-10-06 PROCEDURE — 85025 COMPLETE CBC W/AUTO DIFF WBC: CPT

## 2019-10-06 PROCEDURE — 94761 N-INVAS EAR/PLS OXIMETRY MLT: CPT

## 2019-10-06 PROCEDURE — 82803 BLOOD GASES ANY COMBINATION: CPT

## 2019-10-06 PROCEDURE — 21400001 HC TELEMETRY ROOM

## 2019-10-06 PROCEDURE — 25000242 PHARM REV CODE 250 ALT 637 W/ HCPCS: Performed by: HOSPITALIST

## 2019-10-06 PROCEDURE — 25000003 PHARM REV CODE 250: Performed by: HOSPITALIST

## 2019-10-06 PROCEDURE — 63600175 PHARM REV CODE 636 W HCPCS: Performed by: INTERNAL MEDICINE

## 2019-10-06 PROCEDURE — 99900035 HC TECH TIME PER 15 MIN (STAT)

## 2019-10-06 PROCEDURE — S0171 BUMETANIDE 0.5 MG: HCPCS | Performed by: INTERNAL MEDICINE

## 2019-10-06 RX ORDER — BUMETANIDE 0.25 MG/ML
2 INJECTION INTRAMUSCULAR; INTRAVENOUS DAILY
Status: DISCONTINUED | OUTPATIENT
Start: 2019-10-06 | End: 2019-10-07

## 2019-10-06 RX ORDER — HYDRALAZINE HYDROCHLORIDE 20 MG/ML
10 INJECTION INTRAMUSCULAR; INTRAVENOUS EVERY 8 HOURS PRN
Status: DISCONTINUED | OUTPATIENT
Start: 2019-10-06 | End: 2019-10-17 | Stop reason: HOSPADM

## 2019-10-06 RX ADMIN — INSULIN ASPART 15 UNITS: 100 INJECTION, SUSPENSION SUBCUTANEOUS at 10:10

## 2019-10-06 RX ADMIN — BUMETANIDE 2 MG: 0.25 INJECTION INTRAMUSCULAR; INTRAVENOUS at 01:10

## 2019-10-06 RX ADMIN — GUAIFENESIN AND DEXTROMETHORPHAN 5 ML: 100; 10 SYRUP ORAL at 06:10

## 2019-10-06 RX ADMIN — EPOETIN ALFA-EPBX 6000 UNITS: 10000 INJECTION, SOLUTION INTRAVENOUS; SUBCUTANEOUS at 12:10

## 2019-10-06 RX ADMIN — FERROUS SULFATE TAB EC 325 MG (65 MG FE EQUIVALENT) 325 MG: 325 (65 FE) TABLET DELAYED RESPONSE at 08:10

## 2019-10-06 RX ADMIN — GUAIFENESIN AND DEXTROMETHORPHAN 5 ML: 100; 10 SYRUP ORAL at 05:10

## 2019-10-06 RX ADMIN — ENOXAPARIN SODIUM 30 MG: 100 INJECTION SUBCUTANEOUS at 06:10

## 2019-10-06 RX ADMIN — IPRATROPIUM BROMIDE AND ALBUTEROL SULFATE 3 ML: .5; 3 SOLUTION RESPIRATORY (INHALATION) at 08:10

## 2019-10-06 RX ADMIN — INSULIN ASPART 4 UNITS: 100 INJECTION, SOLUTION INTRAVENOUS; SUBCUTANEOUS at 12:10

## 2019-10-06 RX ADMIN — IRON SUCROSE 200 MG: 20 INJECTION, SOLUTION INTRAVENOUS at 01:10

## 2019-10-06 RX ADMIN — HYDRALAZINE HYDROCHLORIDE 100 MG: 25 TABLET ORAL at 08:10

## 2019-10-06 RX ADMIN — HYDRALAZINE HYDROCHLORIDE 100 MG: 25 TABLET ORAL at 05:10

## 2019-10-06 NOTE — NURSING
Report given to oncoming nurse Jenna Rn, Patient is awake and alert. Bed is in lowest position, wheels locked, call light is in reach. 12 hour chart check completed

## 2019-10-06 NOTE — PLAN OF CARE
Patient is currently on a 28% Venti mask with oxygen saturation of 93%.  Will administer PRN Respiratory treatments as required.  Will continue to monitor.

## 2019-10-06 NOTE — ASSESSMENT & PLAN NOTE
Nephrology consult as above.  Trial of diuretics per nephrology  Plaza in place. Remove? Strict I/Os  BMP in AM

## 2019-10-06 NOTE — SUBJECTIVE & OBJECTIVE
Interval History: difficult to arouse this AM. Got a dose each of alprazolam 0.5 and olanzapine 10 mg. ABG and BG ok. Finally woke up during afternoon and was AAOx3. With SOB but otherwise ok. Good appetite.    Review of Systems   Respiratory: Positive for shortness of breath.    Cardiovascular: Negative.    Gastrointestinal: Negative.      Objective:     Vital Signs (Most Recent):  Temp: 98.9 °F (37.2 °C) (10/06/19 1519)  Pulse: 84 (10/06/19 1519)  Resp: 16 (10/06/19 1519)  BP: (!) 164/75 (10/06/19 1519)  SpO2: 95 % (10/06/19 1519) Vital Signs (24h Range):  Temp:  [97.9 °F (36.6 °C)-98.9 °F (37.2 °C)] 98.9 °F (37.2 °C)  Pulse:  [74-95] 84  Resp:  [16-20] 16  SpO2:  [91 %-96 %] 95 %  BP: (147-181)/(65-81) 164/75     Weight: 117.1 kg (258 lb 2.5 oz)  Body mass index is 38.12 kg/m².    Intake/Output Summary (Last 24 hours) at 10/6/2019 1619  Last data filed at 10/6/2019 1545  Gross per 24 hour   Intake 240 ml   Output 1401 ml   Net -1161 ml      Physical Exam   Constitutional: She is oriented to person, place, and time. She appears well-developed. No distress.   Cardiovascular: Normal rate and regular rhythm.   Pulmonary/Chest: She has no wheezes. She has rales.   Using accessory muscles while speaking   Abdominal: Soft. Bowel sounds are normal.   Genitourinary:   Genitourinary Comments: Plaza in place   Musculoskeletal: She exhibits edema.   anasarca   Neurological: She is alert and oriented to person, place, and time.   Skin: Skin is warm and dry. Capillary refill takes less than 2 seconds. She is not diaphoretic.   Nursing note and vitals reviewed.      Significant Labs: All pertinent labs within the past 24 hours have been reviewed.    Significant Imaging: I have reviewed all pertinent imaging results/findings within the past 24 hours.  I have reviewed and interpreted all pertinent imaging results/findings within the past 24 hours.

## 2019-10-06 NOTE — PROGRESS NOTES
Date of Admission:10/3/2019    SUBJECTIVE: noted somnolent after zyprexa last night    Current Facility-Administered Medications   Medication    acetaminophen tablet 650 mg    albuterol-ipratropium 2.5 mg-0.5 mg/3 mL nebulizer solution 3 mL    ALPRAZolam tablet 0.5 mg    amLODIPine tablet 10 mg    benzonatate capsule 200 mg    bumetanide injection 2 mg    cloNIDine tablet 0.1 mg    dextromethorphan-guaifenesin  mg/5 ml liquid 5 mL    dextrose 50% injection 12.5 g    dextrose 50% injection 25 g    enoxaparin injection 30 mg    epoetin cortes-epbx injection 6,000 Units    ferrous sulfate EC tablet 325 mg    glucagon (human recombinant) injection 1 mg    glucose chewable tablet 16 g    glucose chewable tablet 24 g    hydrALAZINE tablet 100 mg    insulin aspart protamine-insulin aspart (NovoLOG 70/30) injection    insulin aspart U-100 pen 1-10 Units    iron sucrose injection 200 mg    metOLazone tablet 10 mg    metoprolol succinate (TOPROL-XL) 24 hr tablet 50 mg    ondansetron injection 4 mg    polyethylene glycol packet 17 g    pravastatin tablet 10 mg    senna-docusate 8.6-50 mg per tablet 1 tablet    sodium chloride 0.9% flush 10 mL       Wt Readings from Last 3 Encounters:   10/06/19 117.1 kg (258 lb 2.5 oz)   04/18/19 104.3 kg (229 lb 15 oz)   07/22/16 108.9 kg (240 lb)     Temp Readings from Last 3 Encounters:   10/06/19 98.9 °F (37.2 °C) (Oral)   04/18/19 98.3 °F (36.8 °C) (Oral)   07/22/16 97.4 °F (36.3 °C) (Oral)     BP Readings from Last 3 Encounters:   10/06/19 (!) 154/72   04/18/19 139/65   07/22/16 (!) 155/70     Pulse Readings from Last 3 Encounters:   10/06/19 87   04/18/19 81   07/22/16 80       Intake/Output Summary (Last 24 hours) at 10/6/2019 1111  Last data filed at 10/6/2019 1040  Gross per 24 hour   Intake 120 ml   Output 1651 ml   Net -1531 ml       PE:  GEN:wd female in nad  HEENT:ncat,eyes closed,mm  CVS:s1s2 regular  PULM:no rales  ABD:+bs,soft,nd  EXT:3+leg  edema  NEURO:awakens but drowsy    Recent Labs   Lab 10/06/19  0437   *      K 4.1      CO2 21*   BUN 47*   CREATININE 4.0*   CALCIUM 8.4*   MG 2.4       Lab Results   Component Value Date    CALCIUM 8.4 (L) 10/06/2019    PHOS 4.6 (H) 10/04/2019       Recent Labs   Lab 10/06/19  0437   WBC 7.78   RBC 3.05*   HGB 8.7*   HCT 29.3*      MCV 96   MCH 28.5   MCHC 29.7*         A/P:  1.lara. ckd 3 baseline. 2nd to dm/htn. Following creatinine.   2.htn. sbp better. Cont tx.  3.edema. Anasarca. Still lots of fluid. Wt still up. Cont diuretics.  4.dm2. Following sugars.  5.nephrotic. No arb today.  6.iron def anemia. Cont iv iron. Add epo. 2nd to ckd too.

## 2019-10-06 NOTE — NURSING
Patient remains very anxious,attempting to get out bed and removing maskoff and saying help me while nurse is at the bedside. Dr De La Paz notified with order and carried out.

## 2019-10-06 NOTE — PT/OT/SLP PROGRESS
Physical Therapy      Patient Name:  Magaly Epstein   MRN:  [id number]    Patient not seen today secondary to Other (pt is somnolent, unable to maintain level of alertness to participate in therapy treatment. Per nurse , pt unable to stay awake long enough to swallow meds) . Will follow-up as able .    Anastacia oRper, PTA

## 2019-10-06 NOTE — NURSING
Patient trying to get out bed very anxious states she wants to walk to the bathroom,bedside commode placed at bedside.Assisted to transfer to bedside commode but unable due to shortness of breath.Placed back in bed head of bed elevated.

## 2019-10-06 NOTE — CARE UPDATE
Results for ANTONINA KINNEY (MRN [id number]) as of 10/6/2019 12:26   Ref. Range 10/6/2019 12:19   POC PH Latest Ref Range: 7.35 - 7.45  7.347 (L)   POC PCO2 Latest Ref Range: 35 - 45 mmHg 40.5   POC PO2 Latest Ref Range: 80 - 100 mmHg 88   POC BE Latest Ref Range: -2 to 2 mmol/L -3   POC HCO3 Latest Ref Range: 24 - 28 mmol/L 22.2 (L)   POC SATURATED O2 Latest Ref Range: 95 - 100 % 96   POC TCO2 Latest Ref Range: 23 - 27 mmol/L 23   Flow Unknown 3   Sample Unknown ARTERIAL   DelSys Unknown Nasal Can   Allens Test Unknown Pass   Site Unknown RR   Mode Unknown SPONT

## 2019-10-06 NOTE — ASSESSMENT & PLAN NOTE
Secondary to CKD.  Noted drop in H/H but stable  Iron deficiency anemia.    No evidence of active bleeding.   Will not transfuse at this time.

## 2019-10-06 NOTE — ASSESSMENT & PLAN NOTE
Patient with recent admission and evaluated by Nephrology.  Creat much higher than baseline.  Patient with kidney biopsy on last admit.   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  Diuretics per nephrology  ACE-I or ARB when able

## 2019-10-06 NOTE — ASSESSMENT & PLAN NOTE
Patient presents with dyspnea and significant hypertension.  CXR shows mild interstitial edema and bilateral pleural effusions.  Patient was placed on Nitro drip and given dose of Bumex.  Improved BP and symptoms.  Restarted on home oral medications and able to wean off Nitro drip.  Added prn Clonidine.  Vitals q 4 hours

## 2019-10-06 NOTE — PROGRESS NOTES
"[facility] Medicine  Progress Note    Patient Name: Magaly Epstein  MRN: [id number]  Patient Class: IP- Inpatient   Admission Date: 10/3/2019  Length of Stay: 3 days  Attending Physician: Lenka Cesar MD  Primary Care Provider: Anahy Bradley DO        Subjective:     Principal Problem:Hypertensive emergency        HPI:  68 y/o female patient presents for evaluation of acute onset of shortness of breath that started last night.  Patient lives in assisted living.  She was admitted here in April for shortness of breath due to hypertensive crisis and fluid overload.  Patient has history of nephrotic syndrome and anasarca and had a kidney biopsy at that time.   She also has a history of chronic hypertension.   Patient states compliance with medications.  She denies chest pain. She states she was just very short of breath and repeatedly asked for help.  She also complains of a dry cough. She has leg edema that she states is "always there."  Patient was placed on CPAP for an O2 sat of 60% on room air.  Blood pressure was reportedly elevated at 270 systolic.  She presented severely hypertensive and placed on Nitro infusion.  Patient is now feeling better.  No other complaints.    Overview/Hospital Course:  68 y/o female patient presents for evaluation of acute onset of shortness of breath. Patient lives in assisted living.  She was admitted here in April for shortness of breath due to hypertensive crisis and fluid overload. Patient was placed on CPAP for an O2 sat of 60% on room air.  Blood pressure was reportedly elevated at 270 systolic. She presented severely hypertensive and placed on Nitro infusion.  CKD with increased Creat from baseline.  Hx of nephrotic syndrome with lower extremity edema and fluid overload.  Nephrology consulted and started on IV Bumex.  Restarted home medications with improvement of BP and able to wean off Nitro drip.      Interval History: difficult to arouse " this AM. Got a dose each of alprazolam 0.5 and olanzapine 10 mg. ABG and BG ok. Finally woke up during afternoon and was AAOx3. With SOB but otherwise ok. Good appetite.    Review of Systems   Respiratory: Positive for shortness of breath.    Cardiovascular: Negative.    Gastrointestinal: Negative.      Objective:     Vital Signs (Most Recent):  Temp: 98.9 °F (37.2 °C) (10/06/19 1519)  Pulse: 84 (10/06/19 1519)  Resp: 16 (10/06/19 1519)  BP: (!) 164/75 (10/06/19 1519)  SpO2: 95 % (10/06/19 1519) Vital Signs (24h Range):  Temp:  [97.9 °F (36.6 °C)-98.9 °F (37.2 °C)] 98.9 °F (37.2 °C)  Pulse:  [74-95] 84  Resp:  [16-20] 16  SpO2:  [91 %-96 %] 95 %  BP: (147-181)/(65-81) 164/75     Weight: 117.1 kg (258 lb 2.5 oz)  Body mass index is 38.12 kg/m².    Intake/Output Summary (Last 24 hours) at 10/6/2019 1619  Last data filed at 10/6/2019 1545  Gross per 24 hour   Intake 240 ml   Output 1401 ml   Net -1161 ml      Physical Exam   Constitutional: She is oriented to person, place, and time. She appears well-developed. No distress.   Cardiovascular: Normal rate and regular rhythm.   Pulmonary/Chest: She has no wheezes. She has rales.   Using accessory muscles while speaking   Abdominal: Soft. Bowel sounds are normal.   Genitourinary:   Genitourinary Comments: Plaza in place   Musculoskeletal: She exhibits edema.   anasarca   Neurological: She is alert and oriented to person, place, and time.   Skin: Skin is warm and dry. Capillary refill takes less than 2 seconds. She is not diaphoretic.   Nursing note and vitals reviewed.      Significant Labs: All pertinent labs within the past 24 hours have been reviewed.    Significant Imaging: I have reviewed all pertinent imaging results/findings within the past 24 hours.  I have reviewed and interpreted all pertinent imaging results/findings within the past 24 hours.      Assessment/Plan:      * Hypertensive emergency  Patient presents with dyspnea and significant hypertension.  CXR  shows mild interstitial edema and bilateral pleural effusions.  Patient was placed on Nitro drip and given dose of Bumex.  Improved BP and symptoms.  Restarted on home oral medications and able to wean off Nitro drip.  Added prn Clonidine.  Vitals q 4 hours        Acute renal failure superimposed on stage 3 chronic kidney disease  Nephrology consult as above.  Trial of diuretics per nephrology  Plaza in place. Remove? Strict I/Os  BMP in AM      Nephrotic syndrome  Patient with recent admission and evaluated by Nephrology.  Creat much higher than baseline.  Patient with kidney biopsy on last admit.   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  Diuretics per nephrology  ACE-I or ARB when able        Elevated troponin  Probably secondary to demand ischemia from uncontrolled HTN and renal failure.  Denies any chest pain.      Debility  PT/OT eval: SNF       Anemia of chronic disease  Secondary to CKD.  Noted drop in H/H but stable  Iron deficiency anemia.    No evidence of active bleeding.   Will not transfuse at this time.        Hyperlipidemia  Continue Statin.      Type 2 diabetes mellitus with renal complication  Uncontrolled with hyperglycemia.  Diabetic diet and insulin sliding scale.  Started on home 70/30 at lower dose to prevent hypoglycemia.  Increase as needed.      Essential hypertension  As above.        VTE Risk Mitigation (From admission, onward)         Ordered     enoxaparin injection 30 mg  Daily      10/03/19 0684     IP VTE HIGH RISK PATIENT  Once      10/03/19 0563                      Lenka Raygoza MD  Department of Hospital Medicine   [facility]

## 2019-10-06 NOTE — NURSING
Tried to awaken patient for morning meds but patient is way to drowsy. She answers when name is called and awake long enough to state her first name but not long enough to swallow meds. Report from night nurse stated that she did not sleep well, frequently tried to get out of bed with agitation. Night nurse gave Xanax and a one time dose of Zyprexa sublingual. Safety measures remain in place, 02 therapy continued, medeiros to gravity.

## 2019-10-06 NOTE — NURSING
Report Recieved from off going nurse Jenna OBRIEN. Patient is resting in bed, snoring. NAD, Bed in lowest position, wheels locked, call light in reach. IV clean, dry and intact.

## 2019-10-06 NOTE — NURSING
Patient is still very drowsy, has not eaten lunch or breakfast. Answers when name is called and barely opens eyes. Still urinating, vitals signs WDL and 02 therapy in place. Dr. Raygoza notified of condition.

## 2019-10-07 LAB
ANION GAP SERPL CALC-SCNC: 9 MMOL/L (ref 8–16)
BUN SERPL-MCNC: 51 MG/DL (ref 8–23)
CALCIUM SERPL-MCNC: 8 MG/DL (ref 8.7–10.5)
CHLORIDE SERPL-SCNC: 108 MMOL/L (ref 95–110)
CO2 SERPL-SCNC: 23 MMOL/L (ref 23–29)
CREAT SERPL-MCNC: 4.3 MG/DL (ref 0.5–1.4)
EST. GFR  (AFRICAN AMERICAN): 11 ML/MIN/1.73 M^2
EST. GFR  (NON AFRICAN AMERICAN): 10 ML/MIN/1.73 M^2
GLUCOSE SERPL-MCNC: 186 MG/DL (ref 70–110)
POCT GLUCOSE: 147 MG/DL (ref 70–110)
POCT GLUCOSE: 190 MG/DL (ref 70–110)
POCT GLUCOSE: 210 MG/DL (ref 70–110)
POTASSIUM SERPL-SCNC: 3.6 MMOL/L (ref 3.5–5.1)
SODIUM SERPL-SCNC: 140 MMOL/L (ref 136–145)

## 2019-10-07 PROCEDURE — 25000003 PHARM REV CODE 250: Performed by: INTERNAL MEDICINE

## 2019-10-07 PROCEDURE — 25000003 PHARM REV CODE 250: Performed by: HOSPITALIST

## 2019-10-07 PROCEDURE — 63600175 PHARM REV CODE 636 W HCPCS: Performed by: INTERNAL MEDICINE

## 2019-10-07 PROCEDURE — S0171 BUMETANIDE 0.5 MG: HCPCS | Performed by: INTERNAL MEDICINE

## 2019-10-07 PROCEDURE — 97530 THERAPEUTIC ACTIVITIES: CPT

## 2019-10-07 PROCEDURE — 63600175 PHARM REV CODE 636 W HCPCS: Performed by: HOSPITALIST

## 2019-10-07 PROCEDURE — 86580 TB INTRADERMAL TEST: CPT | Performed by: INTERNAL MEDICINE

## 2019-10-07 PROCEDURE — 21400001 HC TELEMETRY ROOM

## 2019-10-07 PROCEDURE — 80048 BASIC METABOLIC PNL TOTAL CA: CPT

## 2019-10-07 PROCEDURE — 30200315 PPD INTRADERMAL TEST REV CODE 302: Performed by: INTERNAL MEDICINE

## 2019-10-07 PROCEDURE — 36415 COLL VENOUS BLD VENIPUNCTURE: CPT

## 2019-10-07 RX ORDER — INSULIN ASPART 100 [IU]/ML
10 INJECTION, SUSPENSION SUBCUTANEOUS 2 TIMES DAILY WITH MEALS
Status: DISCONTINUED | OUTPATIENT
Start: 2019-10-07 | End: 2019-10-17 | Stop reason: HOSPADM

## 2019-10-07 RX ORDER — BUMETANIDE 0.25 MG/ML
2 INJECTION INTRAMUSCULAR; INTRAVENOUS 2 TIMES DAILY
Status: DISCONTINUED | OUTPATIENT
Start: 2019-10-07 | End: 2019-10-09

## 2019-10-07 RX ORDER — METOPROLOL SUCCINATE 50 MG/1
50 TABLET, EXTENDED RELEASE ORAL 2 TIMES DAILY
Status: DISCONTINUED | OUTPATIENT
Start: 2019-10-07 | End: 2019-10-17 | Stop reason: HOSPADM

## 2019-10-07 RX ADMIN — FERROUS SULFATE TAB EC 325 MG (65 MG FE EQUIVALENT) 325 MG: 325 (65 FE) TABLET DELAYED RESPONSE at 09:10

## 2019-10-07 RX ADMIN — AMLODIPINE BESYLATE 10 MG: 5 TABLET ORAL at 08:10

## 2019-10-07 RX ADMIN — METOPROLOL SUCCINATE 50 MG: 50 TABLET, EXTENDED RELEASE ORAL at 09:10

## 2019-10-07 RX ADMIN — GUAIFENESIN AND DEXTROMETHORPHAN 5 ML: 100; 10 SYRUP ORAL at 12:10

## 2019-10-07 RX ADMIN — INSULIN ASPART 2 UNITS: 100 INJECTION, SOLUTION INTRAVENOUS; SUBCUTANEOUS at 09:10

## 2019-10-07 RX ADMIN — GUAIFENESIN AND DEXTROMETHORPHAN 5 ML: 100; 10 SYRUP ORAL at 01:10

## 2019-10-07 RX ADMIN — HYDRALAZINE HYDROCHLORIDE 100 MG: 25 TABLET ORAL at 06:10

## 2019-10-07 RX ADMIN — TUBERCULIN PURIFIED PROTEIN DERIVATIVE 5 UNITS: 5 INJECTION, SOLUTION INTRADERMAL at 07:10

## 2019-10-07 RX ADMIN — ACETAMINOPHEN 650 MG: 325 TABLET, FILM COATED ORAL at 12:10

## 2019-10-07 RX ADMIN — HYDRALAZINE HYDROCHLORIDE 100 MG: 25 TABLET ORAL at 09:10

## 2019-10-07 RX ADMIN — INSULIN ASPART 10 UNITS: 100 INJECTION, SUSPENSION SUBCUTANEOUS at 05:10

## 2019-10-07 RX ADMIN — METOLAZONE 10 MG: 5 TABLET ORAL at 08:10

## 2019-10-07 RX ADMIN — METOPROLOL SUCCINATE 50 MG: 50 TABLET, EXTENDED RELEASE ORAL at 08:10

## 2019-10-07 RX ADMIN — GUAIFENESIN AND DEXTROMETHORPHAN 5 ML: 100; 10 SYRUP ORAL at 06:10

## 2019-10-07 RX ADMIN — HYDRALAZINE HYDROCHLORIDE 100 MG: 25 TABLET ORAL at 01:10

## 2019-10-07 RX ADMIN — INSULIN ASPART 2 UNITS: 100 INJECTION, SOLUTION INTRAVENOUS; SUBCUTANEOUS at 08:10

## 2019-10-07 RX ADMIN — INSULIN ASPART 15 UNITS: 100 INJECTION, SUSPENSION SUBCUTANEOUS at 08:10

## 2019-10-07 RX ADMIN — GUAIFENESIN AND DEXTROMETHORPHAN 5 ML: 100; 10 SYRUP ORAL at 05:10

## 2019-10-07 RX ADMIN — ENOXAPARIN SODIUM 30 MG: 100 INJECTION SUBCUTANEOUS at 05:10

## 2019-10-07 RX ADMIN — BUMETANIDE 2 MG: 0.25 INJECTION INTRAMUSCULAR; INTRAVENOUS at 10:10

## 2019-10-07 RX ADMIN — FERROUS SULFATE TAB EC 325 MG (65 MG FE EQUIVALENT) 325 MG: 325 (65 FE) TABLET DELAYED RESPONSE at 08:10

## 2019-10-07 RX ADMIN — GUAIFENESIN AND DEXTROMETHORPHAN 5 ML: 100; 10 SYRUP ORAL at 11:10

## 2019-10-07 RX ADMIN — PRAVASTATIN SODIUM 10 MG: 10 TABLET ORAL at 08:10

## 2019-10-07 RX ADMIN — BUMETANIDE 2 MG: 0.25 INJECTION INTRAMUSCULAR; INTRAVENOUS at 09:10

## 2019-10-07 RX ADMIN — IRON SUCROSE 200 MG: 20 INJECTION, SOLUTION INTRAVENOUS at 10:10

## 2019-10-07 NOTE — PROGRESS NOTES
"[facility] Medicine  Progress Note    Patient Name: Magaly Epstein  MRN: [id number]  Patient Class: IP- Inpatient   Admission Date: 10/3/2019  Length of Stay: 4 days  Attending Physician: Lenka Cesar MD  Primary Care Provider: Anahy Bradley DO        Subjective:     Principal Problem:Hypertensive emergency        HPI:  70 y/o female patient presents for evaluation of acute onset of shortness of breath that started last night.  Patient lives in assisted living.  She was admitted here in April for shortness of breath due to hypertensive crisis and fluid overload.  Patient has history of nephrotic syndrome and anasarca and had a kidney biopsy at that time.   She also has a history of chronic hypertension.   Patient states compliance with medications.  She denies chest pain. She states she was just very short of breath and repeatedly asked for help.  She also complains of a dry cough. She has leg edema that she states is "always there."  Patient was placed on CPAP for an O2 sat of 60% on room air.  Blood pressure was reportedly elevated at 270 systolic.  She presented severely hypertensive and placed on Nitro infusion.  Patient is now feeling better.  No other complaints.    Overview/Hospital Course:  70 y/o female patient presents for evaluation of acute onset of shortness of breath. Patient lives in assisted living.  She was admitted here in April for shortness of breath due to hypertensive crisis and fluid overload. Patient was placed on CPAP for an O2 sat of 60% on room air.  Blood pressure was reportedly elevated at 270 systolic. She presented severely hypertensive and placed on Nitro infusion.  CKD with increased Creat from baseline.  Hx of nephrotic syndrome with lower extremity edema and fluid overload.  Nephrology consulted and started on IV Bumex.  Restarted home medications with improvement of BP and able to wean off Nitro drip. Stepped down to floor in stable condition. " Metolazone added by nephrology. Weaned off supplemental O2 but remained with anasarca. Unable to use ACE-I due to worsening renal function. PT/OT recommends SNF.       Interval History: weaned off O2 but still pretty edematous. Seen sitting up in chair. In no distress    Review of Systems   Respiratory: Negative for shortness of breath.    Cardiovascular: Negative.    Gastrointestinal: Negative.      Objective:     Vital Signs (Most Recent):  Temp: 98.6 °F (37 °C) (10/07/19 1152)  Pulse: 75 (10/07/19 1152)  Resp: 18 (10/07/19 1152)  BP: (!) 144/65 (10/07/19 1152)  SpO2: 95 % (10/07/19 1152) Vital Signs (24h Range):  Temp:  [98.2 °F (36.8 °C)-99.4 °F (37.4 °C)] 98.6 °F (37 °C)  Pulse:  [75-92] 75  Resp:  [17-18] 18  SpO2:  [92 %-95 %] 95 %  BP: (140-198)/(65-88) 144/65     Weight: 117.1 kg (258 lb 2.5 oz)  Body mass index is 38.12 kg/m².    Intake/Output Summary (Last 24 hours) at 10/7/2019 1558  Last data filed at 10/7/2019 0500  Gross per 24 hour   Intake 690 ml   Output 1400 ml   Net -710 ml      Physical Exam   Constitutional: She is oriented to person, place, and time. She appears well-developed. No distress.   Cardiovascular: Normal rate and regular rhythm.   Pulmonary/Chest: She has no wheezes. She has no rales.   Breathing comfortably at room air   Abdominal: Soft. Bowel sounds are normal.   Musculoskeletal: She exhibits edema.   anasarca   Neurological: She is alert and oriented to person, place, and time.   Skin: Skin is warm and dry. Capillary refill takes less than 2 seconds. She is not diaphoretic.   Nursing note and vitals reviewed.      Significant Labs: All pertinent labs within the past 24 hours have been reviewed.    Significant Imaging: I have reviewed all pertinent imaging results/findings within the past 24 hours.  I have reviewed and interpreted all pertinent imaging results/findings within the past 24 hours.      Assessment/Plan:      * Hypertensive emergency  Patient presents with dyspnea and  significant hypertension.  CXR shows mild interstitial edema and bilateral pleural effusions.  Patient was placed on Nitro drip and given dose of Bumex.  Improved BP and symptoms.  Restarted on home oral medications and able to wean off Nitro drip.  Added prn Clonidine.  Vitals q 4 hours        Acute renal failure superimposed on stage 3 chronic kidney disease  Nephrology consult as above.  Trial of diuretics per nephrology  Weaned off NC but still pretty edematous  BMP in AM      Nephrotic syndrome  Patient with recent admission and evaluated by Nephrology.  Creat much higher than baseline.  Patient with kidney biopsy on last admit.   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  Diuretics per nephrology  ACE-I or ARB when able        Elevated troponin  Probably secondary to demand ischemia from uncontrolled HTN and renal failure.  Denies any chest pain.      Debility  PT/OT eval: SNF       Anemia of chronic disease  Secondary to CKD.  Noted drop in H/H but stable  Iron deficiency anemia.    No evidence of active bleeding.   Will not transfuse at this time.        Hyperlipidemia  Continue Statin.      Type 2 diabetes mellitus with renal complication  Uncontrolled with hyperglycemia.  Diabetic diet and insulin sliding scale.  Started on home 70/30 at lower dose to prevent hypoglycemia.  Increase as needed.      Essential hypertension  As above.      VTE Risk Mitigation (From admission, onward)         Ordered     enoxaparin injection 30 mg  Daily      10/03/19 0656     IP VTE HIGH RISK PATIENT  Once      10/03/19 0527                  Dispo: SNF when cleared by nephrology    Lenka Raygoza MD  Department of Hospital Medicine   [facility]

## 2019-10-07 NOTE — NURSING
Report given to oncoming nurse Jenna RN, Patient is awake. Bed is in lowest position, wheels locked, call light is in reach. 12 hour chart check completed

## 2019-10-07 NOTE — PT/OT/SLP PROGRESS
Occupational Therapy   Treatment    Name: Magaly Epstein  MRN: [id number]  Admitting Diagnosis:  Hypertensive emergency       Recommendations:     Discharge Recommendations: nursing facility, skilled  Discharge Equipment Recommendations:  (per SNF)  Barriers to discharge:  Decreased caregiver support    Assessment:     Magaly Epstein is a 69 y.o. female with a medical diagnosis of Hypertensive emergency. Performance deficits affecting function are weakness, impaired functional mobilty, impaired cognition, decreased safety awareness, impaired coordination, impaired cardiopulmonary response to activity, impaired endurance, gait instability, pain, impaired balance, decreased upper extremity function, impaired self care skills, decreased lower extremity function, edema.     Pt was motivated to participate in today's session. Pt will continue to benefit from acute OT in order to increase safety awareness and independence with ADLs and all aspects of functional mobility. OT rec. SNF at d/c to regain PLOF and reduce risk of falls/readmission/morbidity.      Rehab Prognosis:  Good; patient would benefit from acute skilled OT services to address these deficits and reach maximum level of function.       Plan:     Patient to be seen 5 x/week to address the above listed problems via self-care/home management, therapeutic activities, therapeutic exercises  · Plan of Care Expires: 10/19/19  · Plan of Care Reviewed with: patient    Subjective     Chief complaint: worried with how weak she is, but motivated to participate  Patient's comments/goals: regain PLOF     Pain/Comfort:  · Pain Rating 1: (c/o chronic B/L UE arthritis)    Objective:     Communicated with: nurseTammy, prior to session.  Patient found supine with PCT, Malgorzata, finishing a bath with bed alarm, medeiros catheter, oxygen, telemetry([employer]) upon OT entry to room.    General Precautions: Standard, fall, respiratory   Orthopedic Precautions:N/A   Braces: N/A      Occupational Performance:     Bed Mobility:    · Patient completed Rolling/Turning to Right with contact guard assistance  · Patient completed Scooting/Bridging with contact guard assistance  · Patient completed Supine to Sit with moderate assistance with side rail and HOB elevated      Functional Mobility/Transfers:  · Patient completed Sit <> Stand Transfer with moderate assistance  with  rolling walker and elevated bed   · Patient completed Bed <> Chair Transfer using Step Transfer technique with moderate assistance with rolling walker  · Functional Mobility: Pt stood with MOD A using RW <15 seconds then needed to sit down. After prolonged seated rest break, pt took 6 sidesteps using RW to recliner chair, requiring MOD A for balance/safety.     Activities of Daily Living:  · Upper Body Dressing: moderate assistance to help latricia back hospital gown   · Toileting: total assistance to latricia brief      AMPAC 6 Click ADL: 14    Treatment & Education:  · Pt re-educated on OT role/POC.   · Importance of OOB activity with staff assistance.  · Importance of sitting up in the chair during the day with staff assistance/use of call bell  · Safety during functional t/f and mobility  · Edu on pursed lip breathing; pt required OT demo and prompting to complete this with exertion   · Pt tolerated sitting EOB well completing dynamic sitting exercises with SUP for ~20 min  · Pt presents with mild/moderate B/L UE non-pitting edema. Pt edu to complete the following exercises:  · Sitting EOB, pt completed:  · 1x15 finger flexion/extension  · 1x10: wrist flexion/extension, forearm supination/pronation, and shoulder flexion/extension from *    · Rest break in between reps and sets d/t fatigue and SOB  · Continuous cueing on pursed lip breathing   · White board updated   · Multiple self-care tasks/functional mobility completed- assistance level noted above   · All questions/concerns answered within OT scope of practice        Patient left up in chair with cushion and B/L LE elevated on pillow with all lines intact, call button in reach, nurse, Tammy, and PCT, Malgorzata, notified, lap tray positioned in front of pt, and [employer] presentEducation:   with door open    NurseTammy, notified of skin irritation/wound near IV site and under IV taping under RUE.     GOALS:   Multidisciplinary Problems     Occupational Therapy Goals        Problem: Occupational Therapy Goal    Goal Priority Disciplines Outcome Interventions   Occupational Therapy Goal     OT, PT/OT Ongoing, Progressing    Description:  Goals to be met by: 10/19/19    Patient will increase functional independence with ADLs by performing:    UE Dressing with Set-up Assistance.  LE Dressing with Stand-by Assistance.  Grooming while standing with Stand-by Assistance.  Toileting from bedside commode with Stand-by Assistance for hygiene and clothing management.   Toilet transfer to bedside commode with Stand-by Assistance.  Upper extremity exercise program x 20 reps per handout, with supervision.                      Time Tracking:     OT Date of Treatment: 10/07/19  OT Start Time: 1046  OT Stop Time: 1124  OT Total Time (min): 38 min    Billable Minutes:Therapeutic Activity 15 min  Total Time 38 min (co-treat with PTA)    Akanksha White OT  10/7/2019

## 2019-10-07 NOTE — PLAN OF CARE
Problem: Physical Therapy Goal  Goal: Physical Therapy Goal  Description  Goals to be met by: 10/19/2019     Patient will increase functional independence with mobility by performin. Supine to sit with Modified [place]  2. Sit to stand transfer with Modified [place]  3. Gait  x 100 feet with Modified [place] using Rolling Walker.   4. Lower extremity exercise program x10 reps per handout, with independence     Outcome: Ongoing, Progressing   Pt will benefit from further skilled therapy in order to return to PLOF.

## 2019-10-07 NOTE — PLAN OF CARE
Problem: Occupational Therapy Goal  Goal: Occupational Therapy Goal  Description  Goals to be met by: 10/19/19    Patient will increase functional independence with ADLs by performing:    UE Dressing with Set-up Assistance.  LE Dressing with Stand-by Assistance.  Grooming while standing with Stand-by Assistance.  Toileting from bedside commode with Stand-by Assistance for hygiene and clothing management.   Toilet transfer to bedside commode with Stand-by Assistance.  Upper extremity exercise program x 20 reps per handout, with supervision.     Outcome: Ongoing, Progressing   Pt was motivated to participate in today's session. Pt will continue to benefit from acute OT in order to increase safety awareness and independence with ADLs and all aspects of functional mobility. OT rec. SNF at d/c to regain PLOF and reduce risk of falls/readmission/morbidity.

## 2019-10-07 NOTE — PT/OT/SLP PROGRESS
Physical Therapy Treatment    Patient Name:  Magaly Epstein   MRN:  [id number]    Recommendations:     Discharge Recommendations:  nursing facility, skilled   Discharge Equipment Recommendations: (per SNF)   Barriers to discharge: Decreased caregiver support (pt with decreased mobility )    Assessment:     Magaly Epstein is a 69 y.o. female admitted with a medical diagnosis of Hypertensive emergency.  She presents with the following impairments/functional limitations:  weakness, impaired endurance, impaired functional mobilty, gait instability, decreased upper extremity function, decreased lower extremity function, pain, decreased safety awareness, impaired balance, decreased coordination, decreased ROM, impaired cardiopulmonary response to activity, edema .    Rehab Prognosis: Good; patient would benefit from acute skilled PT services to address these deficits and reach maximum level of function.    Recent Surgery: * No surgery found *      Plan:     During this hospitalization, patient to be seen daily to address the identified rehab impairments via gait training, therapeutic activities, therapeutic exercises and progress toward the following goals:    · Plan of Care Expires:  10/19/19    Subjective     Chief Complaint: weakness   Patient/Family Comments/goals: to get stronger .   Pain/Comfort:  · Pain Rating 1: 0/10  · Pain Rating Post-Intervention 1: 0/10      Objective:     Communicated with nurse Munoz prior to session.  Patient found HOB elevated with bed alarm, telemetry, oxygen, medeiros catheter upon PT entry to room.     General Precautions: Standard, fall, respiratory   Orthopedic Precautions:N/A   Braces: N/A     Functional Mobility: pt required frequent rest breaks and extra time to complete tasks 2* fatigue and c/o SOB despite on 3L O2NC. VC's for pursed lip breathing technique throughout treatment.    · Bed Mobility:     · Rolling Right: contact guard assistance  · Scooting: contact guard  assistance  · Supine to Sit: moderate assistance, HOB elevated ,bedside rail   · Transfers:     · Sit to Stand: 2 trials from bed moderate assistance x 2 with rolling walker. V/T cues for safety technique and walker management.   · Bed to Chair: moderate assistance with  rolling walker  using  Step Transfer  · Gait: Patient ambulated ~ 6 steps from bed to chair on level tile with Rolling Walker with Moderate  Assistance.  Pt with demonstarting a  3-point gait with decreased hi, increased time in double stance, decreased velocity of limb motion, decreased step length, decreased swing-to-stance ratio, decreased toe-to-floor clearance and decreased weight-shifting ability.Impairments contributing to gait deviations include impaired balance, decreased flexibility, pain, impaired postural control, decreased ROM and decreased strength. V/T cues for safety technique and walker management.    · Balance: fair-       AM-PAC 6 CLICK MOBILITY  Turning over in bed (including adjusting bedclothes, sheets and blankets)?: 3  Sitting down on and standing up from a chair with arms (e.g., wheelchair, bedside commode, etc.): 2  Moving from lying on back to sitting on the side of the bed?: 2  Moving to and from a bed to a chair (including a wheelchair)?: 2  Need to walk in hospital room?: 2  Climbing 3-5 steps with a railing?: 1  Basic Mobility Total Score: 12       Therapeutic Activities and Exercises:   Pt performed seated BLE 10 reps :   AP, LAQ, Hip abd/add . V/T's cues for technique and sequence.Educated pt on safety awareness with all OOB mobility , transfer and gait training.     Patient left up in RECLINED chair  On green air cushion with all lines intact, call button in reach, chair alarm on, nurse  notified and [employer] present..    GOALS:   Multidisciplinary Problems     Physical Therapy Goals        Problem: Physical Therapy Goal    Goal Priority Disciplines Outcome Goal Variances Interventions   Physical Therapy Goal      PT, PT/OT Ongoing, Progressing     Description:  Goals to be met by: 10/19/2019     Patient will increase functional independence with mobility by performin. Supine to sit with Modified [place]  2. Sit to stand transfer with Modified [place]  3. Gait  x 100 feet with Modified [place] using Rolling Walker.   4. Lower extremity exercise program x10 reps per handout, with independence                      Time Tracking:     PT Received On: 10/07/19  PT Start Time: 1046     PT Stop Time: 1124  PT Total Time (min): 38 min     Billable Minutes: Therapeutic Activity 23 total 38 min co-treat with OT     Treatment Type: Treatment  PT/PTA: PTA     PTA Visit Number: 1     Anastacia Roper, PTA  10/07/2019

## 2019-10-07 NOTE — PROGRESS NOTES
Magaly Epstein is a 69 y.o. female patient.    Follow for ANIA, CKD stg 3    No new c/o,  feeling OK    Scheduled Meds:   amLODIPine  10 mg Oral Daily    bumetanide  2 mg Intravenous Daily    dextromethorphan-guaifenesin  mg/5 ml  5 mL Oral Q6H    enoxaparin  30 mg Subcutaneous Daily    epoetin cortes-ebpx (RETACRIT) injection  6,000 Units Subcutaneous Q7 Days    ferrous sulfate  325 mg Oral BID    hydrALAZINE  100 mg Oral Q8H    insulin aspart protamine-insulin aspart  15 Units Subcutaneous BIDWM    iron sucrose  200 mg Intravenous Daily    metOLazone  10 mg Oral Daily    metoprolol succinate  50 mg Oral Daily    pravastatin  10 mg Oral Daily       Review of patient's allergies indicates:   Allergen Reactions    Ciprofloxacin Anaphylaxis    Codeine Anaphylaxis    Neosporin [benzalkonium chloride] Anaphylaxis         Vital Signs Range (Last 24H):  Temp:  [98.2 °F (36.8 °C)-99.4 °F (37.4 °C)]   Pulse:  [74-92]   Resp:  [16-18]   BP: (140-198)/(66-88)   SpO2:  [92 %-95 %]     I & O (Last 24H):    Intake/Output Summary (Last 24 hours) at 10/7/2019 1048  Last data filed at 10/7/2019 0500  Gross per 24 hour   Intake 930 ml   Output 1800 ml   Net -870 ml           Physical Exam:  General appearance: well developed, well nourished, no distress  Lungs:  diminished breath sounds bilaterally  Heart: regular rate and rhythm  Abdomen: soft, non-tender non-distented; bowel sounds normal; no masses,  no organomegaly  Extremities: edema (+)    Laboratory:  CBC:   Recent Labs   Lab 10/06/19  0437   WBC 7.78   RBC 3.05*   HGB 8.7*   HCT 29.3*      MCV 96   MCH 28.5   MCHC 29.7*     CMP:   Recent Labs   Lab 10/03/19  0224  10/07/19  0440   *   < > 186*   CALCIUM 8.1*   < > 8.0*   ALBUMIN 2.7*  --   --    PROT 6.1  --   --       < > 140   K 3.9   < > 3.6   CO2 23   < > 23      < > 108   BUN 37*   < > 51*   CREATININE 3.5*   < > 4.3*   ALKPHOS 193*  --   --    ALT 40  --   --    AST  64*  --   --    BILITOT 0.2  --   --     < > = values in this interval not displayed.       Imp/Plan    ANIA on CKD stg 3 - creatinine continues rising  Anasarca - diuresing  HTN  DM type 2  Nephrotic syndrome  Anemia - iron def/chronic disease    Up Bumex to 2mg IV bid  Watch renal function closely while diuresing  CMP in am      Bc Grajeda  10/7/2019

## 2019-10-07 NOTE — ASSESSMENT & PLAN NOTE
Nephrology consult as above.  Trial of diuretics per nephrology  Weaned off NC but still pretty edematous  BMP in AM

## 2019-10-07 NOTE — PROGRESS NOTES
TN called [facility] [phone] #3 for locet, spoke to Leanna.  TN faxed passr to [place] [phone].  Awaiting [phone]  .Felicia Garcia RN, BSN, [place] [facility]  10/7/2019

## 2019-10-07 NOTE — SUBJECTIVE & OBJECTIVE
Interval History: weaned off O2 but still pretty edematous. Seen sitting up in chair. In no distress    Review of Systems   Respiratory: Negative for shortness of breath.    Cardiovascular: Negative.    Gastrointestinal: Negative.      Objective:     Vital Signs (Most Recent):  Temp: 98.6 °F (37 °C) (10/07/19 1152)  Pulse: 75 (10/07/19 1152)  Resp: 18 (10/07/19 1152)  BP: (!) 144/65 (10/07/19 1152)  SpO2: 95 % (10/07/19 1152) Vital Signs (24h Range):  Temp:  [98.2 °F (36.8 °C)-99.4 °F (37.4 °C)] 98.6 °F (37 °C)  Pulse:  [75-92] 75  Resp:  [17-18] 18  SpO2:  [92 %-95 %] 95 %  BP: (140-198)/(65-88) 144/65     Weight: 117.1 kg (258 lb 2.5 oz)  Body mass index is 38.12 kg/m².    Intake/Output Summary (Last 24 hours) at 10/7/2019 1558  Last data filed at 10/7/2019 0500  Gross per 24 hour   Intake 690 ml   Output 1400 ml   Net -710 ml      Physical Exam   Constitutional: She is oriented to person, place, and time. She appears well-developed. No distress.   Cardiovascular: Normal rate and regular rhythm.   Pulmonary/Chest: She has no wheezes. She has no rales.   Breathing comfortably at room air   Abdominal: Soft. Bowel sounds are normal.   Musculoskeletal: She exhibits edema.   anasarca   Neurological: She is alert and oriented to person, place, and time.   Skin: Skin is warm and dry. Capillary refill takes less than 2 seconds. She is not diaphoretic.   Nursing note and vitals reviewed.      Significant Labs: All pertinent labs within the past 24 hours have been reviewed.    Significant Imaging: I have reviewed all pertinent imaging results/findings within the past 24 hours.  I have reviewed and interpreted all pertinent imaging results/findings within the past 24 hours.

## 2019-10-07 NOTE — PLAN OF CARE
Problem: Diabetes Comorbidity  Goal: Blood Glucose Level Within Desired Range  Outcome: Ongoing, Progressing  Intervention: Maintain Glycemic Control  Flowsheets (Taken 10/7/2019 0317)  Glycemic Management: blood glucose monitoring; oral hydration promoted; supplemental insulin given

## 2019-10-08 LAB
ALBUMIN SERPL BCP-MCNC: 2.1 G/DL (ref 3.5–5.2)
ANION GAP SERPL CALC-SCNC: 11 MMOL/L (ref 8–16)
BUN SERPL-MCNC: 53 MG/DL (ref 8–23)
CALCIUM SERPL-MCNC: 8.1 MG/DL (ref 8.7–10.5)
CHLORIDE SERPL-SCNC: 107 MMOL/L (ref 95–110)
CO2 SERPL-SCNC: 22 MMOL/L (ref 23–29)
CREAT SERPL-MCNC: 4.3 MG/DL (ref 0.5–1.4)
EST. GFR  (AFRICAN AMERICAN): 11 ML/MIN/1.73 M^2
EST. GFR  (NON AFRICAN AMERICAN): 10 ML/MIN/1.73 M^2
GLUCOSE SERPL-MCNC: 159 MG/DL (ref 70–110)
PHOSPHATE SERPL-MCNC: 4.9 MG/DL (ref 2.7–4.5)
POCT GLUCOSE: 162 MG/DL (ref 70–110)
POCT GLUCOSE: 187 MG/DL (ref 70–110)
POCT GLUCOSE: 188 MG/DL (ref 70–110)
POCT GLUCOSE: 211 MG/DL (ref 70–110)
POTASSIUM SERPL-SCNC: 3.6 MMOL/L (ref 3.5–5.1)
SODIUM SERPL-SCNC: 140 MMOL/L (ref 136–145)

## 2019-10-08 PROCEDURE — 27000221 HC OXYGEN, UP TO 24 HOURS

## 2019-10-08 PROCEDURE — 63600175 PHARM REV CODE 636 W HCPCS: Performed by: HOSPITALIST

## 2019-10-08 PROCEDURE — 97530 THERAPEUTIC ACTIVITIES: CPT

## 2019-10-08 PROCEDURE — 94761 N-INVAS EAR/PLS OXIMETRY MLT: CPT

## 2019-10-08 PROCEDURE — S0171 BUMETANIDE 0.5 MG: HCPCS | Performed by: INTERNAL MEDICINE

## 2019-10-08 PROCEDURE — 97116 GAIT TRAINING THERAPY: CPT

## 2019-10-08 PROCEDURE — 25000003 PHARM REV CODE 250: Performed by: HOSPITALIST

## 2019-10-08 PROCEDURE — 25000003 PHARM REV CODE 250: Performed by: INTERNAL MEDICINE

## 2019-10-08 PROCEDURE — 63600175 PHARM REV CODE 636 W HCPCS: Performed by: INTERNAL MEDICINE

## 2019-10-08 PROCEDURE — 80069 RENAL FUNCTION PANEL: CPT

## 2019-10-08 PROCEDURE — 99900035 HC TECH TIME PER 15 MIN (STAT)

## 2019-10-08 PROCEDURE — 21400001 HC TELEMETRY ROOM

## 2019-10-08 PROCEDURE — 97535 SELF CARE MNGMENT TRAINING: CPT

## 2019-10-08 RX ADMIN — FERROUS SULFATE TAB EC 325 MG (65 MG FE EQUIVALENT) 325 MG: 325 (65 FE) TABLET DELAYED RESPONSE at 09:10

## 2019-10-08 RX ADMIN — HYDRALAZINE HYDROCHLORIDE 100 MG: 25 TABLET ORAL at 09:10

## 2019-10-08 RX ADMIN — HYDRALAZINE HYDROCHLORIDE 100 MG: 25 TABLET ORAL at 05:10

## 2019-10-08 RX ADMIN — ENOXAPARIN SODIUM 30 MG: 100 INJECTION SUBCUTANEOUS at 05:10

## 2019-10-08 RX ADMIN — PRAVASTATIN SODIUM 10 MG: 10 TABLET ORAL at 09:10

## 2019-10-08 RX ADMIN — BUMETANIDE 2 MG: 0.25 INJECTION INTRAMUSCULAR; INTRAVENOUS at 10:10

## 2019-10-08 RX ADMIN — ACETAMINOPHEN 650 MG: 325 TABLET, FILM COATED ORAL at 09:10

## 2019-10-08 RX ADMIN — INSULIN ASPART 4 UNITS: 100 INJECTION, SOLUTION INTRAVENOUS; SUBCUTANEOUS at 05:10

## 2019-10-08 RX ADMIN — IRON SUCROSE 200 MG: 20 INJECTION, SOLUTION INTRAVENOUS at 11:10

## 2019-10-08 RX ADMIN — GUAIFENESIN AND DEXTROMETHORPHAN 5 ML: 100; 10 SYRUP ORAL at 12:10

## 2019-10-08 RX ADMIN — METOLAZONE 10 MG: 5 TABLET ORAL at 09:10

## 2019-10-08 RX ADMIN — BUMETANIDE 2 MG: 0.25 INJECTION INTRAMUSCULAR; INTRAVENOUS at 09:10

## 2019-10-08 RX ADMIN — METOPROLOL SUCCINATE 50 MG: 50 TABLET, EXTENDED RELEASE ORAL at 09:10

## 2019-10-08 RX ADMIN — AMLODIPINE BESYLATE 10 MG: 5 TABLET ORAL at 09:10

## 2019-10-08 RX ADMIN — INSULIN ASPART 10 UNITS: 100 INJECTION, SUSPENSION SUBCUTANEOUS at 05:10

## 2019-10-08 RX ADMIN — GUAIFENESIN AND DEXTROMETHORPHAN 5 ML: 100; 10 SYRUP ORAL at 05:10

## 2019-10-08 RX ADMIN — INSULIN ASPART 2 UNITS: 100 INJECTION, SOLUTION INTRAVENOUS; SUBCUTANEOUS at 12:10

## 2019-10-08 RX ADMIN — INSULIN ASPART 10 UNITS: 100 INJECTION, SUSPENSION SUBCUTANEOUS at 09:10

## 2019-10-08 NOTE — PLAN OF CARE
Problem: Physical Therapy Goal  Goal: Physical Therapy Goal  Description  Goals to be met by: 10/19/2019     Patient will increase functional independence with mobility by performin. Supine to sit with Modified [place]  2. Sit to stand transfer with Modified [place]  3. Gait  x 100 feet with Modified [place] using Rolling Walker.   4. Lower extremity exercise program x10 reps per handout, with independence     Outcome: Ongoing, Progressing   Pt ambulated ~ 8 ft x 2 trials with RW, MIN A( chair followed and 3L O2NC).  Pt is making good progress toward treatment goals. Pt will benefit from further skilled therapy in order to improve strength, balance and endurance.

## 2019-10-08 NOTE — PLAN OF CARE
Problem: Occupational Therapy Goal  Goal: Occupational Therapy Goal  Description  Goals to be met by: 10/19/19    Patient will increase functional independence with ADLs by performing:    UE Dressing with Set-up Assistance.  LE Dressing with Stand-by Assistance.  Grooming while standing with Stand-by Assistance.  Toileting from bedside commode with Stand-by Assistance for hygiene and clothing management.   Toilet transfer to bedside commode with Stand-by Assistance.  Upper extremity exercise program x 20 reps per handout, with supervision.     Outcome: Ongoing, Progressing    Pt making progress towards goals, requiring MOD A with RW for bed>BSC & BSC> chair t/f. With sit-to-stand transfer from recliner at the end of session, pt required 2 attempts with MAX A and RW.   OT rec. SNF at d/c to regain PLOF.

## 2019-10-08 NOTE — PT/OT/SLP PROGRESS
Occupational Therapy   Treatment    Name: Magaly Epstein  MRN: [id number]  Admitting Diagnosis:  Hypertensive emergency       Recommendations:     Discharge Recommendations: nursing facility, skilled  Discharge Equipment Recommendations:  (per SNF)  Barriers to discharge:  Decreased caregiver support(Pt requires increased amount of care to complete basic self-care needs)    Assessment:     Magaly Epstein is a 69 y.o. female with a medical diagnosis of Hypertensive emergency.  She presents with good motivation to participate with therapy session. Performance deficits affecting function are weakness, impaired functional mobilty, impaired balance, impaired cognition, decreased upper extremity function, decreased safety awareness, impaired coordination, impaired skin, impaired endurance, impaired self care skills, gait instability, decreased lower extremity function, pain, edema.     Rehab Prognosis:  Good; patient would benefit from acute skilled OT services to address these deficits and reach maximum level of function.       Plan:     Patient to be seen 5 x/week to address the above listed problems via self-care/home management, therapeutic activities, therapeutic exercises  · Plan of Care Expires: 10/19/19  · Plan of Care Reviewed with: patient    Subjective     Chief complaint: wanting to use the [facility]  Patient's comments/goals: agreeable to sit up in the chair until PT session     Pain/Comfort:  · Pain Rating 1: (R shoulder intermittent pain )  · Pain Addressed 1: Reposition, Cessation of Activity    Objective:     Communicated with: nurseTammy, prior to session.  Patient found HOB elevated with bed alarm, oxygen (3L), telemetry, peripheral IV([employer]) upon OT entry to room.    General Precautions: Standard, fall, respiratory   Orthopedic Precautions:N/A   Braces: N/A     Occupational Performance:     Bed Mobility:    · Patient completed Scooting/Bridging with contact guard assistance  · Patient completed Supine  to Sit with minimum assistance, with side rail and HOB elevated     Functional Mobility/Transfers:  · Patient completed Sit <> Stand Transfer with moderate assistance  with  rolling walker   · Patient completed Bed <> Chair Transfer using Step Transfer technique with maximal assistance with rolling walker  · Patient completed Toilet Transfer Step Transfer technique with moderate assistance with  rolling walker and bedside commode  · Functional Mobility: Pt initially t/f from bed>BSC with MOD A (~5 steps) and cueing for safety. Pt demo'd good carryover of cueing for safe transfer onto BSC. After toileting and rest break, pt took ~5 more steps from BSC to recliner chair with MOD A using RW. Upon sitting on recliner chair with prolonged seated rest break, pt required MAX A with 2 attempts required in order to complete sit to stand (to place chair alarm).     Activities of Daily Living:  · Feeding:  set-up with lunch tray at end of session    · Grooming: set-up with wipe to clean hands seated after toileting    · Upper Body Dressing: moderate assistance to latricia back hospital gown  · Lower Body Dressing: total assistance to change brief  · Toileting: minimum assistance with pericare while seated      [facility] 6 Click ADL: 16    Treatment & Education:  · Pt re-educated on OT role/POC.   · Importance of OOB activity with staff assistance.  · Importance of sitting up in the chair until session with PTA- pt agreed   · Edu on safe hand placement to increase independence with functional t/f , mobility   · Safety during functional t/f and mobility   · Edu on pursed lip breathing- pt implemented this with cueing  · White board updated   · Multiple self-care tasks/functional mobility completed- assistance level noted above   · All questions/concerns answered within OT scope of practice       Patient left up in chair with all lines intact, call button in reach, chair alarm on, nurse, Tammy, notified, [employer] present and door open  with lap tray in front of pt Education:      GOALS:   Multidisciplinary Problems     Occupational Therapy Goals        Problem: Occupational Therapy Goal    Goal Priority Disciplines Outcome Interventions   Occupational Therapy Goal     OT, PT/OT Ongoing, Progressing    Description:  Goals to be met by: 10/19/19    Patient will increase functional independence with ADLs by performing:    UE Dressing with Set-up Assistance.  LE Dressing with Stand-by Assistance.  Grooming while standing with Stand-by Assistance.  Toileting from bedside commode with Stand-by Assistance for hygiene and clothing management.   Toilet transfer to bedside commode with Stand-by Assistance.  Upper extremity exercise program x 20 reps per handout, with supervision.                      Time Tracking:     OT Date of Treatment: 10/08/19  OT Start Time: 1124  OT Stop Time: 1204  OT Total Time (min): 40 min    Billable Minutes:Self Care/Home Management 10 min  Therapeutic Activity 30 min  Total Time 40 min    Akanksha White OT  10/8/2019

## 2019-10-08 NOTE — PLAN OF CARE
Problem: Adult Inpatient Plan of Care  Goal: Plan of Care Review  Outcome: Ongoing, Not Progressing     Problem: Diabetes Comorbidity  Goal: Blood Glucose Level Within Desired Range  Outcome: Ongoing, Not Progressing     Problem: Skin Injury Risk Increased  Goal: Skin Health and Integrity  Outcome: Ongoing, Not Progressing

## 2019-10-08 NOTE — PLAN OF CARE
Problem: Diabetes Comorbidity  Goal: Blood Glucose Level Within Desired Range  Outcome: Ongoing, Progressing  Intervention: Maintain Glycemic Control  Flowsheets (Taken 10/7/2019 0443)  Glycemic Management: blood glucose monitoring; oral hydration promoted; supplemental insulin given

## 2019-10-08 NOTE — PT/OT/SLP PROGRESS
"Physical Therapy Treatment    Patient Name:  Magaly Epstein   MRN:  [id number]    Recommendations:     Discharge Recommendations:  nursing facility, skilled   Discharge Equipment Recommendations: (per SNF)   Barriers to discharge: Decreased caregiver support and pt with decreased mobility     Assessment:     Magaly Epstein is a 69 y.o. female admitted with a medical diagnosis of Hypertensive emergency.  She presents with the following impairments/functional limitations:  weakness, impaired endurance, impaired self care skills, gait instability, impaired balance, decreased upper extremity function, decreased lower extremity function, decreased ROM, edema, pain, impaired cardiopulmonary response to activity, decreased safety awareness, decreased coordination . Pt will benefit from further skilled therapy in order to return to PLOF (pt was living alone) . There is expectation of returning to prior level of function to maintain independence avoiding readmission.  Pt is at high risk of unplanned readmission, decline functional status due to fall risk and lack of 24 hour caregiver support at home .  Pt is pleasant and motivated to return to prior level of function.     Rehab Prognosis: Good; patient would benefit from acute skilled PT services to address these deficits and reach maximum level of function.    Recent Surgery: * No surgery found *      Plan:     During this hospitalization, patient to be seen daily to address the identified rehab impairments via gait training, therapeutic activities, therapeutic exercises and progress toward the following goals:    · Plan of Care Expires:  10/19/19    Subjective     Chief Complaint: arthritis pain   Patient/Family Comments/goals: pt agreeable to treatment,   Pain/Comfort: arthritis pain " allover ", unable to rate pain number   · Pain Addressed 1: Pre-medicate for activity, Nurse notified, Reposition      Objective:     Communicated with nurse prior to session.  Patient " found up in chair with bed alarm, telemetry, oxygen upon PT entry to room.     General Precautions: Standard, fall, respiratory   Orthopedic Precautions:N/A   Braces: N/A     Functional Mobility:  · Bed Mobility:     · Scooting: contact guard assistance  · Sit to Supine: moderate assistance with BLE   · Transfers:     · Sit to Stand: x 2 trials from chair  moderate assistance and of 2 persons with rolling walker. V/T cues for safety technique and walker management.   · Gait: Patient ambulated ~ 8 ft x 2 on level tile with Rolling Walker with Minimal Assistance (chair followed, 3LO2NC ) . Pt required 1 seated rest break during gait training 2* fatigue.  Pt with demonstarting a  3-point gait with decreased hi, increased time in double stance, decreased velocity of limb motion, decreased step length, decreased swing-to-stance ratio, decreased toe-to-floor clearance and decreased weight-shifting ability.Impairments contributing to gait deviations include impaired balance, decreased flexibility, pain, impaired postural control, decreased ROM and decreased strength. V/T cues for safety technique and walker management.     · Balance:  Fair-      AM-PAC 6 CLICK MOBILITY  Turning over in bed (including adjusting bedclothes, sheets and blankets)?: 4  Sitting down on and standing up from a chair with arms (e.g., wheelchair, bedside commode, etc.): 2  Moving from lying on back to sitting on the side of the bed?: 3  Moving to and from a bed to a chair (including a wheelchair)?: 3  Need to walk in hospital room?: 3  Climbing 3-5 steps with a railing?: 2  Basic Mobility Total Score: 17       Therapeutic Activities and Exercises:   pt performed seated BLE x 10 reps : AP, LAQ, hip flexion. VC's for proper and sequence.   Encouraged pt to perform BLE AP, GS ,QS while in bed throughout the day. Pt verbalized understanding.     Patient left HOB elevated with, heels offloading  all lines intact, call button in reach, bed alarm on,  nurse notified and [employer] present..    GOALS:   Multidisciplinary Problems     Physical Therapy Goals        Problem: Physical Therapy Goal    Goal Priority Disciplines Outcome Goal Variances Interventions   Physical Therapy Goal     PT, PT/OT Ongoing, Progressing     Description:  Goals to be met by: 10/19/2019     Patient will increase functional independence with mobility by performin. Supine to sit with Modified [place]  2. Sit to stand transfer with Modified [place]  3. Gait  x 100 feet with Modified [place] using Rolling Walker.   4. Lower extremity exercise program x10 reps per handout, with independence                      Time Tracking:     PT Received On: 10/08/19  PT Start Time: 1326     PT Stop Time: 1350  PT Total Time (min): 24 min     Billable Minutes: Gait Training 12 and Therapeutic Activity 12    Treatment Type: Treatment  PT/PTA: PTA     PTA Visit Number: 2     Anastacia Roper PTA  10/08/2019

## 2019-10-08 NOTE — PROGRESS NOTES
Magaly Epstein is a 69 y.o. female patient.    Follow for ANIA    No new c/o, comfortable    Scheduled Meds:   amLODIPine  10 mg Oral Daily    bumetanide  2 mg Intravenous BID    dextromethorphan-guaifenesin  mg/5 ml  5 mL Oral Q6H    enoxaparin  30 mg Subcutaneous Daily    epoetin cortes-ebpx (RETACRIT) injection  6,000 Units Subcutaneous Q7 Days    ferrous sulfate  325 mg Oral BID    hydrALAZINE  100 mg Oral Q8H    insulin aspart protamine-insulin aspart  10 Units Subcutaneous BIDWM    iron sucrose  200 mg Intravenous Daily    metOLazone  10 mg Oral Daily    metoprolol succinate  50 mg Oral BID    pravastatin  10 mg Oral Daily       Review of patient's allergies indicates:   Allergen Reactions    Ciprofloxacin Anaphylaxis    Codeine Anaphylaxis    Neosporin [benzalkonium chloride] Anaphylaxis         Vital Signs Range (Last 24H):  Temp:  [98.6 °F (37 °C)-99.9 °F (37.7 °C)]   Pulse:  [70-80]   Resp:  [18-19]   BP: (144-174)/(59-88)   SpO2:  [87 %-95 %]     I & O (Last 24H):    Intake/Output Summary (Last 24 hours) at 10/8/2019 0956  Last data filed at 10/8/2019 0800  Gross per 24 hour   Intake 250 ml   Output 1175 ml   Net -925 ml           Physical Exam:  General appearance: well developed, well nourished, no distress  Lungs:  diminished breath sounds bilaterally  Heart: regular rate and rhythm  Abdomen: soft, non-tender non-distented; bowel sounds normal; no masses,  no organomegaly  Extremities: edema (+)    Laboratory:  CBC:   Recent Labs   Lab 10/06/19  0437   WBC 7.78   RBC 3.05*   HGB 8.7*   HCT 29.3*      MCV 96   MCH 28.5   MCHC 29.7*     CMP:   Recent Labs   Lab 10/03/19  0224  10/08/19  0342   *   < > 159*   CALCIUM 8.1*   < > 8.1*   ALBUMIN 2.7*  --  2.1*   PROT 6.1  --   --       < > 140   K 3.9   < > 3.6   CO2 23   < > 22*      < > 107   BUN 37*   < > 53*   CREATININE 3.5*   < > 4.3*   ALKPHOS 193*  --   --    ALT 40  --   --    AST 64*  --   --     BILITOT 0.2  --   --     < > = values in this interval not displayed.       Imp/Plan    ANIA on CKD stg 3 - creatinine holding  Nephrotic syndrome  Anasarca - diuresing  HTN  DM type 2  Anemia of chronic disease/iron def    Continue diuretic as ordered  Review patient kidney biopsy - DM nephropathy/HTN arteriosclerosis  Watch renal function closely  If not improving will need dialysis  Discussed with patient and she agrees  CMP in am      Bc Grajeda  10/8/2019

## 2019-10-08 NOTE — PLAN OF CARE
10/08/19 1803   Discharge Reassessment   Assessment Type Discharge Planning Reassessment   Provided patient/caregiver education on the expected discharge date and the discharge plan Yes   Do you have any problems affording any of your prescribed medications? No   Discharge Plan A Skilled Nursing Facility   Discharge Plan B Home;Home Health   DME Needed Upon Discharge    (TBD)   Anticipated Discharge Disposition SNF   Can the patient answer the patient profile reliably? Yes, cognitively intact   How does the patient rate their overall health at the present time? Fair   Describe the patient's ability to walk at the present time. Walks with the help of equipment   How often would a person be available to care for the patient? Never   Number of comorbid conditions (as recorded on the chart) Five or more   During the past month, has the patient often been bothered by feeling down, depressed or hopeless? No   During the past month, has the patient often been bothered by little interest or pleasure in doing things? No   Post-Acute Status   Post-Acute Authorization Placement   Post-Acute Placement Status Referrals Sent   Discharge Delays (!) Other  ([facility] will need to submit for [employer] auth)

## 2019-10-08 NOTE — PROGRESS NOTES
Accepted by [facility], patient would like [facility]..Felicia Garcia RN, BSN, STN CCM  10/8/2019

## 2019-10-09 LAB
ALBUMIN SERPL BCP-MCNC: 1.9 G/DL (ref 3.5–5.2)
ANION GAP SERPL CALC-SCNC: 11 MMOL/L (ref 8–16)
BUN SERPL-MCNC: 57 MG/DL (ref 8–23)
CALCIUM SERPL-MCNC: 8 MG/DL (ref 8.7–10.5)
CHLORIDE SERPL-SCNC: 107 MMOL/L (ref 95–110)
CO2 SERPL-SCNC: 22 MMOL/L (ref 23–29)
CREAT SERPL-MCNC: 4.5 MG/DL (ref 0.5–1.4)
EST. GFR  (AFRICAN AMERICAN): 11 ML/MIN/1.73 M^2
EST. GFR  (NON AFRICAN AMERICAN): 9 ML/MIN/1.73 M^2
GLUCOSE SERPL-MCNC: 119 MG/DL (ref 70–110)
PHOSPHATE SERPL-MCNC: 5 MG/DL (ref 2.7–4.5)
POCT GLUCOSE: 149 MG/DL (ref 70–110)
POCT GLUCOSE: 154 MG/DL (ref 70–110)
POCT GLUCOSE: 163 MG/DL (ref 70–110)
POCT GLUCOSE: 213 MG/DL (ref 70–110)
POCT GLUCOSE: 228 MG/DL (ref 70–110)
POTASSIUM SERPL-SCNC: 3.4 MMOL/L (ref 3.5–5.1)
SODIUM SERPL-SCNC: 140 MMOL/L (ref 136–145)
TB INDURATION 48 - 72 HR READ: 0 MM

## 2019-10-09 PROCEDURE — 97110 THERAPEUTIC EXERCISES: CPT

## 2019-10-09 PROCEDURE — 27000221 HC OXYGEN, UP TO 24 HOURS

## 2019-10-09 PROCEDURE — 21400001 HC TELEMETRY ROOM

## 2019-10-09 PROCEDURE — 97530 THERAPEUTIC ACTIVITIES: CPT

## 2019-10-09 PROCEDURE — 94761 N-INVAS EAR/PLS OXIMETRY MLT: CPT

## 2019-10-09 PROCEDURE — 80069 RENAL FUNCTION PANEL: CPT

## 2019-10-09 PROCEDURE — 97116 GAIT TRAINING THERAPY: CPT

## 2019-10-09 PROCEDURE — 25000003 PHARM REV CODE 250: Performed by: INTERNAL MEDICINE

## 2019-10-09 PROCEDURE — S0171 BUMETANIDE 0.5 MG: HCPCS | Performed by: INTERNAL MEDICINE

## 2019-10-09 PROCEDURE — 63600175 PHARM REV CODE 636 W HCPCS: Performed by: HOSPITALIST

## 2019-10-09 PROCEDURE — 97535 SELF CARE MNGMENT TRAINING: CPT

## 2019-10-09 PROCEDURE — 25000003 PHARM REV CODE 250: Performed by: HOSPITALIST

## 2019-10-09 RX ORDER — IPRATROPIUM BROMIDE AND ALBUTEROL SULFATE 2.5; .5 MG/3ML; MG/3ML
3 SOLUTION RESPIRATORY (INHALATION) EVERY 4 HOURS PRN
Qty: 1 BOX | Refills: 0
Start: 2019-10-09 | End: 2022-12-31

## 2019-10-09 RX ORDER — BENZONATATE 200 MG/1
200 CAPSULE ORAL 3 TIMES DAILY PRN
Start: 2019-10-09 | End: 2019-10-19

## 2019-10-09 RX ORDER — FERROUS SULFATE 325(65) MG
325 TABLET, DELAYED RELEASE (ENTERIC COATED) ORAL 2 TIMES DAILY
Refills: 0 | COMMUNITY
Start: 2019-10-09 | End: 2022-12-31

## 2019-10-09 RX ORDER — METOLAZONE 10 MG/1
10 TABLET ORAL DAILY
Qty: 30 TABLET | Refills: 11
Start: 2019-10-10 | End: 2021-09-17

## 2019-10-09 RX ORDER — POLYETHYLENE GLYCOL 3350 17 G/17G
17 POWDER, FOR SOLUTION ORAL 2 TIMES DAILY PRN
Refills: 0
Start: 2019-10-09 | End: 2021-05-28

## 2019-10-09 RX ORDER — METOPROLOL SUCCINATE 50 MG/1
50 TABLET, EXTENDED RELEASE ORAL 2 TIMES DAILY
Qty: 60 TABLET | Refills: 11
Start: 2019-10-09 | End: 2021-09-17

## 2019-10-09 RX ORDER — INSULIN ASPART 100 [IU]/ML
1-10 INJECTION, SOLUTION INTRAVENOUS; SUBCUTANEOUS
Refills: 0
Start: 2019-10-09 | End: 2020-10-08

## 2019-10-09 RX ORDER — INSULIN ASPART 100 [IU]/ML
10 INJECTION, SUSPENSION SUBCUTANEOUS 2 TIMES DAILY
Refills: 0 | Status: ON HOLD
Start: 2019-10-09 | End: 2023-01-01 | Stop reason: HOSPADM

## 2019-10-09 RX ORDER — BUMETANIDE 1 MG/1
2 TABLET ORAL DAILY
Status: DISCONTINUED | OUTPATIENT
Start: 2019-10-09 | End: 2019-10-10

## 2019-10-09 RX ADMIN — INSULIN ASPART 2 UNITS: 100 INJECTION, SOLUTION INTRAVENOUS; SUBCUTANEOUS at 09:10

## 2019-10-09 RX ADMIN — AMLODIPINE BESYLATE 10 MG: 5 TABLET ORAL at 08:10

## 2019-10-09 RX ADMIN — ACETAMINOPHEN 650 MG: 325 TABLET, FILM COATED ORAL at 07:10

## 2019-10-09 RX ADMIN — BUMETANIDE 2 MG: 1 TABLET ORAL at 11:10

## 2019-10-09 RX ADMIN — METOLAZONE 10 MG: 5 TABLET ORAL at 08:10

## 2019-10-09 RX ADMIN — INSULIN ASPART 10 UNITS: 100 INJECTION, SUSPENSION SUBCUTANEOUS at 05:10

## 2019-10-09 RX ADMIN — POLYETHYLENE GLYCOL 3350 17 G: 17 POWDER, FOR SOLUTION ORAL at 01:10

## 2019-10-09 RX ADMIN — METOPROLOL SUCCINATE 50 MG: 50 TABLET, EXTENDED RELEASE ORAL at 09:10

## 2019-10-09 RX ADMIN — HYDRALAZINE HYDROCHLORIDE 100 MG: 25 TABLET ORAL at 02:10

## 2019-10-09 RX ADMIN — BUMETANIDE 2 MG: 0.25 INJECTION INTRAMUSCULAR; INTRAVENOUS at 08:10

## 2019-10-09 RX ADMIN — HYDRALAZINE HYDROCHLORIDE 100 MG: 25 TABLET ORAL at 09:10

## 2019-10-09 RX ADMIN — HYDRALAZINE HYDROCHLORIDE 100 MG: 25 TABLET ORAL at 05:10

## 2019-10-09 RX ADMIN — GUAIFENESIN AND DEXTROMETHORPHAN 5 ML: 100; 10 SYRUP ORAL at 11:10

## 2019-10-09 RX ADMIN — GUAIFENESIN AND DEXTROMETHORPHAN 5 ML: 100; 10 SYRUP ORAL at 05:10

## 2019-10-09 RX ADMIN — FERROUS SULFATE TAB EC 325 MG (65 MG FE EQUIVALENT) 325 MG: 325 (65 FE) TABLET DELAYED RESPONSE at 08:10

## 2019-10-09 RX ADMIN — FERROUS SULFATE TAB EC 325 MG (65 MG FE EQUIVALENT) 325 MG: 325 (65 FE) TABLET DELAYED RESPONSE at 09:10

## 2019-10-09 RX ADMIN — PRAVASTATIN SODIUM 10 MG: 10 TABLET ORAL at 08:10

## 2019-10-09 RX ADMIN — SENNOSIDES, DOCUSATE SODIUM 1 TABLET: 50; 8.6 TABLET, FILM COATED ORAL at 01:10

## 2019-10-09 RX ADMIN — INSULIN ASPART 10 UNITS: 100 INJECTION, SUSPENSION SUBCUTANEOUS at 08:10

## 2019-10-09 RX ADMIN — INSULIN ASPART 2 UNITS: 100 INJECTION, SOLUTION INTRAVENOUS; SUBCUTANEOUS at 05:10

## 2019-10-09 RX ADMIN — GUAIFENESIN AND DEXTROMETHORPHAN 5 ML: 100; 10 SYRUP ORAL at 12:10

## 2019-10-09 RX ADMIN — ENOXAPARIN SODIUM 30 MG: 100 INJECTION SUBCUTANEOUS at 05:10

## 2019-10-09 RX ADMIN — METOPROLOL SUCCINATE 50 MG: 50 TABLET, EXTENDED RELEASE ORAL at 08:10

## 2019-10-09 NOTE — PT/OT/SLP PROGRESS
Physical Therapy Treatment    Patient Name:  Magaly Epstein   MRN:  [id number]    Recommendations:     Discharge Recommendations:  nursing facility, skilled   Discharge Equipment Recommendations: (per SNF)   Barriers to discharge: Decreased caregiver support    Assessment:     Magaly Epstein is a 69 y.o. female admitted with a medical diagnosis of Hypertensive emergency.  She presents with the following impairments/functional limitations:  weakness, impaired endurance, impaired self care skills, gait instability, impaired balance, decreased upper extremity function, decreased lower extremity function, decreased ROM, edema, pain, decreased safety awareness, impaired cardiopulmonary response to activity, decreased coordination . Pt will benefit from further skilled therapy in order to return to PLOF. There is expectation of returning to prior level of function to maintain independence avoiding readmission.  Pt is at high risk of unplanned readmission, decline functional status due to fall risk and lack of 24 hour caregiver support at home .  Pt is pleasant and motivated to return to prior level of function.     Rehab Prognosis: Good; patient would benefit from acute skilled PT services to address these deficits and reach maximum level of function.    Recent Surgery: * No surgery found *      Plan:     During this hospitalization, patient to be seen daily to address the identified rehab impairments via gait training, therapeutic activities, therapeutic exercises and progress toward the following goals:    · Plan of Care Expires:  10/19/19    Subjective     Chief Complaint: weakness, fatigue and sleepy  Patient/Family Comments/goals: pt agreeable to treatment   Pain/Comfort:  · Location - Side 1: Right  · Location 1: shoulder  · Pain Addressed 1: Pre-medicate for activity, Nurse notified      Objective:     Communicated with nurse Casper prior to session.  Patient found seated EOB w/o O2 NC with bed alarm, telemetry,  oxygen upon PT entry to room.     General Precautions: Standard, fall, respiratory   Orthopedic Precautions:N/A   Braces: N/A     Functional Mobility:  · Bed Mobility:     · Scooting: contact guard assistance  · Bridging: contact guard assistance  · Sit to Supine: moderate assistance and maximal assistance with BLE   · Transfers:     · Sit to Stand: x 2 trials from bed and 1 trial from bedside commode moderate assistance with rolling walker. V/T cues for safety technique and hand placement on RW.   · Toilet Transfer: minimum assistance and moderate assistance with  rolling walker  using  Step Transfer  · Gait:  Pt ambulated ~ 5-6 steps x 2 from bed<>commode and ~ 5 side steps to HOB with RW, MIN/MOD A (3LO2NC) .  Pt with demonstarting a  3-point gait with decreased hi, increased time in double stance, decreased velocity of limb motion, decreased step length, decreased swing-to-stance ratio, decreased toe-to-floor clearance and decreased weight-shifting ability.Impairments contributing to gait deviations include impaired balance, decreased flexibility, pain, impaired postural control, decreased ROM and decreased strength. V/T cues for safety technique and walker management. VC's for pursed lip breathing technique.   · Balance:  Fair -      AM-PAC 6 CLICK MOBILITY  Turning over in bed (including adjusting bedclothes, sheets and blankets)?: 3  Sitting down on and standing up from a chair with arms (e.g., wheelchair, bedside commode, etc.): 2  Moving from lying on back to sitting on the side of the bed?: 3  Moving to and from a bed to a chair (including a wheelchair)?: 3  Need to walk in hospital room?: 3  Climbing 3-5 steps with a railing?: 2  Basic Mobility Total Score: 16       Therapeutic Activities and Exercises:   Pt performed supine BLE x 10 reps x 2 : AP, GS, QS and hip ABD/ADD seated BLE 10 reps x 2 trials:   AP, LAQ, HS,  Hip abd/add with pillow , Hip flexion. V/T's cues for technique and sequence. Pt  frequent rest breaks throughout treatment 2* fatigue and SOB (despite on 3L O2NC , nurse notified )  Educated pt on safety awareness with all OOB mobility , transfer and gait training.     Patient left HOB elevated with heels offloading  all lines intact, call button in reach, bed alarm on, nurse notified and PCT and [place]  present..    GOALS:   Multidisciplinary Problems     Physical Therapy Goals        Problem: Physical Therapy Goal    Goal Priority Disciplines Outcome Goal Variances Interventions   Physical Therapy Goal     PT, PT/OT Ongoing, Progressing     Description:  Goals to be met by: 10/19/2019     Patient will increase functional independence with mobility by performin. Supine to sit with Modified [place]  2. Sit to stand transfer with Modified [place]  3. Gait  x 100 feet with Modified [place] using Rolling Walker.   4. Lower extremity exercise program x10 reps per handout, with independence                      Time Tracking:     PT Received On: 10/09/19  PT Start Time: 1114     PT Stop Time: 1153  PT Total Time (min): 39 min     Billable Minutes: Gait Training 9, Therapeutic Activity 15 and Therapeutic Exercise 15    Treatment Type: Treatment  PT/PTA: PTA     PTA Visit Number: 3     Anastacia Roper PTA  10/09/2019

## 2019-10-09 NOTE — PLAN OF CARE
Problem: Infection  Goal: Infection Symptom Resolution  Outcome: Ongoing, Progressing  Intervention: Prevent or Manage Infection  Flowsheets (Taken 10/9/2019 1405)  Infection Management: aseptic technique maintained     Problem: Diabetes Comorbidity  Goal: Blood Glucose Level Within Desired Range  Outcome: Ongoing, Progressing  Intervention: Maintain Glycemic Control  Flowsheets (Taken 10/9/2019 1405)  Glycemic Management: blood glucose monitoring; supplemental insulin given     Problem: Skin Injury Risk Increased  Goal: Skin Health and Integrity  Outcome: Ongoing, Progressing  Intervention: Optimize Skin Protection  Flowsheets (Taken 10/9/2019 1405)  Pressure Reduction Techniques: frequent weight shift encouraged  Pressure Reduction Devices: positioning supports utilized; heel offloading device utilized  Skin Protection: adhesive use limited; tubing/devices free from skin contact  Head of Bed (HOB): HOB at 30-45 degrees    No new areas of skin breakdown noted during shift. Blood glucose levels have been within desired range during shift.

## 2019-10-09 NOTE — PLAN OF CARE
Problem: Occupational Therapy Goal  Goal: Occupational Therapy Goal  Description  Goals to be met by: 10/19/19    Patient will increase functional independence with ADLs by performing:    UE Dressing with Set-up Assistance.  LE Dressing with Stand-by Assistance.  Grooming while standing with Stand-by Assistance.  Toileting from bedside commode with Stand-by Assistance for hygiene and clothing management.   Toilet transfer to bedside commode with Stand-by Assistance.  Upper extremity exercise program x 20 reps per handout, with supervision.     Outcome: Ongoing, Progressing   Pt will continue to benefit from acute OT in order to increase safety awareness and independence with ADLs and all aspects of functional mobility. OT rec. SNF d/c to regain PLOF and reduce risk of falls/readmission/morbidity.

## 2019-10-09 NOTE — PT/OT/SLP PROGRESS
Occupational Therapy   Treatment    Name: Magaly Epstein  MRN: [id number]  Admitting Diagnosis:  Hypertensive emergency       Recommendations:     Discharge Recommendations: nursing facility, skilled  Discharge Equipment Recommendations:  (per SNF)  Barriers to discharge:  Decreased caregiver support(Pt requires increased amount of care to complete basic self-care needs)    Assessment:     Magaly Epstein is a 69 y.o. female with a medical diagnosis of Hypertensive emergency. Performance deficits affecting function are weakness, impaired functional mobilty, impaired cognition, decreased safety awareness, impaired endurance, gait instability, decreased coordination, pain, impaired balance, decreased upper extremity function, impaired self care skills, decreased lower extremity function, edema.     Pt is motivated to participate in order to regain PLOF. Pt was functioning independently PTA with ADLs and ambulated with a rollator. Now, she requires MOD A for functional transfers and decreased functional activity tolerance, impacting her safety and independence with all aspects of care. Pt will make a good candidate in SNF in order to regain her independence.     Rehab Prognosis:  Good; patient would benefit from acute skilled OT services to address these deficits and reach maximum level of function.       Plan:     Patient to be seen 5 x/week to address the above listed problems via self-care/home management, therapeutic activities, therapeutic exercises  · Plan of Care Expires: 10/19/19  · Plan of Care Reviewed with: patient    Subjective     Chief complaint: hasn't had a BM since she's been here   Patient's comments/goals: to go to SNF soon     Pain/Comfort:  · Pain Rating 1: (B/L LE pain d/t arthritis)    Objective:     Communicated with: nurse, Jenniffer, prior to session.  Patient found HOB elevated with bed alarm, telemetry, peripheral IV(Avasys, on room air) upon OT entry to room.    General Precautions: Standard,  fall, respiratory   Orthopedic Precautions:N/A   Braces: N/A     Occupational Performance:     Bed Mobility:    · Patient completed Rolling/Turning to Right with contact guard assistance  · Patient completed Scooting with contact guard assistance   · Patient completed Bridging with minimum assistance to help guide her hands onto side rail and feet to assist with mod verbal cueing   · Patient completed Supine to Sit with minimum assistance, with side rail and HOB elevated with mod verbal cueing   · Patient completed Sit to Supine with moderate assistance and with leg lift     Vitals:   · Supine at rest on room air: 85%, HR 65 bpm. Increased time required with pursed lip breathin%   · Supine at rest on 3L: increased with ~5 min to 91%, HR 64 bpm   · Sitting EOB on 3L: 87%, HR 67 bpm   · During AROM EOB on 3L: 90-92%, HR 65 bpm   · Supine at end of session, 3L: 94%   · NurseJenniffer, notified of vitals and that pt left with 3L on.       Functional Mobility/Transfers:  · Patient completed Sit <> Stand Transfer with moderate assistance  with  rolling walker   · Functional Mobility: Pt completed sidesteps at EOB with MIN A and cueing for sequence with RW and steps.     Activities of Daily Living:  · NT       [facility] 6 Click ADL: 16    Treatment & Education:  · Pt re-educated on OT role/POC.   · Importance of OOB activity with staff assistance.  · Safety during functional t/f and mobility   · Increased time taken to implement pursed lip breathing. Vitals noted above.   · Pt tolerated sitting EOB for 25 min   · B/L UE AROM in unsupported EOB: 1x15:   · Finger flexion/extension   · Shoulder flexion/extension    · Elbow flexion/extension  · Shoulder horizontal ab/dduction   · Edu to complete these 2x/day   · White board updated   · All questions/concerns answered within OT scope of practice       Patient left HOB elevated with all lines intact, call button in reach, bed alarm on, nurseJenniffer, notified and [employer]  presentEducation:      GOALS:   Multidisciplinary Problems     Occupational Therapy Goals        Problem: Occupational Therapy Goal    Goal Priority Disciplines Outcome Interventions   Occupational Therapy Goal     OT, PT/OT Ongoing, Progressing    Description:  Goals to be met by: 10/19/19    Patient will increase functional independence with ADLs by performing:    UE Dressing with Set-up Assistance.  LE Dressing with Stand-by Assistance.  Grooming while standing with Stand-by Assistance.  Toileting from bedside commode with Stand-by Assistance for hygiene and clothing management.   Toilet transfer to bedside commode with Stand-by Assistance.  Upper extremity exercise program x 20 reps per handout, with supervision.                      Time Tracking:     OT Date of Treatment: 10/09/19  OT Start Time: 1448  OT Stop Time: 1526  OT Total Time (min): 38 min    Billable Minutes:Therapeutic Activity 30 min  Therapeutic Exercise 8 min  Total Time 38 min    Akanksha White OT  10/9/2019

## 2019-10-09 NOTE — PLAN OF CARE
Problem: Physical Therapy Goal  Goal: Physical Therapy Goal  Description  Goals to be met by: 10/19/2019     Patient will increase functional independence with mobility by performin. Supine to sit with Modified [place]  2. Sit to stand transfer with Modified [place]  3. Gait  x 100 feet with Modified [place] using Rolling Walker.   4. Lower extremity exercise program x10 reps per handout, with independence     Outcome: Ongoing, Progressing   Pt will benefit from further skilled therapy in order to return to PLOF.

## 2019-10-09 NOTE — PLAN OF CARE
Per bed huddle TN explained that patient was accepted in [facility] and TN left message requesting that [facility] submit to [facility] for insurance snf authorization. TN left messages for admissions.  TN has not been able to talk with Divya or Monae this a.m. A discharge order is in [facility].     10/09/19 0957   Post-Acute Status   Post-Acute Authorization Placement   Post-Acute Placement Status Pending Payor Review   Discharge Delays (!) Other  (no authorization from [facility])   Felicia Garcai RN, BSN, STN [facility]  10/9/2019

## 2019-10-09 NOTE — PLAN OF CARE
10/09/19 1419   Post-Acute Status   Post-Acute Authorization Placement   Post-Acute Placement Status Pending Payor Review  (pending [employer] authorization.)   Discharge Delays (!) Other  (Pending [employer] snf auth.)

## 2019-10-09 NOTE — PROGRESS NOTES
"[facility] Hospital Medicine  Progress Note    Patient Name: Magaly Epstein  MRN: [id number]  Patient Class: IP- Inpatient   Admission Date: 10/3/2019  Length of Stay: 6 days  Attending Physician: Shanita Pettit MD  Primary Care Provider: Anahy Bradley DO        Subjective:     Principal Problem:Hypertensive emergency        HPI:  70 y/o female patient presents for evaluation of acute onset of shortness of breath that started last night.  Patient lives in assisted living.  She was admitted here in April for shortness of breath due to hypertensive crisis and fluid overload.  Patient has history of nephrotic syndrome and anasarca and had a kidney biopsy at that time.   She also has a history of chronic hypertension.   Patient states compliance with medications.  She denies chest pain. She states she was just very short of breath and repeatedly asked for help.  She also complains of a dry cough. She has leg edema that she states is "always there."  Patient was placed on CPAP for an O2 sat of 60% on room air.  Blood pressure was reportedly elevated at 270 systolic.  She presented severely hypertensive and placed on Nitro infusion.  Patient is now feeling better.  No other complaints.    Overview/Hospital Course:  70 y/o female patient presents for evaluation of acute onset of shortness of breath. Patient lives in assisted living.  She was admitted here in April for shortness of breath due to hypertensive crisis and fluid overload. Patient was placed on CPAP for an O2 sat of 60% on room air.  Blood pressure was reportedly elevated at 270 systolic. She presented severely hypertensive and placed on Nitro infusion.  CKD with increased Creat from baseline.  Hx of nephrotic syndrome with lower extremity edema and fluid overload.  Nephrology consulted and started on IV Bumex.  Restarted home medications with improvement of BP and able to wean off Nitro drip. Stepped down to floor in stable condition. " Metolazone added by nephrology. Weaned off supplemental O2 but remained with anasarca. Unable to use ACE-I due to worsening renal function. PT/OT recommends SNF.       Interval History: Weaned off O2, no new complaints    Review of Systems   Respiratory: Negative for shortness of breath.    Cardiovascular: Negative.    Gastrointestinal: Negative.      Objective:     Vital Signs (Most Recent):  Temp: 97.5 °F (36.4 °C) (10/08/19 2347)  Pulse: 74 (10/08/19 2347)  Resp: 18 (10/08/19 2347)  BP: 138/67 (10/08/19 2347)  SpO2: (!) 93 % (10/08/19 2347) Vital Signs (24h Range):  Temp:  [97.5 °F (36.4 °C)-99.1 °F (37.3 °C)] 97.5 °F (36.4 °C)  Pulse:  [68-81] 74  Resp:  [17-19] 18  SpO2:  [87 %-96 %] 93 %  BP: (132-169)/(59-76) 138/67     Weight: 116.4 kg (256 lb 9.9 oz)  Body mass index is 37.9 kg/m².    Intake/Output Summary (Last 24 hours) at 10/9/2019 0139  Last data filed at 10/8/2019 0800  Gross per 24 hour   Intake --   Output 275 ml   Net -275 ml      Physical Exam   Constitutional: She is oriented to person, place, and time. She appears well-developed. No distress.   HENT:   Head: Normocephalic.   Eyes: Conjunctivae and EOM are normal.   Neck: Normal range of motion. Neck supple.   Cardiovascular: Normal rate and regular rhythm.   Pulmonary/Chest: Effort normal. She has no wheezes. She has no rales.   Breathing comfortably at room air   Abdominal: Soft. Bowel sounds are normal.   Musculoskeletal: Normal range of motion. She exhibits edema.   anasarca   Neurological: She is alert and oriented to person, place, and time.   Skin: Skin is warm and dry. Capillary refill takes less than 2 seconds. She is not diaphoretic.   Psychiatric: She has a normal mood and affect.   Nursing note and vitals reviewed.      Significant Labs: All pertinent labs within the past 24 hours have been reviewed.    Significant Imaging: I have reviewed all pertinent imaging results/findings within the past 24 hours.      Assessment/Plan:      *  Hypertensive emergency  Patient presents with dyspnea and significant hypertension.  CXR shows mild interstitial edema and bilateral pleural effusions.  Patient was placed on Nitro drip and given dose of Bumex.  Improved BP and symptoms.  Restarted on home oral medications and able to wean off Nitro drip.  Blood pressure controlled  Added prn Clonidine.    Acute renal failure superimposed on stage 3 chronic kidney disease  Nephrology consult as above.  Trial of diuretics per nephrology  Weaned off NC but still pretty edematous  BMP in AM    Elevated troponin  Probably secondary to demand ischemia from uncontrolled HTN and renal failure.  Denies any chest pain.    Nephrotic syndrome  Patient with recent admission and evaluated by Nephrology.  Creat much higher than baseline.  Patient with kidney biopsy on last admit.   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  Diuretics per nephrology  ACE-I or ARB when able    Debility  PT/OT eval: SNF     Anemia of chronic disease  Secondary to CKD.  Noted drop in H/H but stable  Iron deficiency anemia.    No evidence of active bleeding.     Hyperlipidemia  Continue Statin.    Type 2 diabetes mellitus with renal complication  Uncontrolled with hyperglycemia.  Diabetic diet and insulin sliding scale.  Started on home 70/30 at lower dose to prevent hypoglycemia.  Increase as needed.    Essential hypertension  As above.        VTE Risk Mitigation (From admission, onward)         Ordered     enoxaparin injection 30 mg  Daily      10/03/19 0656     IP VTE HIGH RISK PATIENT  Once      10/03/19 0562                      Shanita Pettit MD  Department of Hospital Medicine   [facility]

## 2019-10-09 NOTE — PLAN OF CARE
[facility]     Department of Hospital Medicine     [address] [place] [address]     [phone] after hours  [phone] fax       NURSING HOME ORDERS    10/09/2019    Admit to Nursing Home:  Skilled Bed      Diagnoses:  Active Hospital Problems    Diagnosis  POA    *Hypertensive emergency [I16.1]  Yes     Priority: 1 - High    Acute renal failure superimposed on stage 3 chronic kidney disease [N17.9, N18.3]  Yes     Priority: 3     Elevated troponin [R79.89]  Yes    Debility [R53.81]  Yes    Nephrotic syndrome [N04.9]  Yes    Anemia of chronic disease [D63.8]  Yes     Chronic    Essential hypertension [I10]  Yes     Chronic    Hyperlipidemia [E78.5]  Yes     Chronic    Type 2 diabetes mellitus with renal complication [E11.29]  Yes     Chronic      Resolved Hospital Problems   No resolved problems to display.       Patient is homebound due to:  Hypertensive emergency    Allergies:  Review of patient's allergies indicates:   Allergen Reactions    Ciprofloxacin Anaphylaxis    Codeine Anaphylaxis    Neosporin [benzalkonium chloride] Anaphylaxis       Vitals:  As per facility protocol    Diet: 1800 ADA     Acitivities:  As tolerated    LABS:  Per facility protocol    Nursing Precautions:    - Aspiration precautions  per nursing home protocol            - Total assistance with meals            -  Upright 90 degrees befor during and after meals             -  Suction at bedside          - Fall precautions per nursing home protocol   - Seizure precaution per senior care protocol   - Decubitus precautions:        -  for positioning   - Pressure reducing foam mattress   - Turn patient every two hours. Use wedge pillows to anchor patient    CONSULTS:    Physical Therapy to evaluate and treat     Occupational Therapy to evaluate and treat      MISCELLANEOUS CARE:    Routine Skin for Bedridden Patients:  Apply moisture barrier cream to all    skin  folds and wet areas in perineal area daily and after baths and                           all bowel movements.      DIABETES CARE:     Check blood sugar:      Fingerstick blood sugar AC and HS   Fingerstick blood sugar every 6 hours if unable to eat      Report CBG < 60 or > 400 to physician.                                          Insulin Sliding Scale          Glucose  Novolog Insulin Subcutaneous        0 - 60   Orange juice or glucose tablet, hold insulin      No insulin   201-250  2 units   251-300  4 units   301-350  6 units   351-400  8 units   >400   10 units then call physician      Medications: Discontinue all previous medication orders, if any. See new list below.     Magaly Epstein   Home Medication Instructions CARMENCITA:[id number]    Printed on:10/09/19 1227   Medication Information                      acetaminophen (TYLENOL) 500 MG tablet  Take 1 tablet (500 mg total) by mouth every 6 (six) hours as needed.             albuterol-ipratropium (DUO-NEB) 2.5 mg-0.5 mg/3 mL nebulizer solution  Take 3 mLs by nebulization every 4 (four) hours as needed for Wheezing or Shortness of Breath. Rescue             amLODIPine (NORVASC) 10 MG tablet  Take 1 tablet (10 mg total) by mouth once daily.             benzonatate (TESSALON) 200 MG capsule  Take 1 capsule (200 mg total) by mouth 3 (three) times daily as needed for Cough.             bumetanide (BUMEX) 2 MG tablet  Take 1 tablet (2 mg total) by mouth once daily.             epoetin cortes-epbx (RETACRIT) 10,000 unit/mL imjection  Inject 0.6 mLs (6,000 Units total) into the skin every 7 days.             escitalopram oxalate (LEXAPRO) 10 MG tablet  Take 10 mg by mouth once daily.             ferrous sulfate 325 (65 FE) MG EC tablet  Take 1 tablet (325 mg total) by mouth 2 (two) times daily.             hydrALAZINE (APRESOLINE) 100 MG tablet  Take 1 tablet (100 mg total) by mouth every 8 (eight) hours.             insulin aspart protamine-insulin aspart  (NOVOLOG 70/30) 100 unit/mL (70-30) InPn pen  Inject 10 Units into the skin 2 (two) times daily.             insulin aspart U-100 (NOVOLOG) 100 unit/mL (3 mL) InPn pen  Inject 1-10 Units into the skin before meals and at bedtime as needed (Hyperglycemia).             metOLazone (ZAROXOLYN) 10 MG tablet  Take 1 tablet (10 mg total) by mouth once daily.             metoprolol succinate (TOPROL-XL) 50 MG 24 hr tablet  Take 1 tablet (50 mg total) by mouth 2 (two) times daily.             polyethylene glycol (GLYCOLAX) 17 gram PwPk  Take 17 g by mouth 2 (two) times daily as needed.             pravastatin (PRAVACHOL) 10 MG tablet  Take 10 mg by mouth once daily.             senna-docusate 8.6-50 mg (PERICOLACE) 8.6-50 mg per tablet  Take 1 tablet by mouth 2 (two) times daily as needed for Constipation.                       _________________________________  Shanita Pettit MD  10/09/2019

## 2019-10-09 NOTE — PLAN OF CARE
Problem: Fall Injury Risk  Goal: Absence of Fall and Fall-Related Injury  Outcome: Ongoing, Progressing  Intervention: Identify and Manage Contributors to Fall Injury Risk  Flowsheets (Taken 10/9/2019 0401)  Self-Care Promotion: BADL personal objects within reach; BADL personal routines maintained; independence encouraged  Medication Review/Management: medications reviewed  Intervention: Promote Injury-Free Environment  Flowsheets (Taken 10/9/2019 0401)  Safety Promotion/Fall Prevention: bed alarm set; bedside commode chair; Fall Risk reviewed with patient/family; lighting adjusted; side rails raised x 2; /camera at bedside  Environmental Safety Modification: clutter free environment maintained; assistive device/personal items within reach; lighting adjusted; room organization consistent; room near unit station     Problem: Diabetes Comorbidity  Goal: Blood Glucose Level Within Desired Range  Outcome: Ongoing, Progressing  Intervention: Maintain Glycemic Control  Flowsheets (Taken 10/9/2019 0401)  Glycemic Management: blood glucose monitoring; oral hydration promoted; supplemental insulin given

## 2019-10-09 NOTE — PROGRESS NOTES
Magaly Epstein is a 69 y.o. female patient.    Follow for ANIA, CKD stg 3    No new c/o, comfortable    Scheduled Meds:   amLODIPine  10 mg Oral Daily    bumetanide  2 mg Oral Daily    dextromethorphan-guaifenesin  mg/5 ml  5 mL Oral Q6H    enoxaparin  30 mg Subcutaneous Daily    epoetin cortes-ebpx (RETACRIT) injection  6,000 Units Subcutaneous Q7 Days    ferrous sulfate  325 mg Oral BID    hydrALAZINE  100 mg Oral Q8H    insulin aspart protamine-insulin aspart  10 Units Subcutaneous BIDWM    metOLazone  10 mg Oral Daily    metoprolol succinate  50 mg Oral BID    pravastatin  10 mg Oral Daily       Review of patient's allergies indicates:   Allergen Reactions    Ciprofloxacin Anaphylaxis    Codeine Anaphylaxis    Neosporin [benzalkonium chloride] Anaphylaxis         Vital Signs Range (Last 24H):  Temp:  [96.2 °F (35.7 °C)-98.6 °F (37 °C)]   Pulse:  [68-81]   Resp:  [16-18]   BP: (132-169)/(60-86)   SpO2:  [92 %-97 %]     I & O (Last 24H):    Intake/Output Summary (Last 24 hours) at 10/9/2019 1008  Last data filed at 10/9/2019 0851  Gross per 24 hour   Intake --   Output 851 ml   Net -851 ml           Physical Exam:  General appearance: well developed, well nourished, no distress  Lungs:  diminished breath sounds bilaterally  Heart: regular rate and rhythm  Abdomen: soft, non-tender non-distented; bowel sounds normal; no masses,  no organomegaly  Extremities: edema (+)    Laboratory:  CBC:   Recent Labs   Lab 10/06/19  0437   WBC 7.78   RBC 3.05*   HGB 8.7*   HCT 29.3*      MCV 96   MCH 28.5   MCHC 29.7*     CMP:   Recent Labs   Lab 10/03/19  0224  10/09/19  0353   *   < > 119*   CALCIUM 8.1*   < > 8.0*   ALBUMIN 2.7*   < > 1.9*   PROT 6.1  --   --       < > 140   K 3.9   < > 3.4*   CO2 23   < > 22*      < > 107   BUN 37*   < > 57*   CREATININE 3.5*   < > 4.5*   ALKPHOS 193*  --   --    ALT 40  --   --    AST 64*  --   --    BILITOT 0.2  --   --     < > = values in  this interval not displayed.       Imp/Plan    ANIA  On CKD stg 3 - creatinine worsening, related to diuretic  HTN  DM type 2  Nephrotic syndrome - DM nephropathy/HTN arteriosclerosis per biopsy  Anemia of chronic disease/iron def    Continue present rx  Change Bumex to oral  If d/c follow up in clinic with Dr. Walsh in 1-2 weeks        Bc Grajeda  10/9/2019

## 2019-10-09 NOTE — SUBJECTIVE & OBJECTIVE
Interval History: Weaned off O2, no new complaints    Review of Systems   Respiratory: Negative for shortness of breath.    Cardiovascular: Negative.    Gastrointestinal: Negative.      Objective:     Vital Signs (Most Recent):  Temp: 97.5 °F (36.4 °C) (10/08/19 2347)  Pulse: 74 (10/08/19 2347)  Resp: 18 (10/08/19 2347)  BP: 138/67 (10/08/19 2347)  SpO2: (!) 93 % (10/08/19 2347) Vital Signs (24h Range):  Temp:  [97.5 °F (36.4 °C)-99.1 °F (37.3 °C)] 97.5 °F (36.4 °C)  Pulse:  [68-81] 74  Resp:  [17-19] 18  SpO2:  [87 %-96 %] 93 %  BP: (132-169)/(59-76) 138/67     Weight: 116.4 kg (256 lb 9.9 oz)  Body mass index is 37.9 kg/m².    Intake/Output Summary (Last 24 hours) at 10/9/2019 0139  Last data filed at 10/8/2019 0800  Gross per 24 hour   Intake --   Output 275 ml   Net -275 ml      Physical Exam   Constitutional: She is oriented to person, place, and time. She appears well-developed. No distress.   HENT:   Head: Normocephalic.   Eyes: Conjunctivae and EOM are normal.   Neck: Normal range of motion. Neck supple.   Cardiovascular: Normal rate and regular rhythm.   Pulmonary/Chest: Effort normal. She has no wheezes. She has no rales.   Breathing comfortably at room air   Abdominal: Soft. Bowel sounds are normal.   Musculoskeletal: Normal range of motion. She exhibits edema.   anasarca   Neurological: She is alert and oriented to person, place, and time.   Skin: Skin is warm and dry. Capillary refill takes less than 2 seconds. She is not diaphoretic.   Psychiatric: She has a normal mood and affect.   Nursing note and vitals reviewed.      Significant Labs: All pertinent labs within the past 24 hours have been reviewed.    Significant Imaging: I have reviewed all pertinent imaging results/findings within the past 24 hours.

## 2019-10-09 NOTE — PROGRESS NOTES
Monae at [facility] says she sent for authorization to [facility] for snf and now waiting to hear back..Felicia Garcia RN, BSN, [facility]  10/9/2019

## 2019-10-10 LAB
ALBUMIN SERPL BCP-MCNC: 1.9 G/DL (ref 3.5–5.2)
ANION GAP SERPL CALC-SCNC: 9 MMOL/L (ref 8–16)
BUN SERPL-MCNC: 59 MG/DL (ref 8–23)
CALCIUM SERPL-MCNC: 7.9 MG/DL (ref 8.7–10.5)
CHLORIDE SERPL-SCNC: 106 MMOL/L (ref 95–110)
CO2 SERPL-SCNC: 25 MMOL/L (ref 23–29)
CREAT SERPL-MCNC: 4.5 MG/DL (ref 0.5–1.4)
EST. GFR  (AFRICAN AMERICAN): 11 ML/MIN/1.73 M^2
EST. GFR  (NON AFRICAN AMERICAN): 9 ML/MIN/1.73 M^2
GLUCOSE SERPL-MCNC: 153 MG/DL (ref 70–110)
HAV IGM SERPL QL IA: NEGATIVE
HBV CORE AB SERPL QL IA: NEGATIVE
HBV CORE IGM SERPL QL IA: NEGATIVE
HBV SURFACE AB SER-ACNC: NEGATIVE M[IU]/ML
HBV SURFACE AG SERPL QL IA: NEGATIVE
HCV AB SERPL QL IA: NEGATIVE
INR PPP: 1 (ref 0.8–1.2)
PHOSPHATE SERPL-MCNC: 5.1 MG/DL (ref 2.7–4.5)
POCT GLUCOSE: 128 MG/DL (ref 70–110)
POCT GLUCOSE: 156 MG/DL (ref 70–110)
POCT GLUCOSE: 216 MG/DL (ref 70–110)
POCT GLUCOSE: 71 MG/DL (ref 70–110)
POTASSIUM SERPL-SCNC: 3.3 MMOL/L (ref 3.5–5.1)
PROTHROMBIN TIME: 10.3 SEC (ref 9–12.5)
SODIUM SERPL-SCNC: 140 MMOL/L (ref 136–145)

## 2019-10-10 PROCEDURE — 63600175 PHARM REV CODE 636 W HCPCS: Performed by: RADIOLOGY

## 2019-10-10 PROCEDURE — 21400001 HC TELEMETRY ROOM

## 2019-10-10 PROCEDURE — 25000003 PHARM REV CODE 250: Performed by: HOSPITALIST

## 2019-10-10 PROCEDURE — 25000003 PHARM REV CODE 250: Performed by: INTERNAL MEDICINE

## 2019-10-10 PROCEDURE — 86706 HEP B SURFACE ANTIBODY: CPT

## 2019-10-10 PROCEDURE — S0171 BUMETANIDE 0.5 MG: HCPCS | Performed by: INTERNAL MEDICINE

## 2019-10-10 PROCEDURE — 80069 RENAL FUNCTION PANEL: CPT

## 2019-10-10 PROCEDURE — 36415 COLL VENOUS BLD VENIPUNCTURE: CPT

## 2019-10-10 PROCEDURE — 86704 HEP B CORE ANTIBODY TOTAL: CPT

## 2019-10-10 PROCEDURE — 97116 GAIT TRAINING THERAPY: CPT

## 2019-10-10 PROCEDURE — 97110 THERAPEUTIC EXERCISES: CPT

## 2019-10-10 PROCEDURE — 99900035 HC TECH TIME PER 15 MIN (STAT)

## 2019-10-10 PROCEDURE — 85610 PROTHROMBIN TIME: CPT

## 2019-10-10 PROCEDURE — 27000221 HC OXYGEN, UP TO 24 HOURS

## 2019-10-10 PROCEDURE — 63600175 PHARM REV CODE 636 W HCPCS: Performed by: HOSPITALIST

## 2019-10-10 PROCEDURE — 97530 THERAPEUTIC ACTIVITIES: CPT

## 2019-10-10 PROCEDURE — 80074 ACUTE HEPATITIS PANEL: CPT

## 2019-10-10 PROCEDURE — 94761 N-INVAS EAR/PLS OXIMETRY MLT: CPT

## 2019-10-10 RX ORDER — FENTANYL CITRATE 50 UG/ML
INJECTION, SOLUTION INTRAMUSCULAR; INTRAVENOUS CODE/TRAUMA/SEDATION MEDICATION
Status: COMPLETED | OUTPATIENT
Start: 2019-10-10 | End: 2019-10-10

## 2019-10-10 RX ORDER — MUPIROCIN 20 MG/G
OINTMENT TOPICAL 2 TIMES DAILY
Status: COMPLETED | OUTPATIENT
Start: 2019-10-10 | End: 2019-10-14

## 2019-10-10 RX ORDER — SODIUM CHLORIDE 9 MG/ML
INJECTION, SOLUTION INTRAVENOUS
Status: DISCONTINUED | OUTPATIENT
Start: 2019-10-10 | End: 2019-10-17 | Stop reason: HOSPADM

## 2019-10-10 RX ORDER — DOCUSATE SODIUM 100 MG/1
100 CAPSULE, LIQUID FILLED ORAL DAILY
Status: DISCONTINUED | OUTPATIENT
Start: 2019-10-11 | End: 2019-10-17 | Stop reason: HOSPADM

## 2019-10-10 RX ORDER — BUMETANIDE 0.25 MG/ML
2 INJECTION INTRAMUSCULAR; INTRAVENOUS 2 TIMES DAILY
Status: DISCONTINUED | OUTPATIENT
Start: 2019-10-10 | End: 2019-10-17 | Stop reason: HOSPADM

## 2019-10-10 RX ORDER — POLYETHYLENE GLYCOL 3350 17 G/17G
17 POWDER, FOR SOLUTION ORAL 2 TIMES DAILY
Status: DISCONTINUED | OUTPATIENT
Start: 2019-10-10 | End: 2019-10-17 | Stop reason: HOSPADM

## 2019-10-10 RX ORDER — SODIUM CHLORIDE 9 MG/ML
INJECTION, SOLUTION INTRAVENOUS ONCE
Status: DISCONTINUED | OUTPATIENT
Start: 2019-10-10 | End: 2019-10-17 | Stop reason: HOSPADM

## 2019-10-10 RX ORDER — HEPARIN SODIUM 5000 [USP'U]/ML
INJECTION, SOLUTION INTRAVENOUS; SUBCUTANEOUS CODE/TRAUMA/SEDATION MEDICATION
Status: COMPLETED | OUTPATIENT
Start: 2019-10-10 | End: 2019-10-10

## 2019-10-10 RX ORDER — MIDAZOLAM HYDROCHLORIDE 1 MG/ML
INJECTION INTRAMUSCULAR; INTRAVENOUS CODE/TRAUMA/SEDATION MEDICATION
Status: COMPLETED | OUTPATIENT
Start: 2019-10-10 | End: 2019-10-10

## 2019-10-10 RX ADMIN — METOPROLOL SUCCINATE 50 MG: 50 TABLET, EXTENDED RELEASE ORAL at 09:10

## 2019-10-10 RX ADMIN — MUPIROCIN: 20 OINTMENT TOPICAL at 11:10

## 2019-10-10 RX ADMIN — INSULIN ASPART 10 UNITS: 100 INJECTION, SUSPENSION SUBCUTANEOUS at 09:10

## 2019-10-10 RX ADMIN — HEPARIN SODIUM 10000 UNITS: 5000 INJECTION, SOLUTION INTRAVENOUS; SUBCUTANEOUS at 03:10

## 2019-10-10 RX ADMIN — FERROUS SULFATE TAB EC 325 MG (65 MG FE EQUIVALENT) 325 MG: 325 (65 FE) TABLET DELAYED RESPONSE at 09:10

## 2019-10-10 RX ADMIN — INSULIN ASPART 10 UNITS: 100 INJECTION, SUSPENSION SUBCUTANEOUS at 05:10

## 2019-10-10 RX ADMIN — MUPIROCIN: 20 OINTMENT TOPICAL at 09:10

## 2019-10-10 RX ADMIN — GUAIFENESIN AND DEXTROMETHORPHAN 5 ML: 100; 10 SYRUP ORAL at 05:10

## 2019-10-10 RX ADMIN — METOLAZONE 10 MG: 5 TABLET ORAL at 09:10

## 2019-10-10 RX ADMIN — GUAIFENESIN AND DEXTROMETHORPHAN 5 ML: 100; 10 SYRUP ORAL at 12:10

## 2019-10-10 RX ADMIN — BUMETANIDE 2 MG: 0.25 INJECTION INTRAMUSCULAR; INTRAVENOUS at 09:10

## 2019-10-10 RX ADMIN — MIDAZOLAM HYDROCHLORIDE 1 MG: 1 INJECTION, SOLUTION INTRAMUSCULAR; INTRAVENOUS at 02:10

## 2019-10-10 RX ADMIN — HYDRALAZINE HYDROCHLORIDE 100 MG: 25 TABLET ORAL at 06:10

## 2019-10-10 RX ADMIN — AMLODIPINE BESYLATE 10 MG: 5 TABLET ORAL at 09:10

## 2019-10-10 RX ADMIN — FENTANYL CITRATE 50 MCG: 50 INJECTION, SOLUTION INTRAMUSCULAR; INTRAVENOUS at 02:10

## 2019-10-10 RX ADMIN — PRAVASTATIN SODIUM 10 MG: 10 TABLET ORAL at 09:10

## 2019-10-10 RX ADMIN — GUAIFENESIN AND DEXTROMETHORPHAN 5 ML: 100; 10 SYRUP ORAL at 06:10

## 2019-10-10 RX ADMIN — HYDRALAZINE HYDROCHLORIDE 100 MG: 25 TABLET ORAL at 09:10

## 2019-10-10 RX ADMIN — POLYETHYLENE GLYCOL 3350 17 G: 17 POWDER, FOR SOLUTION ORAL at 09:10

## 2019-10-10 RX ADMIN — ENOXAPARIN SODIUM 30 MG: 100 INJECTION SUBCUTANEOUS at 05:10

## 2019-10-10 RX ADMIN — INSULIN ASPART 2 UNITS: 100 INJECTION, SOLUTION INTRAVENOUS; SUBCUTANEOUS at 12:10

## 2019-10-10 RX ADMIN — INSULIN ASPART 4 UNITS: 100 INJECTION, SOLUTION INTRAVENOUS; SUBCUTANEOUS at 09:10

## 2019-10-10 RX ADMIN — HYDRALAZINE HYDROCHLORIDE 100 MG: 25 TABLET ORAL at 03:10

## 2019-10-10 NOTE — PLAN OF CARE
Problem: Fall Injury Risk  Goal: Absence of Fall and Fall-Related Injury  Outcome: Ongoing, Progressing     Problem: Skin Injury Risk Increased  Goal: Skin Health and Integrity  Outcome: Ongoing, Progressing     Problem: Diabetes Comorbidity  Goal: Blood Glucose Level Within Desired Range  Outcome: Ongoing, Progressing

## 2019-10-10 NOTE — NURSING
1545 Received patient back to room. Alert, but drowsy. Oriented times 3. Reoriented to date. Denies any pain. Dressing to right neck is dry and intact. Blood sugar 71. Cranberry juice given to patient. Will continue to monitor.

## 2019-10-10 NOTE — PLAN OF CARE
Problem: Occupational Therapy Goal  Goal: Occupational Therapy Goal  Description  Goals to be met by: 10/19/19    Patient will increase functional independence with ADLs by performing:    UE Dressing with Set-up Assistance.  LE Dressing with Stand-by Assistance.  Grooming while standing with Stand-by Assistance.  Toileting from bedside commode with Stand-by Assistance for hygiene and clothing management.   Toilet transfer to bedside commode with Stand-by Assistance.  Upper extremity exercise program x 20 reps per handout, with supervision.  Sit to stand with Minimal Assistance using RW  Step transfer with Minimal Assistance using RW      Outcome: Ongoing, Progressing  Pt will continue to benefit from acute OT in order to increase safety awareness and independence with ADLs and all aspects of functional mobility. OT rec. SNF at d/c to regain PLOF and reduce risk of falls/readmission/morbidity.

## 2019-10-10 NOTE — PROGRESS NOTES
"[facility] Hospital Medicine  Progress Note    Patient Name: Magaly Epstein  MRN: [id number]  Patient Class: IP- Inpatient   Admission Date: 10/3/2019  Length of Stay: 7 days  Attending Physician: Shanita Pettit MD  Primary Care Provider: Anahy Bradley DO        Subjective:     Principal Problem:Hypertensive emergency        HPI:  68 y/o female patient presents for evaluation of acute onset of shortness of breath that started last night.  Patient lives in assisted living.  She was admitted here in April for shortness of breath due to hypertensive crisis and fluid overload.  Patient has history of nephrotic syndrome and anasarca and had a kidney biopsy at that time.   She also has a history of chronic hypertension.   Patient states compliance with medications.  She denies chest pain. She states she was just very short of breath and repeatedly asked for help.  She also complains of a dry cough. She has leg edema that she states is "always there."  Patient was placed on CPAP for an O2 sat of 60% on room air.  Blood pressure was reportedly elevated at 270 systolic.  She presented severely hypertensive and placed on Nitro infusion.  Patient is now feeling better.  No other complaints.    Overview/Hospital Course:  68 y/o female patient presents for evaluation of acute onset of shortness of breath. Patient lives in assisted living.  She was admitted here in April for shortness of breath due to hypertensive crisis and fluid overload. Patient was placed on CPAP for an O2 sat of 60% on room air.  Blood pressure was reportedly elevated at 270 systolic. She presented severely hypertensive and placed on Nitro infusion.  CKD with increased Creat from baseline.  Hx of nephrotic syndrome with lower extremity edema and fluid overload.  Nephrology consulted and started on IV Bumex.  Restarted home medications with improvement of BP and able to wean off Nitro drip. Stepped down to floor in stable condition. " Metolazone added by nephrology. Weaned off supplemental O2 but remained with anasarca. Unable to use ACE-I due to worsening renal function. PT/OT recommends SNF.       Interval History:  Patient has been doing well off all oxygen, no new complaints.    Review of Systems   Constitutional: Negative for chills and fever.   Respiratory: Negative for shortness of breath.    Cardiovascular: Negative for chest pain.     Objective:     Vital Signs (Most Recent):  Temp: 98.8 °F (37.1 °C) (10/09/19 2306)  Pulse: 68 (10/09/19 2306)  Resp: 17 (10/09/19 2306)  BP: 129/60 (10/09/19 2306)  SpO2: (!) 90 % (10/09/19 2306) Vital Signs (24h Range):  Temp:  [96.2 °F (35.7 °C)-98.8 °F (37.1 °C)] 98.8 °F (37.1 °C)  Pulse:  [64-70] 68  Resp:  [16-18] 17  SpO2:  [90 %-97 %] 90 %  BP: (129-145)/(59-86) 129/60     Weight: 114.5 kg (252 lb 6.8 oz)  Body mass index is 37.28 kg/m².    Intake/Output Summary (Last 24 hours) at 10/10/2019 0221  Last data filed at 10/9/2019 1800  Gross per 24 hour   Intake 600 ml   Output 801 ml   Net -201 ml      Physical Exam   Constitutional: She is oriented to person, place, and time. She appears well-developed. No distress.   HENT:   Head: Normocephalic.   Eyes: Conjunctivae and EOM are normal.   Neck: Normal range of motion. Neck supple.   Cardiovascular: Normal rate and regular rhythm.   Pulmonary/Chest: Effort normal. She has no wheezes. She has no rales.   Breathing comfortably at room air   Abdominal: Soft. Bowel sounds are normal.   Musculoskeletal: Normal range of motion. She exhibits edema.   anasarca   Neurological: She is alert and oriented to person, place, and time.   Skin: Skin is warm and dry. Capillary refill takes less than 2 seconds. She is not diaphoretic.   Psychiatric: She has a normal mood and affect.   Nursing note and vitals reviewed.      Significant Labs: All pertinent labs within the past 24 hours have been reviewed.    Significant Imaging: I have reviewed all pertinent imaging  results/findings within the past 24 hours.      Assessment/Plan:      * Hypertensive emergency  Patient presents with dyspnea and significant hypertension.  CXR shows mild interstitial edema and bilateral pleural effusions.  Patient was placed on Nitro drip and given dose of Bumex.  Improved BP and symptoms.  Restarted on home oral medications and able to wean off Nitro drip.  PRN Clonidine.  Vitals q 4 hours    Acute renal failure superimposed on stage 3 chronic kidney disease  Nephrology consult as above.  Trial of diuretics per nephrology  Weaned off NC but still pretty edematous  BMP in AM    Elevated troponin  Probably secondary to demand ischemia from uncontrolled HTN and renal failure.  Denies any chest pain.    Nephrotic syndrome  Patient with recent admission and evaluated by Nephrology.  Creat much higher than baseline.  Patient with kidney biopsy on last admit.   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  Diuretics per nephrology  ACE-I or ARB when able    Debility  PT/OT eval: SNF     Anemia of chronic disease  Secondary to CKD.  Noted drop in H/H but stable  Iron deficiency anemia.    No evidence of active bleeding.   Will not transfuse at this time.      Hyperlipidemia  Continue Statin.    Type 2 diabetes mellitus with renal complication  Uncontrolled with hyperglycemia.  Diabetic diet and insulin sliding scale.  Started on home 70/30 at lower dose to prevent hypoglycemia.  Increase as needed.    Essential hypertension  As above.        VTE Risk Mitigation (From admission, onward)         Ordered     enoxaparin injection 30 mg  Daily      10/03/19 0656     IP VTE HIGH RISK PATIENT  Once      10/03/19 0545                      Shanita Pettit MD  Department of Hospital Medicine   [facility]

## 2019-10-10 NOTE — SEDATION DOCUMENTATION
Report called to MICAH Mallory. VSS. 4LNC. Dressing to IRENE KUNZ. Pt transported back to room 326A per transport and RN.

## 2019-10-10 NOTE — PT/OT/SLP PROGRESS
Physical Therapy Treatment    Patient Name:  Magaly Epstein   MRN:  [id number]    Recommendations:     Discharge Recommendations:  nursing facility, skilled   Discharge Equipment Recommendations: (per SNF)   Barriers to discharge: Decreased caregiver support    Assessment:     Magaly Epstein is a 69 y.o. female admitted with a medical diagnosis of Hypertensive emergency.  She presents with the following impairments/functional limitations:  weakness, impaired endurance, impaired self care skills, gait instability, decreased upper extremity function, impaired balance, decreased lower extremity function, decreased ROM, edema, pain, impaired cardiopulmonary response to activity, decreased safety awareness .Pt will benefit from further skilled therapy in order to return to PLOF. There is expectation of returning to prior level of function to maintain independence avoiding readmission.  Pt is at high risk of unplanned readmission, decline functional status due to fall risk and lack of 24 hour caregiver support at home .  Pt is pleasant and motivated to return to prior level of function.     Rehab Prognosis: Good; patient would benefit from acute skilled PT services to address these deficits and reach maximum level of function.    Recent Surgery: * No surgery found *      Plan:     During this hospitalization, patient to be seen daily to address the identified rehab impairments via gait training, therapeutic activities, therapeutic exercises and progress toward the following goals:    · Plan of Care Expires:  10/19/19    Subjective     Chief Complaint: didn't sleep last night   Patient/Family Comments/goals:  To get back to PLOF  Pain/Comfort:  · Pain Rating 1: 0/10  · Pain Rating Post-Intervention 1: 0/10      Objective:     Communicated with nurse Long prior to session.  Patient found HOB elevated with bed alarm, telemetry, oxygen upon PT entry to room.     General Precautions: Standard, fall, respiratory   Orthopedic  Precautions:N/A   Braces: N/A     Functional Mobility: pt required frequent rest breaks and extra time to complete tasks 2* fatigue and c/o SOB despite on 3L O2NC (O2 SATS maintain 90%-94% on 3L with activities)  VC's for pursed lip breathing technique throughout    · Bed Mobility:     · Rolling Right: stand by assistance  · Scooting: stand by assistance  · Supine to Sit: minimum assistance, HOB elevated, bedside rail   · Sit to Supine: moderate assistance with BLE   · Transfers:     · Sit to Stand: x 2 trials from bed , 2 trials from chair and 1 trial from bedside commode minimum assistance with rolling walker. V/T cues for safety technique and walker management.   · Bed <> Chair: minimum assistance with  rolling walker  using  Step Transfer  · Toilet Transfer (bed <>bedside commode) : minimum assistance with  rolling walker  using  Stand Pivot  · Gait:  Patient ambulated ~  12 ft, 8 ft and ~ 5-6 steps x 2 trials  on level tile with Rolling Walker with Minimal Assistance (chair followed, 3LO2NC) .  Pt with demonstarting MAX decreased hi, increased time in double stance, decreased velocity of limb motion, decreased step length, decreased swing-to-stance ratio. .Impairments contributing to gait deviations include impaired balance, decreased flexibility, pain, impaired postural control, decreased ROM and decreased strength. V/T cues for safety technique and walker management. VC's for pursed lip breathing technique.   · Balance:  Fair -      AM-PAC 6 CLICK MOBILITY  Turning over in bed (including adjusting bedclothes, sheets and blankets)?: 4  Sitting down on and standing up from a chair with arms (e.g., wheelchair, bedside commode, etc.): 3  Moving from lying on back to sitting on the side of the bed?: 3  Moving to and from a bed to a chair (including a wheelchair)?: 3  Need to walk in hospital room?: 3  Climbing 3-5 steps with a railing?: 2  Basic Mobility Total Score: 18       Therapeutic Activities and  Exercises:   Pt performed supine BLE x 20 reps : AP, QS, QS and seated BLE 10 reps x 2 trials:   AP, LAQ, HS,  Hip abd/add with pillow , Hip flexion. V/T's cues for technique and sequence.   Educated pt on safety awareness with all OOB mobility , transfer and gait training.     Patient left HOB elevated with BUE & BLE elevated on pillow  all lines intact, call button in reach, bed alarm on, nurse notified and [employer] present..    GOALS:   Multidisciplinary Problems     Physical Therapy Goals        Problem: Physical Therapy Goal    Goal Priority Disciplines Outcome Goal Variances Interventions   Physical Therapy Goal     PT, PT/OT Ongoing, Progressing     Description:  Goals to be met by: 10/19/2019     Patient will increase functional independence with mobility by performin. Supine to sit with Modified [employer]  2. Sit to stand transfer with Modified [employer]  3. Gait  x 100 feet with Modified [employer] using Rolling Walker.   4. Lower extremity exercise program x10 reps per handout, with independence                      Time Tracking:     PT Received On: 10/10/19  PT Start Time: 1135     PT Stop Time: 1218  PT Total Time (min): 43 min     Billable Minutes: Gait Training 15, Therapeutic Activity 14 and Therapeutic Exercise 14    Treatment Type: Treatment  PT/PTA: PTA     PTA Visit Number: 4     Anastacia Roper, PTA  10/10/2019

## 2019-10-10 NOTE — PT/OT/SLP PROGRESS
"Occupational Therapy   Treatment    Name: Magaly Epstein  MRN: [id number]  Admitting Diagnosis:  Hypertensive emergency       Recommendations:     Discharge Recommendations: nursing facility, skilled  Discharge Equipment Recommendations:  (per SNF)  Barriers to discharge:  Decreased caregiver support(Pt requires increased amount of care to complete basic self-care needs)    Assessment:     Magaly Epstein is a 69 y.o. female with a medical diagnosis of Hypertensive emergency.  She presents with good motivation to participate with therapy. Performance deficits affecting function are weakness, impaired functional mobilty, impaired cognition, decreased safety awareness, impaired endurance, gait instability, decreased coordination, impaired balance, decreased upper extremity function, impaired self care skills, decreased lower extremity function, edema.     Rehab Prognosis:  Good; patient would benefit from acute skilled OT services to address these deficits and reach maximum level of function.       Plan:     Patient to be seen 5 x/week to address the above listed problems via self-care/home management, therapeutic activities, therapeutic exercises  · Plan of Care Expires: 10/19/19  · Plan of Care Reviewed with: patient    Subjective     Chief complaint: "I'm tired and hungry since I can't eat right now"   Patient's comments/goals: anxious about forgetting what medication she is taking    Pain/Comfort:  · Pain Rating 1: 0/10    Objective:     Communicated with: nurse, Mercedes, prior to session.  Patient found HOB elevated with bed alarm, telemetry, oxygen (3L NC), peripheral IV(Avasys) upon OT entry to room.    General Precautions: Standard, fall, respiratory   Orthopedic Precautions:N/A   Braces: N/A     Occupational Performance:     Bed Mobility:    · Patient completed Rolling/Turning to Right with contact guard assistance  · Patient completed Scooting/Bridging with contact guard assistance  · Patient completed Supine " to Sit with contact guard assistance, with side rail and HOB elevated  · Patient completed Sit to Supine with stand by assistance     Functional Mobility/Transfers:  · Functional Mobility: Pt attempted sit to stand transfer after exercises at EOB, but was unable to complete this- this is when pt became emotional because she is NPO and hungry. OT encouraged pt to scoot seated EOB and pt agreed calmly, completing this with SBA.     Activities of Daily Living:  · Grooming: minimum assistance to comb the back of her hair       [facility] 6 Click ADL: 16    Treatment & Education:  · Pt re-educated on OT role/POC.   · Importance of OOB activity with staff assistance.  · Safety and hand placement during functional t/f and bed mobility to increase independence   · Increased time to discuss pt's worries as she cannot remember day-to-day what medication she is on and what type of procedure she is having. OT suggested to write down a small blurb when the nurse goes over it with her to remind her. NurseMercedes, notified of this request.   · In unsupported sit at EOB, pt completed 1x20 B/L:   · Finger flexion/ extension   · Wrist flexion/extension  · Forearm pronation/supination   · Rest breaks in between reps and sets d/t fatigue and time to complete pursed lip breathing (with cueing)  · Dynamic reaching activity addressing coordination and grasp/release: error on 4 of 15  · White board updated   · Multiple self-care tasks/bed mobility completed- assistance level noted above   · All questions/concerns answered within OT scope of practice       Patient left HOB elevated with all lines intact, call button in reach and nurseMercedes, and transportation presentEducation:      GOALS:   Multidisciplinary Problems     Occupational Therapy Goals        Problem: Occupational Therapy Goal    Goal Priority Disciplines Outcome Interventions   Occupational Therapy Goal     OT, PT/OT Ongoing, Progressing    Description:  Goals to be met by:  10/19/19    Patient will increase functional independence with ADLs by performing:    UE Dressing with Set-up Assistance.  LE Dressing with Stand-by Assistance.  Grooming while standing with Stand-by Assistance.  Toileting from bedside commode with Stand-by Assistance for hygiene and clothing management.   Toilet transfer to bedside commode with Stand-by Assistance.  Upper extremity exercise program x 20 reps per handout, with supervision.  Sit to stand with Minimal Assistance using RW  Step transfer with Minimal Assistance using RW                       Time Tracking:     OT Date of Treatment: 10/10/19  OT Start Time: 1324  OT Stop Time: 1348  OT Total Time (min): 24 min    Billable Minutes:Therapeutic Activity 12 min  Therapeutic Exercise 12 min  Total Time 24 min    Akaknsha White OT  10/10/2019

## 2019-10-10 NOTE — NURSING
Note that patient awake, alert and fully oriented with tele-sitter at bedside;  Pt on O2@3L NC; Denies pain at this time;   Up to bedside commode where she voided 200 cc yellow urine;   SPO2@90% after returning to bed;     Will continue to f/u;

## 2019-10-10 NOTE — PROGRESS NOTES
Magaly Epstein is a 69 y.o. female patient.    Follow for ANIA    No new c/o, still having problem with swelling      Scheduled Meds:   amLODIPine  10 mg Oral Daily    bumetanide  2 mg Oral Daily    dextromethorphan-guaifenesin  mg/5 ml  5 mL Oral Q6H    enoxaparin  30 mg Subcutaneous Daily    epoetin cortes-ebpx (RETACRIT) injection  6,000 Units Subcutaneous Q7 Days    ferrous sulfate  325 mg Oral BID    hydrALAZINE  100 mg Oral Q8H    insulin aspart protamine-insulin aspart  10 Units Subcutaneous BIDWM    metOLazone  10 mg Oral Daily    metoprolol succinate  50 mg Oral BID    pravastatin  10 mg Oral Daily       Review of patient's allergies indicates:   Allergen Reactions    Ciprofloxacin Anaphylaxis    Codeine Anaphylaxis    Neosporin [benzalkonium chloride] Anaphylaxis         Vital Signs Range (Last 24H):  Temp:  [96.7 °F (35.9 °C)-98.9 °F (37.2 °C)]   Pulse:  [64-68]   Resp:  [17-18]   BP: (126-145)/(59-67)   SpO2:  [90 %-97 %]     I & O (Last 24H):    Intake/Output Summary (Last 24 hours) at 10/10/2019 0804  Last data filed at 10/10/2019 0756  Gross per 24 hour   Intake 600 ml   Output 425 ml   Net 175 ml           Physical Exam:  General appearance: well developed, well nourished, no distress  Lungs:  clear to auscultation bilaterally and normal respiratory effort  Heart: regular rate and rhythm  Abdomen: soft, non-tender non-distented; bowel sounds normal; no masses,  no organomegaly  Extremities: edema (+)    Laboratory:  CBC:   Recent Labs   Lab 10/06/19  0437   WBC 7.78   RBC 3.05*   HGB 8.7*   HCT 29.3*      MCV 96   MCH 28.5   MCHC 29.7*     CMP:   Recent Labs   Lab 10/10/19  0446   *   CALCIUM 7.9*   ALBUMIN 1.9*      K 3.3*   CO2 25      BUN 59*   CREATININE 4.5*       Imp/Plan    ANIA on CKD stg 3 - creatinine rising  HTN  DM type 2  Nephrotic syndrome  Anesarca  Anemia of chronic disease/iron def    Patient appears not response well to oral  diuretic  Anasarca worsening  Discussed with patient about dialysis - she agrees  Consult IR for tunneled cath placement  HD after catheter place  Change diuretic to IV         Trac JOVANY Grajeda  10/10/2019

## 2019-10-10 NOTE — PROGRESS NOTES
TN faxed referral to [facility] intake form, face sheet, ppd, cxr, mar, lab to [phone] and phone [phone]..Felicia Garcia RN, BSN, [facility]  10/10/2019

## 2019-10-10 NOTE — PROCEDURES
Radiology Post-Procedure Note    Pre Op Diagnosis: AoCKDIII with suspected ESRD  Post Op Diagnosis: Same    Procedure: US and fluoroscopic-guided placement of a RIJV-approach 19-cm THDC    Procedure performed by: Kavon Carson MD    Written Informed Consent Obtained: Yes  Specimen Removed: NO  Estimated Blood Loss: Minimal    Findings:   Successful placement of a RIJV-approach THDC under moderate conscious sedation. Patient tolerated the procedure well. No immediate post-procedural complications noted.     Thank you for considering IR for the care of your patient.     Kavon Carson MD  Interventional Radiology

## 2019-10-10 NOTE — PLAN OF CARE
Problem: Physical Therapy Goal  Goal: Physical Therapy Goal  Description  Goals to be met by: 10/19/2019     Patient will increase functional independence with mobility by performin. Supine to sit with Modified McDonald  2. Sit to stand transfer with Modified McDonald  3. Gait  x 100 feet with Modified McDonald using Rolling Walker.   4. Lower extremity exercise program x10 reps per handout, with independence     Outcome: Ongoing, Progressing   Pt will benefit from further skilled therapy in order to return to PLOF.

## 2019-10-10 NOTE — SUBJECTIVE & OBJECTIVE
Interval History:  Patient has been doing well off all oxygen, no new complaints.    Review of Systems   Constitutional: Negative for chills and fever.   Respiratory: Negative for shortness of breath.    Cardiovascular: Negative for chest pain.     Objective:     Vital Signs (Most Recent):  Temp: 98.8 °F (37.1 °C) (10/09/19 2306)  Pulse: 68 (10/09/19 2306)  Resp: 17 (10/09/19 2306)  BP: 129/60 (10/09/19 2306)  SpO2: (!) 90 % (10/09/19 2306) Vital Signs (24h Range):  Temp:  [96.2 °F (35.7 °C)-98.8 °F (37.1 °C)] 98.8 °F (37.1 °C)  Pulse:  [64-70] 68  Resp:  [16-18] 17  SpO2:  [90 %-97 %] 90 %  BP: (129-145)/(59-86) 129/60     Weight: 114.5 kg (252 lb 6.8 oz)  Body mass index is 37.28 kg/m².    Intake/Output Summary (Last 24 hours) at 10/10/2019 0221  Last data filed at 10/9/2019 1800  Gross per 24 hour   Intake 600 ml   Output 801 ml   Net -201 ml      Physical Exam   Constitutional: She is oriented to person, place, and time. She appears well-developed. No distress.   HENT:   Head: Normocephalic.   Eyes: Conjunctivae and EOM are normal.   Neck: Normal range of motion. Neck supple.   Cardiovascular: Normal rate and regular rhythm.   Pulmonary/Chest: Effort normal. She has no wheezes. She has no rales.   Breathing comfortably at room air   Abdominal: Soft. Bowel sounds are normal.   Musculoskeletal: Normal range of motion. She exhibits edema.   anasarca   Neurological: She is alert and oriented to person, place, and time.   Skin: Skin is warm and dry. Capillary refill takes less than 2 seconds. She is not diaphoretic.   Psychiatric: She has a normal mood and affect.   Nursing note and vitals reviewed.      Significant Labs: All pertinent labs within the past 24 hours have been reviewed.    Significant Imaging: I have reviewed all pertinent imaging results/findings within the past 24 hours.

## 2019-10-10 NOTE — PLAN OF CARE
10/10/19 1434   Post-Acute Status   Post-Acute Authorization Dialysis   Post-Acute Placement Status Pending Payor Review   Diaylsis Status Referrals Sent   Discharge Delays (!) Procedure Scheduling (IR, OR, Labs, Echo, Cath, Echo, EEG)  (need tunneled cath and HD flow sheets)   Felicia Garcia RN, BSN, STN [facility]  10/10/2019

## 2019-10-10 NOTE — PROGRESS NOTES
TN informed Divya at [facility] that patient will start HD.  Divya says she will need to talk with her DON and get back to me..Felicia Garcia RN, BSN, STN [facility]  10/10/2019

## 2019-10-10 NOTE — CONSULTS
Inpatient Radiology Pre-procedure Note    History of Present Illness:  Magaly Epstein is a 69 y.o. female with pertinent PMHx of CKDIII (Cr/GFR on 4/2019 = 1.7/30) who is admitted with dx of AoCKI (current Cr/GFR = 4.5/11) with renal function not yet improving despite maximized medical management, requiring HD, with concerns for progression to ESRD.    A new inpatient IR consult placed for placement of a THDC for current and tentative outpatient HD.       Admission H&P reviewed.  Past Medical History:   Diagnosis Date    Arthritis     CHF (congestive heart failure)     Diabetes mellitus      Past Surgical History:   Procedure Laterality Date    APPENDECTOMY         Review of Systems:   As documented in primary team H&P    Home Meds:   Prior to Admission medications    Medication Sig Start Date End Date Taking? Authorizing Provider   acetaminophen (TYLENOL) 500 MG tablet Take 1 tablet (500 mg total) by mouth every 6 (six) hours as needed. 4/18/19   Phylicia Gastelum MD   albuterol-ipratropium (DUO-NEB) 2.5 mg-0.5 mg/3 mL nebulizer solution Take 3 mLs by nebulization every 4 (four) hours as needed for Wheezing or Shortness of Breath. Rescue 10/9/19 10/8/20  Shanita Pettit MD   amLODIPine (NORVASC) 10 MG tablet Take 1 tablet (10 mg total) by mouth once daily. 4/19/19 5/19/19  Phylicia Gastelum MD   benzonatate (TESSALON) 200 MG capsule Take 1 capsule (200 mg total) by mouth 3 (three) times daily as needed for Cough. 10/9/19 10/19/19  Shanita Pettit MD   bumetanide (BUMEX) 2 MG tablet Take 1 tablet (2 mg total) by mouth once daily. 4/19/19 4/18/20  Phylicia Gastelum MD   epoetin cortes-epbx (RETACRIT) 10,000 unit/mL imjection Inject 0.6 mLs (6,000 Units total) into the skin every 7 days. 10/13/19   Shanita Pettit MD   escitalopram oxalate (LEXAPRO) 10 MG tablet Take 10 mg by mouth once daily.    Historical Provider, MD   ferrous sulfate 325 (65 FE) MG EC tablet Take 1 tablet (325 mg total) by mouth 2  (two) times daily. 10/9/19   Shanita Pettit MD   hydrALAZINE (APRESOLINE) 100 MG tablet Take 1 tablet (100 mg total) by mouth every 8 (eight) hours. 4/18/19 5/18/19  Phylicia Gastelum MD   insulin aspart protamine-insulin aspart (NOVOLOG 70/30) 100 unit/mL (70-30) InPn pen Inject 10 Units into the skin 2 (two) times daily. 10/9/19 10/8/20  Shanita Pettit MD   insulin aspart U-100 (NOVOLOG) 100 unit/mL (3 mL) InPn pen Inject 1-10 Units into the skin before meals and at bedtime as needed (Hyperglycemia). 10/9/19 10/8/20  Shanita Pettit MD   metOLazone (ZAROXOLYN) 10 MG tablet Take 1 tablet (10 mg total) by mouth once daily. 10/10/19 10/9/20  Shanita Pettit MD   metoprolol succinate (TOPROL-XL) 50 MG 24 hr tablet Take 1 tablet (50 mg total) by mouth 2 (two) times daily. 10/9/19 10/8/20  Shanita Pettit MD   polyethylene glycol (GLYCOLAX) 17 gram PwPk Take 17 g by mouth 2 (two) times daily as needed. 10/9/19   Shanita Pettit MD   pravastatin (PRAVACHOL) 10 MG tablet Take 10 mg by mouth once daily.    Trevor Espino MD   senna-docusate 8.6-50 mg (PERICOLACE) 8.6-50 mg per tablet Take 1 tablet by mouth 2 (two) times daily as needed for Constipation. 4/18/19   Phylicia Gastelum MD     Scheduled Meds:    amLODIPine  10 mg Oral Daily    bumetanide  2 mg Intravenous BID    dextromethorphan-guaifenesin  mg/5 ml  5 mL Oral Q6H    enoxaparin  30 mg Subcutaneous Daily    epoetin cortes-ebpx (RETACRIT) injection  6,000 Units Subcutaneous Q7 Days    ferrous sulfate  325 mg Oral BID    hydrALAZINE  100 mg Oral Q8H    insulin aspart protamine-insulin aspart  10 Units Subcutaneous BIDWM    metOLazone  10 mg Oral Daily    metoprolol succinate  50 mg Oral BID    pravastatin  10 mg Oral Daily     Continuous Infusions:     PRN Meds:acetaminophen, albuterol-ipratropium, benzonatate, cloNIDine, dextrose 50%, dextrose 50%, glucagon (human recombinant), glucose, glucose, hydrALAZINE, insulin aspart U-100, ondansetron,  polyethylene glycol, senna-docusate 8.6-50 mg, sodium chloride 0.9%    Anticoagulants/Antiplatelets: Lovenox , 30-mg Q24H    Allergies:   Review of patient's allergies indicates:   Allergen Reactions    Ciprofloxacin Anaphylaxis    Codeine Anaphylaxis    Neosporin [benzalkonium chloride] Anaphylaxis     Sedation Hx: have not been any systemic reactions    Labs:  No results for input(s): INR in the last 168 hours.    Invalid input(s):  PT,  PTT    Recent Labs   Lab 10/06/19  0437   WBC 7.78   HGB 8.7*   HCT 29.3*   MCV 96         Recent Labs   Lab 10/06/19  0437  10/10/19  0446   *   < > 153*      < > 140   K 4.1   < > 3.3*      < > 106   CO2 21*   < > 25   BUN 47*   < > 59*   CREATININE 4.0*   < > 4.5*   CALCIUM 8.4*   < > 7.9*   MG 2.4  --   --    ALBUMIN  --    < > 1.9*    < > = values in this interval not displayed.     Vitals:  Temp: 98.9 °F (37.2 °C) (10/10/19 0441)  Pulse: 65 (10/10/19 0758)  Resp: 18 (10/10/19 0758)  BP: 126/62 (10/10/19 0441)  SpO2: (!) 92 % (10/10/19 0441)     Physical Exam:  ASA: III  Mallampati: IIII    General: no acute distress  Mental Status: alert and oriented to person, place and time  HEENT: normocephalic, atraumatic  Chest: unlabored breathing  Heart: regular heart rate  Abdomen: nondistended  Extremity: moves all extremities. +edema.    A/P:  69 y.o. female with AoCKI (current Cr/GFR = 4.5/11; previous 1.7/30 on 4/2019) with renal function not yet improving despite maximized medical management, requiring HD, with concerns for progression to ESRD.    1. AoCKI with suspected ESRD - Will attempt US and fluoroscopic-guided placement of a RIJV-approach THDC under moderate conscious sedation.    Risks (including, but not limited to, pain, bleeding, infection, damage to nearby structures, failure to obtain sufficient material for a diagnosis, the need for additional procedures, and death), benefits, and alternatives were discussed with the patient. All  questions were answered to the best of my abilities. The patient wishes to proceed with the procedure. Written informed consent was obtained.    Thank you for considering IR for the care of your patient.     Kavon Carson MD  Interventional Radiology

## 2019-10-11 LAB
ALBUMIN SERPL BCP-MCNC: 2 G/DL (ref 3.5–5.2)
ANION GAP SERPL CALC-SCNC: 10 MMOL/L (ref 8–16)
BUN SERPL-MCNC: 59 MG/DL (ref 8–23)
CALCIUM SERPL-MCNC: 7.8 MG/DL (ref 8.7–10.5)
CHLORIDE SERPL-SCNC: 106 MMOL/L (ref 95–110)
CO2 SERPL-SCNC: 24 MMOL/L (ref 23–29)
CREAT SERPL-MCNC: 4.6 MG/DL (ref 0.5–1.4)
EST. GFR  (AFRICAN AMERICAN): 10 ML/MIN/1.73 M^2
EST. GFR  (NON AFRICAN AMERICAN): 9 ML/MIN/1.73 M^2
GLUCOSE SERPL-MCNC: 150 MG/DL (ref 70–110)
PHOSPHATE SERPL-MCNC: 5.3 MG/DL (ref 2.7–4.5)
POCT GLUCOSE: 149 MG/DL (ref 70–110)
POCT GLUCOSE: 150 MG/DL (ref 70–110)
POCT GLUCOSE: 167 MG/DL (ref 70–110)
POCT GLUCOSE: 174 MG/DL (ref 70–110)
POCT GLUCOSE: 212 MG/DL (ref 70–110)
POTASSIUM SERPL-SCNC: 3.1 MMOL/L (ref 3.5–5.1)
SODIUM SERPL-SCNC: 140 MMOL/L (ref 136–145)

## 2019-10-11 PROCEDURE — 90935 HEMODIALYSIS ONE EVALUATION: CPT

## 2019-10-11 PROCEDURE — 97530 THERAPEUTIC ACTIVITIES: CPT

## 2019-10-11 PROCEDURE — S0171 BUMETANIDE 0.5 MG: HCPCS | Performed by: INTERNAL MEDICINE

## 2019-10-11 PROCEDURE — 63600175 PHARM REV CODE 636 W HCPCS: Performed by: HOSPITALIST

## 2019-10-11 PROCEDURE — 80069 RENAL FUNCTION PANEL: CPT

## 2019-10-11 PROCEDURE — 97116 GAIT TRAINING THERAPY: CPT

## 2019-10-11 PROCEDURE — 25000003 PHARM REV CODE 250: Performed by: HOSPITALIST

## 2019-10-11 PROCEDURE — 21400001 HC TELEMETRY ROOM

## 2019-10-11 PROCEDURE — 97110 THERAPEUTIC EXERCISES: CPT

## 2019-10-11 PROCEDURE — 36415 COLL VENOUS BLD VENIPUNCTURE: CPT

## 2019-10-11 PROCEDURE — 97535 SELF CARE MNGMENT TRAINING: CPT

## 2019-10-11 PROCEDURE — 25000003 PHARM REV CODE 250: Performed by: INTERNAL MEDICINE

## 2019-10-11 RX ORDER — SODIUM CHLORIDE 9 MG/ML
INJECTION, SOLUTION INTRAVENOUS
Status: DISCONTINUED | OUTPATIENT
Start: 2019-10-11 | End: 2019-10-17 | Stop reason: HOSPADM

## 2019-10-11 RX ORDER — SODIUM CHLORIDE 9 MG/ML
INJECTION, SOLUTION INTRAVENOUS ONCE
Status: DISCONTINUED | OUTPATIENT
Start: 2019-10-11 | End: 2019-10-12

## 2019-10-11 RX ORDER — HEPARIN SODIUM 5000 [USP'U]/ML
5000 INJECTION, SOLUTION INTRAVENOUS; SUBCUTANEOUS
Status: DISCONTINUED | OUTPATIENT
Start: 2019-10-11 | End: 2019-10-17 | Stop reason: HOSPADM

## 2019-10-11 RX ADMIN — MUPIROCIN: 20 OINTMENT TOPICAL at 08:10

## 2019-10-11 RX ADMIN — BUMETANIDE 2 MG: 0.25 INJECTION INTRAMUSCULAR; INTRAVENOUS at 09:10

## 2019-10-11 RX ADMIN — HYDRALAZINE HYDROCHLORIDE 100 MG: 25 TABLET ORAL at 05:10

## 2019-10-11 RX ADMIN — METOPROLOL SUCCINATE 50 MG: 50 TABLET, EXTENDED RELEASE ORAL at 12:10

## 2019-10-11 RX ADMIN — POLYETHYLENE GLYCOL 3350 17 G: 17 POWDER, FOR SOLUTION ORAL at 08:10

## 2019-10-11 RX ADMIN — GUAIFENESIN AND DEXTROMETHORPHAN 5 ML: 100; 10 SYRUP ORAL at 05:10

## 2019-10-11 RX ADMIN — METOLAZONE 10 MG: 5 TABLET ORAL at 12:10

## 2019-10-11 RX ADMIN — GUAIFENESIN AND DEXTROMETHORPHAN 5 ML: 100; 10 SYRUP ORAL at 01:10

## 2019-10-11 RX ADMIN — PRAVASTATIN SODIUM 10 MG: 10 TABLET ORAL at 08:10

## 2019-10-11 RX ADMIN — AMLODIPINE BESYLATE 10 MG: 5 TABLET ORAL at 12:10

## 2019-10-11 RX ADMIN — INSULIN ASPART 2 UNITS: 100 INJECTION, SOLUTION INTRAVENOUS; SUBCUTANEOUS at 08:10

## 2019-10-11 RX ADMIN — GUAIFENESIN AND DEXTROMETHORPHAN 5 ML: 100; 10 SYRUP ORAL at 11:10

## 2019-10-11 RX ADMIN — FERROUS SULFATE TAB EC 325 MG (65 MG FE EQUIVALENT) 325 MG: 325 (65 FE) TABLET DELAYED RESPONSE at 08:10

## 2019-10-11 RX ADMIN — FERROUS SULFATE TAB EC 325 MG (65 MG FE EQUIVALENT) 325 MG: 325 (65 FE) TABLET DELAYED RESPONSE at 09:10

## 2019-10-11 RX ADMIN — HYDRALAZINE HYDROCHLORIDE 100 MG: 25 TABLET ORAL at 09:10

## 2019-10-11 RX ADMIN — ENOXAPARIN SODIUM 30 MG: 100 INJECTION SUBCUTANEOUS at 05:10

## 2019-10-11 RX ADMIN — HYDRALAZINE HYDROCHLORIDE 100 MG: 25 TABLET ORAL at 02:10

## 2019-10-11 RX ADMIN — DOCUSATE SODIUM 100 MG: 100 CAPSULE, LIQUID FILLED ORAL at 08:10

## 2019-10-11 RX ADMIN — POLYETHYLENE GLYCOL 3350 17 G: 17 POWDER, FOR SOLUTION ORAL at 09:10

## 2019-10-11 RX ADMIN — INSULIN ASPART 4 UNITS: 100 INJECTION, SOLUTION INTRAVENOUS; SUBCUTANEOUS at 05:10

## 2019-10-11 RX ADMIN — BUMETANIDE 2 MG: 0.25 INJECTION INTRAMUSCULAR; INTRAVENOUS at 12:10

## 2019-10-11 RX ADMIN — INSULIN ASPART 10 UNITS: 100 INJECTION, SUSPENSION SUBCUTANEOUS at 05:10

## 2019-10-11 RX ADMIN — MUPIROCIN: 20 OINTMENT TOPICAL at 09:10

## 2019-10-11 RX ADMIN — INSULIN ASPART 10 UNITS: 100 INJECTION, SUSPENSION SUBCUTANEOUS at 08:10

## 2019-10-11 RX ADMIN — METOPROLOL SUCCINATE 50 MG: 50 TABLET, EXTENDED RELEASE ORAL at 09:10

## 2019-10-11 NOTE — ASSESSMENT & PLAN NOTE
Nephrology consult as above.  Trial of diuretics per nephrology  Weaned off NC but still pretty edematous  Nephrology please patient has progressed to end-stage renal disease  Tunneled catheter placed by IR on 10/10/19 with 1st dialysis to start.    Social service to arrange for outpatient dialysis.

## 2019-10-11 NOTE — SUBJECTIVE & OBJECTIVE
Interval History:  Patient has been doing well off all oxygen, no new complaints.  Tunnel catheter placed.    Review of Systems   Constitutional: Negative for chills and fever.   Respiratory: Negative for shortness of breath.    Cardiovascular: Negative for chest pain.     Objective:     Vital Signs (Most Recent):  Temp: 98.2 °F (36.8 °C) (10/10/19 1935)  Pulse: 61 (10/10/19 1935)  Resp: 18 (10/10/19 1935)  BP: 124/77 (10/10/19 1935)  SpO2: 95 % (10/10/19 2022) Vital Signs (24h Range):  Temp:  [97.7 °F (36.5 °C)-98.9 °F (37.2 °C)] 98.2 °F (36.8 °C)  Pulse:  [59-67] 61  Resp:  [16-24] 18  SpO2:  [92 %-98 %] 95 %  BP: (110-134)/(51-77) 124/77     Weight: 114.5 kg (252 lb 6.8 oz)  Body mass index is 37.28 kg/m².    Intake/Output Summary (Last 24 hours) at 10/10/2019 2321  Last data filed at 10/10/2019 1900  Gross per 24 hour   Intake 240 ml   Output 400 ml   Net -160 ml      Physical Exam   Constitutional: She is oriented to person, place, and time. She appears well-developed. No distress.   HENT:   Head: Normocephalic.   Eyes: Conjunctivae and EOM are normal.   Neck: Normal range of motion. Neck supple.   Cardiovascular: Normal rate and regular rhythm.   Right upper anterior chest with tunneled catheter in place   Pulmonary/Chest: Effort normal. She has no wheezes. She has no rales.   Breathing comfortably at room air   Abdominal: Soft. Bowel sounds are normal.   Musculoskeletal: Normal range of motion. She exhibits edema.   anasarca   Neurological: She is alert and oriented to person, place, and time.   Skin: Skin is warm and dry. Capillary refill takes less than 2 seconds. She is not diaphoretic.   Psychiatric: She has a normal mood and affect.   Nursing note and vitals reviewed.      Significant Labs: All pertinent labs within the past 24 hours have been reviewed.    Significant Imaging: I have reviewed all pertinent imaging results/findings within the past 24 hours.

## 2019-10-11 NOTE — PLAN OF CARE
Problem: Infection  Goal: Infection Symptom Resolution  Outcome: Ongoing, Progressing  Note that tele-sitter continues to monitor safety;      Problem: Fall Injury Risk  Goal: Absence of Fall and Fall-Related Injury  Outcome: Ongoing, Progressing     Problem: Diabetes Comorbidity  Goal: Blood Glucose Level Within Desired Range  Outcome: Ongoing, Progressing

## 2019-10-11 NOTE — PT/OT/SLP PROGRESS
Physical Therapy      Patient Name:  Magaly Epstein   MRN:  [id number]    Patient not seen today secondary to Dialysis. Will follow-up as able.    Tonia Ellsworth, PTA

## 2019-10-11 NOTE — PT/OT/SLP PROGRESS
Occupational Therapy   Treatment    Name: Magaly Epstein  MRN: [id number]  Admitting Diagnosis:  Hypertensive emergency       Recommendations:     Discharge Recommendations: nursing facility, skilled  Discharge Equipment Recommendations:  (per SNF)  Barriers to discharge:       Assessment:     Magaly Epstein is a 69 y.o. female with a medical diagnosis of Hypertensive emergency.  She presents with the following performance deficits affecting function are: weakness, impaired endurance, impaired self care skills, impaired functional mobilty, gait instability, impaired balance, decreased coordination, decreased upper extremity function, decreased lower extremity function, decreased safety awareness, edema, impaired cardiopulmonary response to activity. Pt tolerated OT session well today and required decreased assistance during all therapy tasks. Pt motivated to participate with therapy despite increased fatigue post HD (first day of HD today). Pt progressing well toward OT goals and would benefit from continued OT to address functional deficits and return to PLOF.    Rehab Prognosis:  Good; patient would benefit from acute skilled OT services to address these deficits and reach maximum level of function.       Plan:     Patient to be seen 5 x/week to address the above listed problems via self-care/home management, therapeutic activities, therapeutic exercises  · Plan of Care Expires: 10/19/19  · Plan of Care Reviewed with: patient    Subjective   Pt agreeable to therapy.  Pain/Comfort:  · Pain Rating 1: 0/10    Objective:     Communicated with: nurse prior to session.  Patient found HOB elevated with telemetry, peripheral IV, oxygen(HD access) upon OT entry to room.    General Precautions: Standard, fall, respiratory   Orthopedic Precautions:N/A   Braces: N/A     Occupational Performance:     Bed Mobility:    · Patient completed Rolling/Turning to Left with  contact guard assistance  · Patient completed  Rolling/Turning to Right with contact guard assistance  · Patient completed Scooting/Bridging with stand by assistance  · Patient completed Supine to Sit with stand by assistance, HOB elevated and side rail    Functional Mobility/Transfers:  · Patient completed Sit <> Stand Transfer with contact guard assistance  with  rolling walker  from EOB VC;s for hand placement and technique   · Functional Mobility: Pt able to side step along EOB CGA/RW, VC's for safety and walker management.     Activities of Daily Living:  · Grooming: set up assistance seated to wash face and perform oral care    · Upper Body Dressing: minimum assistance to latricia back gown seated EOB  · Lower Body Dressing: total assistance to latricia B socks  · Toileting: total assistance due to saturated brief     AMPAC 6 Click ADL: 16    Treatment & Education:  -Pt tolerated sitting EOB ~15 mins with SBA for therapy activities and ADL's.  -Pt easily fatigued with all activity and requires frequent cues for energy conservation and pursed lip breathing technique due to SOB (4L O2)  -Pt able to perform BUE ROM therex in all available planes while seated EOB. Pt performed x10 reps, encouraged pt to continue to perform throughout day x 20 reps as tolerated. Pt verbalizes understanding.     Patient left seated EOB with PTA present with all lines intact and call button in reachEducation:   and nurse notified.    GOALS:   Multidisciplinary Problems     Occupational Therapy Goals        Problem: Occupational Therapy Goal    Goal Priority Disciplines Outcome Interventions   Occupational Therapy Goal     OT, PT/OT Ongoing, Progressing    Description:  Goals to be met by: 10/19/19    Patient will increase functional independence with ADLs by performing:    UE Dressing with Set-up Assistance.  LE Dressing with Stand-by Assistance.  Grooming while standing with Stand-by Assistance.  Toileting from bedside commode with Stand-by Assistance for hygiene and clothing  management.   Toilet transfer to bedside commode with Stand-by Assistance.  Upper extremity exercise program x 20 reps per handout, with supervision.  Sit to stand with Minimal Assistance using RW Met 10/11  Step transfer with Minimal Assistance using RW                       Time Tracking:     OT Date of Treatment: 10/11/19  OT Start Time: 1406  OT Stop Time: 1429  OT Total Time (min): 23 min    Billable Minutes:Self Care/Home Management 13  Therapeutic Activity 10    RADHA Gerard  10/11/2019

## 2019-10-11 NOTE — PT/OT/SLP PROGRESS
Occupational Therapy      Patient Name:  Magaly Epstein   MRN:  [id number]    Patient not seen today secondary to Dialysis. Will follow-up as able.    RADHA Gerard  10/11/2019

## 2019-10-11 NOTE — PLAN OF CARE
Pt observed transferring self from chair to bed without assist. Chair alarm alerted staff, telesitter did not. Pt redirected and assisted, educated to call with call bell for help. Confused and not able to remember instructions but will reinforce.

## 2019-10-11 NOTE — PROGRESS NOTES
"[facility] Hospital Medicine  Progress Note    Patient Name: Magaly Epstein  MRN: [id number]  Patient Class: IP- Inpatient   Admission Date: 10/3/2019  Length of Stay: 7 days  Attending Physician: Shanita Pettit MD  Primary Care Provider: Anahy Bradley DO        Subjective:     Principal Problem:Hypertensive emergency        HPI:  70 y/o female patient presents for evaluation of acute onset of shortness of breath that started last night.  Patient lives in assisted living.  She was admitted here in April for shortness of breath due to hypertensive crisis and fluid overload.  Patient has history of nephrotic syndrome and anasarca and had a kidney biopsy at that time.   She also has a history of chronic hypertension.   Patient states compliance with medications.  She denies chest pain. She states she was just very short of breath and repeatedly asked for help.  She also complains of a dry cough. She has leg edema that she states is "always there."  Patient was placed on CPAP for an O2 sat of 60% on room air.  Blood pressure was reportedly elevated at 270 systolic.  She presented severely hypertensive and placed on Nitro infusion.  Patient is now feeling better.  No other complaints.    Overview/Hospital Course:  70 y/o female patient presents for evaluation of acute onset of shortness of breath. Patient lives in assisted living.  She was admitted here in April for shortness of breath due to hypertensive crisis and fluid overload. Patient was placed on CPAP for an O2 sat of 60% on room air.  Blood pressure was reportedly elevated at 270 systolic. She presented severely hypertensive and placed on Nitro infusion.  CKD with increased Creat from baseline.  Hx of nephrotic syndrome with lower extremity edema and fluid overload.  Nephrology consulted and started on IV Bumex.  Restarted home medications with improvement of BP and able to wean off Nitro drip. Stepped down to floor in stable condition. " Metolazone added by nephrology. Weaned off supplemental O2 but remained with anasarca. Unable to use ACE-I due to worsening renal function.  Renal function without significant improvement, Nephrology finally recommended patient to start dialysis.  Tunneled catheter placed by IR on 10/10/19 with 1st dialysis to start.  Social service to arrange for outpatient dialysis.  PT/OT recommends SNF.       Interval History:  Patient has been doing well off all oxygen, no new complaints.  Tunnel catheter placed.    Review of Systems   Constitutional: Negative for chills and fever.   Respiratory: Negative for shortness of breath.    Cardiovascular: Negative for chest pain.     Objective:     Vital Signs (Most Recent):  Temp: 98.2 °F (36.8 °C) (10/10/19 1935)  Pulse: 61 (10/10/19 1935)  Resp: 18 (10/10/19 1935)  BP: 124/77 (10/10/19 1935)  SpO2: 95 % (10/10/19 2022) Vital Signs (24h Range):  Temp:  [97.7 °F (36.5 °C)-98.9 °F (37.2 °C)] 98.2 °F (36.8 °C)  Pulse:  [59-67] 61  Resp:  [16-24] 18  SpO2:  [92 %-98 %] 95 %  BP: (110-134)/(51-77) 124/77     Weight: 114.5 kg (252 lb 6.8 oz)  Body mass index is 37.28 kg/m².    Intake/Output Summary (Last 24 hours) at 10/10/2019 2321  Last data filed at 10/10/2019 1900  Gross per 24 hour   Intake 240 ml   Output 400 ml   Net -160 ml      Physical Exam   Constitutional: She is oriented to person, place, and time. She appears well-developed. No distress.   HENT:   Head: Normocephalic.   Eyes: Conjunctivae and EOM are normal.   Neck: Normal range of motion. Neck supple.   Cardiovascular: Normal rate and regular rhythm.   Right upper anterior chest with tunneled catheter in place   Pulmonary/Chest: Effort normal. She has no wheezes. She has no rales.   Breathing comfortably at room air   Abdominal: Soft. Bowel sounds are normal.   Musculoskeletal: Normal range of motion. She exhibits edema.   anasarca   Neurological: She is alert and oriented to person, place, and time.   Skin: Skin is warm  and dry. Capillary refill takes less than 2 seconds. She is not diaphoretic.   Psychiatric: She has a normal mood and affect.   Nursing note and vitals reviewed.      Significant Labs: All pertinent labs within the past 24 hours have been reviewed.    Significant Imaging: I have reviewed all pertinent imaging results/findings within the past 24 hours.      Assessment/Plan:      * Hypertensive emergency  Patient presents with dyspnea and significant hypertension.  CXR shows mild interstitial edema and bilateral pleural effusions.  Patient was placed on Nitro drip and given dose of Bumex.  Improved BP and symptoms.  Restarted on home oral medications and able to wean off Nitro drip.  Added prn Clonidine.  Vitals q 4 hours    Acute renal failure superimposed on stage 3 chronic kidney disease  Nephrology consult as above.  Trial of diuretics per nephrology  Weaned off NC but still pretty edematous  Nephrology please patient has progressed to end-stage renal disease  Tunneled catheter placed by IR on 10/10/19 with 1st dialysis to start.    Social service to arrange for outpatient dialysis.     Elevated troponin  Probably secondary to demand ischemia from uncontrolled HTN and renal failure.  Denies any chest pain.    Nephrotic syndrome  Patient with recent admission and evaluated by Nephrology.  Creat much higher than baseline.  Patient with kidney biopsy on last admit.   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  As per nephrology  ACE-I or ARB when able    Debility  PT/OT eval: SNF     Anemia of chronic disease  Secondary to CKD.  Noted drop in H/H but stable  Iron deficiency anemia.    No evidence of active bleeding.   Will not transfuse at this time.      Hyperlipidemia  Continue Statin.    Type 2 diabetes mellitus with renal complication  Uncontrolled with hyperglycemia.  Diabetic diet and insulin sliding scale.  Started on home 70/30  at lower dose to prevent hypoglycemia.  Increase as needed.    Essential hypertension  As above.      VTE Risk Mitigation (From admission, onward)         Ordered     enoxaparin injection 30 mg  Daily      10/03/19 0656     IP VTE HIGH RISK PATIENT  Once      10/03/19 0538                      Shanita Pettit MD  Department of Hospital Medicine   [facility]

## 2019-10-11 NOTE — PROGRESS NOTES
Magaly Epstein is a 69 y.o. female patient.    Follow for ANIA, dialysis    No new c/o, reportedly feeling OK  1st treatment  Patient seen while on dialysis    Scheduled Meds:   sodium chloride 0.9%   Intravenous Once    amLODIPine  10 mg Oral Daily    bumetanide  2 mg Intravenous BID    dextromethorphan-guaifenesin  mg/5 ml  5 mL Oral Q6H    docusate sodium  100 mg Oral Daily    enoxaparin  30 mg Subcutaneous Daily    epoetin cortes-ebpx (RETACRIT) injection  6,000 Units Subcutaneous Q7 Days    ferrous sulfate  325 mg Oral BID    hydrALAZINE  100 mg Oral Q8H    insulin aspart protamine-insulin aspart  10 Units Subcutaneous BIDWM    metOLazone  10 mg Oral Daily    metoprolol succinate  50 mg Oral BID    mupirocin   Nasal BID    polyethylene glycol  17 g Oral BID    pravastatin  10 mg Oral Daily       Review of patient's allergies indicates:   Allergen Reactions    Ciprofloxacin Anaphylaxis    Codeine Anaphylaxis    Neosporin [benzalkonium chloride] Anaphylaxis         Vital Signs Range (Last 24H):  Temp:  [97.6 °F (36.4 °C)-98.6 °F (37 °C)]   Pulse:  [59-68]   Resp:  [16-24]   BP: (110-150)/(51-77)   SpO2:  [91 %-98 %]     I & O (Last 24H):    Intake/Output Summary (Last 24 hours) at 10/11/2019 1032  Last data filed at 10/11/2019 0200  Gross per 24 hour   Intake 600 ml   Output 550 ml   Net 50 ml           Physical Exam:  General appearance: well developed, well nourished, no distress  Lungs:  clear to auscultation bilaterally and normal respiratory effort  Heart: regular rate and rhythm  Abdomen: soft, non-tender non-distented; bowel sounds normal; no masses,  no organomegaly  Extremities: edema (+)    Laboratory:  CBC:   Recent Labs   Lab 10/06/19  0437   WBC 7.78   RBC 3.05*   HGB 8.7*   HCT 29.3*      MCV 96   MCH 28.5   MCHC 29.7*     CMP:   Recent Labs   Lab 10/11/19  0435   *   CALCIUM 7.8*   ALBUMIN 2.0*      K 3.1*   CO2 24      BUN 59*   CREATININE  4.6*       Imp/Plan    ANIA on CKD stg 3 - HD dependent  HTH  DM type 2  Nephrotic syndrome  Anasarca  Anemia of chronic disease/iron def    HD again in am  Outpatient dialysis arrangement  We'll follow for dialysis      Bc Grajeda  10/11/2019

## 2019-10-11 NOTE — PT/OT/SLP PROGRESS
Physical Therapy Treatment    Patient Name:  Magayl Epstein   MRN:  [id number]    Recommendations:     Discharge Recommendations:  nursing facility, skilled   Discharge Equipment Recommendations: (per snf)   Barriers to discharge: Decreased caregiver support    Assessment:     Magaly Epstein is a 69 y.o. female admitted with a medical diagnosis of Hypertensive emergency.  She presents with the following impairments/functional limitations:  weakness, impaired endurance, impaired self care skills, impaired balance, decreased coordination, decreased safety awareness, impaired functional mobilty, gait instability, decreased lower extremity function, decreased upper extremity function, impaired cardiopulmonary response to activity.  Pt is motivated to get better.  Pt ambulated ~12ft, ~20ft with RW, CGA needing seated rest break in between.  Demonstrates decreased endurance and fatigues easily with activity, despite wearing 4LO2 NC. Prior to admission pt was able to ambulate in grocery store with rollator on RA.  Pt educated on energy conservation with verbalization of understanding.  Pt would continue to benefit from skilled PT services to address pt's deficits and improve current level of function.    Rehab Prognosis: Good; patient would benefit from acute skilled PT services to address these deficits and reach maximum level of function.    Recent Surgery: * No surgery found *      Plan:     During this hospitalization, patient to be seen daily to address the identified rehab impairments via gait training, therapeutic activities, therapeutic exercises and progress toward the following goals:    · Plan of Care Expires:  10/19/19    Subjective     Chief Complaint: tired post dialysis  Patient/Family Comments/goals: Pt reports she does not wear O2 at home and was able to walk without problems.   Pain/Comfort:  · Pain Rating 1: 0/10      Objective:     Communicated with COBY Kong prior to session.  Patient found seated  EOB with Luann, TENORIO with telemetry, oxygen, peripheral IV(AVASYS) upon PT entry to room.     General Precautions: Standard, fall, respiratory   Orthopedic Precautions:N/A   Braces: N/A     Functional Mobility:  · Transfers: from EOB and BSchair  · Sit to Stand:  contact guard assistance with rolling walker  · Gait: Pt ambulated ~12ft, ~20ft with RW, CGA with seated rest break in between.  Displays decreased endurance and fatigues easily with activity.  Pt with decreased hi, step length, velocity of limb, postural control and safety  · Balance: good sitting and fair standing      AM-PAC 6 CLICK MOBILITY  Turning over in bed (including adjusting bedclothes, sheets and blankets)?: 4  Sitting down on and standing up from a chair with arms (e.g., wheelchair, bedside commode, etc.): 3  Moving from lying on back to sitting on the side of the bed?: 3  Moving to and from a bed to a chair (including a wheelchair)?: 3  Need to walk in hospital room?: 3  Climbing 3-5 steps with a railing?: 2  Basic Mobility Total Score: 18       Therapeutic Activities and Exercises:   Pt performed x3 sit<>stand trials with RW, CGA  Pt performed  therex to BLEs x15 reps AROM including: heel raises, toe raises, hip abduction/adduction, ankle pumps reclined in BSchair, quad sets reclined in BSchair  Pt received handout for energy conservation and HEP with education provided. Pt verbalized understanding.    Patient left reclined in BSchair with BLEs elevated with all lines intact, call button in reach, chair alarm on and pt's nurse, Reena, notified..    GOALS:   Multidisciplinary Problems     Physical Therapy Goals        Problem: Physical Therapy Goal    Goal Priority Disciplines Outcome Goal Variances Interventions   Physical Therapy Goal     PT, PT/OT Ongoing, Progressing     Description:  Goals to be met by: 10/19/2019     Patient will increase functional independence with mobility by performin. Supine to sit with Modified  [place]  2. Sit to stand transfer with Modified [place]  3. Gait  x 100 feet with Modified [place] using Rolling Walker.   4. Lower extremity exercise program x10 reps per handout, with independence                      Time Tracking:     PT Received On: 10/11/19  PT Start Time: 1430     PT Stop Time: 1455  PT Total Time (min): 25 min     Billable Minutes: Gait Training 14 and Therapeutic Exercise 11    Treatment Type: Treatment  PT/PTA: PTA     PTA Visit Number: 5     Tonia Ellsworth, PTA  10/11/2019

## 2019-10-11 NOTE — PLAN OF CARE
Problem: Occupational Therapy Goal  Goal: Occupational Therapy Goal  Description  Goals to be met by: 10/19/19    Patient will increase functional independence with ADLs by performing:    UE Dressing with Set-up Assistance.  LE Dressing with Stand-by Assistance.  Grooming while standing with Stand-by Assistance.  Toileting from bedside commode with Stand-by Assistance for hygiene and clothing management.   Toilet transfer to bedside commode with Stand-by Assistance.  Upper extremity exercise program x 20 reps per handout, with supervision.  Sit to stand with Minimal Assistance using RW Met 10/11  Step transfer with Minimal Assistance using RW      Outcome: Ongoing, Progressing   Pt tolerated OT session well today and required decreased assistance during all therapy tasks. Pt is progressing toward OT goals. Continue with OT POC as indicated.

## 2019-10-11 NOTE — NURSING
"PT pleasantly confused, ambulated with therapy using walker. One person CGA with O2 in place at 4L with wheezing and SOB noted at times. Sat patient up in bed with pillows for support. Pt states she did not sleep last night and is "exhausted" but did nap during dialysis today. Will have dialysis again tomorrow.    "

## 2019-10-11 NOTE — PROGRESS NOTES
TN sent flowvsheets from today and data on tunneled cath from 10/10/2019 to [facility]..Felicia Garcia RN, BSN, STN [facility]  10/11/2019

## 2019-10-11 NOTE — PLAN OF CARE
Problem: Infection  Goal: Infection Symptom Resolution  Outcome: Ongoing, Progressing     Problem: Fall Injury Risk  Goal: Absence of Fall and Fall-Related Injury  Outcome: Ongoing, Progressing     Problem: Adult Inpatient Plan of Care  Goal: Plan of Care Review  Outcome: Ongoing, Progressing  Goal: Patient-Specific Goal (Individualization)  Outcome: Ongoing, Progressing  Goal: Absence of Hospital-Acquired Illness or Injury  Outcome: Ongoing, Progressing  Goal: Optimal Comfort and Wellbeing  Outcome: Ongoing, Progressing  Goal: Rounds/Family Conference  Outcome: Ongoing, Progressing     Problem: Diabetes Comorbidity  Goal: Blood Glucose Level Within Desired Range  Outcome: Ongoing, Progressing     Problem: Skin Injury Risk Increased  Goal: Skin Health and Integrity  Outcome: Ongoing, Progressing     Problem: Device-Related Complication Risk (Hemodialysis)  Goal: Safe, Effective Therapy Delivery  Outcome: Ongoing, Progressing     Problem: Hemodynamic Instability (Hemodialysis)  Goal: Vital Signs Remain in Desired Range  Outcome: Ongoing, Progressing     Problem: Infection (Hemodialysis)  Goal: Absence of Infection Signs/Symptoms  Outcome: Ongoing, Progressing

## 2019-10-11 NOTE — PLAN OF CARE
10/11/19 1144   Post-Acute Status   Post-Acute Authorization Dialysis   Post-Acute Placement Status Discharge Plan Changed  ([facility] will still accept [place] and will need new snf authorization)   Diaylsis Status Pending Payor Medical Review  (awaiting [facility] admission )   Discharge Delays (!) Other  (need flow sheets from several  HD)

## 2019-10-12 LAB
ALBUMIN SERPL BCP-MCNC: 2.1 G/DL (ref 3.5–5.2)
ANION GAP SERPL CALC-SCNC: 8 MMOL/L (ref 8–16)
BUN SERPL-MCNC: 43 MG/DL (ref 8–23)
CALCIUM SERPL-MCNC: 8.2 MG/DL (ref 8.7–10.5)
CHLORIDE SERPL-SCNC: 106 MMOL/L (ref 95–110)
CO2 SERPL-SCNC: 24 MMOL/L (ref 23–29)
CREAT SERPL-MCNC: 3.6 MG/DL (ref 0.5–1.4)
EST. GFR  (AFRICAN AMERICAN): 14 ML/MIN/1.73 M^2
EST. GFR  (NON AFRICAN AMERICAN): 12 ML/MIN/1.73 M^2
GLUCOSE SERPL-MCNC: 148 MG/DL (ref 70–110)
PHOSPHATE SERPL-MCNC: 4.1 MG/DL (ref 2.7–4.5)
POCT GLUCOSE: 106 MG/DL (ref 70–110)
POCT GLUCOSE: 150 MG/DL (ref 70–110)
POCT GLUCOSE: 161 MG/DL (ref 70–110)
POTASSIUM SERPL-SCNC: 3.7 MMOL/L (ref 3.5–5.1)
SODIUM SERPL-SCNC: 138 MMOL/L (ref 136–145)

## 2019-10-12 PROCEDURE — 80069 RENAL FUNCTION PANEL: CPT

## 2019-10-12 PROCEDURE — S0171 BUMETANIDE 0.5 MG: HCPCS | Performed by: INTERNAL MEDICINE

## 2019-10-12 PROCEDURE — 25000003 PHARM REV CODE 250: Performed by: INTERNAL MEDICINE

## 2019-10-12 PROCEDURE — 90935 HEMODIALYSIS ONE EVALUATION: CPT

## 2019-10-12 PROCEDURE — 25000003 PHARM REV CODE 250: Performed by: HOSPITALIST

## 2019-10-12 PROCEDURE — P9047 ALBUMIN (HUMAN), 25%, 50ML: HCPCS | Mod: JG | Performed by: INTERNAL MEDICINE

## 2019-10-12 PROCEDURE — 27000221 HC OXYGEN, UP TO 24 HOURS

## 2019-10-12 PROCEDURE — 63600175 PHARM REV CODE 636 W HCPCS: Performed by: HOSPITALIST

## 2019-10-12 PROCEDURE — 94761 N-INVAS EAR/PLS OXIMETRY MLT: CPT

## 2019-10-12 PROCEDURE — 21400001 HC TELEMETRY ROOM

## 2019-10-12 PROCEDURE — 63600175 PHARM REV CODE 636 W HCPCS: Performed by: INTERNAL MEDICINE

## 2019-10-12 PROCEDURE — 63600175 PHARM REV CODE 636 W HCPCS: Mod: JG | Performed by: INTERNAL MEDICINE

## 2019-10-12 RX ORDER — ALBUMIN HUMAN 250 G/1000ML
12.5 SOLUTION INTRAVENOUS ONCE
Status: COMPLETED | OUTPATIENT
Start: 2019-10-12 | End: 2019-10-12

## 2019-10-12 RX ADMIN — ACETAMINOPHEN 650 MG: 325 TABLET, FILM COATED ORAL at 08:10

## 2019-10-12 RX ADMIN — GUAIFENESIN AND DEXTROMETHORPHAN 5 ML: 100; 10 SYRUP ORAL at 05:10

## 2019-10-12 RX ADMIN — GUAIFENESIN AND DEXTROMETHORPHAN 5 ML: 100; 10 SYRUP ORAL at 11:10

## 2019-10-12 RX ADMIN — POLYETHYLENE GLYCOL 3350 17 G: 17 POWDER, FOR SOLUTION ORAL at 08:10

## 2019-10-12 RX ADMIN — FERROUS SULFATE TAB EC 325 MG (65 MG FE EQUIVALENT) 325 MG: 325 (65 FE) TABLET DELAYED RESPONSE at 08:10

## 2019-10-12 RX ADMIN — DOCUSATE SODIUM 100 MG: 100 CAPSULE, LIQUID FILLED ORAL at 08:10

## 2019-10-12 RX ADMIN — METOPROLOL SUCCINATE 50 MG: 50 TABLET, EXTENDED RELEASE ORAL at 09:10

## 2019-10-12 RX ADMIN — PRAVASTATIN SODIUM 10 MG: 10 TABLET ORAL at 08:10

## 2019-10-12 RX ADMIN — FERROUS SULFATE TAB EC 325 MG (65 MG FE EQUIVALENT) 325 MG: 325 (65 FE) TABLET DELAYED RESPONSE at 09:10

## 2019-10-12 RX ADMIN — BUMETANIDE 2 MG: 0.25 INJECTION INTRAMUSCULAR; INTRAVENOUS at 03:10

## 2019-10-12 RX ADMIN — METOPROLOL SUCCINATE 50 MG: 50 TABLET, EXTENDED RELEASE ORAL at 03:10

## 2019-10-12 RX ADMIN — HYDRALAZINE HYDROCHLORIDE 100 MG: 25 TABLET ORAL at 05:10

## 2019-10-12 RX ADMIN — POLYETHYLENE GLYCOL 3350 17 G: 17 POWDER, FOR SOLUTION ORAL at 09:10

## 2019-10-12 RX ADMIN — ENOXAPARIN SODIUM 30 MG: 100 INJECTION SUBCUTANEOUS at 05:10

## 2019-10-12 RX ADMIN — HYDRALAZINE HYDROCHLORIDE 100 MG: 25 TABLET ORAL at 03:10

## 2019-10-12 RX ADMIN — BUMETANIDE 2 MG: 0.25 INJECTION INTRAMUSCULAR; INTRAVENOUS at 09:10

## 2019-10-12 RX ADMIN — MUPIROCIN: 20 OINTMENT TOPICAL at 09:10

## 2019-10-12 RX ADMIN — ALBUMIN (HUMAN) 12.5 G: 25 SOLUTION INTRAVENOUS at 12:10

## 2019-10-12 RX ADMIN — AMLODIPINE BESYLATE 10 MG: 5 TABLET ORAL at 03:10

## 2019-10-12 RX ADMIN — INSULIN ASPART 10 UNITS: 100 INJECTION, SUSPENSION SUBCUTANEOUS at 05:10

## 2019-10-12 RX ADMIN — INSULIN ASPART 10 UNITS: 100 INJECTION, SUSPENSION SUBCUTANEOUS at 08:10

## 2019-10-12 RX ADMIN — HYDRALAZINE HYDROCHLORIDE 100 MG: 25 TABLET ORAL at 09:10

## 2019-10-12 RX ADMIN — EPOETIN ALFA-EPBX 10000 UNITS: 10000 INJECTION, SOLUTION INTRAVENOUS; SUBCUTANEOUS at 05:10

## 2019-10-12 RX ADMIN — MUPIROCIN: 20 OINTMENT TOPICAL at 08:10

## 2019-10-12 NOTE — NURSING
2nd hemodialysis tx completed as ordered. Well tolerated by pt. Central line drsg changed according to Hospital SOP. P/c heplocked and secured per policy. Awaiting transporter, Report given to primary RN as same via landline.

## 2019-10-12 NOTE — ASSESSMENT & PLAN NOTE
Nephrology consult as above.  Trial of diuretics per nephrology  Weaned off NC but still pretty edematous  Nephrology please patient has progressed to end-stage renal disease  Tunneled catheter placed by IR on 10/10/19  First dialysis was on 10/11  As per Nephrology  Social service to arrange for outpatient dialysis.

## 2019-10-12 NOTE — NURSING
Responds to voice, increase respirations and labored breathing. Lethargic, in and out of sleep during conversation. Shaking head and mumbling confused speech. Denies pain. /68 P 58 R22 POX 98% on 4L nc. Transporting to HD.

## 2019-10-12 NOTE — PROGRESS NOTES
Renal Progress Note    Date of Admission:  10/3/2019  2:24 AM    Length of Stay: 9  Days    Subjective: no new complaints    Objective:    Current Facility-Administered Medications   Medication    0.9%  NaCl infusion    0.9%  NaCl infusion    0.9%  NaCl infusion    0.9%  NaCl infusion    acetaminophen tablet 650 mg    albuterol-ipratropium 2.5 mg-0.5 mg/3 mL nebulizer solution 3 mL    amLODIPine tablet 10 mg    benzonatate capsule 200 mg    bumetanide injection 2 mg    cloNIDine tablet 0.1 mg    dextromethorphan-guaifenesin  mg/5 ml liquid 5 mL    dextrose 50% injection 12.5 g    dextrose 50% injection 25 g    docusate sodium capsule 100 mg    enoxaparin injection 30 mg    epoetin cortes-epbx injection 6,000 Units    ferrous sulfate EC tablet 325 mg    glucagon (human recombinant) injection 1 mg    glucose chewable tablet 16 g    glucose chewable tablet 24 g    heparin (porcine) injection 5,000 Units    hydrALAZINE injection 10 mg    hydrALAZINE tablet 100 mg    insulin aspart protamine-insulin aspart (NovoLOG 70/30) injection    insulin aspart U-100 pen 1-10 Units    metOLazone tablet 10 mg    metoprolol succinate (TOPROL-XL) 24 hr tablet 50 mg    mupirocin 2 % ointment    ondansetron injection 4 mg    polyethylene glycol packet 17 g    pravastatin tablet 10 mg    senna-docusate 8.6-50 mg per tablet 1 tablet    sodium chloride 0.9% flush 10 mL       Vitals:    10/11/19 2357 10/12/19 0459 10/12/19 0713 10/12/19 0805   BP: (!) 154/69 (!) 140/70 (!) 161/71    BP Location: Left arm Left arm Left arm    Patient Position: Lying Lying Lying    Pulse: 70 (!) 55 60    Resp: 20 19 18    Temp: 97.4 °F (36.3 °C) 97.6 °F (36.4 °C) 98 °F (36.7 °C)    TempSrc: Oral Oral Oral    SpO2: 95% 96% 96% 95%   Weight:  111.8 kg (246 lb 7.6 oz)     Height:           I/O last 3 completed shifts:  In: 1820 [P.O.:1320; Other:500]  Out: 2853 [Urine:353; Other:2500]  No intake/output  data recorded.      Physical Exam:    NAD  Neck: supple  Heart: RRR   Lungs: Unlabored breathing  Abdomen: n/a  : n/a  Extremities:  n/a  Neurologic: Awake but drowsy      Laboratories:    No results for input(s): WBC, RBC, HGB, HCT, PLT, MCV, MCH, MCHC in the last 24 hours.    Recent Labs   Lab 10/12/19  0351   CALCIUM 8.2*      K 3.7   CO2 24      BUN 43*   CREATININE 3.6*       No results for input(s): COLORU, CLARITYU, SPECGRAV, PHUR, PROTEINUA, GLUCOSEU, BLOODU, WBCU, RBCU, BACTERIA, MUCUS in the last 24 hours.    Invalid input(s):  BILIRUBINCON    Microbiology Results (last 7 days)     ** No results found for the last 168 hours. **            Diagnostic Tests: n/a      Assessment:       70 y/o female pte. Of  mine in clinic admitted with:     - s/p Hypertensive crisis due to non-compliance with Meds at home (pte. Reports missing doses)  - Worsening ANIA on CKD-3a started on Dialysis 10/11  - DM-2 with biopsy proven diabetic Nephropathy  - Nephrotic 2nd. Diabetic-hypertensive glomerulosclerosis   - Anasarca refractory to diuretics  - Hyperglycemia  - Fe++ def. Anemia   - Fluid overload 2nd. CHF exacerbation? Pte.'s last weight in clinic (7/2/19 was 110 Kg) + 7 Kg. now        Plan:     - Fluid management with Dialysis (2nd. Treatment today)  - BP control  - Glycemic control per admitting  - Epogen to 3 x week at a higher dose  - Renal ADA diet w/ fluid restriction  - Arrange out-pte HD

## 2019-10-12 NOTE — NURSING
New Start hemodialysis treatment, Day 2 ; right chest wall permacath noted. Central line drsg change done prior to treatment start according to [employer], biopatch in use, according to Hospital SOP. Dialysis tx initiated as ordered.

## 2019-10-12 NOTE — NURSING
Patient returned to unit. Patient awake and alert.   Patient without c/o discomfort.  No apparent distress noted.   54 y/o male with history of schizoaffective disorder, presenting with agitation in the context of medication non-compliance. The patient is paranoid with thought disorder on interview. The patient has been agitated and violent at his residence and unhygienic (had fecal matter on the floor of the bathroom). Patient an acute risk to himself and others, and he requires inpatient psychiatric admission for safety and stabilization. Plan as above.

## 2019-10-12 NOTE — PLAN OF CARE
2.5 hour hd tx in progress, today is new start Day 2, via right chest P/c. Will continue with plan. Planned UF = 2L.

## 2019-10-12 NOTE — PROGRESS NOTES
"[facility] Medicine  Progress Note    Patient Name: Magaly Epstein  MRN: [id number]  Patient Class: IP- Inpatient   Admission Date: 10/3/2019  Length of Stay: 9 days  Attending Physician: Shanita Pettit MD  Primary Care Provider: Anahy Bradley DO        Subjective:     Principal Problem:Hypertensive emergency        HPI:  70 y/o female patient presents for evaluation of acute onset of shortness of breath that started last night.  Patient lives in assisted living.  She was admitted here in April for shortness of breath due to hypertensive crisis and fluid overload.  Patient has history of nephrotic syndrome and anasarca and had a kidney biopsy at that time.   She also has a history of chronic hypertension.   Patient states compliance with medications.  She denies chest pain. She states she was just very short of breath and repeatedly asked for help.  She also complains of a dry cough. She has leg edema that she states is "always there."  Patient was placed on CPAP for an O2 sat of 60% on room air.  Blood pressure was reportedly elevated at 270 systolic.  She presented severely hypertensive and placed on Nitro infusion.  Patient is now feeling better.  No other complaints.    Overview/Hospital Course:  70 y/o female patient presents for evaluation of acute onset of shortness of breath. Patient lives in assisted living.  She was admitted here in April for shortness of breath due to hypertensive crisis and fluid overload. Patient was placed on CPAP for an O2 sat of 60% on room air.  Blood pressure was reportedly elevated at 270 systolic. She presented severely hypertensive and placed on Nitro infusion.  CKD with increased Creat from baseline.  Hx of nephrotic syndrome with lower extremity edema and fluid overload.  Nephrology consulted and started on IV Bumex.  Restarted home medications with improvement of BP and able to wean off Nitro drip. Stepped down to floor in stable condition. " Metolazone added by nephrology. Weaned off supplemental O2 but remained with anasarca. Unable to use ACE-I due to worsening renal function.  Renal function without significant improvement, Nephrology finally recommended patient to start dialysis.  Tunneled catheter placed by IR on 10/10/19 with 1st dialysis to start.  Social service to arrange for outpatient dialysis.  PT/OT recommends SNF.       Interval History:  Patient has been doing well off all oxygen, no new complaints.  Swelling little decreased after dialysis    Review of Systems   Constitutional: Negative for chills and fever.   Respiratory: Negative for shortness of breath.    Cardiovascular: Negative for chest pain.     Objective:     Vital Signs (Most Recent):  Temp: 97.4 °F (36.3 °C) (10/11/19 2357)  Pulse: 70 (10/11/19 2357)  Resp: 20 (10/11/19 2357)  BP: (!) 154/69 (10/11/19 2357)  SpO2: 95 % (10/11/19 2357) Vital Signs (24h Range):  Temp:  [97.4 °F (36.3 °C)-98.7 °F (37.1 °C)] 97.4 °F (36.3 °C)  Pulse:  [55-71] 70  Resp:  [17-22] 20  SpO2:  [91 %-95 %] 95 %  BP: (119-154)/(52-83) 154/69     Weight: 114.5 kg (252 lb 6.8 oz)  Body mass index is 37.28 kg/m².    Intake/Output Summary (Last 24 hours) at 10/12/2019 0046  Last data filed at 10/11/2019 1800  Gross per 24 hour   Intake 1460 ml   Output 2503 ml   Net -1043 ml      Physical Exam   Constitutional: She is oriented to person, place, and time. She appears well-developed. No distress.   HENT:   Head: Normocephalic.   Eyes: Conjunctivae and EOM are normal.   Neck: Normal range of motion. Neck supple.   Cardiovascular: Normal rate and regular rhythm.   Right upper anterior chest with tunneled catheter in place   Pulmonary/Chest: Effort normal. She has no wheezes. She has no rales.   Breathing comfortably at room air   Abdominal: Soft. Bowel sounds are normal.   Musculoskeletal: Normal range of motion. She exhibits edema.   anasarca   Neurological: She is alert and oriented to person, place, and time.    Skin: Skin is warm and dry. Capillary refill takes less than 2 seconds. She is not diaphoretic.   Psychiatric: She has a normal mood and affect.   Nursing note and vitals reviewed.      Significant Labs: All pertinent labs within the past 24 hours have been reviewed.    Significant Imaging: I have reviewed all pertinent imaging results/findings within the past 24 hours.      Assessment/Plan:      * Hypertensive emergency  Patient presents with dyspnea and significant hypertension.  CXR shows mild interstitial edema and bilateral pleural effusions.  Patient was placed on Nitro drip and given dose of Bumex.  Improved BP and symptoms.  Restarted on home oral medications and able to wean off Nitro drip.  Added prn Clonidine.  Vitals q 4 hours    Acute renal failure superimposed on stage 3 chronic kidney disease  Nephrology consult as above.  Trial of diuretics per nephrology  Weaned off NC but still pretty edematous  Nephrology please patient has progressed to end-stage renal disease  Tunneled catheter placed by IR on 10/10/19  First dialysis was on 10/11  As per Nephrology  Social service to arrange for outpatient dialysis.     Elevated troponin  Probably secondary to demand ischemia from uncontrolled HTN and renal failure.  Denies any chest pain.    Nephrotic syndrome  Patient with recent admission and evaluated by Nephrology.  Creat much higher than baseline.  Patient with kidney biopsy on last admit.   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  Diuretics per nephrology  ACE-I or ARB when able    Debility  PT/OT eval: SNF     Anemia of chronic disease  Secondary to CKD.  Noted drop in H/H but stable  Iron deficiency anemia.    No evidence of active bleeding.   Will not transfuse at this time.      Hyperlipidemia  Continue Statin.    Type 2 diabetes mellitus with renal complication  Uncontrolled with hyperglycemia.  Diabetic diet and  insulin sliding scale.  Started on home 70/30 at lower dose to prevent hypoglycemia.  Increase as needed.    Essential hypertension  As above.        VTE Risk Mitigation (From admission, onward)         Ordered     heparin (porcine) injection 5,000 Units  As needed (PRN)      10/11/19 1109     enoxaparin injection 30 mg  Daily      10/03/19 0656     IP VTE HIGH RISK PATIENT  Once      10/03/19 0538                      Shanita Pettit MD  Department of Hospital Medicine   [facility]

## 2019-10-13 PROBLEM — N04.9 NEPHROTIC SYNDROME: Chronic | Status: ACTIVE | Noted: 2019-04-15

## 2019-10-13 LAB
ALBUMIN SERPL BCP-MCNC: 2.5 G/DL (ref 3.5–5.2)
ANION GAP SERPL CALC-SCNC: 11 MMOL/L (ref 8–16)
BUN SERPL-MCNC: 31 MG/DL (ref 8–23)
CALCIUM SERPL-MCNC: 8.3 MG/DL (ref 8.7–10.5)
CHLORIDE SERPL-SCNC: 107 MMOL/L (ref 95–110)
CO2 SERPL-SCNC: 22 MMOL/L (ref 23–29)
CREAT SERPL-MCNC: 2.9 MG/DL (ref 0.5–1.4)
EST. GFR  (AFRICAN AMERICAN): 18 ML/MIN/1.73 M^2
EST. GFR  (NON AFRICAN AMERICAN): 16 ML/MIN/1.73 M^2
GLUCOSE SERPL-MCNC: 104 MG/DL (ref 70–110)
PHOSPHATE SERPL-MCNC: 4 MG/DL (ref 2.7–4.5)
POCT GLUCOSE: 115 MG/DL (ref 70–110)
POCT GLUCOSE: 158 MG/DL (ref 70–110)
POCT GLUCOSE: 194 MG/DL (ref 70–110)
POCT GLUCOSE: 220 MG/DL (ref 70–110)
POTASSIUM SERPL-SCNC: 3.8 MMOL/L (ref 3.5–5.1)
SODIUM SERPL-SCNC: 140 MMOL/L (ref 136–145)

## 2019-10-13 PROCEDURE — 94761 N-INVAS EAR/PLS OXIMETRY MLT: CPT

## 2019-10-13 PROCEDURE — S0171 BUMETANIDE 0.5 MG: HCPCS | Performed by: INTERNAL MEDICINE

## 2019-10-13 PROCEDURE — 25000003 PHARM REV CODE 250: Performed by: INTERNAL MEDICINE

## 2019-10-13 PROCEDURE — 97116 GAIT TRAINING THERAPY: CPT | Performed by: PHYSICAL THERAPIST

## 2019-10-13 PROCEDURE — 80069 RENAL FUNCTION PANEL: CPT

## 2019-10-13 PROCEDURE — 99900035 HC TECH TIME PER 15 MIN (STAT)

## 2019-10-13 PROCEDURE — 25000003 PHARM REV CODE 250: Performed by: HOSPITALIST

## 2019-10-13 PROCEDURE — 97110 THERAPEUTIC EXERCISES: CPT | Performed by: PHYSICAL THERAPIST

## 2019-10-13 PROCEDURE — 63600175 PHARM REV CODE 636 W HCPCS: Performed by: HOSPITALIST

## 2019-10-13 PROCEDURE — 36415 COLL VENOUS BLD VENIPUNCTURE: CPT

## 2019-10-13 PROCEDURE — 21400001 HC TELEMETRY ROOM

## 2019-10-13 PROCEDURE — 27000221 HC OXYGEN, UP TO 24 HOURS

## 2019-10-13 RX ADMIN — INSULIN ASPART 10 UNITS: 100 INJECTION, SUSPENSION SUBCUTANEOUS at 05:10

## 2019-10-13 RX ADMIN — FERROUS SULFATE TAB EC 325 MG (65 MG FE EQUIVALENT) 325 MG: 325 (65 FE) TABLET DELAYED RESPONSE at 09:10

## 2019-10-13 RX ADMIN — INSULIN ASPART 2 UNITS: 100 INJECTION, SOLUTION INTRAVENOUS; SUBCUTANEOUS at 05:10

## 2019-10-13 RX ADMIN — GUAIFENESIN AND DEXTROMETHORPHAN 5 ML: 100; 10 SYRUP ORAL at 05:10

## 2019-10-13 RX ADMIN — MUPIROCIN: 20 OINTMENT TOPICAL at 09:10

## 2019-10-13 RX ADMIN — ENOXAPARIN SODIUM 30 MG: 100 INJECTION SUBCUTANEOUS at 05:10

## 2019-10-13 RX ADMIN — PRAVASTATIN SODIUM 10 MG: 10 TABLET ORAL at 09:10

## 2019-10-13 RX ADMIN — DOCUSATE SODIUM 100 MG: 100 CAPSULE, LIQUID FILLED ORAL at 09:10

## 2019-10-13 RX ADMIN — METOPROLOL SUCCINATE 50 MG: 50 TABLET, EXTENDED RELEASE ORAL at 09:10

## 2019-10-13 RX ADMIN — HYDRALAZINE HYDROCHLORIDE 100 MG: 25 TABLET ORAL at 09:10

## 2019-10-13 RX ADMIN — POLYETHYLENE GLYCOL 3350 17 G: 17 POWDER, FOR SOLUTION ORAL at 09:10

## 2019-10-13 RX ADMIN — BUMETANIDE 2 MG: 0.25 INJECTION INTRAMUSCULAR; INTRAVENOUS at 05:10

## 2019-10-13 RX ADMIN — HYDRALAZINE HYDROCHLORIDE 100 MG: 25 TABLET ORAL at 05:10

## 2019-10-13 RX ADMIN — HYDRALAZINE HYDROCHLORIDE 100 MG: 25 TABLET ORAL at 03:10

## 2019-10-13 RX ADMIN — INSULIN ASPART 10 UNITS: 100 INJECTION, SUSPENSION SUBCUTANEOUS at 09:10

## 2019-10-13 RX ADMIN — AMLODIPINE BESYLATE 10 MG: 5 TABLET ORAL at 09:10

## 2019-10-13 RX ADMIN — GUAIFENESIN AND DEXTROMETHORPHAN 5 ML: 100; 10 SYRUP ORAL at 12:10

## 2019-10-13 RX ADMIN — BUMETANIDE 2 MG: 0.25 INJECTION INTRAMUSCULAR; INTRAVENOUS at 09:10

## 2019-10-13 RX ADMIN — INSULIN ASPART 2 UNITS: 100 INJECTION, SOLUTION INTRAVENOUS; SUBCUTANEOUS at 09:10

## 2019-10-13 NOTE — PROGRESS NOTES
"[facility] Medicine  Progress Note    Patient Name: Magaly Epstein  MRN: [id number]  Patient Class: IP- Inpatient   Admission Date: 10/3/2019  Length of Stay: 10 days  Attending Physician: Shanita Pettit MD  Primary Care Provider: Anahy Bradley DO        Subjective:     Principal Problem:Hypertensive emergency        HPI:  68 y/o female patient presents for evaluation of acute onset of shortness of breath that started last night.  Patient lives in assisted living.  She was admitted here in April for shortness of breath due to hypertensive crisis and fluid overload.  Patient has history of nephrotic syndrome and anasarca and had a kidney biopsy at that time.   She also has a history of chronic hypertension.   Patient states compliance with medications.  She denies chest pain. She states she was just very short of breath and repeatedly asked for help.  She also complains of a dry cough. She has leg edema that she states is "always there."  Patient was placed on CPAP for an O2 sat of 60% on room air.  Blood pressure was reportedly elevated at 270 systolic.  She presented severely hypertensive and placed on Nitro infusion.  Patient is now feeling better.  No other complaints.    Overview/Hospital Course:  Ms. Epstein presented with shortness of breath. She was admitted for hypertensive crisis and volume overload. Blood pressure was elevated at 270 systolic.  Treatment initiated with Nitro infusion and she was placed on CPAP for hypoxia with O2 sat of 60% on room air.  Also noted with acute renal failure on CKD.  Nephrology consulted and started on IV Bumex.  Restarted home medications with improvement of BP and able to wean off Nitro drip. Stepped down to floor in stable condition on 10/5. Metolazone added by nephrology. Weaned off supplemental O2 but remained with anasarca. Unable to use ACE-I due to worsening renal function.  Renal function without significant improvement, Nephrology " finally recommended patient to start dialysis.  Tunneled catheter placed by IR on 10/10/19 with 1st dialysis the next day.  Social service to arrange for outpatient dialysis.  PT/OT recommends SNF placement.       Interval History:  Patient has been doing well off all oxygen, no new complaints.  Swelling little decreased after dialysis yesterday; patient seen on dialysis unit.    Review of Systems   Constitutional: Negative for chills and fever.   Respiratory: Negative for shortness of breath.    Cardiovascular: Negative for chest pain.     Objective:     Vital Signs (Most Recent):  Temp: 98.1 °F (36.7 °C) (10/12/19 2313)  Pulse: 63 (10/12/19 2313)  Resp: 18 (10/12/19 2313)  BP: 124/60 (10/12/19 2313)  SpO2: 96 % (10/12/19 2313) Vital Signs (24h Range):  Temp:  [97.6 °F (36.4 °C)-99 °F (37.2 °C)] 98.1 °F (36.7 °C)  Pulse:  [52-74] 63  Resp:  [18-22] 18  SpO2:  [94 %-99 %] 96 %  BP: (124-178)/(58-98) 124/60     Weight: 111.8 kg (246 lb 7.6 oz)  Body mass index is 36.4 kg/m².    Intake/Output Summary (Last 24 hours) at 10/13/2019 0024  Last data filed at 10/12/2019 1917  Gross per 24 hour   Intake 1280 ml   Output 2733 ml   Net -1453 ml      Physical Exam   Constitutional: She is oriented to person, place, and time. She appears well-developed. No distress.   HENT:   Head: Normocephalic.   Eyes: Conjunctivae and EOM are normal.   Neck: Normal range of motion. Neck supple.   Cardiovascular: Normal rate and regular rhythm.   Right upper anterior chest with tunneled catheter in place   Pulmonary/Chest: Effort normal. She has no wheezes. She has no rales.   Breathing comfortably at room air   Abdominal: Soft. Bowel sounds are normal.   Musculoskeletal: Normal range of motion. She exhibits edema.   anasarca   Neurological: She is alert and oriented to person, place, and time.   Skin: Skin is warm and dry. Capillary refill takes less than 2 seconds. She is not diaphoretic.   Psychiatric: She has a normal mood and affect.    Nursing note and vitals reviewed.      Significant Labs: All pertinent labs within the past 24 hours have been reviewed.    Significant Imaging: I have reviewed all pertinent imaging results/findings within the past 24 hours.      Assessment/Plan:      * Hypertensive emergency  Patient presents with dyspnea and significant hypertension.  CXR showed mild interstitial edema and bilateral pleural effusions.  Patient was placed on Nitro drip and diuresis with IV Bumex.  Improved BP and symptoms.  Restarted on home oral medications and able to wean off Nitro drip on 10/05 and stepped down to the floor  Added prn Clonidine and volume status now improving with dialysis  Blood pressure controlled  Resolved    Acute renal failure superimposed on stage 3 chronic kidney disease  Nephrology consulted as above.  Trial of diuretics per Nephrology  Weaned off NC but still pretty edematous  Nephrology believes patient has progressed to end-stage renal disease  Tunneled catheter placed by IR on 10/10/19  First dialysis was on 10/11 and 2nd session today  As per Nephrology  Social service to arranging for outpatient dialysis  Discharge to SNF when accepted and dialysis arranged    Elevated troponin  Probably secondary to demand ischemia from uncontrolled HTN and renal failure.  Denies any chest pain.    Nephrotic syndrome and end-stage renal disease  Patient with recent admission and evaluated by Nephrology.  Creatinine much higher than baseline.  Patient with kidney biopsy on last admit.   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  Diuretics per nephrology  Initiated treatment with dialysis as per Nephrology    Debility  PT/OT following  SNF placement pending    Anemia of chronic disease  Secondary to CKD that is now ESRD  Low but stable   Iron deficiency anemia.    No evidence of active bleeding.     Hyperlipidemia  Continue pravastatin    Type 2  diabetes mellitus with renal complication  Uncontrolled with hyperglycemia.  Diabetic diet and insulin sliding scale.  Started on home 70/30 at lower dose to prevent hypoglycemia.  Increase as needed.    Essential hypertension  Continue metoprolol, amlodipine, and hydralazine      VTE Risk Mitigation (From admission, onward)         Ordered     heparin (porcine) injection 5,000 Units  As needed (PRN)      10/11/19 1109     enoxaparin injection 30 mg  Daily      10/03/19 0656     IP VTE HIGH RISK PATIENT  Once      10/03/19 0538                      Shanita Pettit MD  Department of Hospital Medicine   [facility]

## 2019-10-13 NOTE — PLAN OF CARE
Recommendations     1. Rec add Renal Diet restriction to diet order     Goals: Meet > 85% of estimated needs  Communication of RD Recs: (POC)

## 2019-10-13 NOTE — SUBJECTIVE & OBJECTIVE
Interval History: Patient currently without any complaints.  She says that her appetite waxes and wanes.  Reports that she is doing well with hemodialysis and is using the restroom without difficulty.      Review of Systems   Constitutional: Positive for activity change, appetite change and fatigue. Negative for chills, diaphoresis, fever and unexpected weight change.   HENT: Negative.    Eyes: Negative.    Respiratory: Negative for cough, chest tightness, shortness of breath and wheezing.    Cardiovascular: Positive for leg swelling. Negative for chest pain and palpitations.   Gastrointestinal: Negative for abdominal distention, abdominal pain, blood in stool, constipation, diarrhea, nausea and vomiting.   Genitourinary: Negative for dysuria and hematuria.   Musculoskeletal: Negative.    Skin: Negative.    Neurological: Positive for weakness. Negative for dizziness, seizures, syncope and light-headedness.   Psychiatric/Behavioral: Negative.      Objective:     Vital Signs (Most Recent):  Temp: 97.5 °F (36.4 °C) (10/13/19 1158)  Pulse: 61 (10/13/19 1158)  Resp: 18 (10/13/19 1158)  BP: 137/84 (10/13/19 1158)  SpO2: 96 % (10/13/19 1210) Vital Signs (24h Range):  Temp:  [97.5 °F (36.4 °C)-98.6 °F (37 °C)] 97.5 °F (36.4 °C)  Pulse:  [60-72] 61  Resp:  [18-19] 18  SpO2:  [94 %-97 %] 96 %  BP: (124-178)/(60-84) 137/84     Weight: 114 kg (251 lb 5.2 oz)  Body mass index is 37.11 kg/m².    Intake/Output Summary (Last 24 hours) at 10/13/2019 1447  Last data filed at 10/13/2019 1200  Gross per 24 hour   Intake 720 ml   Output 650 ml   Net 70 ml      Physical Exam   Constitutional: She is oriented to person, place, and time. She appears well-developed and well-nourished. No distress.   obese   HENT:   Head: Normocephalic and atraumatic.   Right Ear: External ear normal.   Left Ear: External ear normal.   Nose: Nose normal.   Eyes: Right eye exhibits no discharge. Left eye exhibits no discharge.   Neck: Normal range of motion.    Cardiovascular: Normal rate, regular rhythm, normal heart sounds and intact distal pulses. Exam reveals no gallop and no friction rub.   No murmur heard.  Pulmonary/Chest:   Regular effort with bibasilar crackles   Abdominal: Soft. Bowel sounds are normal. She exhibits no distension. There is no tenderness. There is no rebound and no guarding.   Musculoskeletal: Normal range of motion. She exhibits edema (3+ pitting edema 2/3 the way up the legs bilaterally).   Neurological: She is alert and oriented to person, place, and time.   Skin: Skin is warm and dry. She is not diaphoretic. No erythema.   Psychiatric: She has a normal mood and affect. Her behavior is normal. Judgment and thought content normal.   Nursing note and vitals reviewed.      Significant Labs:   CMP:   Recent Labs   Lab 10/12/19  0351 10/13/19  0357    140   K 3.7 3.8    107   CO2 24 22*   * 104   BUN 43* 31*   CREATININE 3.6* 2.9*   CALCIUM 8.2* 8.3*   ALBUMIN 2.1* 2.5*   ANIONGAP 8 11   EGFRNONAA 12* 16*       Significant Imaging: I have reviewed all pertinent imaging results/findings within the past 24 hours.

## 2019-10-13 NOTE — ASSESSMENT & PLAN NOTE
Patient with recent admission and evaluated by Nephrology.  Creatinine much higher than baseline.  Patient with kidney biopsy on last admit.   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  Diuretics per nephrology  Initiated treatment with dialysis as per Nephrology

## 2019-10-13 NOTE — NURSING
Bedside report received from MICAH West. Patient is awake and alert. Patient appears to be in no apparent distress. Safety maintained. Telesitter at bedside. Will continue to monitor.

## 2019-10-13 NOTE — ASSESSMENT & PLAN NOTE
Patient presents with dyspnea and significant hypertension.  CXR showed mild interstitial edema and bilateral pleural effusions.  Patient was placed on Nitro drip and diuresis with IV Bumex.  Improved BP and symptoms.  Restarted on home oral medications and able to wean off Nitro drip on 10/05 and stepped down to the floor  Added prn Clonidine and volume status now improving with dialysis  Blood pressure controlled  Resolved

## 2019-10-13 NOTE — PLAN OF CARE
Pt remains on 3 L/M NC with no complaints of SOB/wheezing. Continue duoneb svn tx as needed per orders. Will continue to monitor. MADELYN Chin, RRT

## 2019-10-13 NOTE — PROGRESS NOTES
"[facility] Medicine  Progress Note    Patient Name: Magaly Epstein  MRN: [id number]  Patient Class: IP- Inpatient   Admission Date: 10/3/2019  Length of Stay: 10 days  Attending Physician: Shelby De La Paz MD  Primary Care Provider: Anahy Bradley DO        Subjective:     Principal Problem:Hypertensive emergency    HPI:  70 y/o female patient presents for evaluation of acute onset of shortness of breath that started last night.  Patient lives in assisted living.  She was admitted here in April for shortness of breath due to hypertensive crisis and fluid overload.  Patient has history of nephrotic syndrome and anasarca and had a kidney biopsy at that time.   She also has a history of chronic hypertension.   Patient states compliance with medications.  She denies chest pain. She states she was just very short of breath and repeatedly asked for help.  She also complains of a dry cough. She has leg edema that she states is "always there."  Patient was placed on CPAP for an O2 sat of 60% on room air.  Blood pressure was reportedly elevated at 270 systolic.  She presented severely hypertensive and placed on Nitro infusion.  Patient is now feeling better.  No other complaints.    Overview/Hospital Course:  Ms. Epstein presented with shortness of breath. She was admitted for hypertensive crisis and volume overload. Blood pressure was elevated at 270 systolic.  Treatment initiated with Nitro infusion and she was placed on CPAP for hypoxia with O2 sat of 60% on room air.  Also noted with acute renal failure on CKD.  Nephrology consulted and started on IV Bumex.  Restarted home medications with improvement of BP and able to wean off Nitro drip. Stepped down to floor in stable condition on 10/5. Metolazone added by nephrology. Weaned off supplemental O2 but remained with anasarca. Unable to use ACE-I due to worsening renal function.  Renal function without significant improvement, Nephrology " finally recommended patient to start dialysis.  Tunneled catheter placed by IR on 10/10/19 with 1st dialysis the next day.  Social service to arrange for outpatient dialysis.  PT/OT recommends SNF placement.       Interval History: Patient currently without any complaints.  She says that her appetite waxes and wanes.  Reports that she is doing well with hemodialysis and is using the restroom without difficulty.      Review of Systems   Constitutional: Positive for activity change, appetite change and fatigue. Negative for chills, diaphoresis, fever and unexpected weight change.   HENT: Negative.    Eyes: Negative.    Respiratory: Negative for cough, chest tightness, shortness of breath and wheezing.    Cardiovascular: Positive for leg swelling. Negative for chest pain and palpitations.   Gastrointestinal: Negative for abdominal distention, abdominal pain, blood in stool, constipation, diarrhea, nausea and vomiting.   Genitourinary: Negative for dysuria and hematuria.   Musculoskeletal: Negative.    Skin: Negative.    Neurological: Positive for weakness. Negative for dizziness, seizures, syncope and light-headedness.   Psychiatric/Behavioral: Negative.      Objective:     Vital Signs (Most Recent):  Temp: 97.5 °F (36.4 °C) (10/13/19 1158)  Pulse: 61 (10/13/19 1158)  Resp: 18 (10/13/19 1158)  BP: 137/84 (10/13/19 1158)  SpO2: 96 % (10/13/19 1210) Vital Signs (24h Range):  Temp:  [97.5 °F (36.4 °C)-98.6 °F (37 °C)] 97.5 °F (36.4 °C)  Pulse:  [60-72] 61  Resp:  [18-19] 18  SpO2:  [94 %-97 %] 96 %  BP: (124-178)/(60-84) 137/84     Weight: 114 kg (251 lb 5.2 oz)  Body mass index is 37.11 kg/m².    Intake/Output Summary (Last 24 hours) at 10/13/2019 1447  Last data filed at 10/13/2019 1200  Gross per 24 hour   Intake 720 ml   Output 650 ml   Net 70 ml      Physical Exam   Constitutional: She is oriented to person, place, and time. She appears well-developed and well-nourished. No distress.   obese   HENT:   Head:  Normocephalic and atraumatic.   Right Ear: External ear normal.   Left Ear: External ear normal.   Nose: Nose normal.   Eyes: Right eye exhibits no discharge. Left eye exhibits no discharge.   Neck: Normal range of motion.   Cardiovascular: Normal rate, regular rhythm, normal heart sounds and intact distal pulses. Exam reveals no gallop and no friction rub.   No murmur heard.  Pulmonary/Chest:   Regular effort with bibasilar crackles   Abdominal: Soft. Bowel sounds are normal. She exhibits no distension. There is no tenderness. There is no rebound and no guarding.   Musculoskeletal: Normal range of motion. She exhibits edema (3+ pitting edema 2/3 the way up the legs bilaterally).   Neurological: She is alert and oriented to person, place, and time.   Skin: Skin is warm and dry. She is not diaphoretic. No erythema.   Psychiatric: She has a normal mood and affect. Her behavior is normal. Judgment and thought content normal.   Nursing note and vitals reviewed.      Significant Labs:   CMP:   Recent Labs   Lab 10/12/19  0351 10/13/19  0357    140   K 3.7 3.8    107   CO2 24 22*   * 104   BUN 43* 31*   CREATININE 3.6* 2.9*   CALCIUM 8.2* 8.3*   ALBUMIN 2.1* 2.5*   ANIONGAP 8 11   EGFRNONAA 12* 16*       Significant Imaging: I have reviewed all pertinent imaging results/findings within the past 24 hours.      Assessment/Plan:      * Hypertensive emergency  Hypertensive emergency is resolved after initiating hemodialysis.  Patient presents with dyspnea and significant hypertension.  CXR showed mild interstitial edema and bilateral pleural effusions.  Patient was placed on Nitro drip and diuresis with IV Bumex.  Patient's blood pressure is much better controlled in last 24 hours on oral amlodipine, bumetanide, hydralazine, and metoprolol.  Will continue as needed clonidine.    Acute renal failure superimposed on stage 3 chronic kidney disease  Nephrology consulted as above.  Trial of diuretics per  Nephrology  Weaned off nasal cannula but still pretty edematous  Tunneled catheter placed by IR on 10/10/19  First dialysis was on 10/11 and 2nd session today  As per Nephrology  Social service to arranging for outpatient dialysis  Discharged to SNF when accepted and dialysis arranged\    Essential hypertension  Continue bumetanide, metoprolol, amlodipine, and hydralazine, and provide as needed clonidine.    Type 2 diabetes mellitus with renal complication  Uncontrolled with hyperglycemia.  Diabetic diet and insulin sliding scale.  Started on home 70/30 at lower dose to prevent hypoglycemia.  Increase as needed.    Hyperlipidemia  Continue pravastatin    Anemia of chronic disease  Secondary to CKD that is now ESRD  Low but stable   Iron deficiency anemia.    No evidence of active bleeding.     Elevated troponin  Probably secondary to demand ischemia from uncontrolled HTN and renal failure.  Denies any chest pain.      Nephrotic syndrome and end-stage renal disease  Patient with recent admission and evaluated by Nephrology.  Creatinine much higher than baseline.  Patient with kidney biopsy on last admit.   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  Diuretics per nephrology  Initiated treatment with dialysis as per Nephrology    Debility  PT/OT following  SNF placement pending      VTE Risk Mitigation (From admission, onward)         Ordered     heparin (porcine) injection 5,000 Units  As needed (PRN)      10/11/19 1109     enoxaparin injection 30 mg  Daily      10/03/19 0656     IP VTE HIGH RISK PATIENT  Once      10/03/19 0538                Shelby De La Paz M.D.  Staff Nocturnist  Department of Hospital Medicine  [facility]  Pager: [phone]          N.B.: Portions of this note was dictated using M*Modal Fluency Direct--there may be voice recognition errors occasionally missed on review.

## 2019-10-13 NOTE — PLAN OF CARE
Problem: Physical Therapy Goal  Goal: Physical Therapy Goal  Description  Goals to be met by: 10/19/2019     Patient will increase functional independence with mobility by performin. Supine to sit with Modified [place]  2. Sit to stand transfer with Modified [place]  3. Gait  x 100 feet with Modified [place] using Rolling Walker.   4. Lower extremity exercise program x10 reps per handout, with independence     Outcome: Ongoing, Progressing    Price Christie, PT  10/13/2019

## 2019-10-13 NOTE — ASSESSMENT & PLAN NOTE
Hypertensive emergency is resolved after initiating hemodialysis.  Patient presents with dyspnea and significant hypertension.  CXR showed mild interstitial edema and bilateral pleural effusions.  Patient was placed on Nitro drip and diuresis with IV Bumex.  Patient's blood pressure is much better controlled in last 24 hours on oral amlodipine, bumetanide, hydralazine, and metoprolol.  Will continue as needed clonidine.

## 2019-10-13 NOTE — SUBJECTIVE & OBJECTIVE
Interval History:  Patient has been doing well off all oxygen, no new complaints.  Swelling little decreased after dialysis yesterday; patient seen on dialysis unit.    Review of Systems   Constitutional: Negative for chills and fever.   Respiratory: Negative for shortness of breath.    Cardiovascular: Negative for chest pain.     Objective:     Vital Signs (Most Recent):  Temp: 98.1 °F (36.7 °C) (10/12/19 2313)  Pulse: 63 (10/12/19 2313)  Resp: 18 (10/12/19 2313)  BP: 124/60 (10/12/19 2313)  SpO2: 96 % (10/12/19 2313) Vital Signs (24h Range):  Temp:  [97.6 °F (36.4 °C)-99 °F (37.2 °C)] 98.1 °F (36.7 °C)  Pulse:  [52-74] 63  Resp:  [18-22] 18  SpO2:  [94 %-99 %] 96 %  BP: (124-178)/(58-98) 124/60     Weight: 111.8 kg (246 lb 7.6 oz)  Body mass index is 36.4 kg/m².    Intake/Output Summary (Last 24 hours) at 10/13/2019 0024  Last data filed at 10/12/2019 1917  Gross per 24 hour   Intake 1280 ml   Output 2733 ml   Net -1453 ml      Physical Exam   Constitutional: She is oriented to person, place, and time. She appears well-developed. No distress.   HENT:   Head: Normocephalic.   Eyes: Conjunctivae and EOM are normal.   Neck: Normal range of motion. Neck supple.   Cardiovascular: Normal rate and regular rhythm.   Right upper anterior chest with tunneled catheter in place   Pulmonary/Chest: Effort normal. She has no wheezes. She has no rales.   Breathing comfortably at room air   Abdominal: Soft. Bowel sounds are normal.   Musculoskeletal: Normal range of motion. She exhibits edema.   anasarca   Neurological: She is alert and oriented to person, place, and time.   Skin: Skin is warm and dry. Capillary refill takes less than 2 seconds. She is not diaphoretic.   Psychiatric: She has a normal mood and affect.   Nursing note and vitals reviewed.      Significant Labs: All pertinent labs within the past 24 hours have been reviewed.    Significant Imaging: I have reviewed all pertinent imaging results/findings within the past  24 hours.

## 2019-10-13 NOTE — PROGRESS NOTES
"[facility]  Adult Nutrition  Progress Note    SUMMARY       Recommendations    1. Rec add Renal Diet restriction to diet order    Goals: Meet > 85% of estimated needs  Communication of RD Recs: (POC)    Reason for Assessment    Reason For Assessment: length of stay  Diagnosis: (Hypertensive emergency, ESRD)  Relevant Medical History: DM, CHF, nephrotic syndrome  Interdisciplinary Rounds: did not attend    General Information Comments: Pt denies issues chewing/swallowing/n/v/decreased appetite. Patient states she follows ADA diet at home.  Attempted to review Renal diet, but patient kept interrupting to talk about her BM.  Pt also seemed confused when bringing up dialysis. Pt does prepare her own meals at home. Would benefit from Renal diet education prior to d/c.  Pt will receive Renal diet education at HD facility as well. NFPE: 10/13/2019: WDL for fat, adequate muscle mass for age.     Nutrition Discharge Planning: Renal/ADA diet    Nutrition Risk Screen    Nutrition Risk Screen: no indicators present    Nutrition/Diet History    Spiritual, Cultural Beliefs, Mosque Practices, Values that Affect Care: no  Factors Affecting Nutritional Intake: None identified at this time    Anthropometrics    Temp: 97.7 °F (36.5 °C)  Height Method: Stated  Height: 5' 9" (175.3 cm)  Height (inches): 69 in  Weight Method: Bed Scale  Weight: 114 kg (251 lb 5.2 oz)  Weight (lb): 251.33 lb  Ideal Body Weight (IBW), Female: 145 lb  % Ideal Body Weight, Female (lb): 173.33 lb  BMI (Calculated): 37.2  BMI Grade: 35 - 39.9 - obesity - grade II       Lab/Procedures/Meds    Pertinent Labs Reviewed: reviewed  Pertinent Labs Comments: A1c 7.5%  Pertinent Medications Reviewed: reviewed  Pertinent Medications Comments: Bumetanide, EPO, insulin, hydralazine    Estimated/Assessed Needs    Weight Used For Calorie Calculations: 114 kg (251 lb 5.2 oz)  Energy Calorie Requirements (kcal): 2232-5884 kcal (up to 1.2 for stress " factor)  Energy Need Method: [place]  Protein Requirements:  g pro (0.8-1g/kg)  Weight Used For Protein Calculations: 114 kg (251 lb 5.2 oz)     Estimated Fluid Requirement Method: RDA Method  RDA Method (mL): 1750    Nutrition Prescription Ordered    Current Diet Order: 2000 ADA/Cardiac    Evaluation of Received Nutrient/Fluid Intake    I/O: reviewed  Energy Calories Required: meeting needs  Protein Required: meeting needs  Fluid Required: (per MD)  Comments: LBM: 10/12 (per pt)  Tolerance: tolerating  % Intake of Estimated Energy Needs: 75 - 100 %  % Meal Intake: 50 - 100 %    Nutrition Risk    Level of Risk/Frequency of Follow-up: (1 x week after Renal diet education)     Assessment and Plan    Nutrition Problem  Nutrition related knowledge deficit    Related to (etiology):   ESRD    Signs and Symptoms (as evidenced by):   New Onset HD    Interventions  Collaboration with providers    Nutrition Diagnosis Status:   New       Monitor and Evaluation    Food and Nutrient Intake: energy intake, food and beverage intake  Food and Nutrient Adminstration: diet order  Anthropometric Measurements: weight, weight change  Biochemical Data, Medical Tests and Procedures: glucose/endocrine profile  Nutrition-Focused Physical Findings: overall appearance     Malnutrition Assessment       Subcutaneous Fat Loss (Final Summary): well nourished  Muscle Loss Evaluation (Final Summary): well nourished         Nutrition Follow-Up    RD Follow-up?: Yes

## 2019-10-13 NOTE — ASSESSMENT & PLAN NOTE
Secondary to CKD that is now ESRD  Low but stable   Iron deficiency anemia.    No evidence of active bleeding.

## 2019-10-13 NOTE — PT/OT/SLP PROGRESS
Physical Therapy Treatment    Patient Name:  Magaly Epstein   MRN:  [id number]    Recommendations:     Discharge Recommendations:  nursing facility, skilled   Discharge Equipment Recommendations: none   Barriers to discharge: None    Assessment:     Magaly Epstein is a 69 y.o. female admitted with a medical diagnosis of Hypertensive emergency.  She presents with the following impairments/functional limitations:  weakness, impaired endurance, impaired functional mobilty, gait instability, impaired balance, decreased coordination, decreased safety awareness, impaired joint extensibility, decreased lower extremity function, pain, impaired coordination, impaired fine motor, edema, impaired cardiopulmonary response to activity.    Rehab Prognosis: Good; patient would benefit from acute skilled PT services to address these deficits and reach maximum level of function.    Recent Surgery: * No surgery found *      Plan:     During this hospitalization, patient to be seen daily to address the identified rehab impairments via gait training, therapeutic activities, therapeutic exercises and progress toward the following goals:    · Plan of Care Expires:  10/19/19    Subjective     Chief Complaint: I feel like I'm gonna faint.  Patient/Family Comments/goals: to return to PLOF  Pain/Comfort:  · Pain Rating 1: 1/10  · Location - Side 1: Right  · Location 1: knee  · Pain Addressed 1: Cessation of Activity      Objective:     Communicated with Nurse prior to session.  Patient found supine with peripheral IV, oxygen, telemetry(- 3.0 Lpm of O2 via N.C. ) upon PT entry to room.     General Precautions: Standard, fall, respiratory   Orthopedic Precautions:Full weight bearing   Braces: N/A     Functional Mobility:  · Bed Mobility:     · Rolling Left:  contact guard assistance  · Rolling Right: contact guard assistance  · Supine to Sit: contact guard assistance  · Sit to Supine: contact guard assistance  · Transfers:     · Sit to Stand:   minimum assistance with rolling walker  · Gait: 25' with RW and Min A and 4.0 Lpm of O2 via N.C.  Pt required VC's for sequencing, increase step length, and RW positioning.   Pt returned to EOB 2* c/o feeling like she gonna faint.       AM-PAC 6 CLICK MOBILITY  Turning over in bed (including adjusting bedclothes, sheets and blankets)?: 4  Sitting down on and standing up from a chair with arms (e.g., wheelchair, bedside commode, etc.): 3  Moving from lying on back to sitting on the side of the bed?: 4  Moving to and from a bed to a chair (including a wheelchair)?: 3  Need to walk in hospital room?: 3  Climbing 3-5 steps with a railing?: 2  Basic Mobility Total Score: 19       Therapeutic Activities and Exercises:   (B) LE seated TherEx: AP, LAQ, Hip abd/add, Hip flexion; 2 sets x 10 reps with VC's for proper form and sequencing.      Patient left supine with all lines intact and call button in reach..    GOALS:   Multidisciplinary Problems     Physical Therapy Goals        Problem: Physical Therapy Goal    Goal Priority Disciplines Outcome Goal Variances Interventions   Physical Therapy Goal     PT, PT/OT Ongoing, Progressing     Description:  Goals to be met by: 10/19/2019     Patient will increase functional independence with mobility by performin. Supine to sit with Modified [place]  2. Sit to stand transfer with Modified [place]  3. Gait  x 100 feet with Modified [place] using Rolling Walker.   4. Lower extremity exercise program x10 reps per handout, with independence                      Time Tracking:     PT Received On: 10/13/19  PT Start Time: 0900     PT Stop Time: 0930  PT Total Time (min): 30 min     Billable Minutes: Gait Training 14 min and Therapeutic Exercise 16 min    Treatment Type: Treatment  PT/PTA: PT     PTA Visit Number: 0     Price Christie, PT  10/13/2019

## 2019-10-13 NOTE — ASSESSMENT & PLAN NOTE
Nephrology consulted as above.  Trial of diuretics per Nephrology  Weaned off NC but still pretty edematous  Nephrology believes patient has progressed to end-stage renal disease  Tunneled catheter placed by IR on 10/10/19  First dialysis was on 10/11 and 2nd session today  As per Nephrology  Social service to arranging for outpatient dialysis  Discharged to SNF when accepted and dialysis arranged

## 2019-10-13 NOTE — ASSESSMENT & PLAN NOTE
Nephrology consulted as above.  Trial of diuretics per Nephrology  Weaned off nasal cannula but still pretty edematous  Tunneled catheter placed by IR on 10/10/19  First dialysis was on 10/11 and 2nd session today  As per Nephrology  Social service to arranging for outpatient dialysis  Discharged to SNF when accepted and dialysis arranged\

## 2019-10-14 LAB
ALBUMIN SERPL BCP-MCNC: 2.5 G/DL (ref 3.5–5.2)
ALLENS TEST: ABNORMAL
ALLENS TEST: ABNORMAL
ANION GAP SERPL CALC-SCNC: 9 MMOL/L (ref 8–16)
BUN SERPL-MCNC: 34 MG/DL (ref 8–23)
CALCIUM SERPL-MCNC: 8.2 MG/DL (ref 8.7–10.5)
CHLORIDE SERPL-SCNC: 105 MMOL/L (ref 95–110)
CO2 SERPL-SCNC: 24 MMOL/L (ref 23–29)
CREAT SERPL-MCNC: 3.2 MG/DL (ref 0.5–1.4)
DELSYS: ABNORMAL
DELSYS: ABNORMAL
EST. GFR  (AFRICAN AMERICAN): 16 ML/MIN/1.73 M^2
EST. GFR  (NON AFRICAN AMERICAN): 14 ML/MIN/1.73 M^2
FIO2: 100
FLOW: 15
FLOW: 3.5
GLUCOSE SERPL-MCNC: 124 MG/DL (ref 70–110)
HCO3 UR-SCNC: 24.5 MMOL/L (ref 24–28)
HCO3 UR-SCNC: 26.1 MMOL/L (ref 24–28)
MODE: ABNORMAL
MODE: ABNORMAL
PCO2 BLDA: 31.8 MMHG (ref 35–45)
PCO2 BLDA: 52.2 MMHG (ref 35–45)
PH SMN: 7.28 [PH] (ref 7.35–7.45)
PH SMN: 7.52 [PH] (ref 7.35–7.45)
PHOSPHATE SERPL-MCNC: 3.9 MG/DL (ref 2.7–4.5)
PO2 BLDA: 113 MMHG (ref 80–100)
PO2 BLDA: 87 MMHG (ref 80–100)
POC BE: -3 MMOL/L
POC BE: 3 MMOL/L
POC SATURATED O2: 98 % (ref 95–100)
POC SATURATED O2: 98 % (ref 95–100)
POC TCO2: 26 MMOL/L (ref 23–27)
POC TCO2: 27 MMOL/L (ref 23–27)
POCT GLUCOSE: 114 MG/DL (ref 70–110)
POCT GLUCOSE: 166 MG/DL (ref 70–110)
POCT GLUCOSE: 183 MG/DL (ref 70–110)
POCT GLUCOSE: 188 MG/DL (ref 70–110)
POTASSIUM SERPL-SCNC: 3.7 MMOL/L (ref 3.5–5.1)
SAMPLE: ABNORMAL
SAMPLE: ABNORMAL
SITE: ABNORMAL
SITE: ABNORMAL
SODIUM SERPL-SCNC: 138 MMOL/L (ref 136–145)
SP02: 97

## 2019-10-14 PROCEDURE — 25000003 PHARM REV CODE 250: Performed by: INTERNAL MEDICINE

## 2019-10-14 PROCEDURE — 97535 SELF CARE MNGMENT TRAINING: CPT

## 2019-10-14 PROCEDURE — 94761 N-INVAS EAR/PLS OXIMETRY MLT: CPT

## 2019-10-14 PROCEDURE — 25000242 PHARM REV CODE 250 ALT 637 W/ HCPCS: Performed by: HOSPITALIST

## 2019-10-14 PROCEDURE — 82803 BLOOD GASES ANY COMBINATION: CPT

## 2019-10-14 PROCEDURE — 36600 WITHDRAWAL OF ARTERIAL BLOOD: CPT

## 2019-10-14 PROCEDURE — 80100014 HC HEMODIALYSIS 1:1

## 2019-10-14 PROCEDURE — 27000221 HC OXYGEN, UP TO 24 HOURS

## 2019-10-14 PROCEDURE — 63600175 PHARM REV CODE 636 W HCPCS: Performed by: INTERNAL MEDICINE

## 2019-10-14 PROCEDURE — 80069 RENAL FUNCTION PANEL: CPT

## 2019-10-14 PROCEDURE — S0171 BUMETANIDE 0.5 MG: HCPCS | Performed by: INTERNAL MEDICINE

## 2019-10-14 PROCEDURE — 36415 COLL VENOUS BLD VENIPUNCTURE: CPT

## 2019-10-14 PROCEDURE — 99900035 HC TECH TIME PER 15 MIN (STAT)

## 2019-10-14 PROCEDURE — 94640 AIRWAY INHALATION TREATMENT: CPT

## 2019-10-14 PROCEDURE — 63600175 PHARM REV CODE 636 W HCPCS: Performed by: STUDENT IN AN ORGANIZED HEALTH CARE EDUCATION/TRAINING PROGRAM

## 2019-10-14 PROCEDURE — 25000003 PHARM REV CODE 250: Performed by: HOSPITALIST

## 2019-10-14 PROCEDURE — 94660 CPAP INITIATION&MGMT: CPT

## 2019-10-14 PROCEDURE — 27000190 HC CPAP FULL FACE MASK W/VALVE

## 2019-10-14 PROCEDURE — 97530 THERAPEUTIC ACTIVITIES: CPT

## 2019-10-14 PROCEDURE — 21400001 HC TELEMETRY ROOM

## 2019-10-14 PROCEDURE — P9047 ALBUMIN (HUMAN), 25%, 50ML: HCPCS | Performed by: STUDENT IN AN ORGANIZED HEALTH CARE EDUCATION/TRAINING PROGRAM

## 2019-10-14 PROCEDURE — 63600175 PHARM REV CODE 636 W HCPCS: Performed by: HOSPITALIST

## 2019-10-14 RX ORDER — ALBUMIN HUMAN 250 G/1000ML
12.5 SOLUTION INTRAVENOUS ONCE
Status: COMPLETED | OUTPATIENT
Start: 2019-10-14 | End: 2019-10-14

## 2019-10-14 RX ORDER — OLANZAPINE 5 MG/1
10 TABLET, ORALLY DISINTEGRATING ORAL ONCE AS NEEDED
Status: COMPLETED | OUTPATIENT
Start: 2019-10-14 | End: 2019-10-14

## 2019-10-14 RX ADMIN — PRAVASTATIN SODIUM 10 MG: 10 TABLET ORAL at 08:10

## 2019-10-14 RX ADMIN — FERROUS SULFATE TAB EC 325 MG (65 MG FE EQUIVALENT) 325 MG: 325 (65 FE) TABLET DELAYED RESPONSE at 08:10

## 2019-10-14 RX ADMIN — ENOXAPARIN SODIUM 30 MG: 100 INJECTION SUBCUTANEOUS at 05:10

## 2019-10-14 RX ADMIN — IPRATROPIUM BROMIDE AND ALBUTEROL SULFATE 3 ML: .5; 3 SOLUTION RESPIRATORY (INHALATION) at 08:10

## 2019-10-14 RX ADMIN — HEPARIN SODIUM 5000 UNITS: 5000 INJECTION INTRAVENOUS; SUBCUTANEOUS at 01:10

## 2019-10-14 RX ADMIN — INSULIN ASPART 10 UNITS: 100 INJECTION, SUSPENSION SUBCUTANEOUS at 08:10

## 2019-10-14 RX ADMIN — BUMETANIDE 2 MG: 0.25 INJECTION INTRAMUSCULAR; INTRAVENOUS at 05:10

## 2019-10-14 RX ADMIN — OLANZAPINE 10 MG: 5 TABLET, ORALLY DISINTEGRATING ORAL at 03:10

## 2019-10-14 RX ADMIN — INSULIN ASPART 1 UNITS: 100 INJECTION, SOLUTION INTRAVENOUS; SUBCUTANEOUS at 09:10

## 2019-10-14 RX ADMIN — MUPIROCIN: 20 OINTMENT TOPICAL at 08:10

## 2019-10-14 RX ADMIN — GUAIFENESIN AND DEXTROMETHORPHAN 5 ML: 100; 10 SYRUP ORAL at 06:10

## 2019-10-14 RX ADMIN — MUPIROCIN: 20 OINTMENT TOPICAL at 09:10

## 2019-10-14 RX ADMIN — DOCUSATE SODIUM 100 MG: 100 CAPSULE, LIQUID FILLED ORAL at 08:10

## 2019-10-14 RX ADMIN — HYDRALAZINE HYDROCHLORIDE 10 MG: 20 INJECTION INTRAMUSCULAR; INTRAVENOUS at 10:10

## 2019-10-14 RX ADMIN — HYDRALAZINE HYDROCHLORIDE 100 MG: 25 TABLET ORAL at 02:10

## 2019-10-14 RX ADMIN — ALBUMIN HUMAN 12.5 G: 0.25 SOLUTION INTRAVENOUS at 10:10

## 2019-10-14 RX ADMIN — HYDRALAZINE HYDROCHLORIDE 100 MG: 25 TABLET ORAL at 06:10

## 2019-10-14 RX ADMIN — INSULIN ASPART 10 UNITS: 100 INJECTION, SUSPENSION SUBCUTANEOUS at 05:10

## 2019-10-14 RX ADMIN — POLYETHYLENE GLYCOL 3350 17 G: 17 POWDER, FOR SOLUTION ORAL at 08:10

## 2019-10-14 RX ADMIN — GUAIFENESIN AND DEXTROMETHORPHAN 5 ML: 100; 10 SYRUP ORAL at 12:10

## 2019-10-14 NOTE — ASSESSMENT & PLAN NOTE
10/14/2019:  Capillary and serum glucose of stable overnight.  Uncontrolled with hyperglycemia.  Diabetic diet and insulin sliding scale.  Started on home 70/30 at lower dose to prevent hypoglycemia.  Increase as needed.

## 2019-10-14 NOTE — PT/OT/SLP PROGRESS
Occupational Therapy   Treatment    Name: Magaly Epstein  MRN: [id number]  Admitting Diagnosis:  Hypertensive emergency       Recommendations:     Discharge Recommendations: nursing facility, skilled  Discharge Equipment Recommendations:  (per SNF)  Barriers to discharge:  Decreased caregiver support(Pt requires increased amount of care to complete basic self-care needs)    Assessment:     Magaly Epstein is a 69 y.o. female with a medical diagnosis of Hypertensive emergency.  She presents with increased confusion today, but calm and able to re-direct with prompting. Performance deficits affecting function are weakness, impaired functional mobilty, impaired cognition, decreased safety awareness, impaired endurance, gait instability, decreased coordination, impaired balance, decreased upper extremity function, impaired self care skills, decreased lower extremity function, edema.     Rehab Prognosis:  Good; patient would benefit from acute skilled OT services to address these deficits and reach maximum level of function.       Plan:     Patient to be seen 5 x/week to address the above listed problems via self-care/home management, therapeutic activities, therapeutic exercises  · Plan of Care Expires: 10/19/19  · Plan of Care Reviewed with: patient    Subjective     Chief complaint: confused, stating that she needs to leave to fix her home because she lives alone   Patient's comment's/goals: thought she was going home today, re-oriented to goal for SNF and pt agreed to regain PLOF     Pain/Comfort:  · Pain Rating 1: 0/10    Objective:     Communicated with: nurseAyala, prior to session.  Patient found HOB elevated with bed alarm, peripheral IV, telemetry, oxygen(Avasys) upon OT entry to room.    General Precautions: Standard, fall, respiratory   Orthopedic Precautions:N/A   Braces: N/A     Occupational Performance:     Bed Mobility:    · Patient completed Rolling/Turning to Right with stand by assistance  · Patient  completed Scooting/Bridging with contact guard assistance  · Patient completed Supine to Sit with stand by assistance, with side rail and HOB elevated- with prompting   · Patient completed Sit to Supine with stand by assistance     Functional Mobility/Transfers:  · Functional Mobility: Pt attempted to sit to stand transfer x3, but pt kept sitting down in mid-stand, easily distractible. Pt then became emotional, but unable to state why she was upset. Pt was re-directed and remained calm, scooting at EOB with SBA and prompting.     Activities of Daily Living:  · Grooming: set-up with tooth brush, comb, and washcloth sitting EOB unsupported. Pt required frequent cueing d/t slow initiation to task.     · Upper Body Dressing: stand by assistance with donning back hospital gown with prompting       [facility] 6 Click ADL: 16    Treatment & Education:  · Pt re-educated on OT role/POC.   · Increased time taken today to re-orient patient d/t increased confusion   · Importance of OOB activity with staff assistance/ use of call bell   · Edu to increase independence and safety during functional t/f and bed mobility  · Sitting EOB, pt completed 1x15 B/L UE AROM:   · Shoulder flexion/extension   · Shoulder elevation/depression  · Elbow flexion/extension   · Forward punches 2x15   · Pt required increased rest breaks to implement pursed lip breathing and d/t fatigue   · Multiple self-care tasks/functional mobility completed- assistance level noted above   · All questions/concerns answered within OT scope of practice       Patient left HOB elevated with all lines intact, call button in reach, bed alarm on, nurse, Ayala, notified and Avas[employer] presentEducation:      GOALS:   Multidisciplinary Problems     Occupational Therapy Goals        Problem: Occupational Therapy Goal    Goal Priority Disciplines Outcome Interventions   Occupational Therapy Goal     OT, PT/OT Ongoing, Progressing    Description:  Goals to be met by: 10/19/19    Patient  will increase functional independence with ADLs by performing:    UE Dressing with Set-up Assistance.  LE Dressing with Stand-by Assistance.  Grooming while standing with Stand-by Assistance.  Toileting from bedside commode with Stand-by Assistance for hygiene and clothing management.   Toilet transfer to bedside commode with Stand-by Assistance.  Upper extremity exercise program x 20 reps per handout, with supervision.  Sit to stand with Contact Guard Assistance using RW. (goal updated from MIN A on 10/11/19)   Step transfer with Minimal Assistance using RW                         Time Tracking:     OT Date of Treatment: 10/14/19  OT Start Time: 0855  OT Stop Time: 0925  OT Total Time (min): 30 min    Billable Minutes:Self Care/Home Management 15 min  Therapeutic Activity 15 min  Total Time 30 min    Akanksha White OT  10/14/2019

## 2019-10-14 NOTE — NURSING
End of shift bedside report given to MICAH Dhaliwal. Patient in no apparent distress.     12 hour chart check complete.

## 2019-10-14 NOTE — PLAN OF CARE
Problem: Occupational Therapy Goal  Goal: Occupational Therapy Goal  Description  Goals to be met by: 10/19/19    Patient will increase functional independence with ADLs by performing:    UE Dressing with Set-up Assistance.  LE Dressing with Stand-by Assistance.  Grooming while standing with Stand-by Assistance.  Toileting from bedside commode with Stand-by Assistance for hygiene and clothing management.   Toilet transfer to bedside commode with Stand-by Assistance.  Upper extremity exercise program x 20 reps per handout, with supervision.  Sit to stand with Contact Guard Assistance using RW. (goal updated from MIN A on 10/11/19)   Step transfer with Minimal Assistance using RW        Outcome: Ongoing, Progressing   Session limited 2* increased confusion. Nurse, Ayala, sohail.

## 2019-10-14 NOTE — PROGRESS NOTES
"[facility]  Progress Note    Patient Name: Magaly Epstein  MRN: [id number]  Patient Class: IP- Inpatient   Admission Date: 10/3/2019  Length of Stay: 11 days  Attending Physician: Shelby De La Paz MD  Primary Care Provider: Anahy Bradley DO        Subjective:     Principal Problem:Hypertensive emergency    HPI:  70 y/o female patient presents for evaluation of acute onset of shortness of breath that started last night.  Patient lives in assisted living.  She was admitted here in April for shortness of breath due to hypertensive crisis and fluid overload.  Patient has history of nephrotic syndrome and anasarca and had a kidney biopsy at that time.   She also has a history of chronic hypertension.   Patient states compliance with medications.  She denies chest pain. She states she was just very short of breath and repeatedly asked for help.  She also complains of a dry cough. She has leg edema that she states is "always there."  Patient was placed on CPAP for an O2 sat of 60% on room air.  Blood pressure was reportedly elevated at 270 systolic.  She presented severely hypertensive and placed on Nitro infusion.  Patient is now feeling better.  No other complaints.    Overview/Hospital Course:  Ms. Epstein presented with shortness of breath. She was admitted for hypertensive crisis and volume overload. Blood pressure was elevated at 270 systolic.  Treatment initiated with Nitro infusion and she was placed on CPAP for hypoxia with O2 sat of 60% on room air.  Also noted with acute renal failure on CKD.  Nephrology consulted and started on IV Bumex.  Restarted home medications with improvement of BP and able to wean off Nitro drip. Stepped down to floor in stable condition on 10/5. Metolazone added by nephrology. Weaned off supplemental O2 but remained with anasarca. Unable to use ACE-I due to worsening renal function.  Renal function without significant improvement, Nephrology " finally recommended patient to start dialysis.  Tunneled catheter placed by IR on 10/10/19 with 1st dialysis the next day.  Social service to arrange for outpatient dialysis.  PT/OT recommends SNF placement.       Interval History: Patient currently without any complaints.  She says that her appetite waxes and wanes.  Reports that she is doing well with hemodialysis and is using the restroom without difficulty.    10/14/2019:  Patient seen while undergoing hemodialysis.  She reports feeling well and is tolerating an oral diet.  She otherwise has no complaints.    Review of Systems   Constitutional: Positive for activity change, appetite change and fatigue. Negative for chills, diaphoresis, fever and unexpected weight change.   HENT: Negative.    Eyes: Negative.    Respiratory: Negative for cough, chest tightness, shortness of breath and wheezing.    Cardiovascular: Positive for leg swelling. Negative for chest pain and palpitations.   Gastrointestinal: Negative for abdominal distention, abdominal pain, blood in stool, constipation, diarrhea, nausea and vomiting.   Genitourinary: Negative for dysuria and hematuria.   Musculoskeletal: Negative.    Skin: Negative.    Neurological: Positive for weakness. Negative for dizziness, seizures, syncope and light-headedness.   Psychiatric/Behavioral: Negative.      Objective:     Vital Signs (Most Recent):  Temp: 98.2 °F (36.8 °C) (10/14/19 0729)  Pulse: 64 (10/14/19 0729)  Resp: 18 (10/14/19 0729)  BP: (!) 154/77 (10/14/19 0729)  SpO2: 96 % (10/14/19 0800) Vital Signs (24h Range):  Temp:  [97.5 °F (36.4 °C)-98.8 °F (37.1 °C)] 98.2 °F (36.8 °C)  Pulse:  [57-65] 64  Resp:  [17-22] 18  SpO2:  [93 %-97 %] 96 %  BP: (137-163)/(63-84) 154/77     Weight: 111.9 kg (246 lb 11.1 oz)  Body mass index is 36.43 kg/m².    Intake/Output Summary (Last 24 hours) at 10/14/2019 1108  Last data filed at 10/14/2019 0241  Gross per 24 hour   Intake 680 ml   Output 300 ml   Net 380 ml      Physical  Exam   Constitutional: She is oriented to person, place, and time. She appears well-developed and well-nourished. No distress.   obese   HENT:   Head: Normocephalic and atraumatic.   Right Ear: External ear normal.   Left Ear: External ear normal.   Nose: Nose normal.   Eyes: Right eye exhibits no discharge. Left eye exhibits no discharge.   Neck: Normal range of motion.   Cardiovascular: Normal rate, regular rhythm, normal heart sounds and intact distal pulses. Exam reveals no gallop and no friction rub.   No murmur heard.  Pulmonary/Chest:   Regular effort with bibasilar crackles   Abdominal: Soft. Bowel sounds are normal. She exhibits no distension. There is no tenderness. There is no rebound and no guarding.   Musculoskeletal: Normal range of motion. She exhibits edema (3+ pitting edema 2/3 the way up the legs bilaterally).   Neurological: She is alert and oriented to person, place, and time.   Skin: Skin is warm and dry. She is not diaphoretic. No erythema.   Psychiatric: She has a normal mood and affect. Her behavior is normal. Judgment and thought content normal.   Nursing note and vitals reviewed.      Significant Labs:   CMP:   Recent Labs   Lab 10/13/19  0357 10/14/19  0343    138   K 3.8 3.7    105   CO2 22* 24    124*   BUN 31* 34*   CREATININE 2.9* 3.2*   CALCIUM 8.3* 8.2*   ALBUMIN 2.5* 2.5*   ANIONGAP 11 9   EGFRNONAA 16* 14*       Significant Imaging: I have reviewed all pertinent imaging results/findings within the past 24 hours.      Assessment/Plan:      * Hypertensive emergency  10/14/2019:  Possible discharge today pending outpatient hemodialysis scheduling and skilled nursing facility placement at [facility].  She reports needing arrived to hemodialysis and asked if she can go back to her home, instead.    Hypertensive emergency is resolved after initiating hemodialysis.  Patient presents with dyspnea and significant hypertension.  CXR showed mild interstitial edema  and bilateral pleural effusions.  Patient was placed on Nitro drip and diuresis with IV Bumex.  Patient's blood pressure is much better controlled in last 24 hours on oral amlodipine, bumetanide, hydralazine, and metoprolol.  Will continue as needed clonidine.    Acute renal failure superimposed on stage 3 chronic kidney disease  Nephrology consulted as above.  Trial of diuretics per Nephrology  Weaned off nasal cannula but still pretty edematous  Tunneled catheter placed by IR on 10/10/19  First dialysis was on 10/11 and 2nd session today  As per Nephrology  Social service to arranging for outpatient dialysis  Discharged to SNF when accepted and dialysis arranged    Essential hypertension  10/14/2019:  Blood pressure overnight stable.    Continue bumetanide, metoprolol, amlodipine, and hydralazine, and provide as needed clonidine.    Type 2 diabetes mellitus with renal complication  10/14/2019:  Capillary and serum glucose of stable overnight.  Uncontrolled with hyperglycemia.  Diabetic diet and insulin sliding scale.  Started on home 70/30 at lower dose to prevent hypoglycemia.  Increase as needed.    Hyperlipidemia  Continue pravastatin    Anemia of chronic disease  Secondary to CKD that is now ESRD  Low but stable   Iron deficiency anemia.    No evidence of active bleeding.     Elevated troponin  Probably secondary to demand ischemia from uncontrolled HTN and renal failure.  Denies any chest pain.      Nephrotic syndrome and end-stage renal disease  Patient with recent admission and evaluated by Nephrology.  Creatinine much higher than baseline.  Patient with kidney biopsy on last admit.   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  Diuretics per nephrology  Initiated treatment with dialysis as per Nephrology    Debility  PT/OT following  SNF placement pending      VTE Risk Mitigation (From admission, onward)         Ordered      heparin (porcine) injection 5,000 Units  As needed (PRN)      10/11/19 1109     enoxaparin injection 30 mg  Daily      10/03/19 0656     IP VTE HIGH RISK PATIENT  Once      10/03/19 0538                      Shelby De La Paz M.D.  Staff Nocturnist  Department of Hospital Medicine  [facility]  Pager: [phone]          N.B.: Portions of this note was dictated using M*Modal Fluency Direct--there may be voice recognition errors occasionally missed on review.

## 2019-10-14 NOTE — PROGRESS NOTES
Awake alert oriented NAD    Seen while on HD which she is tolerating well    Denies CNS ENT CP GI  RHEUM OR DERM SX  Past Medical History:   Diagnosis Date    Arthritis     CHF (congestive heart failure)     Diabetes mellitus      Review of patient's allergies indicates:   Allergen Reactions    Ciprofloxacin Anaphylaxis    Codeine Anaphylaxis    Neosporin [benzalkonium chloride] Anaphylaxis       Current Facility-Administered Medications   Medication    0.9%  NaCl infusion    0.9%  NaCl infusion    0.9%  NaCl infusion    acetaminophen tablet 650 mg    albuterol-ipratropium 2.5 mg-0.5 mg/3 mL nebulizer solution 3 mL    amLODIPine tablet 10 mg    benzonatate capsule 200 mg    bumetanide injection 2 mg    cloNIDine tablet 0.1 mg    dextromethorphan-guaifenesin  mg/5 ml liquid 5 mL    dextrose 50% injection 12.5 g    dextrose 50% injection 25 g    docusate sodium capsule 100 mg    enoxaparin injection 30 mg    epoetin cortes-epbx injection 10,000 Units    ferrous sulfate EC tablet 325 mg    glucagon (human recombinant) injection 1 mg    glucose chewable tablet 16 g    glucose chewable tablet 24 g    heparin (porcine) injection 5,000 Units    hydrALAZINE injection 10 mg    hydrALAZINE tablet 100 mg    insulin aspart protamine-insulin aspart (NovoLOG 70/30) injection    insulin aspart U-100 pen 1-10 Units    metoprolol succinate (TOPROL-XL) 24 hr tablet 50 mg    mupirocin 2 % ointment    ondansetron injection 4 mg    polyethylene glycol packet 17 g    pravastatin tablet 10 mg    senna-docusate 8.6-50 mg per tablet 1 tablet    sodium chloride 0.9% flush 10 mL       LABS    Recent Results (from the past 24 hour(s))   POCT glucose    Collection Time: 10/13/19  3:58 PM   Result Value Ref Range    POCT Glucose 194 (H) 70 - 110 mg/dL   POCT glucose    Collection Time: 10/13/19  7:14 PM   Result Value Ref Range    POCT Glucose 220 (H) 70 - 110 mg/dL   Renal function panel    Collection  Time: 10/14/19  3:43 AM   Result Value Ref Range    Glucose 124 (H) 70 - 110 mg/dL    Sodium 138 136 - 145 mmol/L    Potassium 3.7 3.5 - 5.1 mmol/L    Chloride 105 95 - 110 mmol/L    CO2 24 23 - 29 mmol/L    BUN, Bld 34 (H) 8 - 23 mg/dL    Calcium 8.2 (L) 8.7 - 10.5 mg/dL    Creatinine 3.2 (H) 0.5 - 1.4 mg/dL    Albumin 2.5 (L) 3.5 - 5.2 g/dL    Phosphorus 3.9 2.7 - 4.5 mg/dL    eGFR if African American 16 (A) >60 mL/min/1.73 m^2    eGFR if non African American 14 (A) >60 mL/min/1.73 m^2    Anion Gap 9 8 - 16 mmol/L   POCT glucose    Collection Time: 10/14/19  7:29 AM   Result Value Ref Range    POCT Glucose 166 (H) 70 - 110 mg/dL   ]    I/O last 3 completed shifts:  In: 920 [P.O.:920]  Out: 800 [Urine:800]    Vitals:    10/13/19 2312 10/14/19 0458 10/14/19 0729 10/14/19 0800   BP: (!) 162/70 (!) 163/71 (!) 154/77    Pulse: 65 62 64    Resp: 19 (!) 22 18    Temp: 98.8 °F (37.1 °C) 98.2 °F (36.8 °C) 98.2 °F (36.8 °C)    TempSrc: Oral Oral Oral    SpO2: 95% (!) 93% 97% 96%   Weight:  111.9 kg (246 lb 11.1 oz)     Height:           No Jvd, Thyromegaly or Lymphadenopathy  Lungs: Fairly clear anteriorly and laterally  Cor: RRR no G or rubs  Abd: Soft benign good bowel sounds non tender  Ext: Pos edema    A)  ANIA on ckd3a dialysis dependent for now  Fluid OL  DM with dm nephropathy  Hx of pulm edema  Anasarca  Iron def anemia    P)    Renal Diet  Home meds  Protect access  HD MWF  EPO   Binders  Adjust all meds to the degree of renal fx  Close follow up I/O and weights  Maintain Hydration

## 2019-10-14 NOTE — SUBJECTIVE & OBJECTIVE
Interval History: Patient currently without any complaints.  She says that her appetite waxes and wanes.  Reports that she is doing well with hemodialysis and is using the restroom without difficulty.    10/14/2019:  Patient seen while undergoing hemodialysis.  She reports feeling well and is tolerating an oral diet.  She otherwise has no complaints.    Review of Systems   Constitutional: Positive for activity change, appetite change and fatigue. Negative for chills, diaphoresis, fever and unexpected weight change.   HENT: Negative.    Eyes: Negative.    Respiratory: Negative for cough, chest tightness, shortness of breath and wheezing.    Cardiovascular: Positive for leg swelling. Negative for chest pain and palpitations.   Gastrointestinal: Negative for abdominal distention, abdominal pain, blood in stool, constipation, diarrhea, nausea and vomiting.   Genitourinary: Negative for dysuria and hematuria.   Musculoskeletal: Negative.    Skin: Negative.    Neurological: Positive for weakness. Negative for dizziness, seizures, syncope and light-headedness.   Psychiatric/Behavioral: Negative.      Objective:     Vital Signs (Most Recent):  Temp: 98.2 °F (36.8 °C) (10/14/19 0729)  Pulse: 64 (10/14/19 0729)  Resp: 18 (10/14/19 0729)  BP: (!) 154/77 (10/14/19 0729)  SpO2: 96 % (10/14/19 0800) Vital Signs (24h Range):  Temp:  [97.5 °F (36.4 °C)-98.8 °F (37.1 °C)] 98.2 °F (36.8 °C)  Pulse:  [57-65] 64  Resp:  [17-22] 18  SpO2:  [93 %-97 %] 96 %  BP: (137-163)/(63-84) 154/77     Weight: 111.9 kg (246 lb 11.1 oz)  Body mass index is 36.43 kg/m².    Intake/Output Summary (Last 24 hours) at 10/14/2019 1108  Last data filed at 10/14/2019 0241  Gross per 24 hour   Intake 680 ml   Output 300 ml   Net 380 ml      Physical Exam   Constitutional: She is oriented to person, place, and time. She appears well-developed and well-nourished. No distress.   obese   HENT:   Head: Normocephalic and atraumatic.   Right Ear: External ear normal.    Left Ear: External ear normal.   Nose: Nose normal.   Eyes: Right eye exhibits no discharge. Left eye exhibits no discharge.   Neck: Normal range of motion.   Cardiovascular: Normal rate, regular rhythm, normal heart sounds and intact distal pulses. Exam reveals no gallop and no friction rub.   No murmur heard.  Pulmonary/Chest:   Regular effort with bibasilar crackles   Abdominal: Soft. Bowel sounds are normal. She exhibits no distension. There is no tenderness. There is no rebound and no guarding.   Musculoskeletal: Normal range of motion. She exhibits edema (3+ pitting edema 2/3 the way up the legs bilaterally).   Neurological: She is alert and oriented to person, place, and time.   Skin: Skin is warm and dry. She is not diaphoretic. No erythema.   Psychiatric: She has a normal mood and affect. Her behavior is normal. Judgment and thought content normal.   Nursing note and vitals reviewed.      Significant Labs:   CMP:   Recent Labs   Lab 10/13/19  0357 10/14/19  0343    138   K 3.8 3.7    105   CO2 22* 24    124*   BUN 31* 34*   CREATININE 2.9* 3.2*   CALCIUM 8.3* 8.2*   ALBUMIN 2.5* 2.5*   ANIONGAP 11 9   EGFRNONAA 16* 14*       Significant Imaging: I have reviewed all pertinent imaging results/findings within the past 24 hours.

## 2019-10-14 NOTE — ASSESSMENT & PLAN NOTE
10/14/2019:  Possible discharge today pending outpatient hemodialysis scheduling and skilled nursing facility placement at [facility].  She reports needing arrived to hemodialysis and asked if she can go back to her home, instead.    Hypertensive emergency is resolved after initiating hemodialysis.  Patient presents with dyspnea and significant hypertension.  CXR showed mild interstitial edema and bilateral pleural effusions.  Patient was placed on Nitro drip and diuresis with IV Bumex.  Patient's blood pressure is much better controlled in last 24 hours on oral amlodipine, bumetanide, hydralazine, and metoprolol.  Will continue as needed clonidine.

## 2019-10-14 NOTE — ASSESSMENT & PLAN NOTE
10/14/2019:  Blood pressure overnight stable.    Continue bumetanide, metoprolol, amlodipine, and hydralazine, and provide as needed clonidine.

## 2019-10-14 NOTE — NURSING
Report received from Ashleigh LASSITER RN. Patient care assumed. Patient observed lying in bed with cardiac monitoring in progress. Vital signs obtained and found in flow sheets with complete patient assessment. Skin dry and intact with a 22g LAC PIV noted saline locked. No complaints of pain or signs of respiratory distress noted. Nephrology consulted. Plan to monitor blood pressure, continue diuresis, and await SNF placement. Patient updated on plan of care and verbalized understanding. Call light in reach and patient instructed to inform the nurse if anything is needed. Bed alarm maintained for patient safety. Patient stable and will continue to be monitored.

## 2019-10-14 NOTE — PT/OT/SLP PROGRESS
Physical Therapy      Patient Name:  Magaly Epstein   MRN:  [id number]    Patient not seen today secondary to Dialysis. Will follow-up 10/15/2019.    Raquel Taylor, PT

## 2019-10-14 NOTE — ASSESSMENT & PLAN NOTE
Nephrology consulted as above.  Trial of diuretics per Nephrology  Weaned off nasal cannula but still pretty edematous  Tunneled catheter placed by IR on 10/10/19  First dialysis was on 10/11 and 2nd session today  As per Nephrology  Social service to arranging for outpatient dialysis  Discharged to SNF when accepted and dialysis arranged

## 2019-10-14 NOTE — NURSING
Bedside report received from MICAH Dhaliwal. Patient awake and alert sitting up in bed. NAD noted at this time. All safety precautions in place. Will continue to monitor.

## 2019-10-14 NOTE — PROGRESS NOTES
Results for ANTONINA KINNEY (MRN [id number]) as of 10/14/2019 15:58   Ref. Range 10/14/2019 15:47   POC PH Latest Ref Range: 7.35 - 7.45  7.523 (H)   POC PCO2 Latest Ref Range: 35 - 45 mmHg 31.8 (L)   POC PO2 Latest Ref Range: 80 - 100 mmHg 87   POC BE Latest Ref Range: -2 to 2 mmol/L 3   POC HCO3 Latest Ref Range: 24 - 28 mmol/L 26.1   POC SATURATED O2 Latest Ref Range: 95 - 100 % 98   POC TCO2 Latest Ref Range: 23 - 27 mmol/L 27   Flow Unknown 3.5   Sample Unknown ARTERIAL   DelSys Unknown Nasal Can   Allens Test Unknown Pass   Site Unknown RR   Mode Unknown SPONT

## 2019-10-14 NOTE — PLAN OF CARE
Problem: Fall Injury Risk  Goal: Absence of Fall and Fall-Related Injury  Intervention: Identify and Manage Contributors to Fall Injury Risk  Flowsheets (Taken 10/14/2019 0525)  Self-Care Promotion: independence encouraged; BADL personal objects within reach; BADL personal routines maintained; meal setup provided  Medication Review/Management: medications reviewed  Intervention: Promote Injury-Free Environment  Flowsheets (Taken 10/14/2019 0525)  Safety Promotion/Fall Prevention: chair alarm set; bed alarm set; commode/urinal/bedpan at bedside; nonskid shoes/socks when out of bed; room near unit station; side rails raised x 2; /camera at bedside  Environmental Safety Modification: assistive device/personal items within reach; clutter free environment maintained; lighting adjusted; room organization consistent; room near unit station     Problem: Adult Inpatient Plan of Care  Goal: Plan of Care Review  Flowsheets (Taken 10/14/2019 0525)  Plan of Care Reviewed With: patient     Problem: Adult Inpatient Plan of Care  Goal: Absence of Hospital-Acquired Illness or Injury  Intervention: Identify and Manage Fall Risk  Flowsheets (Taken 10/14/2019 0525)  Safety Promotion/Fall Prevention: chair alarm set; bed alarm set; commode/urinal/bedpan at bedside; nonskid shoes/socks when out of bed; room near unit station; side rails raised x 2; /camera at bedside  Intervention: Prevent VTE (venous thromboembolism)  Flowsheets (Taken 10/14/2019 0525)  VTE Prevention/Management: ROM (passive) performed; ROM (active) performed     Problem: Adult Inpatient Plan of Care  Goal: Optimal Comfort and Wellbeing  Intervention: Provide Person-Centered Care  Flowsheets (Taken 10/14/2019 0525)  Trust Relationship/Rapport: care explained; choices provided; questions answered; questions encouraged; reassurance provided; thoughts/feelings acknowledged     Problem: Diabetes Comorbidity  Goal: Blood Glucose Level  Within Desired Range  Intervention: Maintain Glycemic Control  Flowsheets (Taken 10/14/2019 0525)  Glycemic Management: blood glucose monitoring; supplemental insulin given     Problem: Skin Injury Risk Increased  Goal: Skin Health and Integrity  Intervention: Optimize Skin Protection  Flowsheets (Taken 10/14/2019 0525)  Pressure Reduction Techniques: frequent weight shift encouraged  Head of Bed (HOB): HOB at 60-90 degrees     Problem: Hypertension Acute  Goal: Blood Pressure Within Desired Range  Intervention: Normalize Blood Pressure  Flowsheets (Taken 10/14/2019 0525)  Sensory Stimulation Regulation: music/television provided for relaxation  Medication Review/Management: medications reviewed; dosing adjusted     Patient remained free of injury throughout the shift. Vital signs remained WNL. No complaints of pain or signs of respiratory distress noted. Skin clean, dry, and no new abnormalities noted. Nephrology consulted. Plan to monitor blood pressure, continue diuresis, and await SNF placement once outpatient dialysis set up. Patient updated on plan of care and verbalized understanding. Bed alarm maintained for patient safety and telesitter at bedside. Call light in reach and patient instructed to inform the nurse if anything is needed. Patient stable and will continue to be monitored.

## 2019-10-15 LAB
ALBUMIN SERPL BCP-MCNC: 2.2 G/DL (ref 3.5–5.2)
ALLENS TEST: ABNORMAL
ANION GAP SERPL CALC-SCNC: 5 MMOL/L (ref 8–16)
BUN SERPL-MCNC: 23 MG/DL (ref 8–23)
CALCIUM SERPL-MCNC: 7.8 MG/DL (ref 8.7–10.5)
CHLORIDE SERPL-SCNC: 106 MMOL/L (ref 95–110)
CO2 SERPL-SCNC: 26 MMOL/L (ref 23–29)
CREAT SERPL-MCNC: 2.6 MG/DL (ref 0.5–1.4)
DELSYS: ABNORMAL
EP: 5
ERYTHROCYTE [SEDIMENTATION RATE] IN BLOOD BY WESTERGREN METHOD: 8 MM/H
EST. GFR  (AFRICAN AMERICAN): 21 ML/MIN/1.73 M^2
EST. GFR  (NON AFRICAN AMERICAN): 18 ML/MIN/1.73 M^2
FIO2: 50
GLUCOSE SERPL-MCNC: 175 MG/DL (ref 70–110)
HCO3 UR-SCNC: 26.5 MMOL/L (ref 24–28)
IP: 12
MIN VOL: 7.6
MODE: ABNORMAL
PCO2 BLDA: 45.3 MMHG (ref 35–45)
PH SMN: 7.38 [PH] (ref 7.35–7.45)
PHOSPHATE SERPL-MCNC: 3.7 MG/DL (ref 2.7–4.5)
PO2 BLDA: 165 MMHG (ref 80–100)
POC BE: 1 MMOL/L
POC SATURATED O2: 99 % (ref 95–100)
POC TCO2: 28 MMOL/L (ref 23–27)
POCT GLUCOSE: 139 MG/DL (ref 70–110)
POCT GLUCOSE: 141 MG/DL (ref 70–110)
POCT GLUCOSE: 148 MG/DL (ref 70–110)
POCT GLUCOSE: 179 MG/DL (ref 70–110)
POTASSIUM SERPL-SCNC: 3.7 MMOL/L (ref 3.5–5.1)
SAMPLE: ABNORMAL
SITE: ABNORMAL
SODIUM SERPL-SCNC: 137 MMOL/L (ref 136–145)

## 2019-10-15 PROCEDURE — 82803 BLOOD GASES ANY COMBINATION: CPT

## 2019-10-15 PROCEDURE — 25000003 PHARM REV CODE 250: Performed by: HOSPITALIST

## 2019-10-15 PROCEDURE — 94640 AIRWAY INHALATION TREATMENT: CPT

## 2019-10-15 PROCEDURE — 27000221 HC OXYGEN, UP TO 24 HOURS

## 2019-10-15 PROCEDURE — 97110 THERAPEUTIC EXERCISES: CPT

## 2019-10-15 PROCEDURE — 63600175 PHARM REV CODE 636 W HCPCS: Performed by: INTERNAL MEDICINE

## 2019-10-15 PROCEDURE — 36600 WITHDRAWAL OF ARTERIAL BLOOD: CPT

## 2019-10-15 PROCEDURE — 97803 MED NUTRITION INDIV SUBSEQ: CPT

## 2019-10-15 PROCEDURE — 25000003 PHARM REV CODE 250: Performed by: INTERNAL MEDICINE

## 2019-10-15 PROCEDURE — 97530 THERAPEUTIC ACTIVITIES: CPT

## 2019-10-15 PROCEDURE — 99900035 HC TECH TIME PER 15 MIN (STAT)

## 2019-10-15 PROCEDURE — 25000242 PHARM REV CODE 250 ALT 637 W/ HCPCS: Performed by: HOSPITALIST

## 2019-10-15 PROCEDURE — 80069 RENAL FUNCTION PANEL: CPT

## 2019-10-15 PROCEDURE — S0171 BUMETANIDE 0.5 MG: HCPCS | Performed by: INTERNAL MEDICINE

## 2019-10-15 PROCEDURE — 21400001 HC TELEMETRY ROOM

## 2019-10-15 PROCEDURE — 94761 N-INVAS EAR/PLS OXIMETRY MLT: CPT

## 2019-10-15 PROCEDURE — 36415 COLL VENOUS BLD VENIPUNCTURE: CPT

## 2019-10-15 RX ORDER — HEPARIN SODIUM 5000 [USP'U]/ML
5000 INJECTION, SOLUTION INTRAVENOUS; SUBCUTANEOUS EVERY 12 HOURS
Status: DISCONTINUED | OUTPATIENT
Start: 2019-10-15 | End: 2019-10-17 | Stop reason: HOSPADM

## 2019-10-15 RX ORDER — QUETIAPINE FUMARATE 25 MG/1
25 TABLET, FILM COATED ORAL NIGHTLY
Status: DISCONTINUED | OUTPATIENT
Start: 2019-10-15 | End: 2019-10-17 | Stop reason: HOSPADM

## 2019-10-15 RX ORDER — HYDRALAZINE HYDROCHLORIDE 25 MG/1
25 TABLET, FILM COATED ORAL EVERY 8 HOURS
Status: DISCONTINUED | OUTPATIENT
Start: 2019-10-15 | End: 2019-10-17 | Stop reason: HOSPADM

## 2019-10-15 RX ADMIN — HYDRALAZINE HYDROCHLORIDE 25 MG: 25 TABLET, FILM COATED ORAL at 02:10

## 2019-10-15 RX ADMIN — QUETIAPINE FUMARATE 25 MG: 25 TABLET ORAL at 09:10

## 2019-10-15 RX ADMIN — INSULIN ASPART 10 UNITS: 100 INJECTION, SUSPENSION SUBCUTANEOUS at 09:10

## 2019-10-15 RX ADMIN — IPRATROPIUM BROMIDE AND ALBUTEROL SULFATE 3 ML: .5; 3 SOLUTION RESPIRATORY (INHALATION) at 09:10

## 2019-10-15 RX ADMIN — BUMETANIDE 2 MG: 0.25 INJECTION INTRAMUSCULAR; INTRAVENOUS at 09:10

## 2019-10-15 RX ADMIN — INSULIN ASPART 10 UNITS: 100 INJECTION, SUSPENSION SUBCUTANEOUS at 05:10

## 2019-10-15 RX ADMIN — GUAIFENESIN AND DEXTROMETHORPHAN 5 ML: 100; 10 SYRUP ORAL at 11:10

## 2019-10-15 RX ADMIN — METOPROLOL SUCCINATE 50 MG: 50 TABLET, EXTENDED RELEASE ORAL at 09:10

## 2019-10-15 RX ADMIN — INSULIN ASPART 2 UNITS: 100 INJECTION, SOLUTION INTRAVENOUS; SUBCUTANEOUS at 05:10

## 2019-10-15 RX ADMIN — FERROUS SULFATE TAB EC 325 MG (65 MG FE EQUIVALENT) 325 MG: 325 (65 FE) TABLET DELAYED RESPONSE at 09:10

## 2019-10-15 RX ADMIN — BUMETANIDE 2 MG: 0.25 INJECTION INTRAMUSCULAR; INTRAVENOUS at 05:10

## 2019-10-15 RX ADMIN — EPOETIN ALFA-EPBX 10000 UNITS: 10000 INJECTION, SOLUTION INTRAVENOUS; SUBCUTANEOUS at 05:10

## 2019-10-15 RX ADMIN — HYDRALAZINE HYDROCHLORIDE 25 MG: 25 TABLET, FILM COATED ORAL at 09:10

## 2019-10-15 RX ADMIN — GUAIFENESIN AND DEXTROMETHORPHAN 5 ML: 100; 10 SYRUP ORAL at 02:10

## 2019-10-15 RX ADMIN — PRAVASTATIN SODIUM 10 MG: 10 TABLET ORAL at 09:10

## 2019-10-15 RX ADMIN — DOCUSATE SODIUM 100 MG: 100 CAPSULE, LIQUID FILLED ORAL at 09:10

## 2019-10-15 RX ADMIN — AMLODIPINE BESYLATE 10 MG: 5 TABLET ORAL at 09:10

## 2019-10-15 RX ADMIN — HEPARIN SODIUM 5000 UNITS: 5000 INJECTION INTRAVENOUS; SUBCUTANEOUS at 09:10

## 2019-10-15 NOTE — PLAN OF CARE
Patient remains free from injury and falls this shift. Patient remains on Bipap. Patient remains in bilateral wrist restraints due to pulling off mask. Patient arouses to voice. Repositioned throughout shift with heels elevated. Plan of care continued.

## 2019-10-15 NOTE — PLAN OF CARE
10/15/19 1603   Post-Acute Status   Post-Acute Authorization Dialysis;Placement  ([facility] & [facility])   Post-Acute Placement Status Pending Payor Review  (Dialysis chair to start on Thursday,10/17/2019 at 10:15AM. Nuvia with [facility] called.  [place] informed Monae at [facility] to request [employer] authorization for SNF.)   Diaylsis Status Set-up Complete   Discharge Delays (!) Other   Felicia Garcia, RN, BSN, STN CCM  10/15/2019

## 2019-10-15 NOTE — PLAN OF CARE
Problem: Physical Therapy Goal  Goal: Physical Therapy Goal  Description  Goals to be met by: 10/19/2019     Patient will increase functional independence with mobility by performin. Supine to sit with Modified Bradley  2. Sit to stand transfer with Modified Bradley  3. Gait  x 100 feet with Modified Bradley using Rolling Walker.   4. Lower extremity exercise program x10 reps per handout, with independence     Outcome: Ongoing, Progressing   Pt limited 2/2 confusion and agitation yesterday.  Pt alert and oriented to day, continue with POC.

## 2019-10-15 NOTE — NURSING
Patient placed on Bipap per order. Bilateral wrist restraints on due to patient pulling off mask. Patient breathing comfortably.

## 2019-10-15 NOTE — SUBJECTIVE & OBJECTIVE
Interval History: given olanzapine 10 mg last night for agitation. Was also placed on BiPAP and restraints. Patient slept. Now awake and stable. Patient states issues with insomnia at home.     Review of Systems   Respiratory: Negative.    Cardiovascular: Negative.    Gastrointestinal: Negative.    Psychiatric/Behavioral: Positive for confusion.        Insomnia     Objective:     Vital Signs (Most Recent):  Temp: 98.9 °F (37.2 °C) (10/15/19 0829)  Pulse: 64 (10/15/19 0829)  Resp: 16 (10/15/19 0829)  BP: (!) 154/90 (10/15/19 0829)  SpO2: 100 % (10/15/19 0829) Vital Signs (24h Range):  Temp:  [96.8 °F (36 °C)-98.9 °F (37.2 °C)] 98.9 °F (37.2 °C)  Pulse:  [] 64  Resp:  [16-36] 16  SpO2:  [96 %-100 %] 100 %  BP: (121-178)/(55-97) 154/90     Weight: 111.9 kg (246 lb 11.1 oz)  Body mass index is 36.43 kg/m².    Intake/Output Summary (Last 24 hours) at 10/15/2019 1156  Last data filed at 10/14/2019 2200  Gross per 24 hour   Intake 740 ml   Output 1969 ml   Net -1229 ml      Physical Exam   Constitutional: She is oriented to person, place, and time. She appears well-developed. No distress.   Cardiovascular: Normal rate and regular rhythm.   Pulmonary/Chest: Effort normal and breath sounds normal. She has no rales.   Abdominal: Soft. Bowel sounds are normal.   Musculoskeletal: She exhibits edema.   Neurological: She is alert and oriented to person, place, and time.   Skin: She is not diaphoretic.   Psychiatric: She has a normal mood and affect. Her behavior is normal. Judgment and thought content normal.   Nursing note and vitals reviewed.      Significant Labs: All pertinent labs within the past 24 hours have been reviewed.    Significant Imaging: I have reviewed all pertinent imaging results/findings within the past 24 hours.  I have reviewed and interpreted all pertinent imaging results/findings within the past 24 hours.

## 2019-10-15 NOTE — PROGRESS NOTES
Awake alert oriented  Very upset because last PM she needed to be restrained     Denies CNS ENT CP GI  RHEUM OR DERM SX  Past Medical History:   Diagnosis Date    Arthritis     CHF (congestive heart failure)     Diabetes mellitus      Review of patient's allergies indicates:   Allergen Reactions    Ciprofloxacin Anaphylaxis    Codeine Anaphylaxis    Neosporin [benzalkonium chloride] Anaphylaxis       Current Facility-Administered Medications   Medication    0.9%  NaCl infusion    0.9%  NaCl infusion    0.9%  NaCl infusion    acetaminophen tablet 650 mg    albuterol-ipratropium 2.5 mg-0.5 mg/3 mL nebulizer solution 3 mL    amLODIPine tablet 10 mg    benzonatate capsule 200 mg    bumetanide injection 2 mg    cloNIDine tablet 0.1 mg    dextromethorphan-guaifenesin  mg/5 ml liquid 5 mL    dextrose 50% injection 12.5 g    dextrose 50% injection 25 g    docusate sodium capsule 100 mg    enoxaparin injection 30 mg    epoetin cortes-epbx injection 10,000 Units    ferrous sulfate EC tablet 325 mg    glucagon (human recombinant) injection 1 mg    glucose chewable tablet 16 g    glucose chewable tablet 24 g    heparin (porcine) injection 5,000 Units    hydrALAZINE injection 10 mg    hydrALAZINE tablet 100 mg    insulin aspart protamine-insulin aspart (NovoLOG 70/30) injection    insulin aspart U-100 pen 1-10 Units    metoprolol succinate (TOPROL-XL) 24 hr tablet 50 mg    ondansetron injection 4 mg    polyethylene glycol packet 17 g    pravastatin tablet 10 mg    senna-docusate 8.6-50 mg per tablet 1 tablet    sodium chloride 0.9% flush 10 mL     CMP  Sodium   Date Value Ref Range Status   10/15/2019 137 136 - 145 mmol/L Final     Potassium   Date Value Ref Range Status   10/15/2019 3.7 3.5 - 5.1 mmol/L Final     Chloride   Date Value Ref Range Status   10/15/2019 106 95 - 110 mmol/L Final     CO2   Date Value Ref Range Status   10/15/2019 26 23 - 29 mmol/L Final     Glucose   Date  Value Ref Range Status   10/15/2019 175 (H) 70 - 110 mg/dL Final     BUN, Bld   Date Value Ref Range Status   10/15/2019 23 8 - 23 mg/dL Final     Creatinine   Date Value Ref Range Status   10/15/2019 2.6 (H) 0.5 - 1.4 mg/dL Final     Calcium   Date Value Ref Range Status   10/15/2019 7.8 (L) 8.7 - 10.5 mg/dL Final     Total Protein   Date Value Ref Range Status   10/03/2019 6.1 6.0 - 8.4 g/dL Final     Albumin   Date Value Ref Range Status   10/15/2019 2.2 (L) 3.5 - 5.2 g/dL Final     Total Bilirubin   Date Value Ref Range Status   10/03/2019 0.2 0.1 - 1.0 mg/dL Final     Comment:     For infants and newborns, interpretation of results should be based  on gestational age, weight and in agreement with clinical  observations.  Premature Infant recommended reference ranges:  Up to 24 hours.............<8.0 mg/dL  Up to 48 hours............<12.0 mg/dL  3-5 days..................<15.0 mg/dL  6-29 days.................<15.0 mg/dL       Alkaline Phosphatase   Date Value Ref Range Status   10/03/2019 193 (H) 55 - 135 U/L Final     AST   Date Value Ref Range Status   10/03/2019 64 (H) 10 - 40 U/L Final     ALT   Date Value Ref Range Status   10/03/2019 40 10 - 44 U/L Final     Anion Gap   Date Value Ref Range Status   10/15/2019 5 (L) 8 - 16 mmol/L Final     eGFR if    Date Value Ref Range Status   10/15/2019 21 (A) >60 mL/min/1.73 m^2 Final     eGFR if non    Date Value Ref Range Status   10/15/2019 18 (A) >60 mL/min/1.73 m^2 Final     Comment:     Calculation used to obtain the estimated glomerular filtration  rate (eGFR) is the CKD-EPI equation.          I/O last 3 completed shifts:  In: 1420 [P.O.:920; Other:500]  Out: 2119 [Urine:450; Other:1669]    Vitals:    10/14/19 2313 10/15/19 0438 10/15/19 0559 10/15/19 0829   BP: (!) 121/55 (!) 169/77 (!) 162/73 (!) 154/90   Pulse: 72 72  64   Resp: 17 18  16   Temp: 98.7 °F (37.1 °C) 96.8 °F (36 °C)  98.9 °F (37.2 °C)   TempSrc: Oral Axillary   Axillary   SpO2: 100% 100%  100%   Weight:       Height:           No Jvd, Thyromegaly or Lymphadenopathy  Lungs: Fairly clear anteriorly and laterally  Cor: RRR no G or rubs  Abd: Soft benign good bowel sounds non tender  Ext: Pos edema    A)    ANIA on ckd3a dialysis dependent for now   ml   Fluid OL seems better  DM with dm nephropathy  Hx of pulm edema  Anasarca better   Nephrotic snd   Iron def anemia  Episode of confusion last pm that needed retraints    P)    Renal Diet  Home meds  Protect access  HD MWF  EPO   Binders  Adjust all meds to the degree of renal fx  Close follow up I/O and weights  Maintain Hydration

## 2019-10-15 NOTE — NURSING
Bedside report received from MICAH Villasenor. Patient remains on Bipap. Remains in bilateral wrist restraints due to pulling of mask. Patient arouses to loud verbal stimuli and touch. NAD noted at this time. All safety precautions in place. Will continue to monitor.

## 2019-10-15 NOTE — NURSING
Upon receiving report on patient. Previous RN placed bilateral soft wrist restaints on patient due to continuing to pull off oxygen. Patient in respiratory distress. Oxygen 98% on 100% non re-breather respirations 35. Respiratory at bedside to perform ABGS. Radiology notified of STAT Xray.

## 2019-10-15 NOTE — ASSESSMENT & PLAN NOTE
Patient with recent admission and evaluated by Nephrology.  Creatinine much higher than baseline.  Patient with kidney biopsy on last admit.   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  Now on HD  Patient still urinating. Continue diuretics for extra volume control

## 2019-10-15 NOTE — NURSING
Patient is very agitated and trying to get out of bed. Pulling at her gown and IV site. Keeps repeating that she needs to get her things done and she doesn't understand why she cant just leave and get her errands done. MD notified new order for Zyprexa given.   1639- Patient lying in bed with eyes closed. Respirations even and unlabored. Easily aroused by verbal stimuli. MD notified

## 2019-10-15 NOTE — PROGRESS NOTES
"[facility]  Progress Note    Patient Name: Magaly Epstein  MRN: [id number]  Patient Class: IP- Inpatient   Admission Date: 10/3/2019  Length of Stay: 12 days  Attending Physician: Lenka Cesar MD  Primary Care Provider: Anahy Bradley DO        Subjective:     Principal Problem:Hypertensive emergency        HPI:  68 y/o female patient presents for evaluation of acute onset of shortness of breath that started last night.  Patient lives in assisted living.  She was admitted here in April for shortness of breath due to hypertensive crisis and fluid overload.  Patient has history of nephrotic syndrome and anasarca and had a kidney biopsy at that time.   She also has a history of chronic hypertension.   Patient states compliance with medications.  She denies chest pain. She states she was just very short of breath and repeatedly asked for help.  She also complains of a dry cough. She has leg edema that she states is "always there."  Patient was placed on CPAP for an O2 sat of 60% on room air.  Blood pressure was reportedly elevated at 270 systolic.  She presented severely hypertensive and placed on Nitro infusion.  Patient is now feeling better.  No other complaints.    Overview/Hospital Course:  Ms. Epstein presented with shortness of breath. She was admitted for hypertensive crisis and volume overload. Blood pressure was elevated at 270 systolic.  Treatment initiated with Nitro infusion and she was placed on CPAP for hypoxia with O2 sat of 60% on room air.  Also noted with acute renal failure on CKD.  Nephrology consulted and started on IV Bumex.  Restarted home medications with improvement of BP and able to wean off Nitro drip. Stepped down to floor in stable condition on 10/5. Metolazone added by nephrology. Weaned off supplemental O2 but remained with anasarca. Unable to use ACE-I due to worsening renal function.  Renal function without significant improvement, " Nephrology finally recommended patient to start dialysis.  Tunneled catheter placed by IR on 10/10/19 with 1st dialysis the next day.  Social service to arrange for outpatient dialysis.  PT/OT recommends SNF placement.       Interval History: given olanzapine 10 mg last night for agitation. Was also placed on BiPAP and restraints. Patient slept. Now awake and stable. Patient states issues with insomnia at home.     Review of Systems   Respiratory: Negative.    Cardiovascular: Negative.    Gastrointestinal: Negative.    Psychiatric/Behavioral: Positive for confusion.        Insomnia     Objective:     Vital Signs (Most Recent):  Temp: 98.9 °F (37.2 °C) (10/15/19 0829)  Pulse: 64 (10/15/19 0829)  Resp: 16 (10/15/19 0829)  BP: (!) 154/90 (10/15/19 0829)  SpO2: 100 % (10/15/19 0829) Vital Signs (24h Range):  Temp:  [96.8 °F (36 °C)-98.9 °F (37.2 °C)] 98.9 °F (37.2 °C)  Pulse:  [] 64  Resp:  [16-36] 16  SpO2:  [96 %-100 %] 100 %  BP: (121-178)/(55-97) 154/90     Weight: 111.9 kg (246 lb 11.1 oz)  Body mass index is 36.43 kg/m².    Intake/Output Summary (Last 24 hours) at 10/15/2019 1156  Last data filed at 10/14/2019 2200  Gross per 24 hour   Intake 740 ml   Output 1969 ml   Net -1229 ml      Physical Exam   Constitutional: She is oriented to person, place, and time. She appears well-developed. No distress.   Cardiovascular: Normal rate and regular rhythm.   Pulmonary/Chest: Effort normal and breath sounds normal. She has no rales.   Abdominal: Soft. Bowel sounds are normal.   Musculoskeletal: She exhibits edema.   Neurological: She is alert and oriented to person, place, and time.   Skin: She is not diaphoretic.   Psychiatric: She has a normal mood and affect. Her behavior is normal. Judgment and thought content normal.   Nursing note and vitals reviewed.      Significant Labs: All pertinent labs within the past 24 hours have been reviewed.    Significant Imaging: I have reviewed all pertinent imaging  results/findings within the past 24 hours.  I have reviewed and interpreted all pertinent imaging results/findings within the past 24 hours.      Assessment/Plan:      * Hypertensive emergency  Hypertensive emergency resolved and BP better managed after initiating hemodialysis.  Continue with  amlodipine, bumetanide, hydralazine (adjust dose to avoid hypotension), and metoprolol.  Will continue as needed clonidine.  Vitals q 4 hours    Acute renal failure superimposed on stage 3 chronic kidney disease  With nephrotic syndrome, significant edema and elevated Cr level  Failed conservative treatment with diuretics   Patient has diabetic nephropathy (confirmed by Bx) in mod-severe degree making renal function unlikely to normalize.  Tunneled catheter placed by IR on 10/10/19  First dialysis was on 10/11. Thus far tolerating well  Social service to arranging for outpatient dialysis  Discharge to SNF once outpatient dialysis arranged    Nephrotic syndrome and end-stage renal disease  Patient with recent admission and evaluated by Nephrology.  Creatinine much higher than baseline.  Patient with kidney biopsy on last admit.   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  Now on HD  Patient still urinating. Continue diuretics for extra volume control      Elevated troponin  Probably secondary to demand ischemia from uncontrolled HTN and renal failure.  Denies any chest pain.      Debility  PT/OT following  SNF placement pending    Anemia of chronic disease  Secondary to CKD that is now ESRD  Low but stable   Iron deficiency anemia.    No evidence of active bleeding.     Hyperlipidemia  Continue pravastatin    Type 2 diabetes mellitus with renal complication  10/14/2019:  Capillary and serum glucose of stable overnight.  Uncontrolled with hyperglycemia.  Diabetic diet and insulin sliding scale.  Started on home 70/30 at lower dose to prevent  hypoglycemia.  Increase as needed.    Essential hypertension  10/14/2019:  Blood pressure overnight stable.    Continue bumetanide, metoprolol, amlodipine, and hydralazine, and provide as needed clonidine.    VTE Risk Mitigation (From admission, onward)         Ordered     heparin (porcine) injection 5,000 Units  Every 12 hours      10/15/19 1155     heparin (porcine) injection 5,000 Units  As needed (PRN)      10/11/19 1109     IP VTE HIGH RISK PATIENT  Once      10/03/19 0538                Dispo: SNF once outpatient dialysis arranged. Start seroquel for insomnia and delirium at night.       Lenka Raygoza MD  Department of Hospital Medicine   [facility]

## 2019-10-15 NOTE — PLAN OF CARE
Recommendations    Recommendation/Intervention:   1. Rec add Renal Diet restriction to diet order    Goals: Meet > 85% of estimated needs  Communication of RD Recs: (POC)

## 2019-10-15 NOTE — PLAN OF CARE
Problem: Occupational Therapy Goal  Goal: Occupational Therapy Goal  Description  Goals to be met by: 10/19/19    Patient will increase functional independence with ADLs by performing:    UE Dressing with Set-up Assistance.  LE Dressing with Stand-by Assistance.  Grooming while standing with Stand-by Assistance.  Toileting from bedside commode with Stand-by Assistance for hygiene and clothing management.   Toilet transfer to bedside commode with Stand-by Assistance.  Upper extremity exercise program x 20 reps per handout, with supervision.  Sit to stand with Contact Guard Assistance using RW. (goal updated from MIN A on 10/11/19)   Step transfer with Stand-by Assistance using RW (goal updated from MIN A on 10/14/19)         Outcome: Ongoing, Progressing  Pt demonstrated increased participation with therapy today compared to increased confusion during session on 10/14/19. OT rec. SNF at d/c to regain PLOF and reduce risk of falls/readmission/morbidity.

## 2019-10-15 NOTE — ASSESSMENT & PLAN NOTE
Hypertensive emergency resolved and BP better managed after initiating hemodialysis.  Continue with  amlodipine, bumetanide, hydralazine (adjust dose to avoid hypotension), and metoprolol.  Will continue as needed clonidine.  Vitals q 4 hours

## 2019-10-15 NOTE — PROGRESS NOTES
"[facility] [place]  Adult Nutrition  Progress Note    SUMMARY       Recommendations    Recommendation/Intervention:   1. Rec add Renal Diet restriction to diet order    Goals: Meet > 85% of estimated needs  Communication of RD Recs: (POC)    Reason for Assessment    Reason For Assessment: RD follow-up  Diagnosis: (Hypertensive emergency, ESRD)  Relevant Medical History: DM, CHF, nephrotic syndrome  Interdisciplinary Rounds: did not attend  General Information Comments: Pt reports good appetite, PO intake 100% of meals. Denies N/V/D/C. Pt had questions about the renal diet. Reviewed diet education and encouraged pt to F/U with the dialysis dietitan for clarification. Pt stated dialysis on 10/10.   Nutrition Discharge Planning: Renal/ADA diet    Nutrition Risk Screen    Nutrition Risk Screen: no indicators present    Nutrition/Diet History    Spiritual, Cultural Beliefs, Synagogue Practices, Values that Affect Care: no  Factors Affecting Nutritional Intake: None identified at this time    Anthropometrics    Temp: 98.1 °F (36.7 °C)  Height Method: Stated  Height: 5' 9" (175.3 cm)  Height (inches): 69 in  Weight Method: Bed Scale  Weight: 111.9 kg (246 lb 11.1 oz)  Weight (lb): 246.7 lb  Ideal Body Weight (IBW), Female: 145 lb  % Ideal Body Weight, Female (lb): 173.33 lb  BMI (Calculated): 37.2  BMI Grade: 35 - 39.9 - obesity - grade II    Lab/Procedures/Meds    Pertinent Labs Reviewed: reviewed  Pertinent Labs Comments: A1c 7.5%  Pertinent Medications Reviewed: reviewed  Pertinent Medications Comments: Bumetanide, EPO, insulin, hydralazine    Estimated/Assessed Needs    Weight Used For Calorie Calculations: 114 kg (251 lb 5.2 oz)  Energy Calorie Requirements (kcal): 7972-2200 kcal (up to 1.2 for stress factor)  Energy Need Method: [place]-St Jeor  Protein Requirements:  g pro (0.8-1g/kg)  Weight Used For Protein Calculations: 114 kg (251 lb 5.2 oz)     Estimated Fluid Requirement Method: RDA " Method  RDA Method (mL): 1750     Nutrition Prescription Ordered    Current Diet Order: 2000 ADA/Cardiac    Evaluation of Received Nutrient/Fluid Intake    I/O: 980/1969  Energy Calories Required: meeting needs  Protein Required: meeting needs  Fluid Required: (per MD)  Comments: LBM: 10/14  Tolerance: tolerating  % Intake of Estimated Energy Needs: 75 - 100 %   % Meal Intake: 75 - 100 %     Nutrition Risk    Level of Risk/Frequency of Follow-up: (1 x week after Renal diet education)     Assessment and Plan    Nutrition Problem  Nutrition related knowledge deficit     Related to (etiology):   ESRD     Signs and Symptoms (as evidenced by):   New Onset HD     Interventions  Collaboration with providers     Nutrition Diagnosis Status:   Continues     Monitor and Evaluation    Food and Nutrient Intake: energy intake, food and beverage intake  Food and Nutrient Adminstration: diet order  Anthropometric Measurements: weight, weight change  Biochemical Data, Medical Tests and Procedures: glucose/endocrine profile  Nutrition-Focused Physical Findings: overall appearance     Malnutrition Assessment     Subcutaneous Fat Loss (Final Summary): well nourished  Muscle Loss Evaluation (Final Summary): well nourished       Nutrition Follow-Up    RD Follow-up?: Yes

## 2019-10-15 NOTE — ASSESSMENT & PLAN NOTE
With nephrotic syndrome, significant edema and elevated Cr level  Failed conservative treatment with diuretics   Patient has diabetic nephropathy (confirmed by Bx) in mod-severe degree making renal function unlikely to normalize.  Tunneled catheter placed by IR on 10/10/19  First dialysis was on 10/11. Thus far tolerating well  Social service to arranging for outpatient dialysis  Discharge to SNF once outpatient dialysis arranged

## 2019-10-15 NOTE — PT/OT/SLP PROGRESS
"Occupational Therapy   Treatment    Name: Magaly Epstein  MRN: [id number]  Admitting Diagnosis:  Hypertensive emergency       Recommendations:     Discharge Recommendations: nursing facility, skilled  Discharge Equipment Recommendations:  (per SNF)  Barriers to discharge:  Inaccessible home environment, Decreased caregiver support    Assessment:     Magaly Epstein is a 69 y.o. female with a medical diagnosis of Hypertensive emergency.  She presents with improved participation today, compared to increased confusion in session on 10/14/19. Performance deficits affecting function are weakness, impaired functional mobilty, impaired cognition, decreased safety awareness, impaired cardiopulmonary response to activity, impaired endurance, gait instability, pain, impaired balance, decreased upper extremity function, impaired self care skills, decreased lower extremity function, edema, impaired coordination.     Rehab Prognosis:  Good; patient would benefit from acute skilled OT services to address these deficits and reach maximum level of function.       Plan:     Patient to be seen 5 x/week to address the above listed problems via self-care/home management, therapeutic activities, therapeutic exercises  · Plan of Care Expires: 10/19/19  · Plan of Care Reviewed with: patient    Subjective     Chief complaint: "They won't let me leave the bed and I'm wet" re-oriented pt to situation and pt was calm and participatory   Patient's comments/goals: participatory with session, agreeable to sit up in the chair     Pain/Comfort:  · Pain Rating 1: ("I have pain everywhere" )    Objective:     Communicated with: nurseAyala, prior to session.  Patient found HOB elevated with bed alarm, peripheral IV, telemetry, oxygen, PureWick(Avasys) upon OT entry to room.    General Precautions: Standard, fall   Orthopedic Precautions:N/A   Braces: N/A     Occupational Performance:     Bed Mobility:    · Patient completed Rolling/Turning to Left " with  contact guard assistance and prompting for hand placement on siderails   · Patient completed Rolling/Turning to Right with contact guard assistance and prompting for hand placement on siderails   · Patient completed Scooting/Bridging with contact guard assistance  · Patient completed Supine to Sit with moderate assistance and HOB elevated. Upon sitting up, pt became anxious and SOB. Pt required max cueing and demo to implement pursed lip breathing. With time, symptoms resolved and pt remained calm.     Functional Mobility/Transfers:  · Patient completed Sit <> Stand Transfer with contact guard assistance  with  rolling walker   · Patient completed Bed <> Chair Transfer using Step Transfer technique with contact guard assistance with rolling walker  · Functional Mobility: Pt ambulated minimal distance within the room with CGA and RW. Pt required cueing for body mechanics and safety with all aspects of functional mobility.     Activities of Daily Living:  · Grooming: minimum assistance to help brush the back of her hair, SBA for the front of her hair  · Upper Body Dressing: moderate assistance to latricia back hospital gown   · Lower Body Dressing: total assistance to latricia brief after taking Purewick out (nurse, Ayala, notified)       [facility] 6 Click ADL: 16    Treatment & Education:  · Pt re-educated on OT role/POC.   · Importance of OOB activity with staff assistance.  · Safety during functional t/f and mobility with safe hand placement   · Increased time for cueing with pursed lip breathing   · Handout provided for AROM: pt completed 1x15  · Shoulder elevation/depression  · Shoulder flexion/extension  · Elbow flexion/extension  · Forearm supination/pronation   · Finger flexion/extension   · Rest breaks in between each set   · Multiple self-care tasks/functional mobility completed- assistance level noted above  · All questions/concerns answered within OT scope of practice        Patient left up in chair with all  lines intact, call button in reach, chair alarm on, nurse, Ayala, notified and Eucharistic  presentEducation:      GOALS:   Multidisciplinary Problems     Occupational Therapy Goals        Problem: Occupational Therapy Goal    Goal Priority Disciplines Outcome Interventions   Occupational Therapy Goal     OT, PT/OT Ongoing, Progressing    Description:  Goals to be met by: 10/19/19    Patient will increase functional independence with ADLs by performing:    UE Dressing with Set-up Assistance.  LE Dressing with Stand-by Assistance.  Grooming while standing with Stand-by Assistance.  Toileting from bedside commode with Stand-by Assistance for hygiene and clothing management.   Toilet transfer to bedside commode with Stand-by Assistance.  Upper extremity exercise program x 20 reps per handout, with supervision.  Sit to stand with Contact Guard Assistance using RW. (goal updated from MIN A on 10/11/19)   Step transfer with Stand-by Assistance using RW (goal updated from MIN A on 10/14/19)                          Time Tracking:     OT Date of Treatment: 10/15/19  OT Start Time: 1004  OT Stop Time: 1027  OT Total Time (min): 23 min    Billable Minutes:Therapeutic Activity 11 min  Total Time 23 min    Akanksha White OT  10/15/2019

## 2019-10-15 NOTE — PT/OT/SLP PROGRESS
"Physical Therapy Treatment    Patient Name:  Magaly Epstein   MRN:  [id number]    Recommendations:     Discharge Recommendations:  nursing facility, skilled   Discharge Equipment Recommendations: (Per SNF)   Barriers to discharge: Decreased caregiver support    Assessment:     Magaly Epstein is a 69 y.o. female admitted with a medical diagnosis of Hypertensive emergency.  She presents with the following impairments/functional limitations:  weakness, impaired functional mobilty, decreased safety awareness, impaired coordination, impaired cardiopulmonary response to activity, impaired endurance, gait instability, decreased coordination, pain, impaired fine motor, impaired balance, decreased upper extremity function, impaired self care skills, decreased lower extremity function Pt alert and oriented today, continue with POC.    Rehab Prognosis: Good; patient would benefit from acute skilled PT services to address these deficits and reach maximum level of function.    Recent Surgery: * No surgery found *      Plan:     During this hospitalization, patient to be seen 6 x/week to address the identified rehab impairments via gait training, therapeutic activities, therapeutic exercises and progress toward the following goals:    · Plan of Care Expires:  10/19/19    Subjective     Chief Complaint: fatigue  Patient/Family Comments/goals: I can take care of myself, I've been doing it for the pst 50 yrs"  Pain/Comfort:  · Pain Rating 1: (not rated)  · Location 1: ("everywhere")  · Pain Addressed 1: Reposition, Distraction, Cessation of Activity, Nurse notified      Objective:     Communicated with nsg prior to session.  Patient found HOB elevated with bed alarm, oxygen, telemetry, peripheral IV, PureWick upon PT entry to room.     General Precautions: Standard, fall   Orthopedic Precautions:N/A   Braces: N/A     Functional Mobility:  · Bed Mobility:     · Rolling R:  CGA and VC/TC for reaching for BR  · Rolling L:  CGA with " VC/TC for reaching for BR  · Bridging:  SBA with VC/TC to bend knees  · Scooting: stand by assistance  · Supine to Sit: moderate assistance and with VC for bodymechanics  · Transfers:     · Sit to Stand:  contact guard assistance and with Vc for hand placement/safety with rolling walker  · Gait: 20' with CGA and VC for increased trun ke xt and walker management/safety   · Balance: FAir+ sit, FAir stand      AM-PAC 6 CLICK MOBILITY  Turning over in bed (including adjusting bedclothes, sheets and blankets)?: 3  Sitting down on and standing up from a chair with arms (e.g., wheelchair, bedside commode, etc.): 3  Moving from lying on back to sitting on the side of the bed?: 2  Moving to and from a bed to a chair (including a wheelchair)?: 3  Need to walk in hospital room?: 3  Climbing 3-5 steps with a railing?: 2  Basic Mobility Total Score: 16       Therapeutic Activities and Exercises:   Pt sat at EOB with SBA and performed B FAQ's x 10 reps, t/f and gait tr as above.  Pt performed marching in sitting x 10 reps and AP's and TKE's x 10 in reclined position.  B LE there ex handout retrieved, dispersed, and reviewed with pt .  Pt verbalzied/demonstrated understanding.      Patient left up in chair with all lines intact, call button in reach and nsg notified..    GOALS:   Multidisciplinary Problems     Physical Therapy Goals        Problem: Physical Therapy Goal    Goal Priority Disciplines Outcome Goal Variances Interventions   Physical Therapy Goal     PT, PT/OT Ongoing, Progressing     Description:  Goals to be met by: 10/19/2019     Patient will increase functional independence with mobility by performin. Supine to sit with Modified [place]  2. Sit to stand transfer with Modified [place]  3. Gait  x 100 feet with Modified [place] using Rolling Walker.   4. Lower extremity exercise program x10 reps per handout, with independence                      Time Tracking:     PT Received On: 10/15/19  PT  Start Time: 1003     PT Stop Time: 1021  PT Total Time (min): 18 min   11:45-11:51 (8m)   Billable Minutes: Therapeutic Exercise 14    Treatment Type: Treatment  PT/PTA: PT     PTA Visit Number: 0     Raquel Taylor, TSERING  10/15/2019

## 2019-10-16 LAB
ALBUMIN SERPL BCP-MCNC: 2.3 G/DL (ref 3.5–5.2)
ANION GAP SERPL CALC-SCNC: 10 MMOL/L (ref 8–16)
BUN SERPL-MCNC: 29 MG/DL (ref 8–23)
CALCIUM SERPL-MCNC: 7.9 MG/DL (ref 8.7–10.5)
CHLORIDE SERPL-SCNC: 106 MMOL/L (ref 95–110)
CO2 SERPL-SCNC: 24 MMOL/L (ref 23–29)
CREAT SERPL-MCNC: 3 MG/DL (ref 0.5–1.4)
EST. GFR  (AFRICAN AMERICAN): 17 ML/MIN/1.73 M^2
EST. GFR  (NON AFRICAN AMERICAN): 15 ML/MIN/1.73 M^2
GLUCOSE SERPL-MCNC: 112 MG/DL (ref 70–110)
PHOSPHATE SERPL-MCNC: 3.7 MG/DL (ref 2.7–4.5)
POCT GLUCOSE: 110 MG/DL (ref 70–110)
POCT GLUCOSE: 149 MG/DL (ref 70–110)
POCT GLUCOSE: 210 MG/DL (ref 70–110)
POTASSIUM SERPL-SCNC: 3.8 MMOL/L (ref 3.5–5.1)
SODIUM SERPL-SCNC: 140 MMOL/L (ref 136–145)

## 2019-10-16 PROCEDURE — 97535 SELF CARE MNGMENT TRAINING: CPT

## 2019-10-16 PROCEDURE — 21400001 HC TELEMETRY ROOM

## 2019-10-16 PROCEDURE — 25000003 PHARM REV CODE 250: Performed by: HOSPITALIST

## 2019-10-16 PROCEDURE — 97530 THERAPEUTIC ACTIVITIES: CPT

## 2019-10-16 PROCEDURE — 80100016 HC MAINTENANCE HEMODIALYSIS

## 2019-10-16 PROCEDURE — 63600175 PHARM REV CODE 636 W HCPCS: Performed by: INTERNAL MEDICINE

## 2019-10-16 PROCEDURE — 25000003 PHARM REV CODE 250: Performed by: INTERNAL MEDICINE

## 2019-10-16 PROCEDURE — S0171 BUMETANIDE 0.5 MG: HCPCS | Performed by: INTERNAL MEDICINE

## 2019-10-16 PROCEDURE — 36415 COLL VENOUS BLD VENIPUNCTURE: CPT

## 2019-10-16 PROCEDURE — 80069 RENAL FUNCTION PANEL: CPT

## 2019-10-16 RX ORDER — AMLODIPINE BESYLATE 2.5 MG/1
2.5 TABLET ORAL DAILY
Status: DISCONTINUED | OUTPATIENT
Start: 2019-10-17 | End: 2019-10-17 | Stop reason: HOSPADM

## 2019-10-16 RX ADMIN — PRAVASTATIN SODIUM 10 MG: 10 TABLET ORAL at 09:10

## 2019-10-16 RX ADMIN — HYDRALAZINE HYDROCHLORIDE 25 MG: 25 TABLET, FILM COATED ORAL at 05:10

## 2019-10-16 RX ADMIN — POLYETHYLENE GLYCOL 3350 17 G: 17 POWDER, FOR SOLUTION ORAL at 09:10

## 2019-10-16 RX ADMIN — GUAIFENESIN AND DEXTROMETHORPHAN 5 ML: 100; 10 SYRUP ORAL at 05:10

## 2019-10-16 RX ADMIN — DOCUSATE SODIUM 100 MG: 100 CAPSULE, LIQUID FILLED ORAL at 09:10

## 2019-10-16 RX ADMIN — INSULIN ASPART 10 UNITS: 100 INJECTION, SUSPENSION SUBCUTANEOUS at 05:10

## 2019-10-16 RX ADMIN — HEPARIN SODIUM 5000 UNITS: 5000 INJECTION INTRAVENOUS; SUBCUTANEOUS at 09:10

## 2019-10-16 RX ADMIN — HEPARIN SODIUM 5000 UNITS: 5000 INJECTION INTRAVENOUS; SUBCUTANEOUS at 03:10

## 2019-10-16 RX ADMIN — FERROUS SULFATE TAB EC 325 MG (65 MG FE EQUIVALENT) 325 MG: 325 (65 FE) TABLET DELAYED RESPONSE at 09:10

## 2019-10-16 RX ADMIN — QUETIAPINE FUMARATE 25 MG: 25 TABLET ORAL at 09:10

## 2019-10-16 RX ADMIN — INSULIN ASPART 10 UNITS: 100 INJECTION, SUSPENSION SUBCUTANEOUS at 09:10

## 2019-10-16 RX ADMIN — METOPROLOL SUCCINATE 50 MG: 50 TABLET, EXTENDED RELEASE ORAL at 09:10

## 2019-10-16 RX ADMIN — INSULIN ASPART 2 UNITS: 100 INJECTION, SOLUTION INTRAVENOUS; SUBCUTANEOUS at 09:10

## 2019-10-16 RX ADMIN — HYDRALAZINE HYDROCHLORIDE 25 MG: 25 TABLET, FILM COATED ORAL at 09:10

## 2019-10-16 RX ADMIN — GUAIFENESIN AND DEXTROMETHORPHAN 5 ML: 100; 10 SYRUP ORAL at 11:10

## 2019-10-16 RX ADMIN — BUMETANIDE 2 MG: 0.25 INJECTION INTRAMUSCULAR; INTRAVENOUS at 05:10

## 2019-10-16 NOTE — SUBJECTIVE & OBJECTIVE
Interval History: slept better with seroquel overnight.     Review of Systems   Respiratory: Negative.    Cardiovascular: Negative.    Gastrointestinal: Negative.    Psychiatric/Behavioral: Negative for confusion.     Objective:     Vital Signs (Most Recent):  Temp: 98.1 °F (36.7 °C) (10/16/19 0817)  Pulse: 71 (10/16/19 0817)  Resp: 18 (10/16/19 0817)  BP: 121/79 (10/16/19 0817)  SpO2: 96 % (10/16/19 0817) Vital Signs (24h Range):  Temp:  [98 °F (36.7 °C)-98.5 °F (36.9 °C)] 98.1 °F (36.7 °C)  Pulse:  [68-73] 71  Resp:  [17-20] 18  SpO2:  [96 %-98 %] 96 %  BP: (121-160)/(66-80) 121/79     Weight: 111.9 kg (246 lb 11.1 oz)  Body mass index is 36.43 kg/m².    Intake/Output Summary (Last 24 hours) at 10/16/2019 1332  Last data filed at 10/16/2019 0800  Gross per 24 hour   Intake 720 ml   Output 650 ml   Net 70 ml      Physical Exam   Constitutional: She is oriented to person, place, and time. She appears well-developed. No distress.   Cardiovascular: Normal rate and regular rhythm.   Pulmonary/Chest: Effort normal and breath sounds normal. She has no rales.   Abdominal: Soft. Bowel sounds are normal.   Musculoskeletal: She exhibits edema.   Neurological: She is alert and oriented to person, place, and time.   Skin: She is not diaphoretic.   Psychiatric: She has a normal mood and affect. Her behavior is normal. Judgment and thought content normal.   Nursing note and vitals reviewed.      Significant Labs: All pertinent labs within the past 24 hours have been reviewed.    Significant Imaging: I have reviewed all pertinent imaging results/findings within the past 24 hours.  I have reviewed and interpreted all pertinent imaging results/findings within the past 24 hours.

## 2019-10-16 NOTE — PT/OT/SLP PROGRESS
Occupational Therapy   Treatment    Name: Magaly Epstein  MRN: [id number]  Admitting Diagnosis:  Hypertensive emergency       Recommendations:     Discharge Recommendations: nursing facility, skilled  Discharge Equipment Recommendations:  (per SNF)  Barriers to discharge:  Inaccessible home environment, Decreased caregiver support    Assessment:     Magaly Epstein is a 69 y.o. female with a medical diagnosis of Hypertensive emergency.  She presents with good motivation to participate with therapy. Performance deficits affecting function are weakness, impaired functional mobilty, impaired cognition, decreased safety awareness, impaired cardiopulmonary response to activity, impaired endurance, gait instability, impaired balance, decreased upper extremity function, impaired self care skills, decreased lower extremity function, edema.     Rehab Prognosis:  Good; patient would benefit from acute skilled OT services to address these deficits and reach maximum level of function.       Plan:     Patient to be seen 5 x/week to address the above listed problems via self-care/home management, therapeutic activities, therapeutic exercises  · Plan of Care Expires: 10/19/19  · Plan of Care Reviewed with: patient    Subjective     Chief complaint: needing to use the restroom   Patient's comments/goals: participatory with therapy before dialysis     Pain/Comfort:  · Pain Rating 1: (minimal pain- generalized)  · Pain Addressed 1: Reposition, Cessation of Activity, Distraction    Objective:     Communicated with: nurseAyala, prior to session.  Patient found HOB elevated with peripheral IV, oxygen, telemetry(Avasys) upon OT entry to room.    General Precautions: Standard, fall   Orthopedic Precautions:N/A   Braces: N/A     Occupational Performance:     Bed Mobility:    · Patient completed Scooting/Bridging with stand by assistance  · Patient completed Supine to Sit with stand by assistance, with side rail and HOB elevated; v.c/t.c  for hand placement  · Patient completed Sit to Supine with stand by assistance     Functional Mobility/Transfers:  · Patient completed Sit <> Stand Transfer with contact guard assistance  with  rolling walker   · Patient completed Toilet Transfer Step Transfer technique with contact guard assistance with  rolling walker and bedside commode  · Functional Mobility: Pt ambulated within the room from bed>BSC ~8 ft and back with CGA and intermittent cueing for proper body mechanics within RW. Pt required frequent cueing d/t impaired safety awareness.     Activities of Daily Living:  · Feeding:  independence with water hodling water pitcher  · Grooming: set-up with wipe to wash hands seated after toileting    · Upper Body Dressing: minimum assistance with donning back hospital gown  · Lower Body Dressing: maximal assistance with donning/doffing brief  · Toileting: minimum assistance        AMPA 6 Click ADL: 16    Treatment & Education:  · Pt re-educated on OT role/POC.   · Importance of OOB activity with staff assistance  · Safety during functional t/f and mobility- prolonged time required to increase safety awareness with all aspects of functional mobility   · Pt demo'd good standing tolerance ~4 min for donning new brief, ~7 min while nursing changing linens: pt demo'd rounded shoulders and forward head in static standing, requiring v.c/t.c for correction   · Multiple self-care tasks/functional mobility completed- assistance level noted above   · All questions/concerns answered within OT scope of practice       Patient left HOB elevated per nurse request (d/t leaving for dialysis soon) with all lines intact, call button in reach, bed alarm on, nurse, Ayala, notified and Avasys presentEducation:  - with door open and safety measures in place     GOALS:   Multidisciplinary Problems     Occupational Therapy Goals        Problem: Occupational Therapy Goal    Goal Priority Disciplines Outcome Interventions   Occupational  Therapy Goal     OT, PT/OT Ongoing, Progressing    Description:  Goals to be met by: 10/19/19    Patient will increase functional independence with ADLs by performing:    UE Dressing with Set-up Assistance.  LE Dressing with Stand-by Assistance.  Grooming while standing with Stand-by Assistance.  Toileting from bedside commode with Stand-by Assistance for hygiene and clothing management.   Toilet transfer to bedside commode with Stand-by Assistance.  Upper extremity exercise program x 20 reps per handout, with supervision.  Sit to stand with Contact Guard Assistance using RW. (goal updated from MIN A on 10/11/19)   Step transfer with Stand-by Assistance using RW (goal updated from MIN A on 10/14/19)                          Time Tracking:     OT Date of Treatment: 10/16/19  OT Start Time: 0937  OT Stop Time: 1004  OT Total Time (min): 27 min    Billable Minutes:Self Care/Home Management 16 min  Therapeutic Activity 11 min  Total Time 27 min    Akanksha White OT  10/16/2019

## 2019-10-16 NOTE — PT/OT/SLP PROGRESS
Physical Therapy      Patient Name:  Magaly Epstein   MRN:  [id number]    Patient not seen today secondary to Dialysis. Will follow-up 10/17/2019.    Raquel Taylor, PT

## 2019-10-16 NOTE — PROGRESS NOTES
"[facility] Medicine  Progress Note    Patient Name: Magaly Epstein  MRN: [id number]  Patient Class: IP- Inpatient   Admission Date: 10/3/2019  Length of Stay: 13 days  Attending Physician: Lenka Cesar MD  Primary Care Provider: Anahy Bradley DO        Subjective:     Principal Problem:Hypertensive emergency        HPI:  70 y/o female patient presents for evaluation of acute onset of shortness of breath that started last night.  Patient lives in assisted living.  She was admitted here in April for shortness of breath due to hypertensive crisis and fluid overload.  Patient has history of nephrotic syndrome and anasarca and had a kidney biopsy at that time.   She also has a history of chronic hypertension.   Patient states compliance with medications.  She denies chest pain. She states she was just very short of breath and repeatedly asked for help.  She also complains of a dry cough. She has leg edema that she states is "always there."  Patient was placed on CPAP for an O2 sat of 60% on room air.  Blood pressure was reportedly elevated at 270 systolic.  She presented severely hypertensive and placed on Nitro infusion.  Patient is now feeling better.  No other complaints.    Overview/Hospital Course:  Ms. Epstein presented with shortness of breath. She was admitted for hypertensive crisis and volume overload. Blood pressure was elevated at 270 systolic.  Treatment initiated with Nitro infusion and she was placed on CPAP for hypoxia with O2 sat of 60% on room air.  Also noted with acute renal failure on CKD.  Nephrology consulted and started on IV Bumex.  Restarted home medications with improvement of BP and able to wean off Nitro drip. Stepped down to floor in stable condition on 10/5. Metolazone added by nephrology. Weaned off supplemental O2 but remained with anasarca. Unable to use ACE-I due to worsening renal function.  Renal function without significant improvement, " Nephrology finally recommended patient to start dialysis.  Tunneled catheter placed by IR on 10/10/19 with 1st dialysis the next day.  Social service to arrange for outpatient dialysis.  PT/OT recommends SNF placement.       Interval History: slept better with seroquel overnight.     Review of Systems   Respiratory: Negative.    Cardiovascular: Negative.    Gastrointestinal: Negative.    Psychiatric/Behavioral: Negative for confusion.     Objective:     Vital Signs (Most Recent):  Temp: 98.1 °F (36.7 °C) (10/16/19 0817)  Pulse: 71 (10/16/19 0817)  Resp: 18 (10/16/19 0817)  BP: 121/79 (10/16/19 0817)  SpO2: 96 % (10/16/19 0817) Vital Signs (24h Range):  Temp:  [98 °F (36.7 °C)-98.5 °F (36.9 °C)] 98.1 °F (36.7 °C)  Pulse:  [68-73] 71  Resp:  [17-20] 18  SpO2:  [96 %-98 %] 96 %  BP: (121-160)/(66-80) 121/79     Weight: 111.9 kg (246 lb 11.1 oz)  Body mass index is 36.43 kg/m².    Intake/Output Summary (Last 24 hours) at 10/16/2019 1332  Last data filed at 10/16/2019 0800  Gross per 24 hour   Intake 720 ml   Output 650 ml   Net 70 ml      Physical Exam   Constitutional: She is oriented to person, place, and time. She appears well-developed. No distress.   Cardiovascular: Normal rate and regular rhythm.   Pulmonary/Chest: Effort normal and breath sounds normal. She has no rales.   Abdominal: Soft. Bowel sounds are normal.   Musculoskeletal: She exhibits edema.   Neurological: She is alert and oriented to person, place, and time.   Skin: She is not diaphoretic.   Psychiatric: She has a normal mood and affect. Her behavior is normal. Judgment and thought content normal.   Nursing note and vitals reviewed.      Significant Labs: All pertinent labs within the past 24 hours have been reviewed.    Significant Imaging: I have reviewed all pertinent imaging results/findings within the past 24 hours.  I have reviewed and interpreted all pertinent imaging results/findings within the past 24 hours.      Assessment/Plan:      *  Hypertensive emergency  Hypertensive emergency resolved and BP better managed after initiating hemodialysis.  Continue with  amlodipine, bumetanide, hydralazine (adjust dose to avoid hypotension), and metoprolol.  Will continue as needed clonidine.  Vitals q 4 hours    Acute renal failure superimposed on stage 3 chronic kidney disease  With nephrotic syndrome, significant edema and elevated Cr level  Failed conservative treatment with diuretics   Patient has diabetic nephropathy (confirmed by Bx) in mod-severe degree making renal function unlikely to normalize.  Tunneled catheter placed by IR on 10/10/19  First dialysis was on 10/11. Thus far tolerating well  Social service to arranging for outpatient dialysis  Discharge to SNF once outpatient dialysis arranged    Nephrotic syndrome and end-stage renal disease  Patient with recent admission and evaluated by Nephrology.  Creatinine much higher than baseline.  Patient with kidney biopsy on last admit.   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  Now on HD  Patient still urinating. Continue diuretics for extra volume control      Elevated troponin  Probably secondary to demand ischemia from uncontrolled HTN and renal failure.  Denies any chest pain.      Debility  PT/OT following  SNF placement pending    Anemia of chronic disease  Secondary to CKD that is now ESRD  Low but stable   Iron deficiency anemia.    No evidence of active bleeding.     Hyperlipidemia  Continue pravastatin    Type 2 diabetes mellitus with renal complication  10/14/2019:  Capillary and serum glucose of stable overnight.  Uncontrolled with hyperglycemia.  Diabetic diet and insulin sliding scale.  Started on home 70/30 at lower dose to prevent hypoglycemia.  Increase as needed.    Essential hypertension  10/14/2019:  Blood pressure overnight stable.    Continue bumetanide, metoprolol, amlodipine, and hydralazine, and  provide as needed clonidine.    VTE Risk Mitigation (From admission, onward)         Ordered     heparin (porcine) injection 5,000 Units  Every 12 hours      10/15/19 1155     heparin (porcine) injection 5,000 Units  As needed (PRN)      10/11/19 1109     IP VTE HIGH RISK PATIENT  Once      10/03/19 0538              Dispo: patient cleared for discharge once SNF and outpatient dialysis arranged.         Lenka Raygoza MD  Department of Hospital Medicine   [facility]

## 2019-10-16 NOTE — PROGRESS NOTES
Awake alert oriented NAD    Had a good sleep last pm    Denies CNS ENT CP GI  RHEUM OR DERM SX    Feels like she could use some more UF  Past Medical History:   Diagnosis Date    Arthritis     CHF (congestive heart failure)     Diabetes mellitus      Review of patient's allergies indicates:   Allergen Reactions    Ciprofloxacin Anaphylaxis    Codeine Anaphylaxis    Neosporin [benzalkonium chloride] Anaphylaxis       Current Facility-Administered Medications   Medication    0.9%  NaCl infusion    0.9%  NaCl infusion    0.9%  NaCl infusion    acetaminophen tablet 650 mg    albuterol-ipratropium 2.5 mg-0.5 mg/3 mL nebulizer solution 3 mL    [START ON 10/17/2019] amLODIPine tablet 2.5 mg    benzonatate capsule 200 mg    bumetanide injection 2 mg    cloNIDine tablet 0.1 mg    dextromethorphan-guaifenesin  mg/5 ml liquid 5 mL    dextrose 50% injection 12.5 g    dextrose 50% injection 25 g    docusate sodium capsule 100 mg    epoetin cortes-epbx injection 10,000 Units    ferrous sulfate EC tablet 325 mg    glucagon (human recombinant) injection 1 mg    glucose chewable tablet 16 g    glucose chewable tablet 24 g    heparin (porcine) injection 5,000 Units    heparin (porcine) injection 5,000 Units    hydrALAZINE injection 10 mg    hydrALAZINE tablet 25 mg    insulin aspart protamine-insulin aspart (NovoLOG 70/30) injection    insulin aspart U-100 pen 1-10 Units    metoprolol succinate (TOPROL-XL) 24 hr tablet 50 mg    ondansetron injection 4 mg    polyethylene glycol packet 17 g    pravastatin tablet 10 mg    QUEtiapine tablet 25 mg    senna-docusate 8.6-50 mg per tablet 1 tablet    sodium chloride 0.9% flush 10 mL       LABS    Recent Results (from the past 24 hour(s))   POCT glucose    Collection Time: 10/15/19 11:40 AM   Result Value Ref Range    POCT Glucose 141 (H) 70 - 110 mg/dL   POCT glucose    Collection Time: 10/15/19  2:27 PM   Result Value Ref Range    POCT Glucose 179  (H) 70 - 110 mg/dL   POCT glucose    Collection Time: 10/15/19  8:04 PM   Result Value Ref Range    POCT Glucose 148 (H) 70 - 110 mg/dL   Renal function panel    Collection Time: 10/16/19  3:47 AM   Result Value Ref Range    Glucose 112 (H) 70 - 110 mg/dL    Sodium 140 136 - 145 mmol/L    Potassium 3.8 3.5 - 5.1 mmol/L    Chloride 106 95 - 110 mmol/L    CO2 24 23 - 29 mmol/L    BUN, Bld 29 (H) 8 - 23 mg/dL    Calcium 7.9 (L) 8.7 - 10.5 mg/dL    Creatinine 3.0 (H) 0.5 - 1.4 mg/dL    Albumin 2.3 (L) 3.5 - 5.2 g/dL    Phosphorus 3.7 2.7 - 4.5 mg/dL    eGFR if African American 17 (A) >60 mL/min/1.73 m^2    eGFR if non African American 15 (A) >60 mL/min/1.73 m^2    Anion Gap 10 8 - 16 mmol/L   POCT glucose    Collection Time: 10/16/19  8:18 AM   Result Value Ref Range    POCT Glucose 149 (H) 70 - 110 mg/dL   ]    I/O last 3 completed shifts:  In: 720 [P.O.:720]  Out: 950 [Urine:950]    Vitals:    10/15/19 2105 10/16/19 0038 10/16/19 0541 10/16/19 0817   BP:  138/80 (!) 160/70 121/79   Pulse: 73 72 68 71   Resp: 20 18 17 18   Temp:  98.2 °F (36.8 °C) 98.5 °F (36.9 °C) 98.1 °F (36.7 °C)   TempSrc:  Oral Oral    SpO2: 98% 96% 97% 96%   Weight:       Height:           No Jvd, Thyromegaly or Lymphadenopathy  Lungs: Fairly clear anteriorly and laterally few post rhonchi  Cor: RRR no G or rubs  Abd: Soft benign good bowel sounds non tender  Ext: Pos edema    A)    ANIA on ckd3a dialysis dependent for now   ml   Fluid OL seems better but with some more room for UF  Has lost 10 kg in 10 days  DM with dm nephropathy  Hx of pulm edema  Anasarca better   Nephrotic snd   Iron def anemia  Episode of confusion better     P)     Renal Diet  Home meds  Protect access  HD MWF increase uf  EPO   Binders  Adjust all meds to the degree of renal fx  Close follow up I/O and weights  Maintain Hydration

## 2019-10-16 NOTE — NURSING
Bedside report given to MICAH Ramírez. Patient awake and alert sitting up in bed. NAD noted at this time. All safety precautions in place. Will continue to monitor.

## 2019-10-16 NOTE — NURSING
Patient transferred to dialysis in hospital bed. NAD noted at this time. 3L 02 via nasal cannula in place.

## 2019-10-16 NOTE — PLAN OF CARE
Problem: Occupational Therapy Goal  Goal: Occupational Therapy Goal  Description  Goals to be met by: 10/19/19    Patient will increase functional independence with ADLs by performing:    UE Dressing with Set-up Assistance.  LE Dressing with Stand-by Assistance.  Grooming while standing with Stand-by Assistance.  Toileting from bedside commode with Stand-by Assistance for hygiene and clothing management.   Toilet transfer to bedside commode with Stand-by Assistance.  Upper extremity exercise program x 20 reps per handout, with supervision.  Sit to stand with Contact Guard Assistance using RW. (goal updated from MIN A on 10/11/19)   Step transfer with Stand-by Assistance using RW (goal updated from MIN A on 10/14/19)         Outcome: Ongoing, Progressing    Pt will continue to benefit from acute OT in order to increase safety awareness and independence with ADLs and all aspects of functional mobility. OT rec. SNF at d/c to regain PLOF and reduce risk of falls/readmission/morbidity.

## 2019-10-16 NOTE — NURSING
3.5 hour intermittent hemodialysis tx initiated, noted with right chest wall P/C, clean dry and intact.

## 2019-10-16 NOTE — NURSING
Report received from MICAH Sosa. Patient completed HD. 3.3L removed. Tolerated well per RN. Patient returned to room 3L 02 nasal cannula in place. AAOx4. NAD noted at this time.

## 2019-10-16 NOTE — UM SECONDARY REVIEW
[facility]    IP Extended Stay > 10     Dc to [facility]. Waiting on auth from [facility]: approved an agreement with D/C plan

## 2019-10-16 NOTE — NURSING
Bedside report received from MICAH Ramírez. Patient awake and alert. NAD noted at this time. All safety precautions in place. Will continue to monitor.

## 2019-10-17 VITALS
HEIGHT: 69 IN | WEIGHT: 246.69 LBS | TEMPERATURE: 98 F | RESPIRATION RATE: 18 BRPM | BODY MASS INDEX: 36.54 KG/M2 | DIASTOLIC BLOOD PRESSURE: 67 MMHG | OXYGEN SATURATION: 95 % | HEART RATE: 66 BPM | SYSTOLIC BLOOD PRESSURE: 141 MMHG

## 2019-10-17 LAB
ALBUMIN SERPL BCP-MCNC: 2.3 G/DL (ref 3.5–5.2)
ANION GAP SERPL CALC-SCNC: 9 MMOL/L (ref 8–16)
BUN SERPL-MCNC: 13 MG/DL (ref 8–23)
CALCIUM SERPL-MCNC: 7.8 MG/DL (ref 8.7–10.5)
CHLORIDE SERPL-SCNC: 108 MMOL/L (ref 95–110)
CO2 SERPL-SCNC: 23 MMOL/L (ref 23–29)
CREAT SERPL-MCNC: 2 MG/DL (ref 0.5–1.4)
EST. GFR  (AFRICAN AMERICAN): 29 ML/MIN/1.73 M^2
EST. GFR  (NON AFRICAN AMERICAN): 25 ML/MIN/1.73 M^2
GLUCOSE SERPL-MCNC: 110 MG/DL (ref 70–110)
PHOSPHATE SERPL-MCNC: 2.6 MG/DL (ref 2.7–4.5)
POCT GLUCOSE: 132 MG/DL (ref 70–110)
POCT GLUCOSE: 163 MG/DL (ref 70–110)
POTASSIUM SERPL-SCNC: 4 MMOL/L (ref 3.5–5.1)
SODIUM SERPL-SCNC: 140 MMOL/L (ref 136–145)

## 2019-10-17 PROCEDURE — 25000003 PHARM REV CODE 250: Performed by: HOSPITALIST

## 2019-10-17 PROCEDURE — 25000003 PHARM REV CODE 250: Performed by: INTERNAL MEDICINE

## 2019-10-17 PROCEDURE — 94761 N-INVAS EAR/PLS OXIMETRY MLT: CPT

## 2019-10-17 PROCEDURE — 97110 THERAPEUTIC EXERCISES: CPT

## 2019-10-17 PROCEDURE — 36415 COLL VENOUS BLD VENIPUNCTURE: CPT

## 2019-10-17 PROCEDURE — 80069 RENAL FUNCTION PANEL: CPT

## 2019-10-17 PROCEDURE — 63600175 PHARM REV CODE 636 W HCPCS: Performed by: INTERNAL MEDICINE

## 2019-10-17 PROCEDURE — S0171 BUMETANIDE 0.5 MG: HCPCS | Performed by: INTERNAL MEDICINE

## 2019-10-17 PROCEDURE — 97535 SELF CARE MNGMENT TRAINING: CPT

## 2019-10-17 RX ORDER — AMLODIPINE BESYLATE 2.5 MG/1
2.5 TABLET ORAL DAILY
Qty: 30 TABLET | Refills: 11 | Status: ON HOLD | OUTPATIENT
Start: 2019-10-18 | End: 2019-11-14 | Stop reason: HOSPADM

## 2019-10-17 RX ORDER — HYDRALAZINE HYDROCHLORIDE 25 MG/1
50 TABLET, FILM COATED ORAL EVERY 8 HOURS
Qty: 180 TABLET | Refills: 11 | Status: ON HOLD | OUTPATIENT
Start: 2019-10-17 | End: 2019-11-14 | Stop reason: HOSPADM

## 2019-10-17 RX ORDER — QUETIAPINE FUMARATE 25 MG/1
25 TABLET, FILM COATED ORAL NIGHTLY
Qty: 30 TABLET | Refills: 11 | Status: SHIPPED | OUTPATIENT
Start: 2019-10-17 | End: 2021-05-28

## 2019-10-17 RX ADMIN — BUMETANIDE 2 MG: 0.25 INJECTION INTRAMUSCULAR; INTRAVENOUS at 10:10

## 2019-10-17 RX ADMIN — INSULIN ASPART 10 UNITS: 100 INJECTION, SUSPENSION SUBCUTANEOUS at 09:10

## 2019-10-17 RX ADMIN — AMLODIPINE BESYLATE 2.5 MG: 2.5 TABLET ORAL at 09:10

## 2019-10-17 RX ADMIN — HYDRALAZINE HYDROCHLORIDE 25 MG: 25 TABLET, FILM COATED ORAL at 05:10

## 2019-10-17 RX ADMIN — FERROUS SULFATE TAB EC 325 MG (65 MG FE EQUIVALENT) 325 MG: 325 (65 FE) TABLET DELAYED RESPONSE at 09:10

## 2019-10-17 RX ADMIN — POLYETHYLENE GLYCOL 3350 17 G: 17 POWDER, FOR SOLUTION ORAL at 09:10

## 2019-10-17 RX ADMIN — HEPARIN SODIUM 5000 UNITS: 5000 INJECTION INTRAVENOUS; SUBCUTANEOUS at 09:10

## 2019-10-17 RX ADMIN — EPOETIN ALFA-EPBX 10000 UNITS: 10000 INJECTION, SOLUTION INTRAVENOUS; SUBCUTANEOUS at 09:10

## 2019-10-17 RX ADMIN — GUAIFENESIN AND DEXTROMETHORPHAN 5 ML: 100; 10 SYRUP ORAL at 05:10

## 2019-10-17 RX ADMIN — PRAVASTATIN SODIUM 10 MG: 10 TABLET ORAL at 09:10

## 2019-10-17 RX ADMIN — METOPROLOL SUCCINATE 50 MG: 50 TABLET, EXTENDED RELEASE ORAL at 09:10

## 2019-10-17 RX ADMIN — DOCUSATE SODIUM 100 MG: 100 CAPSULE, LIQUID FILLED ORAL at 09:10

## 2019-10-17 NOTE — PROGRESS NOTES
Patient seems to be extremely confused, but able to answer every questions correctly, AAOx4. Will continue to monitor.

## 2019-10-17 NOTE — PROGRESS NOTES
Report received from off going nurse, MICAH Ramírez. Patient AAO. No signs of distress noted. Call light in reach. Bed low and locked. Will continue to monitor.

## 2019-10-17 NOTE — PROGRESS NOTES
Patient transport to [facility] via \[facility]\"" tranporter.

## 2019-10-17 NOTE — PLAN OF CARE
10/17/19 1353   Post-Acute Status   Post-Acute Authorization Placement   Post-Acute Placement Status   (awaiting call report information)   Diaylsis Status Set-up Complete   Discharge Delays (!) Patient Transportation   Felicia Garcia RN, BSN, STN [facility]  10/17/2019

## 2019-10-17 NOTE — PLAN OF CARE
Problem: Occupational Therapy Goal  Goal: Occupational Therapy Goal  Description  Goals to be met by: 10/19/19    Patient will increase functional independence with ADLs by performing:    UE Dressing with Set-up Assistance.  LE Dressing with Stand-by Assistance.  Grooming while standing with Stand-by Assistance.  Toileting from bedside commode with Stand-by Assistance for hygiene and clothing management.   Toilet transfer to bedside commode with Stand-by Assistance.  Upper extremity exercise program x 20 reps per handout, with supervision.  Sit to stand with Contact Guard Assistance using RW. (goal updated from MIN A on 10/11/19)   Step transfer with Stand-by Assistance using RW (goal updated from MIN A on 10/14/19)         Outcome: Ongoing, Progressing   Session limited today to EOB activities 2* lethargic state and increased confusion. Pt calmly cooperated, but required increased cueing.

## 2019-10-17 NOTE — PROGRESS NOTES
Sleeping very restfully    Did not wake her up    Past Medical History:   Diagnosis Date    Arthritis     CHF (congestive heart failure)     Diabetes mellitus      Review of patient's allergies indicates:   Allergen Reactions    Ciprofloxacin Anaphylaxis    Codeine Anaphylaxis    Neosporin [benzalkonium chloride] Anaphylaxis       Current Facility-Administered Medications   Medication    0.9%  NaCl infusion    0.9%  NaCl infusion    0.9%  NaCl infusion    acetaminophen tablet 650 mg    albuterol-ipratropium 2.5 mg-0.5 mg/3 mL nebulizer solution 3 mL    amLODIPine tablet 2.5 mg    benzonatate capsule 200 mg    bumetanide injection 2 mg    cloNIDine tablet 0.1 mg    dextromethorphan-guaifenesin  mg/5 ml liquid 5 mL    dextrose 50% injection 12.5 g    dextrose 50% injection 25 g    docusate sodium capsule 100 mg    epoetin cortes-epbx injection 10,000 Units    ferrous sulfate EC tablet 325 mg    glucagon (human recombinant) injection 1 mg    glucose chewable tablet 16 g    glucose chewable tablet 24 g    heparin (porcine) injection 5,000 Units    heparin (porcine) injection 5,000 Units    hydrALAZINE injection 10 mg    hydrALAZINE tablet 25 mg    insulin aspart protamine-insulin aspart (NovoLOG 70/30) injection    insulin aspart U-100 pen 1-10 Units    metoprolol succinate (TOPROL-XL) 24 hr tablet 50 mg    ondansetron injection 4 mg    polyethylene glycol packet 17 g    pravastatin tablet 10 mg    QUEtiapine tablet 25 mg    senna-docusate 8.6-50 mg per tablet 1 tablet    sodium chloride 0.9% flush 10 mL       LABS    Recent Results (from the past 24 hour(s))   POCT glucose    Collection Time: 10/16/19  4:14 PM   Result Value Ref Range    POCT Glucose 110 70 - 110 mg/dL   POCT glucose    Collection Time: 10/16/19  7:37 PM   Result Value Ref Range    POCT Glucose 210 (H) 70 - 110 mg/dL   Renal function panel    Collection Time: 10/17/19  3:49 AM   Result Value Ref Range    Glucose  110 70 - 110 mg/dL    Sodium 140 136 - 145 mmol/L    Potassium 4.0 3.5 - 5.1 mmol/L    Chloride 108 95 - 110 mmol/L    CO2 23 23 - 29 mmol/L    BUN, Bld 13 8 - 23 mg/dL    Calcium 7.8 (L) 8.7 - 10.5 mg/dL    Creatinine 2.0 (H) 0.5 - 1.4 mg/dL    Albumin 2.3 (L) 3.5 - 5.2 g/dL    Phosphorus 2.6 (L) 2.7 - 4.5 mg/dL    eGFR if African American 29 (A) >60 mL/min/1.73 m^2    eGFR if non African American 25 (A) >60 mL/min/1.73 m^2    Anion Gap 9 8 - 16 mmol/L   POCT glucose    Collection Time: 10/17/19  7:21 AM   Result Value Ref Range    POCT Glucose 132 (H) 70 - 110 mg/dL   ]    I/O last 3 completed shifts:  In: 980 [P.O.:480; Other:500]  Out: 3803 [Urine:100; Other:3703]    Vitals:    10/17/19 0526 10/17/19 0756 10/17/19 0841 10/17/19 1108   BP: (!) 148/86  134/62 (!) 141/67   Pulse: 77  73 66   Resp: 19 18 18   Temp: 99.1 °F (37.3 °C)  98.6 °F (37 °C) 97.8 °F (36.6 °C)   TempSrc: Oral   Oral   SpO2: (!) 94% (!) 91% 98% 95%   Weight:       Height:           No Jvd, Thyromegaly or Lymphadenopathy  Lungs: Fairly clear anteriorly and laterally  Cor: RRR no G or rubs  Abd: Soft benign good bowel sounds non tender  Ext: Less edema    A)       ANIA on ckd3a dialysis dependent for now   ml   Fluid OL seems better   Has lost 10 kg in 10 days  DM with dm nephropathy  Hx of pulm edema  Anasarca better   Nephrotic snd   Iron def anemia  Episode of confusion better     P)     Renal Diet  Home meds  Protect access  HD MWF increase uf  EPO   Binders  Adjust all meds to the degree of renal fx  Close follow up I/O and weights  Maintain Hydration

## 2019-10-17 NOTE — PLAN OF CARE
10/17/19 1544   Final Note   Assessment Type Final Discharge Note   Anticipated Discharge Disposition SNF   What phone number can be called within the next 1-3 days to see how you are doing after discharge?   (see chart)   Discharge plans and expectations educations in teach back method with documentation complete? Yes   Right Care Referral Info   Referral Type SNF   Facility Name [facility]   Felicia Garcia RN, BSN, STN CCM  10/17/2019

## 2019-10-17 NOTE — PT/OT/SLP PROGRESS
"Occupational Therapy   Treatment    Name: Magaly Epstein  MRN: [id number]  Admitting Diagnosis:  Hypertensive emergency       Recommendations:     Discharge Recommendations: nursing facility, skilled  Discharge Equipment Recommendations:  (per SNF)  Barriers to discharge:  Inaccessible home environment, Decreased caregiver support    Assessment:     Magaly Epstein is a 69 y.o. female with a medical diagnosis of Hypertensive emergency. Performance deficits affecting function are weakness, impaired functional mobilty, decreased safety awareness, impaired cognition, impaired endurance, impaired cardiopulmonary response to activity, gait instability, impaired balance, decreased upper extremity function, impaired self care skills, decreased lower extremity function, edema.     Session limited today to EOB activities 2* lethargic state and increased confusion. Pt calmly cooperated, but required increased cueing.     Rehab Prognosis:  fair +; patient would benefit from acute skilled OT services to address these deficits and reach maximum level of function.       Plan:     Patient to be seen 5 x/week to address the above listed problems via self-care/home management, therapeutic activities, therapeutic exercises  · Plan of Care Expires: 10/19/19  · Plan of Care Reviewed with: patient    Subjective     Chief complaint: wanting to lay back down after therapy session   Patient's comments/goals: "where's the man?" "look at the lady bug"    Pain/Comfort:  · Pain Rating 1: 0/10    Objective:     Communicated with: nurseCee, prior to session.  Patient found HOB elevated with B/L LE hanging off the side of the R side of the bed with her O2 NC on the top of her head with peripheral IV, oxygen, telemetry, bed alarm(Avasys) upon OT entry to room.    General Precautions: Standard, fall   Orthopedic Precautions:N/A   Braces: N/A     Occupational Performance:     Bed Mobility:    · Patient completed Rolling/Turning to Right with " "minimum assistance  · Patient completed Scooting/Bridging with contact guard assistance  · Patient completed Supine to Sit with minimum assistance and HOB elevated  · Patient completed Sit to Supine with moderate assistance     Functional Mobility/Transfers:  · Functional Mobility: Pt became very lethargic sitting EOB after completing some therapeutic exercises, falling asleep. Pt required max cueing and was able to scoot at EOB to HOB with CGA and prompting.     Activities of Daily Living:  · Grooming: minimum assistance to brush her teeth and wash her face. Pt began to rub the toothbrush with toothpaste onto her hands, requiring re-direction and help to clean up. With consistent prompting, pt was then able to appropriately brush her teeth   · Upper Body Dressing: moderate assistance to adjust donning her hospital gown   · Lower Body Dressing: total assistance with socks      [facility] 6 Click ADL: 16    Treatment & Education:  · Pt re-educated on OT role/POC.   · Importance of OOB activity with staff assistance.  · Pt tolerated sitting EOB with SBA for ~20 min with SBA/CGA for safety   · Sitting EOB, pt completed the following B/U UE exercises with yellow theraband:   · 1x15 shoulder horizontal ab/dduction  · 1x10 elbow flexion/extension   · 1x5 shoulder flexion/extension- then pt became hard to cue with increased lethargic state so session was ended.   · Safety/hand placement to increase independence with bed mobility   · Multiple self-care tasks/bed mobility completed- assistance level noted above   · All questions/concerns answered within OT scope of practice     Upon OT arrival, pt had a small cut on to top of her nose. When asked about it, pt reported, "We are not going to talk about that." NurseCee, notified.     Patient left HOB elevated with all lines intact, call button in reach, bed alarm on, nurseCee, notified, Avasys present and lap tray positioned in reach/safety measures in place/door " openEducation:      GOALS:   Multidisciplinary Problems     Occupational Therapy Goals        Problem: Occupational Therapy Goal    Goal Priority Disciplines Outcome Interventions   Occupational Therapy Goal     OT, PT/OT Ongoing, Progressing    Description:  Goals to be met by: 10/19/19    Patient will increase functional independence with ADLs by performing:    UE Dressing with Set-up Assistance.  LE Dressing with Stand-by Assistance.  Grooming while standing with Stand-by Assistance.  Toileting from bedside commode with Stand-by Assistance for hygiene and clothing management.   Toilet transfer to bedside commode with Stand-by Assistance.  Upper extremity exercise program x 20 reps per handout, with supervision.  Sit to stand with Contact Guard Assistance using RW. (goal updated from MIN A on 10/11/19)   Step transfer with Stand-by Assistance using RW (goal updated from MIN A on 10/14/19)                          Time Tracking:     OT Date of Treatment: 10/17/19  OT Start Time: 0910  OT Stop Time: 0935  OT Total Time (min): 25 min    Billable Minutes:Self Care/Home Management 13 min  Therapeutic Exercise 12 min  Total Time 25 min    Akanksha White OT  10/17/2019

## 2019-10-17 NOTE — PLAN OF CARE
Monae with [facility] called she received [employer] SNF authorization.     10/17/19 1121   Post-Acute Status   Post-Acute Authorization Placement  ([facility] snf and [facility] on [address])   Post-Acute Placement Status Awaiting Orders for SNF   Diaylsis Status Set-up Complete   .Felicia Garcia RN, BSN, STN CCM  10/17/2019

## 2019-10-17 NOTE — PT/OT/SLP PROGRESS
Physical Therapy      Patient Name:  Magaly Epstein   MRN:  [id number]    Patient not seen today secondary to Nursing care. Will follow-up as able today .    Anastacia Roper, PTA

## 2019-10-17 NOTE — PROGRESS NOTES
TN informed telemetry nurse Cee that patient is cleared for discharge form  [facility]. TN provided Cee with travel packet and call report info. Call [facility] [phone] nurse who has patient in room 135A..Felicia Garcia RN, BSN, [facility]  10/17/2019

## 2019-10-17 NOTE — PLAN OF CARE
[facility]     Department of Hospital Medicine     [address] [place] [address]     [phone] after hours  [phone] fax       NURSING HOME ORDERS    Patient Name: Magaly Epstein  YOB: 1950    10/17/2019    Admit to Nursing Home: Skilled Bed      Diagnoses:  Active Hospital Problems    Diagnosis  POA    *Hypertensive emergency [I16.1]  Yes     Priority: 1 - High    Acute renal failure superimposed on stage 3 chronic kidney disease [N17.9, N18.3]  Yes     Priority: 2     Nephrotic syndrome and end-stage renal disease [N04.9]  Yes     Priority: 3      Chronic    Elevated troponin [R79.89]  Yes    Debility [R53.81]  Yes    Anemia of chronic disease [D63.8]  Yes     Chronic    Essential hypertension [I10]  Yes     Chronic    Hyperlipidemia [E78.5]  Yes     Chronic    Type 2 diabetes mellitus with renal complication [E11.29]  Yes     Chronic      Resolved Hospital Problems   No resolved problems to display.       Patient is homebound due to:  Hypertensive emergency    Allergies:  Review of patient's allergies indicates:   Allergen Reactions    Ciprofloxacin Anaphylaxis    Codeine Anaphylaxis    Neosporin [benzalkonium chloride] Anaphylaxis       Vitals:       Every shift (Skilled Nursing patients)    Diet: cardiac diet, diabetic diet: 2000 calorie and renal diet                Acitivities:   - Up in a chair each morning as tolerated  - Ambulate with assistance to bathroom      LABS:  Per facility protocol    Nursing Precautions:      - Fall precautions per nursing home protocol    CONSULTS:     Physical Therapy to evaluate and treat     Occupational Therapy to evaluate and treat    Oxygen: as needed for goal oxygen saturation > 92%     MISCELLANEOUS CARE:  DIABETES CARE:      Check blood sugar:          Fingerstick blood sugar AC and HS       Report CBG < 60 or > 400 to physician.                                          Insulin  Sliding Scale          Glucose  Novolog Insulin Subcutaneous        0 - 60   Orange juice or glucose tablet, hold insulin      No insulin   201-250  2 units   251-300  4 units   301-350  6 units   351-400  8 units   >400   10 units then call physician      Medications: Discontinue all previous medication orders, if any. See new list below.     Magaly Epstein   Home Medication Instructions CARMENCITA:[id number]    Printed on:10/17/19 1976   Medication Information                      acetaminophen (TYLENOL) 500 MG tablet  Take 1 tablet (500 mg total) by mouth every 6 (six) hours as needed for pain.             albuterol-ipratropium (DUO-NEB) 2.5 mg-0.5 mg/3 mL nebulizer solution  Take 3 mLs by nebulization every 4 (four) hours as needed for Wheezing or Shortness of Breath. Rescue             amLODIPine (NORVASC) 2.5 MG tablet  Take 1 tablet (2.5 mg total) by mouth once daily.             benzonatate (TESSALON) 200 MG capsule  Take 1 capsule (200 mg total) by mouth 3 (three) times daily as needed for Cough.             escitalopram oxalate (LEXAPRO) 10 MG tablet  Take 10 mg by mouth once daily.             ferrous sulfate 325 (65 FE) MG EC tablet  Take 1 tablet (325 mg total) by mouth 2 (two) times daily.             hydrALAZINE (APRESOLINE) 25 MG tablet  Take 2 tablets (50 mg total) by mouth every 8 (eight) hours.             insulin aspart protamine-insulin aspart (NOVOLOG 70/30) 100 unit/mL (70-30) InPn pen  Inject 10 Units into the skin 2 (two) times daily.             metOLazone (ZAROXOLYN) 10 MG tablet  Take 1 tablet (10 mg total) by mouth once daily.             metoprolol succinate (TOPROL-XL) 50 MG 24 hr tablet  Take 1 tablet (50 mg total) by mouth 2 (two) times daily.             polyethylene glycol (GLYCOLAX) 17 gram PwPk  Take 17 g by mouth 2 (two) times daily as needed constipation.             pravastatin (PRAVACHOL) 10 MG tablet  Take 10 mg by mouth once daily.             QUEtiapine (SEROQUEL) 25 MG  Tab  Take 1 tablet (25 mg total) by mouth every evening.               Electronically signed  _________________________________  Lenka Raygoza MD  10/17/2019

## 2019-10-17 NOTE — PROGRESS NOTES
ADT 30 order for  Transportation.    ETA: 3:30 PM BRING WHEEL CHAIR WITH SAFETY BELT.  If transportation does not arrive at ETA time nurse will be instructed to follow protocol for transportation below:   1.How can I get in touch directly with dispatch, if needed?                 2. Non-emergent dispatch: [phone]   3. Emergent dispatch: [phone]   4. Non-emergent (stretcher): [phone]    5. Escalation Needs (PFC Lead): [phone]    ..Felicia Garcia, RN, BSN, [facility]  10/17/2019

## 2019-10-18 NOTE — PT/OT/SLP DISCHARGE
Physical Therapy Discharge Summary    Name: Magaly Epstein  MRN: [id number]   Principal Problem: Hypertensive emergency     Patient Discharged from acute Physical Therapy on 10/17/2019.  Please refer to prior PT noted date on 10/15/2019 for functional status.     Assessment:     Patient appropriate for care in another setting.    Objective:     GOALS:   Multidisciplinary Problems     Physical Therapy Goals        Problem: Physical Therapy Goal    Goal Priority Disciplines Outcome Goal Variances Interventions   Physical Therapy Goal     PT, PT/OT Ongoing, Progressing     Description:  Goals to be met by: 10/19/2019     Patient will increase functional independence with mobility by performin. Supine to sit with Modified Humphreys  2. Sit to stand transfer with Modified Humphreys  3. Gait  x 100 feet with Modified Humphreys using Rolling Walker.   4. Lower extremity exercise program x10 reps per handout, with independence                      Reasons for Discontinuation of Therapy Services  Transfer to alternate level of care.      Plan:     Patient Discharged to: Skilled Nursing Facility.    Raquel Taylor, PT  10/18/2019

## 2019-10-19 NOTE — DISCHARGE SUMMARY
"[facility] Hospital Medicine  Discharge Summary      Patient Name: Magaly Epstein  MRN: [id number]  Admission Date: 10/3/2019  Hospital Length of Stay: 14 days  Discharge Date and Time:  10/17/2019 7:23 AM  Attending Physician: No att. providers found   Discharging Provider: Lenka Raygoza MD  Primary Care Provider: Anahy Bradley DO      HPI:   70 y/o female patient presents for evaluation of acute onset of shortness of breath that started last night.  Patient lives in assisted living.  She was admitted here in April for shortness of breath due to hypertensive crisis and fluid overload.  Patient has history of nephrotic syndrome and anasarca and had a kidney biopsy at that time.   She also has a history of chronic hypertension.   Patient states compliance with medications.  She denies chest pain. She states she was just very short of breath and repeatedly asked for help.  She also complains of a dry cough. She has leg edema that she states is "always there."  Patient was placed on CPAP for an O2 sat of 60% on room air.  Blood pressure was reportedly elevated at 270 systolic.  She presented severely hypertensive and placed on Nitro infusion.  Patient is now feeling better.  No other complaints.    * No surgery found *      Hospital Course:   Ms. Epstein presented with shortness of breath. She was admitted for hypertensive crisis and volume overload. Blood pressure was elevated at 270 systolic.  Treatment initiated with Nitro infusion and she was placed on CPAP for hypoxia with O2 sat of 60% on room air.  Also noted with acute renal failure on CKD.  Nephrology consulted and started on IV Bumex.  Restarted home medications with improvement of BP and able to wean off Nitro drip. Stepped down to floor in stable condition on 10/5. Metolazone added by nephrology. Weaned off supplemental O2 but remained with anasarca. Unable to use ACE-I due to worsening renal function.  Renal function without " significant improvement. Nephrology finally recommended patient to start dialysis mainly for UF.  Patient did continue oral metolazone and Bumex for continued diuresis in between dialysis days. Tunneled catheter placed by IR on 10/10/19 with 1st dialysis the next day.  Social service arranged outpatient dialysis for every Tuesday, Thursday, Saturday as requested by patient.  PT/OT recommends SNF placement.  Patient was accepted at [facility] for SNF.  Insurance approved.  Patient is to use oxygen as needed.  Activity as tolerated.  Cardiac/renal diet.  PCP follow-up to be arranged once out of SNF       Consults:   Consults (From admission, onward)        Status Ordering Provider     Inpatient consult to Interventional Radiology  Once     Provider:  (Not yet assigned)    Completed MIKEL ARCHER     Inpatient consult to Registered Dietitian/Nutritionist  Once     Provider:  (Not yet assigned)    Completed KAN HEREDIA     IP consult to case management  Once     Provider:  (Not yet assigned)    Completed DESIREE MARSH        Final Active Diagnoses:    Diagnosis Date Noted POA    PRINCIPAL PROBLEM:  Hypertensive emergency [I16.1] 04/11/2019 Yes    Acute renal failure superimposed on stage 3 chronic kidney disease [N17.9, N18.3] 04/11/2019 Yes    Nephrotic syndrome and end-stage renal disease [N04.9] 04/15/2019 Yes     Chronic    Elevated troponin [R79.89] 10/03/2019 Yes    Debility [R53.81] 04/15/2019 Yes    Anemia of chronic disease [D63.8] 04/11/2019 Yes     Chronic    Essential hypertension [I10] 04/11/2019 Yes     Chronic    Hyperlipidemia [E78.5] 04/11/2019 Yes     Chronic    Type 2 diabetes mellitus with renal complication [E11.29] 04/11/2019 Yes     Chronic      Problems Resolved During this Admission:       Discharged Condition: stable    Disposition: Skilled Nursing Facility    Follow Up:  Follow-up Information     MD at [facility].           [facility].    Specialties:  Nursing  Home Agency, SNF Agency  Contact information:  [address]  [facility] [address]  [phone]                 Patient Instructions:      Diet Cardiac     Diet diabetic     Activity as tolerated     Medications:  Reconciled Home Medications:      Medication List      START taking these medications    albuterol-ipratropium 2.5 mg-0.5 mg/3 mL nebulizer solution  Commonly known as:  DUO-NEB  Take 3 mLs by nebulization every 4 (four) hours as needed for Wheezing or Shortness of Breath. Rescue     benzonatate 200 MG capsule  Commonly known as:  TESSALON  Take 1 capsule (200 mg total) by mouth 3 (three) times daily as needed for Cough.     epoetin cortes-epbx 10,000 unit/mL imjection  Commonly known as:  RETACRIT  Inject 0.6 mLs (6,000 Units total) into the skin every 7 days.     ferrous sulfate 325 (65 FE) MG EC tablet  Take 1 tablet (325 mg total) by mouth 2 (two) times daily.     insulin aspart protamine-insulin aspart 100 unit/mL (70-30) Inpn pen  Commonly known as:  NovoLOG 70/30  Inject 10 Units into the skin 2 (two) times daily.  Replaces:  insulin NPH-insulin regular (70/30) 100 unit/mL (70-30) injection     insulin aspart U-100 100 unit/mL (3 mL) Inpn pen  Commonly known as:  NovoLOG  Inject 1-10 Units into the skin before meals and at bedtime as needed (Hyperglycemia).     metOLazone 10 MG tablet  Commonly known as:  ZAROXOLYN  Take 1 tablet (10 mg total) by mouth once daily.     metoprolol succinate 50 MG 24 hr tablet  Commonly known as:  TOPROL-XL  Take 1 tablet (50 mg total) by mouth 2 (two) times daily.     polyethylene glycol 17 gram Pwpk  Commonly known as:  GLYCOLAX  Take 17 g by mouth 2 (two) times daily as needed.     QUEtiapine 25 MG Tab  Commonly known as:  SEROQUEL  Take 1 tablet (25 mg total) by mouth every evening for insomnia.        CHANGE how you take these medications    amLODIPine 2.5 MG tablet  Commonly known as:  NORVASC  Take 1 tablet (2.5 mg total) by mouth once daily.  What changed:     · medication strength  · how much to take     hydrALAZINE 25 MG tablet  Commonly known as:  APRESOLINE  Take 2 tablets (50 mg total) by mouth every 8 (eight) hours.  What changed:    · medication strength  · how much to take        CONTINUE taking these medications    acetaminophen 500 MG tablet  Commonly known as:  TYLENOL  Take 1 tablet (500 mg total) by mouth every 6 (six) hours as needed.     bumetanide 2 MG tablet  Commonly known as:  BUMEX  Take 1 tablet (2 mg total) by mouth once daily.     escitalopram oxalate 10 MG tablet  Commonly known as:  LEXAPRO  Take 10 mg by mouth once daily.     pravastatin 10 MG tablet  Commonly known as:  PRAVACHOL  Take 10 mg by mouth once daily.     senna-docusate 8.6-50 mg 8.6-50 mg per tablet  Commonly known as:  PERICOLACE  Take 1 tablet by mouth 2 (two) times daily as needed for Constipation.         Indwelling Lines/Drains at time of discharge:   Lines/Drains/Airways     Central Venous Catheter Line                 Hemodialysis Catheter 10/10/19 1453 right internal jugular 8 days                Time spent on the discharge of patient: > 35 minutes  Patient was seen and examined on the date of discharge and determined to be suitable for discharge.         Lenka Raygoza MD  Department of Hospital Medicine  [facility]

## 2019-10-19 NOTE — PT/OT/SLP DISCHARGE
Occupational Therapy Discharge Summary    Magaly Epstein  MRN: [id number]   Principal Problem: Hypertensive emergency      Patient Discharged from acute Occupational Therapy on 10/17/19.  Please refer to prior OT notes for functional status.    Assessment:      Patient appropriate for care in another setting.    Objective:     GOALS:   Multidisciplinary Problems     Occupational Therapy Goals        Problem: Occupational Therapy Goal    Goal Priority Disciplines Outcome Interventions   Occupational Therapy Goal     OT, PT/OT Ongoing, Progressing    Description:  Goals to be met by: 10/19/19    Patient will increase functional independence with ADLs by performing:    UE Dressing with Set-up Assistance.  LE Dressing with Stand-by Assistance.  Grooming while standing with Stand-by Assistance.  Toileting from bedside commode with Stand-by Assistance for hygiene and clothing management.   Toilet transfer to bedside commode with Stand-by Assistance.  Upper extremity exercise program x 20 reps per handout, with supervision.  Sit to stand with Contact Guard Assistance using RW. (goal updated from MIN A on 10/11/19)   Step transfer with Stand-by Assistance using RW (goal updated from MIN A on 10/14/19)                          Reasons for Discontinuation of Therapy Services  Transfer to alternate level of care.      Plan:     Patient Discharged to: Skilled Nursing Facility    Akanksha White OT  10/19/2019

## 2019-10-22 NOTE — SUBJECTIVE & OBJECTIVE
Fax confirmation received and placed in blue folder Interval History:  Patient has been doing well off all oxygen, no new complaints.  Swelling little decreased after dialysis    Review of Systems   Constitutional: Negative for chills and fever.   Respiratory: Negative for shortness of breath.    Cardiovascular: Negative for chest pain.     Objective:     Vital Signs (Most Recent):  Temp: 97.4 °F (36.3 °C) (10/11/19 2357)  Pulse: 70 (10/11/19 2357)  Resp: 20 (10/11/19 2357)  BP: (!) 154/69 (10/11/19 2357)  SpO2: 95 % (10/11/19 2357) Vital Signs (24h Range):  Temp:  [97.4 °F (36.3 °C)-98.7 °F (37.1 °C)] 97.4 °F (36.3 °C)  Pulse:  [55-71] 70  Resp:  [17-22] 20  SpO2:  [91 %-95 %] 95 %  BP: (119-154)/(52-83) 154/69     Weight: 114.5 kg (252 lb 6.8 oz)  Body mass index is 37.28 kg/m².    Intake/Output Summary (Last 24 hours) at 10/12/2019 0046  Last data filed at 10/11/2019 1800  Gross per 24 hour   Intake 1460 ml   Output 2503 ml   Net -1043 ml      Physical Exam   Constitutional: She is oriented to person, place, and time. She appears well-developed. No distress.   HENT:   Head: Normocephalic.   Eyes: Conjunctivae and EOM are normal.   Neck: Normal range of motion. Neck supple.   Cardiovascular: Normal rate and regular rhythm.   Right upper anterior chest with tunneled catheter in place   Pulmonary/Chest: Effort normal. She has no wheezes. She has no rales.   Breathing comfortably at room air   Abdominal: Soft. Bowel sounds are normal.   Musculoskeletal: Normal range of motion. She exhibits edema.   anasarca   Neurological: She is alert and oriented to person, place, and time.   Skin: Skin is warm and dry. Capillary refill takes less than 2 seconds. She is not diaphoretic.   Psychiatric: She has a normal mood and affect.   Nursing note and vitals reviewed.      Significant Labs: All pertinent labs within the past 24 hours have been reviewed.    Significant Imaging: I have reviewed all pertinent imaging results/findings within the past 24 hours.

## 2019-11-08 ENCOUNTER — HOSPITAL ENCOUNTER (INPATIENT)
Facility: HOSPITAL | Age: 69
LOS: 6 days | Discharge: NURSING, REHAB OR LONG-TERM CARE | DRG: 291 | End: 2019-11-14
Attending: EMERGENCY MEDICINE | Admitting: EMERGENCY MEDICINE
Payer: MEDICARE

## 2019-11-08 DIAGNOSIS — J90 CHRONIC BILATERAL PLEURAL EFFUSIONS: ICD-10-CM

## 2019-11-08 DIAGNOSIS — E11.29 TYPE 2 DIABETES MELLITUS WITH RENAL COMPLICATION: Chronic | ICD-10-CM

## 2019-11-08 DIAGNOSIS — J96.01 ACUTE RESPIRATORY FAILURE WITH HYPOXIA: ICD-10-CM

## 2019-11-08 DIAGNOSIS — N18.6 ESRD (END STAGE RENAL DISEASE) ON DIALYSIS: ICD-10-CM

## 2019-11-08 DIAGNOSIS — I10 HYPERTENSION, UNSPECIFIED TYPE: ICD-10-CM

## 2019-11-08 DIAGNOSIS — E78.5 HYPERLIPIDEMIA: Chronic | ICD-10-CM

## 2019-11-08 DIAGNOSIS — R06.02 SHORTNESS OF BREATH: ICD-10-CM

## 2019-11-08 DIAGNOSIS — F32.A DEPRESSION: Chronic | ICD-10-CM

## 2019-11-08 DIAGNOSIS — R09.02 HYPOXIA: ICD-10-CM

## 2019-11-08 DIAGNOSIS — R60.1 ANASARCA: ICD-10-CM

## 2019-11-08 DIAGNOSIS — Z99.2 ESRD (END STAGE RENAL DISEASE) ON DIALYSIS: ICD-10-CM

## 2019-11-08 DIAGNOSIS — I50.43 ACUTE ON CHRONIC COMBINED SYSTOLIC AND DIASTOLIC CONGESTIVE HEART FAILURE: Primary | ICD-10-CM

## 2019-11-08 DIAGNOSIS — I10 ESSENTIAL HYPERTENSION: Chronic | ICD-10-CM

## 2019-11-08 LAB
ALBUMIN SERPL BCP-MCNC: 2.6 G/DL (ref 3.5–5.2)
ALLENS TEST: ABNORMAL
ALP SERPL-CCNC: 115 U/L (ref 55–135)
ALT SERPL W/O P-5'-P-CCNC: 16 U/L (ref 10–44)
ANION GAP SERPL CALC-SCNC: 8 MMOL/L (ref 8–16)
APPEARANCE FLD: CLEAR
AST SERPL-CCNC: 25 U/L (ref 10–40)
BASOPHILS # BLD AUTO: 0.02 K/UL (ref 0–0.2)
BASOPHILS NFR BLD: 0.3 % (ref 0–1.9)
BILIRUB SERPL-MCNC: 0.2 MG/DL (ref 0.1–1)
BNP SERPL-MCNC: 1269 PG/ML (ref 0–99)
BODY FLD TYPE: NORMAL
BUN SERPL-MCNC: 28 MG/DL (ref 8–23)
CALCIUM SERPL-MCNC: 8.3 MG/DL (ref 8.7–10.5)
CHLORIDE SERPL-SCNC: 101 MMOL/L (ref 95–110)
CO2 SERPL-SCNC: 28 MMOL/L (ref 23–29)
COLOR FLD: YELLOW
CREAT SERPL-MCNC: 3.2 MG/DL (ref 0.5–1.4)
DELSYS: ABNORMAL
DIFFERENTIAL METHOD: ABNORMAL
EOSINOPHIL # BLD AUTO: 0 K/UL (ref 0–0.5)
EOSINOPHIL NFR BLD: 0.1 % (ref 0–8)
EOSINOPHIL NFR FLD MANUAL: 1 %
EP: 7
ERYTHROCYTE [DISTWIDTH] IN BLOOD BY AUTOMATED COUNT: 13.5 % (ref 11.5–14.5)
ERYTHROCYTE [SEDIMENTATION RATE] IN BLOOD BY WESTERGREN METHOD: 14 MM/H
EST. GFR  (AFRICAN AMERICAN): 16 ML/MIN/1.73 M^2
EST. GFR  (NON AFRICAN AMERICAN): 14 ML/MIN/1.73 M^2
FIO2: 6
GLUCOSE SERPL-MCNC: 143 MG/DL (ref 70–110)
HCO3 UR-SCNC: 27.9 MMOL/L (ref 24–28)
HCT VFR BLD AUTO: 30.6 % (ref 37–48.5)
HGB BLD-MCNC: 9.5 G/DL (ref 12–16)
IMM GRANULOCYTES # BLD AUTO: 0.03 K/UL (ref 0–0.04)
IMM GRANULOCYTES NFR BLD AUTO: 0.4 % (ref 0–0.5)
INR PPP: 1 (ref 0.8–1.2)
IP: 15
LDH SERPL L TO P-CCNC: 270 U/L (ref 110–260)
LYMPHOCYTES # BLD AUTO: 1.6 K/UL (ref 1–4.8)
LYMPHOCYTES NFR BLD: 23.5 % (ref 18–48)
LYMPHOCYTES NFR FLD MANUAL: 89 %
MAGNESIUM SERPL-MCNC: 2 MG/DL (ref 1.6–2.6)
MCH RBC QN AUTO: 29 PG (ref 27–31)
MCHC RBC AUTO-ENTMCNC: 31 G/DL (ref 32–36)
MCV RBC AUTO: 93 FL (ref 82–98)
MIN VOL: 23
MODE: ABNORMAL
MONOCYTES # BLD AUTO: 0.7 K/UL (ref 0.3–1)
MONOCYTES NFR BLD: 9.8 % (ref 4–15)
MONOS+MACROS NFR FLD MANUAL: 2 %
NEUTROPHILS # BLD AUTO: 4.5 K/UL (ref 1.8–7.7)
NEUTROPHILS NFR BLD: 65.9 % (ref 38–73)
NEUTROPHILS NFR FLD MANUAL: 8 %
NRBC BLD-RTO: 0 /100 WBC
PCO2 BLDA: 46.8 MMHG (ref 35–45)
PH SMN: 7.38 [PH] (ref 7.35–7.45)
PHOSPHATE SERPL-MCNC: 3.2 MG/DL (ref 2.7–4.5)
PLATELET # BLD AUTO: 303 K/UL (ref 150–350)
PMV BLD AUTO: 9.8 FL (ref 9.2–12.9)
PO2 BLDA: 262 MMHG (ref 80–100)
POC BE: 2 MMOL/L
POC SATURATED O2: 100 % (ref 95–100)
POC TCO2: 29 MMOL/L (ref 23–27)
POCT GLUCOSE: 152 MG/DL (ref 70–110)
POCT GLUCOSE: 177 MG/DL (ref 70–110)
POTASSIUM SERPL-SCNC: 4.2 MMOL/L (ref 3.5–5.1)
PROT SERPL-MCNC: 5.8 G/DL (ref 6–8.4)
PROTHROMBIN TIME: 10.6 SEC (ref 9–12.5)
RBC # BLD AUTO: 3.28 M/UL (ref 4–5.4)
SAMPLE: ABNORMAL
SITE: ABNORMAL
SODIUM SERPL-SCNC: 137 MMOL/L (ref 136–145)
SP02: 100
SPONT RATE: 33
TROPONIN I SERPL DL<=0.01 NG/ML-MCNC: 0.07 NG/ML (ref 0–0.03)
WBC # BLD AUTO: 6.82 K/UL (ref 3.9–12.7)
WBC # FLD: 102 /CU MM

## 2019-11-08 PROCEDURE — 63600175 PHARM REV CODE 636 W HCPCS: Performed by: INTERNAL MEDICINE

## 2019-11-08 PROCEDURE — 89051 BODY FLUID CELL COUNT: CPT

## 2019-11-08 PROCEDURE — 88112 CYTOLOGY SPECIMEN- MEDICAL CYTOLOGY (FLUID/WASH/BRUSH): ICD-10-PCS | Mod: 26,,, | Performed by: PATHOLOGY

## 2019-11-08 PROCEDURE — 93010 ELECTROCARDIOGRAM REPORT: CPT | Mod: ,,, | Performed by: INTERNAL MEDICINE

## 2019-11-08 PROCEDURE — 84100 ASSAY OF PHOSPHORUS: CPT

## 2019-11-08 PROCEDURE — 83615 LACTATE (LD) (LDH) ENZYME: CPT

## 2019-11-08 PROCEDURE — 27000221 HC OXYGEN, UP TO 24 HOURS

## 2019-11-08 PROCEDURE — 99900035 HC TECH TIME PER 15 MIN (STAT)

## 2019-11-08 PROCEDURE — 25000003 PHARM REV CODE 250: Performed by: HOSPITALIST

## 2019-11-08 PROCEDURE — 63600175 PHARM REV CODE 636 W HCPCS: Performed by: HOSPITALIST

## 2019-11-08 PROCEDURE — 36600 WITHDRAWAL OF ARTERIAL BLOOD: CPT

## 2019-11-08 PROCEDURE — 85610 PROTHROMBIN TIME: CPT

## 2019-11-08 PROCEDURE — 27000190 HC CPAP FULL FACE MASK W/VALVE

## 2019-11-08 PROCEDURE — 88305 CYTOLOGY SPECIMEN- MEDICAL CYTOLOGY (FLUID/WASH/BRUSH): ICD-10-PCS | Mod: 26,,, | Performed by: PATHOLOGY

## 2019-11-08 PROCEDURE — 99000 SPECIMEN HANDLING OFFICE-LAB: CPT | Mod: 91

## 2019-11-08 PROCEDURE — 80053 COMPREHEN METABOLIC PANEL: CPT

## 2019-11-08 PROCEDURE — 25000003 PHARM REV CODE 250: Performed by: EMERGENCY MEDICINE

## 2019-11-08 PROCEDURE — 94660 CPAP INITIATION&MGMT: CPT

## 2019-11-08 PROCEDURE — 87205 SMEAR GRAM STAIN: CPT

## 2019-11-08 PROCEDURE — 94640 AIRWAY INHALATION TREATMENT: CPT

## 2019-11-08 PROCEDURE — 87075 CULTR BACTERIA EXCEPT BLOOD: CPT

## 2019-11-08 PROCEDURE — 96375 TX/PRO/DX INJ NEW DRUG ADDON: CPT

## 2019-11-08 PROCEDURE — 83880 ASSAY OF NATRIURETIC PEPTIDE: CPT

## 2019-11-08 PROCEDURE — 93010 EKG 12-LEAD: ICD-10-PCS | Mod: ,,, | Performed by: INTERNAL MEDICINE

## 2019-11-08 PROCEDURE — 93005 ELECTROCARDIOGRAM TRACING: CPT

## 2019-11-08 PROCEDURE — 96376 TX/PRO/DX INJ SAME DRUG ADON: CPT

## 2019-11-08 PROCEDURE — 84484 ASSAY OF TROPONIN QUANT: CPT

## 2019-11-08 PROCEDURE — 88112 CYTOPATH CELL ENHANCE TECH: CPT | Performed by: PATHOLOGY

## 2019-11-08 PROCEDURE — 20000000 HC ICU ROOM

## 2019-11-08 PROCEDURE — 87070 CULTURE OTHR SPECIMN AEROBIC: CPT

## 2019-11-08 PROCEDURE — 94761 N-INVAS EAR/PLS OXIMETRY MLT: CPT

## 2019-11-08 PROCEDURE — 99285 EMERGENCY DEPT VISIT HI MDM: CPT | Mod: 25

## 2019-11-08 PROCEDURE — 83615 LACTATE (LD) (LDH) ENZYME: CPT | Mod: 91

## 2019-11-08 PROCEDURE — 25000242 PHARM REV CODE 250 ALT 637 W/ HCPCS: Performed by: EMERGENCY MEDICINE

## 2019-11-08 PROCEDURE — 88112 CYTOPATH CELL ENHANCE TECH: CPT | Mod: 26,,, | Performed by: PATHOLOGY

## 2019-11-08 PROCEDURE — 84157 ASSAY OF PROTEIN OTHER: CPT

## 2019-11-08 PROCEDURE — 82803 BLOOD GASES ANY COMBINATION: CPT

## 2019-11-08 PROCEDURE — 99000 SPECIMEN HANDLING OFFICE-LAB: CPT

## 2019-11-08 PROCEDURE — 36415 COLL VENOUS BLD VENIPUNCTURE: CPT

## 2019-11-08 PROCEDURE — 80100014 HC HEMODIALYSIS 1:1

## 2019-11-08 PROCEDURE — 83735 ASSAY OF MAGNESIUM: CPT

## 2019-11-08 PROCEDURE — 88305 TISSUE EXAM BY PATHOLOGIST: CPT | Mod: 26,,, | Performed by: PATHOLOGY

## 2019-11-08 PROCEDURE — 85025 COMPLETE CBC W/AUTO DIFF WBC: CPT

## 2019-11-08 PROCEDURE — 63600175 PHARM REV CODE 636 W HCPCS: Performed by: EMERGENCY MEDICINE

## 2019-11-08 PROCEDURE — 96374 THER/PROPH/DIAG INJ IV PUSH: CPT

## 2019-11-08 RX ORDER — IBUPROFEN 200 MG
24 TABLET ORAL
Status: DISCONTINUED | OUTPATIENT
Start: 2019-11-08 | End: 2019-11-14 | Stop reason: HOSPADM

## 2019-11-08 RX ORDER — FUROSEMIDE 10 MG/ML
40 INJECTION INTRAMUSCULAR; INTRAVENOUS
Status: COMPLETED | OUTPATIENT
Start: 2019-11-08 | End: 2019-11-08

## 2019-11-08 RX ORDER — MUPIROCIN 20 MG/G
OINTMENT TOPICAL 2 TIMES DAILY
Status: DISPENSED | OUTPATIENT
Start: 2019-11-08 | End: 2019-11-13

## 2019-11-08 RX ORDER — POLYETHYLENE GLYCOL 3350 17 G/17G
17 POWDER, FOR SOLUTION ORAL DAILY
Status: DISCONTINUED | OUTPATIENT
Start: 2019-11-09 | End: 2019-11-14 | Stop reason: HOSPADM

## 2019-11-08 RX ORDER — METOLAZONE 5 MG/1
10 TABLET ORAL DAILY
Status: DISCONTINUED | OUTPATIENT
Start: 2019-11-08 | End: 2019-11-09

## 2019-11-08 RX ORDER — GLUCAGON 1 MG
1 KIT INJECTION
Status: DISCONTINUED | OUTPATIENT
Start: 2019-11-08 | End: 2019-11-14 | Stop reason: HOSPADM

## 2019-11-08 RX ORDER — HEPARIN SODIUM 5000 [USP'U]/ML
5000 INJECTION, SOLUTION INTRAVENOUS; SUBCUTANEOUS
Status: DISCONTINUED | OUTPATIENT
Start: 2019-11-08 | End: 2019-11-13

## 2019-11-08 RX ORDER — FUROSEMIDE 10 MG/ML
120 INJECTION INTRAMUSCULAR; INTRAVENOUS ONCE
Status: COMPLETED | OUTPATIENT
Start: 2019-11-08 | End: 2019-11-08

## 2019-11-08 RX ORDER — SUCCINYLCHOLINE CHLORIDE 20 MG/ML
150 INJECTION INTRAMUSCULAR; INTRAVENOUS
Status: DISCONTINUED | OUTPATIENT
Start: 2019-11-08 | End: 2019-11-08

## 2019-11-08 RX ORDER — QUETIAPINE FUMARATE 25 MG/1
25 TABLET, FILM COATED ORAL NIGHTLY
Status: DISCONTINUED | OUTPATIENT
Start: 2019-11-08 | End: 2019-11-14 | Stop reason: HOSPADM

## 2019-11-08 RX ORDER — INSULIN ASPART 100 [IU]/ML
0-5 INJECTION, SOLUTION INTRAVENOUS; SUBCUTANEOUS
Status: DISCONTINUED | OUTPATIENT
Start: 2019-11-08 | End: 2019-11-14 | Stop reason: HOSPADM

## 2019-11-08 RX ORDER — SODIUM CHLORIDE 9 MG/ML
INJECTION, SOLUTION INTRAVENOUS
Status: DISCONTINUED | OUTPATIENT
Start: 2019-11-08 | End: 2019-11-14 | Stop reason: HOSPADM

## 2019-11-08 RX ORDER — HYDRALAZINE HYDROCHLORIDE 25 MG/1
50 TABLET, FILM COATED ORAL EVERY 8 HOURS
Status: DISCONTINUED | OUTPATIENT
Start: 2019-11-08 | End: 2019-11-14 | Stop reason: HOSPADM

## 2019-11-08 RX ORDER — LORAZEPAM 2 MG/ML
0.5 INJECTION INTRAMUSCULAR
Status: DISCONTINUED | OUTPATIENT
Start: 2019-11-08 | End: 2019-11-08

## 2019-11-08 RX ORDER — AMLODIPINE BESYLATE 2.5 MG/1
2.5 TABLET ORAL DAILY
Status: DISCONTINUED | OUTPATIENT
Start: 2019-11-09 | End: 2019-11-10

## 2019-11-08 RX ORDER — INSULIN ASPART 100 [IU]/ML
10 INJECTION, SUSPENSION SUBCUTANEOUS 2 TIMES DAILY
Status: DISCONTINUED | OUTPATIENT
Start: 2019-11-08 | End: 2019-11-13

## 2019-11-08 RX ORDER — HYDRALAZINE HYDROCHLORIDE 20 MG/ML
10 INJECTION INTRAMUSCULAR; INTRAVENOUS
Status: COMPLETED | OUTPATIENT
Start: 2019-11-08 | End: 2019-11-08

## 2019-11-08 RX ORDER — SODIUM CHLORIDE 9 MG/ML
INJECTION, SOLUTION INTRAVENOUS ONCE
Status: DISCONTINUED | OUTPATIENT
Start: 2019-11-08 | End: 2019-11-08

## 2019-11-08 RX ORDER — METOPROLOL SUCCINATE 50 MG/1
50 TABLET, EXTENDED RELEASE ORAL 2 TIMES DAILY
Status: DISCONTINUED | OUTPATIENT
Start: 2019-11-08 | End: 2019-11-14 | Stop reason: HOSPADM

## 2019-11-08 RX ORDER — IBUPROFEN 200 MG
16 TABLET ORAL
Status: DISCONTINUED | OUTPATIENT
Start: 2019-11-08 | End: 2019-11-14 | Stop reason: HOSPADM

## 2019-11-08 RX ORDER — FUROSEMIDE 40 MG/1
80 TABLET ORAL DAILY
Status: DISCONTINUED | OUTPATIENT
Start: 2019-11-09 | End: 2019-11-14 | Stop reason: HOSPADM

## 2019-11-08 RX ORDER — HYDRALAZINE HYDROCHLORIDE 20 MG/ML
10 INJECTION INTRAMUSCULAR; INTRAVENOUS
Status: DISCONTINUED | OUTPATIENT
Start: 2019-11-08 | End: 2019-11-08

## 2019-11-08 RX ORDER — SODIUM CHLORIDE 0.9 % (FLUSH) 0.9 %
10 SYRINGE (ML) INJECTION
Status: DISCONTINUED | OUTPATIENT
Start: 2019-11-08 | End: 2019-11-14 | Stop reason: HOSPADM

## 2019-11-08 RX ORDER — FERROUS SULFATE 325(65) MG
325 TABLET, DELAYED RELEASE (ENTERIC COATED) ORAL 2 TIMES DAILY
Status: DISCONTINUED | OUTPATIENT
Start: 2019-11-08 | End: 2019-11-14 | Stop reason: HOSPADM

## 2019-11-08 RX ORDER — PRAVASTATIN SODIUM 10 MG/1
10 TABLET ORAL DAILY
Status: DISCONTINUED | OUTPATIENT
Start: 2019-11-08 | End: 2019-11-14 | Stop reason: HOSPADM

## 2019-11-08 RX ORDER — ETOMIDATE 2 MG/ML
20 INJECTION INTRAVENOUS
Status: DISCONTINUED | OUTPATIENT
Start: 2019-11-08 | End: 2019-11-08

## 2019-11-08 RX ORDER — BUMETANIDE 1 MG/1
2 TABLET ORAL DAILY
Status: DISCONTINUED | OUTPATIENT
Start: 2019-11-08 | End: 2019-11-08

## 2019-11-08 RX ORDER — IPRATROPIUM BROMIDE AND ALBUTEROL SULFATE 2.5; .5 MG/3ML; MG/3ML
3 SOLUTION RESPIRATORY (INHALATION)
Status: COMPLETED | OUTPATIENT
Start: 2019-11-08 | End: 2019-11-08

## 2019-11-08 RX ORDER — IPRATROPIUM BROMIDE AND ALBUTEROL SULFATE 2.5; .5 MG/3ML; MG/3ML
3 SOLUTION RESPIRATORY (INHALATION) EVERY 4 HOURS PRN
Status: DISCONTINUED | OUTPATIENT
Start: 2019-11-08 | End: 2019-11-14 | Stop reason: HOSPADM

## 2019-11-08 RX ORDER — ESCITALOPRAM OXALATE 10 MG/1
10 TABLET ORAL DAILY
Status: DISCONTINUED | OUTPATIENT
Start: 2019-11-09 | End: 2019-11-14 | Stop reason: HOSPADM

## 2019-11-08 RX ADMIN — FUROSEMIDE 40 MG: 10 INJECTION, SOLUTION INTRAVENOUS at 12:11

## 2019-11-08 RX ADMIN — METOPROLOL SUCCINATE 50 MG: 50 TABLET, EXTENDED RELEASE ORAL at 09:11

## 2019-11-08 RX ADMIN — QUETIAPINE FUMARATE 25 MG: 25 TABLET ORAL at 09:11

## 2019-11-08 RX ADMIN — IPRATROPIUM BROMIDE AND ALBUTEROL SULFATE 3 ML: .5; 3 SOLUTION RESPIRATORY (INHALATION) at 01:11

## 2019-11-08 RX ADMIN — MUPIROCIN: 20 OINTMENT TOPICAL at 09:11

## 2019-11-08 RX ADMIN — PRAVASTATIN SODIUM 10 MG: 10 TABLET ORAL at 04:11

## 2019-11-08 RX ADMIN — INSULIN ASPART 10 UNITS: 100 INJECTION, SUSPENSION SUBCUTANEOUS at 09:11

## 2019-11-08 RX ADMIN — HYDRALAZINE HYDROCHLORIDE 50 MG: 25 TABLET ORAL at 11:11

## 2019-11-08 RX ADMIN — HEPARIN SODIUM 5000 UNITS: 5000 INJECTION INTRAVENOUS; SUBCUTANEOUS at 08:11

## 2019-11-08 RX ADMIN — FUROSEMIDE 80 MG: 10 INJECTION, SOLUTION INTRAVENOUS at 03:11

## 2019-11-08 RX ADMIN — FUROSEMIDE 40 MG: 10 INJECTION, SOLUTION INTRAVENOUS at 01:11

## 2019-11-08 RX ADMIN — HYDRALAZINE HYDROCHLORIDE 10 MG: 20 INJECTION INTRAMUSCULAR; INTRAVENOUS at 12:11

## 2019-11-08 RX ADMIN — METOLAZONE 10 MG: 5 TABLET ORAL at 04:11

## 2019-11-08 RX ADMIN — NITROGLYCERIN 2 INCH: 20 OINTMENT TOPICAL at 12:11

## 2019-11-08 RX ADMIN — FERROUS SULFATE TAB EC 325 MG (65 MG FE EQUIVALENT) 325 MG: 325 (65 FE) TABLET DELAYED RESPONSE at 09:11

## 2019-11-08 NOTE — OP NOTE
Interventional Radiology Procedure Note    Procedure: right thoracentesis    Pre procedure diagnosis: right pleural effusiton    Post procedure diagnosis: same    : MD Eloise    Specimens removed: yellow fluid.     Estimated Blood Loss: 0 ml     Complications: none     Findings: small right pleural effusion

## 2019-11-08 NOTE — H&P
[facility] Hospital Medicine  History & Physical    Patient Name: Magaly Epstein  MRN: [id number]  Admission Date: 11/8/2019  Attending Physician: Naomi Pedersen MD   Primary Care Provider: Anahy Bradley DO         Patient information was obtained from patient and ER records.     Subjective:     Principal Problem:Acute on chronic combined systolic and diastolic congestive heart failure    Chief Complaint:   Chief Complaint   Patient presents with    Shortness of Breath     Reports increasing SOB; pt recently diagnosed with bilateral pleural effusion        HPI: 69 y.o. female with PMHx of CHF and diabetes mellitus, ESRD on HD who presents to the ED with complaint of shortness of breath. Patient states that she is chronically short of breath; however, much feels much more short of breath for about 1 week, with SpO2 of 87% on EMS arrival today. Patient states being seen by her PCP about 1 week ago for shortness of breath and being diagnosed with bilateral pleural effusions, for which she apparently has an appointment with IR today. Patient reports last being dialyzed 4 days ago and that she is scheduled for dialysis today at 2:40 PM at a facility that is new to her. Patient lives at home alone. Patient denies supplemental oxygen use at home. She reports chronic bilateral leg swelling which is not worse today. Patient denies fevers. She states that she still feels somewhat short of breath while on O2 per nasal cannula in the ED.     Pt placed on bipap in ED and transferred to ICU. Pt underwent thoracentesis with 300 ml fluid removed. Studies pending. Plan to receive HD and wean O2 as tolerated.       Past Medical History:   Diagnosis Date    Arthritis     CHF (congestive heart failure)     Diabetes mellitus     ESRD on hemodialysis     S/P hemodialysis catheter insertion 10/10/2019    Right IJ    Unsteady gait     Uses roller walker        Past Surgical History:   Procedure Laterality  Date    APPENDECTOMY      INSERTION OF TUNNELED CENTRAL VENOUS HEMODIALYSIS CATHETER Right 10/10/2019       Review of patient's allergies indicates:   Allergen Reactions    Ciprofloxacin Anaphylaxis    Codeine Anaphylaxis    Neosporin [benzalkonium chloride] Anaphylaxis       No current facility-administered medications on file prior to encounter.      Current Outpatient Medications on File Prior to Encounter   Medication Sig    acetaminophen (TYLENOL) 500 MG tablet Take 1 tablet (500 mg total) by mouth every 6 (six) hours as needed.    albuterol-ipratropium (DUO-NEB) 2.5 mg-0.5 mg/3 mL nebulizer solution Take 3 mLs by nebulization every 4 (four) hours as needed for Wheezing or Shortness of Breath. Rescue    amLODIPine (NORVASC) 2.5 MG tablet Take 1 tablet (2.5 mg total) by mouth once daily.    bumetanide (BUMEX) 2 MG tablet Take 1 tablet (2 mg total) by mouth once daily.    epoetin cortes-epbx (RETACRIT) 10,000 unit/mL imjection Inject 0.6 mLs (6,000 Units total) into the skin every 7 days.    escitalopram oxalate (LEXAPRO) 10 MG tablet Take 10 mg by mouth once daily.    ferrous sulfate 325 (65 FE) MG EC tablet Take 1 tablet (325 mg total) by mouth 2 (two) times daily.    hydrALAZINE (APRESOLINE) 25 MG tablet Take 2 tablets (50 mg total) by mouth every 8 (eight) hours.    insulin aspart protamine-insulin aspart (NOVOLOG 70/30) 100 unit/mL (70-30) InPn pen Inject 10 Units into the skin 2 (two) times daily.    insulin aspart U-100 (NOVOLOG) 100 unit/mL (3 mL) InPn pen Inject 1-10 Units into the skin before meals and at bedtime as needed (Hyperglycemia).    metOLazone (ZAROXOLYN) 10 MG tablet Take 1 tablet (10 mg total) by mouth once daily.    metoprolol succinate (TOPROL-XL) 50 MG 24 hr tablet Take 1 tablet (50 mg total) by mouth 2 (two) times daily.    polyethylene glycol (GLYCOLAX) 17 gram PwPk Take 17 g by mouth 2 (two) times daily as needed.    pravastatin (PRAVACHOL) 10 MG tablet Take 10 mg by  mouth once daily.    QUEtiapine (SEROQUEL) 25 MG Tab Take 1 tablet (25 mg total) by mouth every evening.    senna-docusate 8.6-50 mg (PERICOLACE) 8.6-50 mg per tablet Take 1 tablet by mouth 2 (two) times daily as needed for Constipation.     Family History     None        Tobacco Use    Smoking status: Former Smoker    Smokeless tobacco: Never Used   Substance and Sexual Activity    Alcohol use: Not Currently    Drug use: No    Sexual activity: Not Currently     Partners: Male     Review of Systems   Constitutional: Positive for activity change.   HENT: Negative.    Eyes: Negative.    Respiratory: Positive for cough and shortness of breath.    Cardiovascular: Positive for chest pain and leg swelling.   Gastrointestinal: Negative.    Endocrine: Negative.    Genitourinary: Positive for difficulty urinating.   Musculoskeletal: Negative.    Neurological: Positive for weakness.   Psychiatric/Behavioral: Negative.      Objective:     Vital Signs (Most Recent):  Temp: 98.3 °F (36.8 °C) (11/08/19 1525)  Pulse: 80 (11/08/19 1600)  Resp: (!) 30 (11/08/19 1600)  BP: (!) 149/69 (11/08/19 1600)  SpO2: 100 % (11/08/19 1600) Vital Signs (24h Range):  Temp:  [98.3 °F (36.8 °C)-98.8 °F (37.1 °C)] 98.3 °F (36.8 °C)  Pulse:  [] 80  Resp:  [15-33] 30  SpO2:  [94 %-100 %] 100 %  BP: (138-204)/(59-98) 149/69     Weight: 105.3 kg (232 lb 2.3 oz)  Body mass index is 34.28 kg/m².    Physical Exam   Constitutional: She is oriented to person, place, and time. She appears well-developed and well-nourished. No distress.   HENT:   Head: Normocephalic and atraumatic.   Eyes: Pupils are equal, round, and reactive to light. EOM are normal.   Neck: Normal range of motion. Neck supple. JVD present.   Cardiovascular: Normal rate, regular rhythm, normal heart sounds and intact distal pulses.   Pulmonary/Chest: She is in respiratory distress. She has no wheezes. She has rales.   Abdominal: Soft. Bowel sounds are normal. She exhibits  distension.   Musculoskeletal: She exhibits edema and tenderness.   Neurological: She is alert and oriented to person, place, and time.   Skin: Capillary refill takes less than 2 seconds.   Psychiatric: She has a normal mood and affect. Her behavior is normal.         CRANIAL NERVES     CN III, IV, VI   Pupils are equal, round, and reactive to light.  Extraocular motions are normal.        Significant Labs:   A1C:   Recent Labs   Lab 10/03/19  0742   HGBA1C 7.5*     ABGs:   Recent Labs   Lab 11/08/19  1323   PH 7.384   PCO2 46.8*   HCO3 27.9   POCSATURATED 100   BE 2     Blood Culture: No results for input(s): LABBLOO in the last 48 hours.  CBC:   Recent Labs   Lab 11/08/19  1034   WBC 6.82   HGB 9.5*   HCT 30.6*        CMP:   Recent Labs   Lab 11/08/19  1034      K 4.2      CO2 28   *   BUN 28*   CREATININE 3.2*   CALCIUM 8.3*   PROT 5.8*   ALBUMIN 2.6*   BILITOT 0.2   ALKPHOS 115   AST 25   ALT 16   ANIONGAP 8   EGFRNONAA 14*     Cardiac Markers:   Recent Labs   Lab 11/08/19  1034   BNP 1,269*     Coagulation:   Recent Labs   Lab 11/08/19  1034   INR 1.0     Lactic Acid: No results for input(s): LACTATE in the last 48 hours.  Magnesium:   Recent Labs   Lab 11/08/19  1034   MG 2.0     POCT Glucose: No results for input(s): POCTGLUCOSE in the last 48 hours.  Troponin:   Recent Labs   Lab 11/08/19  1034   TROPONINI 0.066*     TSH: No results for input(s): TSH in the last 4320 hours.  Urine Studies: No results for input(s): COLORU, APPEARANCEUA, PHUR, SPECGRAV, PROTEINUA, GLUCUA, KETONESU, BILIRUBINUA, OCCULTUA, NITRITE, UROBILINOGEN, LEUKOCYTESUR, RBCUA, WBCUA, BACTERIA, SQUAMEPITHEL, HYALINECASTS in the last 48 hours.    Invalid input(s): WRIGHTSUR    Significant Imaging:   Impression/CXR       Bilateral pleural fluid with adjacent atelectasis versus consolidation, right greater than left    Bilateral perihilar opacities favored to reflect interstitial edema.         Assessment/Plan:     *  Acute on chronic combined systolic and diastolic congestive heart failure  Lasix given in ED 80 + 80mg   REsume lasix daily + metolazone   bumex held  HD for volume control  BP control        ESRD (end stage renal disease) on dialysis  Nephrology consulted ( dialysis patient)      Anasarca  Daily weights  HD for volume control  BP control  Renal-cardiac diet       Depression  Resume home meds      Hyperlipidemia    Resume statin     Type 2 diabetes mellitus with renal complication    Uncontrolled, A1c 7.5%  SSi   Diabetic diet  Resume 70/30 - home insulin regimen     Essential hypertension  Uncontrolled   Resume home meds        VTE Risk Mitigation (From admission, onward)         Ordered     IP VTE HIGH RISK PATIENT  Once      11/08/19 1523              Critical care time spent on the evaluation and treatment of severe organ dysfunction, review of pertinent labs and imaging studies, discussions with consulting providers and discussions with patient/family: 30 minutes.     Naomi Pedersen MD  Department of Hospital Medicine   [facility]

## 2019-11-08 NOTE — ED NOTES
Called dialysis clinic for patient due to her high level of anxiety about being dropped from the dialysis site because it was her first scheduled treatment at this new location  Advised center that the patient was in the ER and not going to make her treatment for today  The rep stated she would contact the coordinator to reschedule the patients visit

## 2019-11-08 NOTE — ED NOTES
Patient noted to have wet diaper. Pt expressed sensation to urinate; attempted to have patient use bedpan but patient unable.

## 2019-11-08 NOTE — CONSULTS
[facility]  History & Physical - Short Stay  Interventional Radiology    SUBJECTIVE:     Chief Complaint/Reason for Admission: pleural effusion, right    History of Present Illness:  Magaly Epstein is a 69 y.o. female with a history of right pleural effusion.      OBJECTIVE:     Vital Signs (Most Recent):  Temp: 98.4 °F (36.9 °C) (11/08/19 1209)  Pulse: 89 (11/08/19 1445)  Resp: (!) 25 (11/08/19 1445)  BP: (!) 146/71 (11/08/19 1445)  SpO2: 100 % (11/08/19 1445)    Physical Exam:  Awake, alert and oriented    ASSESSMENT/PLAN:     right pleural effusion.    Patient will undergo thoracentesis.    Sedation Plan: lidocaine

## 2019-11-08 NOTE — ED NOTES
Peg RN notified of patient going to IR and then to room on a monitor with RT, IR RN, and IR tech.

## 2019-11-08 NOTE — CONSULTS
REASON FOR CONSULTATION:  End-stage renal disease, fluid overload.    HISTORY OF PRESENT ILLNESS:  The patient is a 69-year-old lady with past medical   history significant for hypertension, diabetes type 2, congestive heart   failure, end-stage renal disease, currently on maintenance hemodialysis every   Monday, Wednesday and Friday, who presented to the Emergency Room this morning   complaining of having worsening shortness of breath and the patient missed her   dialysis today.  She was evaluated in the Emergency Room and was found to be in   congestive heart failure and we were consulted to see the patient for evaluation   of end-stage renal disease and fluid overload requiring emergent hemodialysis.    At this time, the patient is on BiPAP.  Denied any fever, chills, chest pain,   nausea or vomiting.    PAST MEDICAL HISTORY:  As above.    MEDICATIONS:  The patient is currently receiving hydralazine 10 mg IV x1, Lasix   200 mg IV x1 altogether.    FAMILY HISTORY:  Noncontributory.    SOCIAL HISTORY:  The patient denied any recent history of tobacco use, alcohol   abuse or IV drug use.    PHYSICAL EXAMINATION:  GENERAL:  The patient is awake, alert and in mild respiratory distress on BiPAP.  VITAL SIGNS:  Temperature 98.4, blood pressure 138/65 with a pulse of 84,   respirations of 15.  HEENT:  Pupils equally round, reactive to light.  EOMI.  Oral mucosa is moist.    Oropharynx clear.  Positive for JVD.  HEART:  Regular rhythm and rate.  LUNGS:  Decreased breath sounds bilaterally with basilar crackle bilaterally.  ABDOMEN:  Soft, positive bowel sounds, nondistended, nontender.  EXTREMITIES:  Positive for 2+ pedal edema.    LABORATORY DATA:  Sodium 137, potassium 4.2, chloride 101, CO2 28, BUN 28,   creatinine 3.0, and glucose 146.  WBC 6.82, hemoglobin 9.5, hematocrit 30.6 and   platelet count 193.    IMPRESSION:  1. End-stage renal disease, currently receiving hemodialysis every Monday,   Wednesday, Friday.  2.  Acute on chronic congestive heart failure, fluid overload in dialysis   patient.  3. Hypertension.  4. Diabetes type 2.  5. Anemia of chronic kidney disease.    DISCUSSION AND RECOMMENDATION:  We will dialyze the patient today and we will   ultrafiltrate the patient as tolerated.  We will continue to dialyze the patient   every Monday, Wednesday and Friday as outpatient hemodialysis schedule.  If the   patient continued to have problem with fluid overload, she might require to   have an extra session of dialysis in the morning.  We will follow the patient   with you for maintenance hemodialysis.    Thank you for the courtesy of the consultation and allowing us to participate in   the patient's care.        TERESA/JAYSHREE  dd: 11/08/2019 14:36:03 (CST)  td: 11/08/2019 23:19:10 (CST)  Doc ID   #[id number]  Job ID #[id number]    CC:       Consult dictated # [id number]    Hd today and q MW  We'll follow for dialysis  Thanks

## 2019-11-08 NOTE — ED PROVIDER NOTES
Encounter Date: 11/8/2019    SCRIBE #1 NOTE: I, Viraj Damon, am scribing for, and in the presence of,  Dr. Chuy MD . I have scribed the following portions of the note - Other sections scribed: HPI, ROS, PE.       History     Chief Complaint   Patient presents with    Shortness of Breath     Reports increasing SOB; pt recently diagnosed with bilateral pleural effusion     Time seen by provider: 10:40 AM    This is a 69 y.o. female with PMHx of CHF and diabetes mellitus, ESRD on HD who presents to the ED with complaint of shortness of breath. Patient states that she is chronically short of breath; however, much feels much more short of breath for about 1 week, with SpO2 of 87% on EMS arrival today. Patient states being seen by her PCP about 1 week ago for shortness of breath and being diagnosed with bilateral pleural effusions, for which she apparently has an appointment with IR today. Patient reports last being dialyzed 4 days ago and that she is scheduled for dialysis today at 2:40 PM at a facility that is new to her. Patient lives at home alone. Patient denies supplemental oxygen use at home. She reports chronic bilateral leg swelling which is not worse today. Patient denies fevers. She states that she still feels somewhat short of breath while on O2 per nasal cannula in the ED.         Review of patient's allergies indicates:   Allergen Reactions    Ciprofloxacin Anaphylaxis    Codeine Anaphylaxis    Neosporin [benzalkonium chloride] Anaphylaxis     Past Medical History:   Diagnosis Date    Arthritis     CHF (congestive heart failure)     Diabetes mellitus     Renal disorder      Past Surgical History:   Procedure Laterality Date    APPENDECTOMY       History reviewed. No pertinent family history.  Social History     Tobacco Use    Smoking status: Former Smoker   Substance Use Topics    Alcohol use: Not Currently    Drug use: No     Review of Systems   Constitutional: Negative for fever.   HENT:  Negative for sore throat.    Respiratory: Negative for shortness of breath.    Cardiovascular: Negative for chest pain.   Gastrointestinal: Negative for nausea.   Genitourinary: Negative for dysuria.   Musculoskeletal: Negative for back pain.   Skin: Negative for rash.   Neurological: Negative for weakness.   Hematological: Does not bruise/bleed easily.       Physical Exam     Initial Vitals [11/08/19 0959]   BP Pulse Resp Temp SpO2   (!) 174/89 82 (!) 22 98.8 °F (37.1 °C) (!) 94 %      MAP       --         Physical Exam    Nursing note and vitals reviewed.  Constitutional: She appears well-developed and well-nourished.   HENT:   Head: Normocephalic and atraumatic.   Eyes: EOM are normal. Pupils are equal, round, and reactive to light.   Neck: Normal range of motion. Neck supple. No thyromegaly present. JVD (mild JVD) present.   Cardiovascular: Normal rate, regular rhythm and normal heart sounds. Exam reveals no gallop and no friction rub.    No murmur heard.  Pulmonary/Chest: Tachypnea (mild tachypnea) noted. She has no wheezes. She has rales.   Decreased breath sounds to bilateral lower lobes.    Abdominal: Soft. Bowel sounds are normal. There is no tenderness.   Musculoskeletal: Normal range of motion. She exhibits edema. She exhibits no tenderness.   2+ lower extremity swelling bilaterally. Patient has anasarca   Neurological: She is alert and oriented to person, place, and time. She has normal strength. GCS score is 15. GCS eye subscore is 4. GCS verbal subscore is 5. GCS motor subscore is 6.   Skin: Skin is warm and dry. Capillary refill takes less than 2 seconds.         ED Course   Critical Care  Date/Time: 11/8/2019 12:17 PM  Performed by: Ethan Vera MD  Authorized by: Ethan Vera MD   Direct patient critical care time: 15 minutes  Additional history critical care time: 5 minutes  Ordering / reviewing critical care time: 10 minutes  Documentation critical care time: 10 minutes  Consulting  other physicians critical care time: 10 minutes  Consult with family critical care time: 5 minutes  Other critical care time: 5 minutes  Total critical care time (exclusive of procedural time) : 60 minutes  Critical care time was exclusive of separately billable procedures and treating other patients and teaching time.  Critical care was necessary to treat or prevent imminent or life-threatening deterioration of the following conditions: respiratory failure.  Critical care was time spent personally by me on the following activities: development of treatment plan with patient or surrogate, discussions with consultants, interpretation of cardiac output measurements, evaluation of patient's response to treatment, examination of patient, obtaining history from patient or surrogate, ordering and performing treatments and interventions, ordering and review of laboratory studies, ordering and review of radiographic studies, pulse oximetry, re-evaluation of patient's condition and review of old charts.        Labs Reviewed   CBC W/ AUTO DIFFERENTIAL - Abnormal; Notable for the following components:       Result Value    RBC 3.28 (*)     Hemoglobin 9.5 (*)     Hematocrit 30.6 (*)     Mean Corpuscular Hemoglobin Conc 31.0 (*)     All other components within normal limits   COMPREHENSIVE METABOLIC PANEL - Abnormal; Notable for the following components:    Glucose 143 (*)     BUN, Bld 28 (*)     Creatinine 3.2 (*)     Calcium 8.3 (*)     Total Protein 5.8 (*)     Albumin 2.6 (*)     eGFR if  16 (*)     eGFR if non  14 (*)     All other components within normal limits   TROPONIN I - Abnormal; Notable for the following components:    Troponin I 0.066 (*)     All other components within normal limits   B-TYPE NATRIURETIC PEPTIDE - Abnormal; Notable for the following components:    BNP 1,269 (*)     All other components within normal limits   PROTIME-INR   MAGNESIUM   PHOSPHORUS     EKG Readings:  (Independently Interpreted)   Rhythm: Normal Sinus Rhythm. Heart Rate: 93. Ectopy: No Ectopy.   non-specific inferolateral T-wave abnormalities.   QTc of 504 ms.        Imaging Results          X-Ray Chest AP Portable (Final result)  Result time 11/08/19 10:43:30    Final result by Berenice Briscoe MD (11/08/19 10:43:30)                 Impression:      Bilateral pleural fluid with adjacent atelectasis versus consolidation, right greater than left    Bilateral perihilar opacities favored to reflect interstitial edema.      Electronically signed by: Berenice Briscoe MD  Date:    11/08/2019  Time:    10:43             Narrative:    EXAMINATION:  XR CHEST AP PORTABLE    CLINICAL HISTORY:  CHF;    TECHNIQUE:  Single frontal view of the chest was performed.    COMPARISON:  Prior from 10/14/2019    FINDINGS:  There is a central venous catheter present with tip projecting over the SVC/RA junction.  The mediastinal structures are midline.  The cardiac silhouette is not well evaluated due to AP technique.  There is bilateral pleural fluid with adjacent atelectasis versus consolidation, slightly increased from previous exam.  Bilateral streaky perihilar opacities are also present favored to reflect edema.    There is a stable benign-appearing sclerotic lesion of the left humeral head.                              X-Rays:   Independently Interpreted Readings:   Chest X-Ray: Cardiomegaly present.  Increased vascular markings consistent with CHF are present. Bilateral pleural effusions     For EMS on arrival patient was tachypneic and had oxygen room-air saturations of 87%.  Came in on oxygen.  Patient 94% on nasal cannula.  Still tachypneic.  Evidence of volume overload with JVD, pleural effusions, pulmonary edema, anasarca.  Patient due for dialysis later today.  Unlikely able to get to dialysis with hypoxia.  Patient is not on home oxygen.  Will admit for dialysis, diuresis.                                   Clinical  Impression:       ICD-10-CM ICD-9-CM   1. Acute on chronic combined systolic and diastolic congestive heart failure I50.43 428.43     428.0   2. Shortness of breath R06.02 786.05   3. ESRD (end stage renal disease) on dialysis N18.6 585.6    Z99.2 V45.11   4. Anasarca R60.1 782.3   5. Hypoxia R09.02 799.02   6. Hypertension, unspecified type I10 401.9   7. Chronic bilateral pleural effusions J90 511.9                             Ethan Vera MD  11/08/19 1219       Ethan Vera MD  11/22/19 1416

## 2019-11-08 NOTE — ASSESSMENT & PLAN NOTE
Lasix given in ED 80 + 80mg   REsume lasix daily + metolazone   bumex held  HD for volume control  BP control

## 2019-11-08 NOTE — ED NOTES
"Patient experiencing severe anxiety attack, pleading "help me, help me, I cant breath"  Assured patient O2 95% on the nasal cannula, patient again very panicky  Placed NRB on patient at 15 liters, patient continuously pulling off mask, panic mode saying she cant breath  Called for MD and respiratory to put BIPAP on patient  Administered medications per EMAR, attempted to assist patient in self calming measures with very little success  Patient still hyperventilating and constantly repositioning in bed due anxiety  Now pulling constantly at BIPAP mask  Tried to explain course of treatment and plans for admission to obtain dialysis and to get fluid levels reduced  Patient still panicking and trying to remove BIPAP mask    "

## 2019-11-08 NOTE — ED TRIAGE NOTES
"Patient reports shortness of breath since last week  Today had dialysis scheduled in the afternoon and "some procedure in IR" scheduled but was too short of breath to go to either  EMS reports patient is part of their home nursing program who sets all appointments up for her, she was supposed to start a new dialysis center today in order to be closer to home  EMS reported patient dropped O2 sats to 88-89% when ambulating across her home to get onto stretcher  Patient denies chronic use of oxygen at home    "

## 2019-11-08 NOTE — HPI
69 y.o. female with PMHx of CHF and diabetes mellitus, ESRD on HD who presents to the ED with complaint of shortness of breath. Patient states that she is chronically short of breath; however, much feels much more short of breath for about 1 week, with SpO2 of 87% on EMS arrival today. Patient states being seen by her PCP about 1 week ago for shortness of breath and being diagnosed with bilateral pleural effusions, for which she apparently has an appointment with IR today. Patient reports last being dialyzed 4 days ago and that she is scheduled for dialysis today at 2:40 PM at a facility that is new to her. Patient lives at home alone. Patient denies supplemental oxygen use at home. She reports chronic bilateral leg swelling which is not worse today. Patient denies fevers. She states that she still feels somewhat short of breath while on O2 per nasal cannula in the ED.     Pt placed on bipap in ED and transferred to ICU. Pt underwent thoracentesis with 300 ml fluid removed. Studies pending. Plan to receive HD and wean O2 as tolerated.

## 2019-11-08 NOTE — ED NOTES
Inquired about placement of medeiros for I&O tracking  MD declined and expressed did not want to place one at this time due to renal impairment and expected lack of output  No medeiros placed at this time

## 2019-11-08 NOTE — SUBJECTIVE & OBJECTIVE
Past Medical History:   Diagnosis Date    Arthritis     CHF (congestive heart failure)     Diabetes mellitus     ESRD on hemodialysis     S/P hemodialysis catheter insertion 10/10/2019    Right IJ    Unsteady gait     Uses roller walker        Past Surgical History:   Procedure Laterality Date    APPENDECTOMY      INSERTION OF TUNNELED CENTRAL VENOUS HEMODIALYSIS CATHETER Right 10/10/2019       Review of patient's allergies indicates:   Allergen Reactions    Ciprofloxacin Anaphylaxis    Codeine Anaphylaxis    Neosporin [benzalkonium chloride] Anaphylaxis       No current facility-administered medications on file prior to encounter.      Current Outpatient Medications on File Prior to Encounter   Medication Sig    acetaminophen (TYLENOL) 500 MG tablet Take 1 tablet (500 mg total) by mouth every 6 (six) hours as needed.    albuterol-ipratropium (DUO-NEB) 2.5 mg-0.5 mg/3 mL nebulizer solution Take 3 mLs by nebulization every 4 (four) hours as needed for Wheezing or Shortness of Breath. Rescue    amLODIPine (NORVASC) 2.5 MG tablet Take 1 tablet (2.5 mg total) by mouth once daily.    bumetanide (BUMEX) 2 MG tablet Take 1 tablet (2 mg total) by mouth once daily.    epoetin cortes-epbx (RETACRIT) 10,000 unit/mL imjection Inject 0.6 mLs (6,000 Units total) into the skin every 7 days.    escitalopram oxalate (LEXAPRO) 10 MG tablet Take 10 mg by mouth once daily.    ferrous sulfate 325 (65 FE) MG EC tablet Take 1 tablet (325 mg total) by mouth 2 (two) times daily.    hydrALAZINE (APRESOLINE) 25 MG tablet Take 2 tablets (50 mg total) by mouth every 8 (eight) hours.    insulin aspart protamine-insulin aspart (NOVOLOG 70/30) 100 unit/mL (70-30) InPn pen Inject 10 Units into the skin 2 (two) times daily.    insulin aspart U-100 (NOVOLOG) 100 unit/mL (3 mL) InPn pen Inject 1-10 Units into the skin before meals and at bedtime as needed (Hyperglycemia).    metOLazone (ZAROXOLYN) 10 MG tablet Take 1 tablet (10 mg  total) by mouth once daily.    metoprolol succinate (TOPROL-XL) 50 MG 24 hr tablet Take 1 tablet (50 mg total) by mouth 2 (two) times daily.    polyethylene glycol (GLYCOLAX) 17 gram PwPk Take 17 g by mouth 2 (two) times daily as needed.    pravastatin (PRAVACHOL) 10 MG tablet Take 10 mg by mouth once daily.    QUEtiapine (SEROQUEL) 25 MG Tab Take 1 tablet (25 mg total) by mouth every evening.    senna-docusate 8.6-50 mg (PERICOLACE) 8.6-50 mg per tablet Take 1 tablet by mouth 2 (two) times daily as needed for Constipation.     Family History     None        Tobacco Use    Smoking status: Former Smoker    Smokeless tobacco: Never Used   Substance and Sexual Activity    Alcohol use: Not Currently    Drug use: No    Sexual activity: Not Currently     Partners: Male     Review of Systems   Constitutional: Positive for activity change.   HENT: Negative.    Eyes: Negative.    Respiratory: Positive for cough and shortness of breath.    Cardiovascular: Positive for chest pain and leg swelling.   Gastrointestinal: Negative.    Endocrine: Negative.    Genitourinary: Positive for difficulty urinating.   Musculoskeletal: Negative.    Neurological: Positive for weakness.   Psychiatric/Behavioral: Negative.      Objective:     Vital Signs (Most Recent):  Temp: 98.3 °F (36.8 °C) (11/08/19 1525)  Pulse: 80 (11/08/19 1600)  Resp: (!) 30 (11/08/19 1600)  BP: (!) 149/69 (11/08/19 1600)  SpO2: 100 % (11/08/19 1600) Vital Signs (24h Range):  Temp:  [98.3 °F (36.8 °C)-98.8 °F (37.1 °C)] 98.3 °F (36.8 °C)  Pulse:  [] 80  Resp:  [15-33] 30  SpO2:  [94 %-100 %] 100 %  BP: (138-204)/(59-98) 149/69     Weight: 105.3 kg (232 lb 2.3 oz)  Body mass index is 34.28 kg/m².    Physical Exam   Constitutional: She is oriented to person, place, and time. She appears well-developed and well-nourished. No distress.   HENT:   Head: Normocephalic and atraumatic.   Eyes: Pupils are equal, round, and reactive to light. EOM are normal.    Neck: Normal range of motion. Neck supple. JVD present.   Cardiovascular: Normal rate, regular rhythm, normal heart sounds and intact distal pulses.   Pulmonary/Chest: She is in respiratory distress. She has no wheezes. She has rales.   Abdominal: Soft. Bowel sounds are normal. She exhibits distension.   Musculoskeletal: She exhibits edema and tenderness.   Neurological: She is alert and oriented to person, place, and time.   Skin: Capillary refill takes less than 2 seconds.   Psychiatric: She has a normal mood and affect. Her behavior is normal.         CRANIAL NERVES     CN III, IV, VI   Pupils are equal, round, and reactive to light.  Extraocular motions are normal.        Significant Labs:   A1C:   Recent Labs   Lab 10/03/19  0742   HGBA1C 7.5*     ABGs:   Recent Labs   Lab 11/08/19  1323   PH 7.384   PCO2 46.8*   HCO3 27.9   POCSATURATED 100   BE 2     Blood Culture: No results for input(s): LABBLOO in the last 48 hours.  CBC:   Recent Labs   Lab 11/08/19  1034   WBC 6.82   HGB 9.5*   HCT 30.6*        CMP:   Recent Labs   Lab 11/08/19  1034      K 4.2      CO2 28   *   BUN 28*   CREATININE 3.2*   CALCIUM 8.3*   PROT 5.8*   ALBUMIN 2.6*   BILITOT 0.2   ALKPHOS 115   AST 25   ALT 16   ANIONGAP 8   EGFRNONAA 14*     Cardiac Markers:   Recent Labs   Lab 11/08/19  1034   BNP 1,269*     Coagulation:   Recent Labs   Lab 11/08/19  1034   INR 1.0     Lactic Acid: No results for input(s): LACTATE in the last 48 hours.  Magnesium:   Recent Labs   Lab 11/08/19  1034   MG 2.0     POCT Glucose: No results for input(s): POCTGLUCOSE in the last 48 hours.  Troponin:   Recent Labs   Lab 11/08/19  1034   TROPONINI 0.066*     TSH: No results for input(s): TSH in the last 4320 hours.  Urine Studies: No results for input(s): COLORU, APPEARANCEUA, PHUR, SPECGRAV, PROTEINUA, GLUCUA, KETONESU, BILIRUBINUA, OCCULTUA, NITRITE, UROBILINOGEN, LEUKOCYTESUR, RBCUA, WBCUA, BACTERIA, SQUAMEPITHEL, HYALINECASTS in  the last 48 hours.    Invalid input(s): WRIGHTSUR    Significant Imaging:   Impression/CXR       Bilateral pleural fluid with adjacent atelectasis versus consolidation, right greater than left    Bilateral perihilar opacities favored to reflect interstitial edema.

## 2019-11-08 NOTE — ED NOTES
Patient more at ease that dialysis was informed of her being in ER  Recycled vital signs  Will continue to monitor

## 2019-11-09 LAB
BODY FLUID SOURCE, LDH: NORMAL
LDH FLD L TO P-CCNC: 63 U/L
POCT GLUCOSE: 111 MG/DL (ref 70–110)
POCT GLUCOSE: 123 MG/DL (ref 70–110)
POCT GLUCOSE: 162 MG/DL (ref 70–110)
POCT GLUCOSE: 166 MG/DL (ref 70–110)
PROT FLD-MCNC: 1.1 G/DL
SPECIMEN SOURCE: NORMAL

## 2019-11-09 PROCEDURE — 25000003 PHARM REV CODE 250: Performed by: HOSPITALIST

## 2019-11-09 PROCEDURE — 63600175 PHARM REV CODE 636 W HCPCS: Performed by: INTERNAL MEDICINE

## 2019-11-09 PROCEDURE — 63600175 PHARM REV CODE 636 W HCPCS: Performed by: HOSPITALIST

## 2019-11-09 PROCEDURE — 21400001 HC TELEMETRY ROOM

## 2019-11-09 PROCEDURE — 87340 HEPATITIS B SURFACE AG IA: CPT

## 2019-11-09 PROCEDURE — 86706 HEP B SURFACE ANTIBODY: CPT

## 2019-11-09 PROCEDURE — 94761 N-INVAS EAR/PLS OXIMETRY MLT: CPT

## 2019-11-09 PROCEDURE — 36415 COLL VENOUS BLD VENIPUNCTURE: CPT

## 2019-11-09 PROCEDURE — P9047 ALBUMIN (HUMAN), 25%, 50ML: HCPCS | Performed by: INTERNAL MEDICINE

## 2019-11-09 PROCEDURE — 80100014 HC HEMODIALYSIS 1:1

## 2019-11-09 PROCEDURE — 27000221 HC OXYGEN, UP TO 24 HOURS

## 2019-11-09 RX ORDER — ALBUMIN HUMAN 250 G/1000ML
12.5 SOLUTION INTRAVENOUS ONCE
Status: COMPLETED | OUTPATIENT
Start: 2019-11-09 | End: 2019-11-09

## 2019-11-09 RX ADMIN — FERROUS SULFATE TAB EC 325 MG (65 MG FE EQUIVALENT) 325 MG: 325 (65 FE) TABLET DELAYED RESPONSE at 08:11

## 2019-11-09 RX ADMIN — METOPROLOL SUCCINATE 50 MG: 50 TABLET, EXTENDED RELEASE ORAL at 09:11

## 2019-11-09 RX ADMIN — METOPROLOL SUCCINATE 50 MG: 50 TABLET, EXTENDED RELEASE ORAL at 08:11

## 2019-11-09 RX ADMIN — INSULIN ASPART 10 UNITS: 100 INJECTION, SUSPENSION SUBCUTANEOUS at 10:11

## 2019-11-09 RX ADMIN — MUPIROCIN: 20 OINTMENT TOPICAL at 09:11

## 2019-11-09 RX ADMIN — FUROSEMIDE 80 MG: 40 TABLET ORAL at 08:11

## 2019-11-09 RX ADMIN — AMLODIPINE BESYLATE 2.5 MG: 2.5 TABLET ORAL at 08:11

## 2019-11-09 RX ADMIN — EPOETIN ALFA-EPBX 10000 UNITS: 10000 INJECTION, SOLUTION INTRAVENOUS; SUBCUTANEOUS at 10:11

## 2019-11-09 RX ADMIN — FERROUS SULFATE TAB EC 325 MG (65 MG FE EQUIVALENT) 325 MG: 325 (65 FE) TABLET DELAYED RESPONSE at 09:11

## 2019-11-09 RX ADMIN — HYDRALAZINE HYDROCHLORIDE 50 MG: 25 TABLET ORAL at 09:11

## 2019-11-09 RX ADMIN — METOLAZONE 10 MG: 5 TABLET ORAL at 08:11

## 2019-11-09 RX ADMIN — HEPARIN SODIUM 5000 UNITS: 5000 INJECTION INTRAVENOUS; SUBCUTANEOUS at 04:11

## 2019-11-09 RX ADMIN — QUETIAPINE FUMARATE 25 MG: 25 TABLET ORAL at 09:11

## 2019-11-09 RX ADMIN — ALBUMIN HUMAN 12.5 G: 0.25 SOLUTION INTRAVENOUS at 01:11

## 2019-11-09 RX ADMIN — HYDRALAZINE HYDROCHLORIDE 50 MG: 25 TABLET ORAL at 06:11

## 2019-11-09 RX ADMIN — POLYETHYLENE GLYCOL 3350 17 G: 17 POWDER, FOR SOLUTION ORAL at 08:11

## 2019-11-09 RX ADMIN — PRAVASTATIN SODIUM 10 MG: 10 TABLET ORAL at 08:11

## 2019-11-09 RX ADMIN — MUPIROCIN: 20 OINTMENT TOPICAL at 08:11

## 2019-11-09 RX ADMIN — INSULIN ASPART 10 UNITS: 100 INJECTION, SUSPENSION SUBCUTANEOUS at 08:11

## 2019-11-09 RX ADMIN — ESCITALOPRAM OXALATE 10 MG: 10 TABLET ORAL at 08:11

## 2019-11-09 NOTE — PROGRESS NOTES
I tried to call report but was informed per Melba that I could not call report until after shift change. Charge nurse notified.

## 2019-11-09 NOTE — PROGRESS NOTES
Renal ICU Progress Note    Date of Admission:  11/8/2019 10:02 AM    Length of Stay: 1  Days    Subjective: Breathing much better following HD yesterday    Objective:    Current Facility-Administered Medications   Medication    0.9%  NaCl infusion    albuterol-ipratropium 2.5 mg-0.5 mg/3 mL nebulizer solution 3 mL    amLODIPine tablet 2.5 mg    dextrose 50% injection 12.5 g    dextrose 50% injection 25 g    escitalopram oxalate tablet 10 mg    ferrous sulfate EC tablet 325 mg    furosemide tablet 80 mg    glucagon (human recombinant) injection 1 mg    glucose chewable tablet 16 g    glucose chewable tablet 24 g    heparin (porcine) injection 5,000 Units    hydrALAZINE tablet 50 mg    insulin aspart protamine-insulin aspart (NovoLOG 70/30) injection    insulin aspart U-100 pen 0-5 Units    metOLazone tablet 10 mg    metoprolol succinate (TOPROL-XL) 24 hr tablet 50 mg    mupirocin 2 % ointment    polyethylene glycol packet 17 g    pravastatin tablet 10 mg    QUEtiapine tablet 25 mg    sodium chloride 0.9% flush 10 mL       Vitals:    11/09/19 0715 11/09/19 0730 11/09/19 0745 11/09/19 0852   BP:  (!) 145/87  (!) 145/65   Pulse: 72 75 80 68   Resp: (!) 22 20 (!) 29    Temp:       TempSrc:       SpO2: 97% 96% (!) 92%    Weight:       Height:           I/O last 3 completed shifts:  In: 500 [Other:500]  Out: 4600 [Urine:800; Other:3800]  I/O this shift:  In: -   Out: 300 [Urine:300]      Physical Exam:    NAD  Some + facial edema  Neck: supple  Heart: RRR   Lungs: Unlabored breathing  Abdomen: n/a  : n/a  Extremities:  ++ edema  Neurologic: Awake, alert, oriented x 3      Laboratories:    Recent Labs   Lab 11/08/19  1034   WBC 6.82   RBC 3.28*   HGB 9.5*   HCT 30.6*      MCV 93   MCH 29.0   MCHC 31.0*       Recent Labs   Lab 11/08/19  1034   CALCIUM 8.3*   PROT 5.8*      K 4.2   CO2 28      BUN 28*   CREATININE 3.2*   ALKPHOS 115   ALT 16   AST 25   BILITOT  "0.2       No results for input(s): COLORU, CLARITYU, SPECGRAV, PHUR, PROTEINUA, GLUCOSEU, BLOODU, WBCU, RBCU, BACTERIA, MUCUS in the last 24 hours.    Invalid input(s):  BILIRUBINCON    Microbiology Results (last 7 days)     Procedure Component Value Units Date/Time    Aerobic culture [[id number]] Collected:  11/08/19 1400    Order Status:  Completed Specimen:  Body Fluid from Pleural Fluid Updated:  11/09/19 0854     Aerobic Bacterial Culture No growth    Gram stain [[id number]] Collected:  11/08/19 1400    Order Status:  Completed Specimen:  Body Fluid from Pleural Fluid Updated:  11/08/19 2104     Gram Stain Result Cytospin indicates:      Moderate WBC's      No organisms seen    Culture, Anaerobe [[id number]] Collected:  11/08/19 1400    Order Status:  Sent Specimen:  Body Fluid from Pleural Fluid Updated:  11/08/19 1741            Diagnostic Tests: n/a      Assessment:  68 y/o female with Hx. ESRD on HD admitted with:    Acute on chronic combined systolic and diastolic congestive heart failure   ESRD on dialysis (Q M-F)  Pleural effusion s/p Thoracentesis  Anasarca   Anemia of CKD  Hypoalbuminemia  Hyperlipidemia  Uncontrolled type 2 diabetes mellitus with renal complication  Uncontrolled Essential hypertension         Plan:    - UF today as tolerated (need to lower estimated "dry weight")  - Epogen  - BP control  - Renal ADA diet  - Glycemic control and other problems per admitting                  "

## 2019-11-09 NOTE — PROGRESS NOTES
[facility] Hospital Medicine  Progress Note    Patient Name: Magaly Epstein  MRN: [id number]  Patient Class: IP- Inpatient   Admission Date: 11/8/2019  Length of Stay: 1 days  Attending Physician: Naomi Pedersen MD  Primary Care Provider: Anahy Bradley DO        Subjective:     Principal Problem:Acute on chronic combined systolic and diastolic congestive heart failure        HPI:  69 y.o. female with PMHx of CHF and diabetes mellitus, ESRD on HD who presents to the ED with complaint of shortness of breath. Patient states that she is chronically short of breath; however, much feels much more short of breath for about 1 week, with SpO2 of 87% on EMS arrival today. Patient states being seen by her PCP about 1 week ago for shortness of breath and being diagnosed with bilateral pleural effusions, for which she apparently has an appointment with IR today. Patient reports last being dialyzed 4 days ago and that she is scheduled for dialysis today at 2:40 PM at a facility that is new to her. Patient lives at home alone. Patient denies supplemental oxygen use at home. She reports chronic bilateral leg swelling which is not worse today. Patient denies fevers. She states that she still feels somewhat short of breath while on O2 per nasal cannula in the ED.     Pt placed on bipap in ED and transferred to ICU. Pt underwent thoracentesis with 300 ml fluid removed. Studies pending. Plan to receive HD and wean O2 as tolerated.       Overview/Hospital Course:  Pt admitted with resp failure secondary to pulmonary edema/CHF on bipap. Pt underwent HD 11/8 with 3 liters removed. HD planned 11/9. Able to wean off bipap to NC oxygen. Vital stable and ok for transfer to floor.     Interval History: pt reports breathing easier and feeling better since admission    Review of Systems   Constitutional: Positive for activity change.   HENT: Negative.    Eyes: Negative.    Respiratory: Positive for cough and shortness of  breath.    Cardiovascular: Positive for leg swelling. Negative for chest pain.   Gastrointestinal: Negative.    Endocrine: Negative.    Genitourinary: Positive for difficulty urinating.   Musculoskeletal: Negative.    Neurological: Positive for weakness.   Psychiatric/Behavioral: Negative.      Objective:     Vital Signs (Most Recent):  Temp: 98.8 °F (37.1 °C) (11/09/19 1501)  Pulse: 71 (11/09/19 1515)  Resp: (!) 27 (11/09/19 1515)  BP: (!) 119/58 (11/09/19 1515)  SpO2: 97 % (11/09/19 1515) Vital Signs (24h Range):  Temp:  [97.4 °F (36.3 °C)-98.9 °F (37.2 °C)] 98.8 °F (37.1 °C)  Pulse:  [63-90] 71  Resp:  [15-39] 27  SpO2:  [92 %-100 %] 97 %  BP: (112-194)/() 119/58     Weight: 105.3 kg (232 lb 2.3 oz)  Body mass index is 34.28 kg/m².    Intake/Output Summary (Last 24 hours) at 11/9/2019 1623  Last data filed at 11/9/2019 0900  Gross per 24 hour   Intake 740 ml   Output 4600 ml   Net -3860 ml      Physical Exam   Constitutional: She is oriented to person, place, and time. She appears well-developed and well-nourished. No distress.   HENT:   Head: Normocephalic and atraumatic.   Eyes: Pupils are equal, round, and reactive to light. EOM are normal.   Neck: Normal range of motion. Neck supple. JVD present.   Cardiovascular: Normal rate, regular rhythm, normal heart sounds and intact distal pulses.   Pulmonary/Chest: She is in respiratory distress. She has no wheezes. She has rales.   Abdominal: Soft. Bowel sounds are normal. She exhibits distension.   Musculoskeletal: She exhibits edema and tenderness.   Neurological: She is alert and oriented to person, place, and time.   Skin: Capillary refill takes less than 2 seconds.   Psychiatric: She has a normal mood and affect. Her behavior is normal.       Significant Labs:   BMP:   Recent Labs   Lab 11/08/19  1034   *      K 4.2      CO2 28   BUN 28*   CREATININE 3.2*   CALCIUM 8.3*   MG 2.0     CBC:   Recent Labs   Lab 11/08/19  1034   WBC 6.82    HGB 9.5*   HCT 30.6*          Significant Imaging: I have reviewed and interpreted all pertinent imaging results/findings within the past 24 hours.      Assessment/Plan:      * Acute on chronic combined systolic and diastolic congestive heart failure  Lasix given in ED 80 + 80mg   REsume lasix daily + metolazone   bumex held  HD for volume control  BP control        ESRD (end stage renal disease) on dialysis  Nephrology consulted ( dialysis patient)      Anasarca  Daily weights  HD for volume control  BP control  Renal-cardiac diet       Depression  Resume home meds      Hyperlipidemia    Resume statin     Type 2 diabetes mellitus with renal complication    Uncontrolled, A1c 7.5%  SSi   Diabetic diet  Resume 70/30 - home insulin regimen     Essential hypertension  Uncontrolled   Resume home meds        VTE Risk Mitigation (From admission, onward)         Ordered     heparin (porcine) injection 5,000 Units  As needed (PRN)      11/08/19 1639     IP VTE HIGH RISK PATIENT  Once      11/08/19 1523                Critical care time spent on the evaluation and treatment of severe organ dysfunction, review of pertinent labs and imaging studies, discussions with consulting providers and discussions with patient/family: 40 minutes.      Naomi Pedersen MD  Department of Hospital Medicine   [facility]

## 2019-11-09 NOTE — PLAN OF CARE
"   11/09/19 1335   Discharge Assessment   Assessment Type Discharge Planning Assessment   Confirmed/corrected address and phone number on facesheet? Yes   Assessment information obtained from? Patient   Expected Length of Stay (days) 4   Communicated expected length of stay with patient/caregiver yes   Prior to hospitilization cognitive status: Alert/Oriented   Prior to hospitalization functional status: Assistive Equipment   Current cognitive status: Alert/Oriented   Current Functional Status: Needs Assistance   Lives With alone   Able to Return to Prior Arrangements yes   Is patient able to care for self after discharge? Unable to determine at this time (comments)   Patient's perception of discharge disposition home or selfcare   Readmission Within the Last 30 Days no previous admission in last 30 days   Patient currently being followed by outpatient case management? No   Patient currently receives any other outside agency services? No   Equipment Currently Used at Home glucometer;rollator   Do you have any problems affording any of your prescribed medications? No   Is the patient taking medications as prescribed? yes   Does the patient have transportation home? No   Does the patient receive services at the [facility]? No   Discharge Plan A Home Health   Discharge Plan B   (tbd)   DME Needed Upon Discharge    (tbd)   Patient/Family in Agreement with Plan yes   To patient's room to discuss patient managing her care at home.      TN Role Explained.  Patient identified by using 2 identifiers:  Name and date of birth    Patient stated that she has no HELP AT HOME  Her 2 sisters live in [place].  She lives in [facility].  Patient goes to [facility] HD.  She uses senior transportation services or a taxi for HD, pharmacy and grocery.       TN reviewed with patient contents of "[facility]".      Pref[address] Pharmacy:    [address] [place] [address]  " [facility] [address]  [address] [facility] [address]  [place] [address]  Phone: [phone] Fax: [phone]

## 2019-11-09 NOTE — PLAN OF CARE
To  hospital with worsening shortness of breath.  Pt sent to IR for thoracentesis where 300cc was removed.  Hemodialysis begun once in ICU to last for 3.5 hours.  Pulse ox greater than 95% with 4L NC.

## 2019-11-09 NOTE — CARE UPDATE
[facility]  ICU Multidisciplinary Bedside Rounds   SUMMARY     Date: 11/9/2019    Prehospitalization: Home  Admit Date / LOS : 11/8/2019/ 1 days    Diagnosis: Acute on chronic combined systolic and diastolic congestive heart failure    Consults:        Active: Nephro       Needed: N/A     Code Status: Full Code   Advanced Directive: <no information>    LDA: PIV and Purewick       Central Lines/Site/Justification:Patient Does Not Have Central Line       Urinary Cath/Order/Justification:Patient Does Not Have Urinary Catheter    Vasopressors/Infusions:        GOALS: Volume/ Hemodynamic: N/A                     RASS: 0  alert and calm    CAM ICU: Negative  Pain Management: PO       Pain Controlled: yes     Rhythm: NSR    Respiratory Device: Nasal Cannula        VTE Prophylaxis: Pharm  Mobility: Bedrest  Stress Ulcer Prophylaxis: No    Dietary: PO  Tolerance: yes  /  Advancement: @ goal    Isolation: No active isolations    Restraints: No    Significant Dates:  Post Op Date: N/A  Rescue Date: N/A  Imaging/ Diagnostics: IR- thoracentesis 11/8/19    Noteworthy Labs:  none    CBC/Anemia Labs: Coags:    Recent Labs   Lab 11/08/19  1034   WBC 6.82   HGB 9.5*   HCT 30.6*      MCV 93   RDW 13.5    Recent Labs   Lab 11/08/19  1034   INR 1.0        Chemistries:   Recent Labs   Lab 11/08/19  1034      K 4.2      CO2 28   BUN 28*   CREATININE 3.2*   CALCIUM 8.3*   PROT 5.8*   BILITOT 0.2   ALKPHOS 115   ALT 16   AST 25   MG 2.0   PHOS 3.2        Cardiac Enzymes: Ejection Fractions:    Recent Labs     11/08/19  1034   TROPONINI 0.066*    No results found for: EF     POCT Glucose: HbA1c:    Recent Labs   Lab 11/08/19  1656 11/08/19  2149   POCTGLUCOSE 152* 177*    Hemoglobin A1C   Date Value Ref Range Status   10/03/2019 7.5 (H) 4.0 - 5.6 % Final     Comment:     ADA Screening Guidelines:  5.7-6.4%  Consistent with prediabetes  >or=6.5%  Consistent with diabetes  High levels of fetal hemoglobin  interfere with the HbA1C  assay. Heterozygous hemoglobin variants (HbS, HgC, etc)do  not significantly interfere with this assay.   However, presence of multiple variants may affect accuracy.     04/11/2019 7.1 (H) 4.0 - 5.6 % Final     Comment:     ADA Screening Guidelines:  5.7-6.4%  Consistent with prediabetes  >or=6.5%  Consistent with diabetes  High levels of fetal hemoglobin interfere with the HbA1C  assay. Heterozygous hemoglobin variants (HbS, HgC, etc)do  not significantly interfere with this assay.   However, presence of multiple variants may affect accuracy.          Needs from Care Team: stress ulcer prophylaxis     ICU LOS 14h  Level of Care: OK to Transfer

## 2019-11-09 NOTE — SUBJECTIVE & OBJECTIVE
Interval History: pt reports breathing easier and feeling better since admission    Review of Systems   Constitutional: Positive for activity change.   HENT: Negative.    Eyes: Negative.    Respiratory: Positive for cough and shortness of breath.    Cardiovascular: Positive for leg swelling. Negative for chest pain.   Gastrointestinal: Negative.    Endocrine: Negative.    Genitourinary: Positive for difficulty urinating.   Musculoskeletal: Negative.    Neurological: Positive for weakness.   Psychiatric/Behavioral: Negative.      Objective:     Vital Signs (Most Recent):  Temp: 98.8 °F (37.1 °C) (11/09/19 1501)  Pulse: 71 (11/09/19 1515)  Resp: (!) 27 (11/09/19 1515)  BP: (!) 119/58 (11/09/19 1515)  SpO2: 97 % (11/09/19 1515) Vital Signs (24h Range):  Temp:  [97.4 °F (36.3 °C)-98.9 °F (37.2 °C)] 98.8 °F (37.1 °C)  Pulse:  [63-90] 71  Resp:  [15-39] 27  SpO2:  [92 %-100 %] 97 %  BP: (112-194)/() 119/58     Weight: 105.3 kg (232 lb 2.3 oz)  Body mass index is 34.28 kg/m².    Intake/Output Summary (Last 24 hours) at 11/9/2019 1623  Last data filed at 11/9/2019 0900  Gross per 24 hour   Intake 740 ml   Output 4600 ml   Net -3860 ml      Physical Exam   Constitutional: She is oriented to person, place, and time. She appears well-developed and well-nourished. No distress.   HENT:   Head: Normocephalic and atraumatic.   Eyes: Pupils are equal, round, and reactive to light. EOM are normal.   Neck: Normal range of motion. Neck supple. JVD present.   Cardiovascular: Normal rate, regular rhythm, normal heart sounds and intact distal pulses.   Pulmonary/Chest: She is in respiratory distress. She has no wheezes. She has rales.   Abdominal: Soft. Bowel sounds are normal. She exhibits distension.   Musculoskeletal: She exhibits edema and tenderness.   Neurological: She is alert and oriented to person, place, and time.   Skin: Capillary refill takes less than 2 seconds.   Psychiatric: She has a normal mood and affect. Her  behavior is normal.       Significant Labs:   BMP:   Recent Labs   Lab 11/08/19  1034   *      K 4.2      CO2 28   BUN 28*   CREATININE 3.2*   CALCIUM 8.3*   MG 2.0     CBC:   Recent Labs   Lab 11/08/19  1034   WBC 6.82   HGB 9.5*   HCT 30.6*          Significant Imaging: I have reviewed and interpreted all pertinent imaging results/findings within the past 24 hours.

## 2019-11-09 NOTE — PLAN OF CARE
Pt remains in ICU. Hemodialysis this shift, 3 L off per dialysis RN. Purewick changed, in place and patent without any complications, 800 mL clear yellow UOP this shift. VSS, NSR on monitor. Pitting edema in bilat LE still present. Remains on 3L NC for SATs >94%. Plan of care reviewed with pt. No falls, injuries or skin breakdown this shift, precautions maintained for all.

## 2019-11-09 NOTE — HOSPITAL COURSE
69 y.o. female with PMHx of CHF and diabetes mellitus, ESRD on HD who presents to the ED with complaint of shortness of breath. Patient states that she is chronically short of breath; however, much feels much more short of breath for about 1 week, with SpO2 of 87% on EMS arrival,. Patient states being seen by her PCP about 1 week ago for shortness of breath and being diagnosed with bilateral pleural effusions, for which she apparently had an appointment with IR . Patient reports last being dialyzed 4 days ago and that she is scheduled for dialysis on day of admission, at a facility that is new to her. Patient lives at home alone. Patient denies supplemental oxygen use at home. She reports chronic bilateral leg swelling which is not worse . Patient denies fevers.patient still was SOB on NC O 2,Pt, was placed on bipap in ED and transferred to ICU. Pt underwent thoracentesis with 300 ml fluid removed by IR,nephrology was consulted for HD and wean O2 as tolerated.. Pt underwent HD 11/8 with 3 liters removed.laso had  HD next days on 11/9. Able to wean off bipap to NC oxygen. Vitals was  stable ,she was transferred out of ICU to Telemetry,  Consulted PT,OT,was stable on Nc O 2,  PT,OT recommending SNF,consulted SW.  SOB is resolved with HD she was stable on RA at DC time.  Patient has been discharged to SNF with plan with PCP and nephrology follow up.

## 2019-11-10 PROBLEM — J96.01 ACUTE RESPIRATORY FAILURE WITH HYPOXIA: Status: ACTIVE | Noted: 2019-11-10

## 2019-11-10 LAB
ANION GAP SERPL CALC-SCNC: 2 MMOL/L (ref 8–16)
BASOPHILS # BLD AUTO: 0.06 K/UL (ref 0–0.2)
BASOPHILS NFR BLD: 0.9 % (ref 0–1.9)
BUN SERPL-MCNC: 25 MG/DL (ref 8–23)
CALCIUM SERPL-MCNC: 7.9 MG/DL (ref 8.7–10.5)
CHLORIDE SERPL-SCNC: 106 MMOL/L (ref 95–110)
CO2 SERPL-SCNC: 29 MMOL/L (ref 23–29)
CREAT SERPL-MCNC: 3 MG/DL (ref 0.5–1.4)
DIFFERENTIAL METHOD: ABNORMAL
EOSINOPHIL # BLD AUTO: 0.3 K/UL (ref 0–0.5)
EOSINOPHIL NFR BLD: 4.7 % (ref 0–8)
ERYTHROCYTE [DISTWIDTH] IN BLOOD BY AUTOMATED COUNT: 13.4 % (ref 11.5–14.5)
EST. GFR  (AFRICAN AMERICAN): 17 ML/MIN/1.73 M^2
EST. GFR  (NON AFRICAN AMERICAN): 15 ML/MIN/1.73 M^2
GLUCOSE SERPL-MCNC: 170 MG/DL (ref 70–110)
GRAM STN SPEC: NORMAL
HCT VFR BLD AUTO: 31.3 % (ref 37–48.5)
HGB BLD-MCNC: 9.6 G/DL (ref 12–16)
IMM GRANULOCYTES # BLD AUTO: 0.04 K/UL (ref 0–0.04)
IMM GRANULOCYTES NFR BLD AUTO: 0.6 % (ref 0–0.5)
LYMPHOCYTES # BLD AUTO: 1.8 K/UL (ref 1–4.8)
LYMPHOCYTES NFR BLD: 28.1 % (ref 18–48)
MCH RBC QN AUTO: 29.2 PG (ref 27–31)
MCHC RBC AUTO-ENTMCNC: 30.7 G/DL (ref 32–36)
MCV RBC AUTO: 95 FL (ref 82–98)
MONOCYTES # BLD AUTO: 0.6 K/UL (ref 0.3–1)
MONOCYTES NFR BLD: 9.5 % (ref 4–15)
NEUTROPHILS # BLD AUTO: 3.7 K/UL (ref 1.8–7.7)
NEUTROPHILS NFR BLD: 56.2 % (ref 38–73)
NRBC BLD-RTO: 0 /100 WBC
PLATELET # BLD AUTO: 211 K/UL (ref 150–350)
PMV BLD AUTO: 10.2 FL (ref 9.2–12.9)
POCT GLUCOSE: 107 MG/DL (ref 70–110)
POCT GLUCOSE: 138 MG/DL (ref 70–110)
POCT GLUCOSE: 165 MG/DL (ref 70–110)
POCT GLUCOSE: 212 MG/DL (ref 70–110)
POTASSIUM SERPL-SCNC: 4.2 MMOL/L (ref 3.5–5.1)
RBC # BLD AUTO: 3.29 M/UL (ref 4–5.4)
SODIUM SERPL-SCNC: 137 MMOL/L (ref 136–145)
WBC # BLD AUTO: 6.54 K/UL (ref 3.9–12.7)

## 2019-11-10 PROCEDURE — 97162 PT EVAL MOD COMPLEX 30 MIN: CPT | Performed by: PHYSICAL THERAPIST

## 2019-11-10 PROCEDURE — 25000003 PHARM REV CODE 250: Performed by: HOSPITALIST

## 2019-11-10 PROCEDURE — 94761 N-INVAS EAR/PLS OXIMETRY MLT: CPT

## 2019-11-10 PROCEDURE — 80048 BASIC METABOLIC PNL TOTAL CA: CPT

## 2019-11-10 PROCEDURE — 63600175 PHARM REV CODE 636 W HCPCS: Performed by: HOSPITALIST

## 2019-11-10 PROCEDURE — 85025 COMPLETE CBC W/AUTO DIFF WBC: CPT

## 2019-11-10 PROCEDURE — 27000221 HC OXYGEN, UP TO 24 HOURS

## 2019-11-10 PROCEDURE — 36415 COLL VENOUS BLD VENIPUNCTURE: CPT

## 2019-11-10 PROCEDURE — 97165 OT EVAL LOW COMPLEX 30 MIN: CPT | Performed by: SPECIALIST

## 2019-11-10 PROCEDURE — 97110 THERAPEUTIC EXERCISES: CPT | Performed by: PHYSICAL THERAPIST

## 2019-11-10 PROCEDURE — 21400001 HC TELEMETRY ROOM

## 2019-11-10 RX ORDER — AMLODIPINE BESYLATE 5 MG/1
10 TABLET ORAL DAILY
Status: DISCONTINUED | OUTPATIENT
Start: 2019-11-11 | End: 2019-11-14 | Stop reason: HOSPADM

## 2019-11-10 RX ORDER — LOSARTAN POTASSIUM 25 MG/1
25 TABLET ORAL DAILY
Status: DISCONTINUED | OUTPATIENT
Start: 2019-11-10 | End: 2019-11-14 | Stop reason: HOSPADM

## 2019-11-10 RX ORDER — HEPARIN SODIUM 5000 [USP'U]/ML
5000 INJECTION, SOLUTION INTRAVENOUS; SUBCUTANEOUS EVERY 8 HOURS
Status: DISCONTINUED | OUTPATIENT
Start: 2019-11-10 | End: 2019-11-14 | Stop reason: HOSPADM

## 2019-11-10 RX ADMIN — MUPIROCIN: 20 OINTMENT TOPICAL at 08:11

## 2019-11-10 RX ADMIN — HYDRALAZINE HYDROCHLORIDE 50 MG: 25 TABLET ORAL at 06:11

## 2019-11-10 RX ADMIN — INSULIN ASPART 1 UNITS: 100 INJECTION, SOLUTION INTRAVENOUS; SUBCUTANEOUS at 09:11

## 2019-11-10 RX ADMIN — ESCITALOPRAM OXALATE 10 MG: 10 TABLET ORAL at 08:11

## 2019-11-10 RX ADMIN — FUROSEMIDE 80 MG: 40 TABLET ORAL at 08:11

## 2019-11-10 RX ADMIN — HYDRALAZINE HYDROCHLORIDE 50 MG: 25 TABLET ORAL at 09:11

## 2019-11-10 RX ADMIN — AMLODIPINE BESYLATE 2.5 MG: 2.5 TABLET ORAL at 08:11

## 2019-11-10 RX ADMIN — FERROUS SULFATE TAB EC 325 MG (65 MG FE EQUIVALENT) 325 MG: 325 (65 FE) TABLET DELAYED RESPONSE at 08:11

## 2019-11-10 RX ADMIN — HYDRALAZINE HYDROCHLORIDE 50 MG: 25 TABLET ORAL at 02:11

## 2019-11-10 RX ADMIN — HEPARIN SODIUM 5000 UNITS: 5000 INJECTION, SOLUTION INTRAVENOUS; SUBCUTANEOUS at 09:11

## 2019-11-10 RX ADMIN — MUPIROCIN: 20 OINTMENT TOPICAL at 09:11

## 2019-11-10 RX ADMIN — HEPARIN SODIUM 5000 UNITS: 5000 INJECTION, SOLUTION INTRAVENOUS; SUBCUTANEOUS at 02:11

## 2019-11-10 RX ADMIN — INSULIN ASPART 10 UNITS: 100 INJECTION, SUSPENSION SUBCUTANEOUS at 09:11

## 2019-11-10 RX ADMIN — INSULIN ASPART 10 UNITS: 100 INJECTION, SUSPENSION SUBCUTANEOUS at 08:11

## 2019-11-10 RX ADMIN — PRAVASTATIN SODIUM 10 MG: 10 TABLET ORAL at 08:11

## 2019-11-10 RX ADMIN — POLYETHYLENE GLYCOL 3350 17 G: 17 POWDER, FOR SOLUTION ORAL at 08:11

## 2019-11-10 RX ADMIN — QUETIAPINE FUMARATE 25 MG: 25 TABLET ORAL at 09:11

## 2019-11-10 RX ADMIN — LOSARTAN POTASSIUM 25 MG: 25 TABLET, FILM COATED ORAL at 11:11

## 2019-11-10 RX ADMIN — METOPROLOL SUCCINATE 50 MG: 50 TABLET, EXTENDED RELEASE ORAL at 08:11

## 2019-11-10 RX ADMIN — FERROUS SULFATE TAB EC 325 MG (65 MG FE EQUIVALENT) 325 MG: 325 (65 FE) TABLET DELAYED RESPONSE at 09:11

## 2019-11-10 RX ADMIN — METOPROLOL SUCCINATE 50 MG: 50 TABLET, EXTENDED RELEASE ORAL at 09:11

## 2019-11-10 NOTE — PROGRESS NOTES
[facility] Hospital Medicine  Progress Note    Patient Name: Magaly Epstein  MRN: [id number]  Patient Class: IP- Inpatient   Admission Date: 11/8/2019  Length of Stay: 2 days  Attending Physician: Phylicia Gastelum MD  Primary Care Provider: Anahy Bradley DO        Subjective:     Principal Problem:Acute on chronic combined systolic and diastolic congestive heart failure        HPI:  69 y.o. female with PMHx of CHF and diabetes mellitus, ESRD on HD who presents to the ED with complaint of shortness of breath. Patient states that she is chronically short of breath; however, much feels much more short of breath for about 1 week, with SpO2 of 87% on EMS arrival today. Patient states being seen by her PCP about 1 week ago for shortness of breath and being diagnosed with bilateral pleural effusions, for which she apparently has an appointment with IR today. Patient reports last being dialyzed 4 days ago and that she is scheduled for dialysis today at 2:40 PM at a facility that is new to her. Patient lives at home alone. Patient denies supplemental oxygen use at home. She reports chronic bilateral leg swelling which is not worse today. Patient denies fevers. She states that she still feels somewhat short of breath while on O2 per nasal cannula in the ED.     Pt placed on bipap in ED and transferred to ICU. Pt underwent thoracentesis with 300 ml fluid removed. Studies pending. Plan to receive HD and wean O2 as tolerated.       Overview/Hospital Course:  Pt admitted with acute hypoxic resp failure secondary to pulmonary edema/CHF,was  on bipap. Pt underwent HD 11/8 with 3 liters removed. HD planned 11/9. Able to wean off bipap to NC oxygen. Vital stable   Consulted PT,OT,stable on Nc O 2,    Interval History: pt reports breathing easier and feeling better since admission    Review of Systems   Constitutional: Positive for activity change.   HENT: Negative.    Eyes: Negative.    Respiratory:  Positive for cough and shortness of breath.    Cardiovascular: Positive for leg swelling. Negative for chest pain.   Gastrointestinal: Negative.    Endocrine: Negative.    Genitourinary: Positive for difficulty urinating.   Musculoskeletal: Negative.    Neurological: Positive for weakness.   Psychiatric/Behavioral: Negative.      Objective:     Vital Signs (Most Recent):  Temp: 98.3 °F (36.8 °C) (11/10/19 0721)  Pulse: 69 (11/10/19 0721)  Resp: 17 (11/10/19 0721)  BP: (!) 169/71 (11/10/19 0721)  SpO2: 96 % (11/10/19 0742) Vital Signs (24h Range):  Temp:  [97.5 °F (36.4 °C)-98.8 °F (37.1 °C)] 98.3 °F (36.8 °C)  Pulse:  [64-78] 69  Resp:  [17-39] 17  SpO2:  [95 %-98 %] 96 %  BP: (112-181)/(57-80) 169/71     Weight: 98.3 kg (216 lb 11.4 oz)  Body mass index is 32 kg/m².    Intake/Output Summary (Last 24 hours) at 11/10/2019 0934  Last data filed at 11/10/2019 0800  Gross per 24 hour   Intake 1420 ml   Output 3650 ml   Net -2230 ml      Physical Exam   Constitutional: She is oriented to person, place, and time. She appears well-developed and well-nourished. No distress.   HENT:   Head: Normocephalic and atraumatic.   Eyes: Pupils are equal, round, and reactive to light. EOM are normal.   Neck: Normal range of motion. Neck supple. JVD present.   Cardiovascular: Normal rate, regular rhythm, normal heart sounds and intact distal pulses.   Pulmonary/Chest: She is in respiratory distress. She has no wheezes. She has rales.   Abdominal: Soft. Bowel sounds are normal. She exhibits distension.   Musculoskeletal: She exhibits edema and tenderness.   Neurological: She is alert and oriented to person, place, and time.   Skin: Capillary refill takes less than 2 seconds.   Psychiatric: She has a normal mood and affect. Her behavior is normal.       Significant Labs:   BMP:   Recent Labs   Lab 11/08/19  1034   *      K 4.2      CO2 28   BUN 28*   CREATININE 3.2*   CALCIUM 8.3*   MG 2.0     CBC:   Recent Labs   Lab  11/08/19  1034   WBC 6.82   HGB 9.5*   HCT 30.6*          Significant Imaging: I have reviewed and interpreted all pertinent imaging results/findings within the past 24 hours.      Assessment/Plan:      * Acute on chronic combined systolic and diastolic congestive heart failure  With EF of 45%,Lasix given in ED 80 + 80mg   REsume lasix daily + metolazone   bumex held  HD for volume control  BP control        Acute respiratory failure with hypoxia  Duo to CHF exacerbation,Consulted PT,OT,stable on Nc O 2,      ESRD (end stage renal disease) on dialysis  Nephrology consulted ( dialysis patient)      Anasarca  Daily weights  HD for volume control  BP control  Renal-cardiac diet   On IV lasix,      Depression  Resume home meds      Hyperlipidemia    Resume statin     Type 2 diabetes mellitus with renal complication    Uncontrolled, A1c 7.5%  SSi   Diabetic diet  Resume 70/30 - home insulin regimen     Essential hypertension  Uncontrolled   Resume home meds        VTE Risk Mitigation (From admission, onward)         Ordered     heparin (porcine) injection 5,000 Units  Every 8 hours      11/10/19 0907     heparin (porcine) injection 5,000 Units  As needed (PRN)      11/08/19 1639     IP VTE HIGH RISK PATIENT  Once      11/08/19 1523                      Phylicia Gastelum MD  Department of Hospital Medicine   [facility]

## 2019-11-10 NOTE — PLAN OF CARE
Pt remains free of falls, bed locked and in lowest position, call light within reach and bed alarm on.

## 2019-11-10 NOTE — PROGRESS NOTES
Renal Progress Note    Date of Admission:  11/8/2019 10:02 AM    Length of Stay: 2  Days    Subjective: Breathing much better following UF yesterday    Objective:    Current Facility-Administered Medications   Medication    0.9%  NaCl infusion    albuterol-ipratropium 2.5 mg-0.5 mg/3 mL nebulizer solution 3 mL    [START ON 11/11/2019] amLODIPine tablet 10 mg    dextrose 50% injection 12.5 g    dextrose 50% injection 25 g    escitalopram oxalate tablet 10 mg    ferrous sulfate EC tablet 325 mg    furosemide tablet 80 mg    glucagon (human recombinant) injection 1 mg    glucose chewable tablet 16 g    glucose chewable tablet 24 g    heparin (porcine) injection 5,000 Units    heparin (porcine) injection 5,000 Units    hydrALAZINE tablet 50 mg    insulin aspart protamine-insulin aspart (NovoLOG 70/30) injection    insulin aspart U-100 pen 0-5 Units    losartan tablet 25 mg    metoprolol succinate (TOPROL-XL) 24 hr tablet 50 mg    mupirocin 2 % ointment    polyethylene glycol packet 17 g    pravastatin tablet 10 mg    QUEtiapine tablet 25 mg    sodium chloride 0.9% flush 10 mL       Vitals:    11/10/19 0542 11/10/19 0607 11/10/19 0721 11/10/19 0742   BP: (!) 150/64 (!) 150/64 (!) 169/71    BP Location: Left arm  Left arm    Patient Position: Lying  Lying    Pulse:   69    Resp:   17    Temp:   98.3 °F (36.8 °C)    TempSrc:   Oral    SpO2:   96% 96%   Weight:       Height:           I/O last 3 completed shifts:  In: 1680 [P.O.:680; Other:1000]  Out: 8250 [Urine:1250; Other:7000]  I/O this shift:  In: 480 [P.O.:480]  Out: -       Physical Exam:    NAD  Some improved  facial edema  Neck: supple  Heart: RRR   Lungs: Unlabored breathing  Abdomen: n/a  : n/a  Extremities:  ++ edema  Neurologic: Awake, alert, oriented x 3      Laboratories:    No results for input(s): WBC, RBC, HGB, HCT, PLT, MCV, MCH, MCHC in the last 24 hours.    No results for input(s): GLUCOSE, CALCIUM, PROT,  NA, K, CO2, CL, BUN, CREATININE, ALKPHOS, ALT, AST, BILITOT in the last 24 hours.    Invalid input(s):  ALBUMIN    No results for input(s): COLORU, CLARITYU, SPECGRAV, PHUR, PROTEINUA, GLUCOSEU, BLOODU, WBCU, RBCU, BACTERIA, MUCUS in the last 24 hours.    Invalid input(s):  BILIRUBINCON    Microbiology Results (last 7 days)     Procedure Component Value Units Date/Time    Gram stain [[id number]] Collected:  11/08/19 1400    Order Status:  Completed Specimen:  Body Fluid from Pleural Fluid Updated:  11/10/19 0659     Gram Stain Result Cytospin indicates:      Moderate WBC's      No organisms seen    Aerobic culture [[id number]] Collected:  11/08/19 1400    Order Status:  Completed Specimen:  Body Fluid from Pleural Fluid Updated:  11/10/19 0659     Aerobic Bacterial Culture No growth    Culture, Anaerobe [[id number]] Collected:  11/08/19 1400    Order Status:  Completed Specimen:  Body Fluid from Pleural Fluid Updated:  11/10/19 0459     Anaerobic Culture Culture in progress            Diagnostic Tests: n/a      Assessment:  68 y/o female with Hx. ESRD on HD admitted with:    Acute on chronic combined systolic and diastolic congestive heart failure   ESRD on dialysis (Q M-F)  Pleural effusion s/p Thoracentesis  Anasarca improving  Anemia of CKD  Hypoalbuminemia  Hyperlipidemia  Uncontrolled type 2 diabetes mellitus with renal complication  Uncontrolled Essential hypertension         Plan:    - Dialysis in a.m.  - UF as tolerated  - Epogen  - BP control  - Renal ADA diet  - Glycemic control and other problems per admitting

## 2019-11-10 NOTE — PT/OT/SLP EVAL
Physical Therapy Evaluation    Patient Name:  Magaly Epstein   MRN:  [id number]    Recommendations:     Discharge Recommendations:  nursing facility, skilled   Discharge Equipment Recommendations: none   Barriers to discharge: None    Assessment:     Magaly Epstein is a 69 y.o. female admitted with a medical diagnosis of Acute on chronic combined systolic and diastolic congestive heart failure.  She presents with the following impairments/functional limitations:  weakness, impaired endurance, impaired functional mobilty, gait instability, decreased lower extremity function, decreased coordination, impaired balance, decreased safety awareness, decreased ROM, impaired cardiopulmonary response to activity, impaired coordination, edema.    Rehab Prognosis: Good; patient would benefit from acute skilled PT services to address these deficits and reach maximum level of function.    Recent Surgery: * No surgery found *      Plan:     During this hospitalization, patient to be seen 5 x/week to address the identified rehab impairments via gait training, therapeutic activities, therapeutic exercises and progress toward the following goals:    · Plan of Care Expires:  11/23/19    Subjective     Chief Complaint: Short of breath with weight bearing activities.   Patient/Family Comments/goals: to return to PLOF  Pain/Comfort:  · Pain Rating 1: 0/10    Patients cultural, spiritual, Bahai conflicts given the current situation:      Living Environment:  Pt lives alone in an apartment ([facility]).  Pt reports using van services or taxi to get to Hemo-dialysis (T, Th, Sat).   Prior to admission, patients level of function was Mod I.  Equipment used at home: rollator, cane, straight, glucometer, wheelchair(- patient reports that wheelchair is old and AD needs to be replaced. ).  DME owned (not currently used): none.  Upon discharge, patient will have assistance from Self.    Objective:     Communicated with  Nurse Majoria prior to session.  Patient found supine with oxygen, telemetry, peripheral IV, bed alarm, PureWick(- 3.0 Lpm of O2 via [place] )  upon PT entry to room.    General Precautions: Standard, diabetic, fall, respiratory   Orthopedic Precautions:Full weight bearing   Braces: N/A     Exams:  · Cognitive Exam:  Patient is oriented to Person, Place and Situation  · Gross Motor Coordination:  WFL  · Postural Exam:  Patient presented with the following abnormalities:    · -       Rounded shoulders  · -       Forward head  · -       Abnormal trunk flexion  · Skin Integrity/Edema:      · -       Skin integrity: Visible skin intact  · -       Edema: Mild B LE   · RLE ROM: WFL  · RLE Strength: WFL  · LLE ROM: WFL  · LLE Strength: WFL    Functional Mobility:  · Bed Mobility:     · Rolling Left:  minimum assistance  · Rolling Right: minimum assistance  · Supine to Sit: moderate assistance  · Sit to Supine: moderate assistance  · Transfers:     · Sit to Stand:  moderate assistance with rolling walker  · Gait: 25' x's 3 with RW and Mod A.  Pt required VC's for maintaining an upright posture, increase step length, and RW position to increase safety and standing balance with weight bearing task.       Therapeutic Activities and Exercises:   (B) LE seated TherEx: AP, LAQ, Hip abd/add, Hip flexion; 2 sets x 10 reps with VC's for proper form and sequencing.      AM-PAC 6 CLICK MOBILITY  Total Score:13     Patient left supine with all lines intact and call button in reach.    GOALS:   Multidisciplinary Problems     Physical Therapy Goals        Problem: Physical Therapy Goal    Goal Priority Disciplines Outcome Goal Variances Interventions   Physical Therapy Goal     PT, PT/OT Ongoing, Progressing     Description:  Goals to be met by: 2019     Patient will increase functional independence with mobility by performin. Supine to sit with Modified [place]  2. Sit to supine with Modified [place]  3. Sit to stand  transfer with Modified [place]  4. Gait  x 350 feet with Modified [place] using Rolling Walker.    Recommend: SNF at time of discharge.                          History:     Past Medical History:   Diagnosis Date    Arthritis     CHF (congestive heart failure)     Diabetes mellitus     ESRD on hemodialysis     S/P hemodialysis catheter insertion 10/10/2019    Right IJ    Unsteady gait     Uses roller walker        Past Surgical History:   Procedure Laterality Date    APPENDECTOMY      INSERTION OF TUNNELED CENTRAL VENOUS HEMODIALYSIS CATHETER Right 10/10/2019       Time Tracking:     PT Received On: 11/10/19  PT Start Time: 1200     PT Stop Time: 1233  PT Total Time (min): 33 min     Billable Minutes: Evaluation 14 min and Therapeutic Exercise 12 min      Price Christie, PT  11/10/2019

## 2019-11-10 NOTE — ASSESSMENT & PLAN NOTE
With EF of 45%,Lasix given in ED 80 + 80mg   REsume lasix daily + metolazone   bumex held  HD for volume control  BP control

## 2019-11-10 NOTE — PLAN OF CARE
OT evaluation complete.  Recommend SNF.      Problem: Occupational Therapy Goal  Goal: Occupational Therapy Goal  Description  Goals to be met by: 11/24/2019     Patient will increase functional independence with ADLs by performing:    UE Dressing with Modified [place].  LE Dressing with Modified [place].  Grooming while standing at sink with Modified [place].  Toileting from toilet with Modified [place] for hygiene and clothing management.   Toilet transfer to toilet with Modified [place].  Upper extremity exercise program x 10 reps per handout, with supervision.     Outcome: Ongoing, Progressing

## 2019-11-10 NOTE — PLAN OF CARE
Pt had dialysis today and she tolerated well. She remains SOB with activity. VSS thru out shift. Pt awaiting transfer to [facility]. Pt has remained safe and free of injury. Pt has no skin breakdown noted.

## 2019-11-10 NOTE — PT/OT/SLP EVAL
Occupational Therapy   Evaluation    Name: Magaly Epstein  MRN: [id number]  Admitting Diagnosis:  Acute on chronic combined systolic and diastolic congestive heart failure      Recommendations:     Discharge Recommendations: nursing facility, skilled  Discharge Equipment Recommendations:  (TBD at next LOC)  Barriers to discharge:  Decreased caregiver support, Other (Comment)(Poor endurance)    Assessment:     Magaly Epstein is a 69 y.o. female with a medical diagnosis of Acute on chronic combined systolic and diastolic congestive heart failure.  She presents with limited endurance for functional mobility and self care. Performance deficits affecting function: weakness, impaired endurance, impaired self care skills, impaired functional mobilty, impaired balance, decreased upper extremity function, decreased lower extremity function, decreased safety awareness, impaired cardiopulmonary response to activity.      Rehab Prognosis: Good; patient would benefit from acute skilled OT services to address these deficits and reach maximum level of function.       Plan:     Patient to be seen 5 x/week to address the above listed problems via self-care/home management, therapeutic activities, therapeutic exercises  · Plan of Care Expires:    · Plan of Care Reviewed with: patient    Subjective     Chief Complaint: R knee pain  Patient/Family Comments/goals: To get better    Occupational Profile:  Living Environment: Pt lives in [employer] [facility] apartment on first floor.  Previous level of function: Mod I  Roles and Routines: Mod I (able to walk around grocery store when brought by [employer] Living staff)  Equipment Used at Home:  rollator, cane, straight  Assistance upon Discharge: NONE    Pain/Comfort:  Pain Rating 1: (Did not rate)  Location - Side 1: Right  Location 1: knee  Pain Addressed 1: Reposition, Distraction, Cessation of Activity  Pain Rating Post-Intervention 1: 0/10    Patients cultural, spiritual, Scientology conflicts given  the current situation:      Objective:     Communicated with: RN prior to session.  Patient found supine with oxygen, PureWick, telemetry upon OT entry to room.    General Precautions: Standard, fall, respiratory   Orthopedic Precautions:N/A   Braces: N/A     Occupational Performance:    Bed Mobility:    · Patient completed Rolling/Turning to Left with  minimum assistance  · Patient completed Rolling/Turning to Right with minimum assistance  · Patient completed Supine to Sit with moderate assistance  · Patient completed Sit to Supine with moderate assistance    Functional Mobility/Transfers:  · Patient completed Sit <> Stand Transfer with moderate assistance  with  rolling walker   · Functional Mobility: Pt ambulated in pt room and hallway with RW (See PT eval for details)    Activities of Daily Living:  · Upper Body Dressing: minimum assistance to latricia gown as robe while sitting EOB  · Lower Body Dressing: stand by assistance donning socks EOB  · Toileting: total assistance with Pure Wick    Cognitive/Visual Perceptual:  Cognitive/Psychosocial Skills:     -       Oriented to: Person, Place and Situation   -       Follows Commands/attention:Easily distracted and Follows one-step commands  -       Communication: clear/fluent  -       Memory: Poor immediate recall  -       Safety awareness/insight to disability: impaired   -       Mood/Affect/Coping skills/emotional control: Appropriate to situation  Visual/Perceptual:      -Intact     Physical Exam:  Balance:    -       SBA in sitting, Mod A in stanc  Postural examination/scapula alignment:    -       Rounded shoulders  -       Forward head  -       Posterior pelvic tilt  Sensation:    -       Intact  Motor Planning:    -       WFL  Upper Extremity Range of Motion:     -       Right Upper Extremity: WFL  -       Left Upper Extremity: WFL  Upper Extremity Strength:    -       Right Upper Extremity: 4/5  -       Left Upper Extremity: 4/5  Fine Motor Coordination:    -        Intact  Gross motor coordination:   WFL    AMPAC 6 Click ADL:  AMPAC Total Score: 20    Education:  OT role and POC  Education:    Patient left supine with all lines intact, call button in reach, bed alarm on and RN notified    GOALS:   Multidisciplinary Problems     Occupational Therapy Goals        Problem: Occupational Therapy Goal    Goal Priority Disciplines Outcome Interventions   Occupational Therapy Goal     OT, PT/OT Ongoing, Progressing    Description:  Goals to be met by: 11/24/2019     Patient will increase functional independence with ADLs by performing:    UE Dressing with Modified [place].  LE Dressing with Modified [place].  Grooming while standing at sink with Modified [place].  Toileting from toilet with Modified [place] for hygiene and clothing management.   Toilet transfer to toilet with Modified [place].  Upper extremity exercise program x 10 reps per handout, with supervision.                      History:     Past Medical History:   Diagnosis Date    Arthritis     CHF (congestive heart failure)     Diabetes mellitus     ESRD on hemodialysis     S/P hemodialysis catheter insertion 10/10/2019    Right IJ    Unsteady gait     Uses roller walker          Past Surgical History:   Procedure Laterality Date    APPENDECTOMY      INSERTION OF TUNNELED CENTRAL VENOUS HEMODIALYSIS CATHETER Right 10/10/2019       Time Tracking:     OT Date of Treatment: 11/10/19  OT Start Time: 1210  OT Stop Time: 1235  OT Total Time (min): 25 min (Co Eval with PT)    Billable Minutes:Evaluation 20    Tianna Forde OT  11/10/2019

## 2019-11-10 NOTE — SUBJECTIVE & OBJECTIVE
Interval History: pt reports breathing easier and feeling better since admission    Review of Systems   Constitutional: Positive for activity change.   HENT: Negative.    Eyes: Negative.    Respiratory: Positive for cough and shortness of breath.    Cardiovascular: Positive for leg swelling. Negative for chest pain.   Gastrointestinal: Negative.    Endocrine: Negative.    Genitourinary: Positive for difficulty urinating.   Musculoskeletal: Negative.    Neurological: Positive for weakness.   Psychiatric/Behavioral: Negative.      Objective:     Vital Signs (Most Recent):  Temp: 98.3 °F (36.8 °C) (11/10/19 0721)  Pulse: 69 (11/10/19 0721)  Resp: 17 (11/10/19 0721)  BP: (!) 169/71 (11/10/19 0721)  SpO2: 96 % (11/10/19 0742) Vital Signs (24h Range):  Temp:  [97.5 °F (36.4 °C)-98.8 °F (37.1 °C)] 98.3 °F (36.8 °C)  Pulse:  [64-78] 69  Resp:  [17-39] 17  SpO2:  [95 %-98 %] 96 %  BP: (112-181)/(57-80) 169/71     Weight: 98.3 kg (216 lb 11.4 oz)  Body mass index is 32 kg/m².    Intake/Output Summary (Last 24 hours) at 11/10/2019 0934  Last data filed at 11/10/2019 0800  Gross per 24 hour   Intake 1420 ml   Output 3650 ml   Net -2230 ml      Physical Exam   Constitutional: She is oriented to person, place, and time. She appears well-developed and well-nourished. No distress.   HENT:   Head: Normocephalic and atraumatic.   Eyes: Pupils are equal, round, and reactive to light. EOM are normal.   Neck: Normal range of motion. Neck supple. JVD present.   Cardiovascular: Normal rate, regular rhythm, normal heart sounds and intact distal pulses.   Pulmonary/Chest: She is in respiratory distress. She has no wheezes. She has rales.   Abdominal: Soft. Bowel sounds are normal. She exhibits distension.   Musculoskeletal: She exhibits edema and tenderness.   Neurological: She is alert and oriented to person, place, and time.   Skin: Capillary refill takes less than 2 seconds.   Psychiatric: She has a normal mood and affect. Her behavior  is normal.       Significant Labs:   BMP:   Recent Labs   Lab 11/08/19  1034   *      K 4.2      CO2 28   BUN 28*   CREATININE 3.2*   CALCIUM 8.3*   MG 2.0     CBC:   Recent Labs   Lab 11/08/19  1034   WBC 6.82   HGB 9.5*   HCT 30.6*          Significant Imaging: I have reviewed and interpreted all pertinent imaging results/findings within the past 24 hours.

## 2019-11-10 NOTE — NURSING
Pt worked with PT this morning, tired after session. Lethargic but easily arousable.SOB and grunting noted. O2 94% on O2. Large episode of incontinent diarrhea. Too weak and lethargic to get up to BSC quickly, using bedpan at this time. Second BM was continent and soft.  Purewick replaced.

## 2019-11-10 NOTE — PLAN OF CARE
Problem: Physical Therapy Goal  Goal: Physical Therapy Goal  Description  Goals to be met by: 2019     Patient will increase functional independence with mobility by performin. Supine to sit with Modified [place]  2. Sit to supine with Modified [place]  3. Sit to stand transfer with Modified [place]  4. Gait  x 350 feet with Modified [place] using Rolling Walker.    Recommend: SNF at time of discharge.      Price Christie, PT  11/10/2019       Outcome: Ongoing, Progressing

## 2019-11-10 NOTE — PLAN OF CARE
Problem: Fall Injury Risk  Goal: Absence of Fall and Fall-Related Injury  Outcome: Ongoing, Progressing     Problem: Diabetes Comorbidity  Goal: Blood Glucose Level Within Desired Range  Outcome: Ongoing, Progressing     Problem: Skin Injury Risk Increased  Goal: Skin Health and Integrity  Outcome: Ongoing, Progressing

## 2019-11-11 LAB
ANION GAP SERPL CALC-SCNC: 6 MMOL/L (ref 8–16)
BASOPHILS # BLD AUTO: 0.04 K/UL (ref 0–0.2)
BASOPHILS NFR BLD: 0.8 % (ref 0–1.9)
BUN SERPL-MCNC: 27 MG/DL (ref 8–23)
CALCIUM SERPL-MCNC: 7.9 MG/DL (ref 8.7–10.5)
CHLORIDE SERPL-SCNC: 105 MMOL/L (ref 95–110)
CO2 SERPL-SCNC: 27 MMOL/L (ref 23–29)
CREAT SERPL-MCNC: 3 MG/DL (ref 0.5–1.4)
DIFFERENTIAL METHOD: ABNORMAL
EOSINOPHIL # BLD AUTO: 0.3 K/UL (ref 0–0.5)
EOSINOPHIL NFR BLD: 5 % (ref 0–8)
ERYTHROCYTE [DISTWIDTH] IN BLOOD BY AUTOMATED COUNT: 13.4 % (ref 11.5–14.5)
EST. GFR  (AFRICAN AMERICAN): 17 ML/MIN/1.73 M^2
EST. GFR  (NON AFRICAN AMERICAN): 15 ML/MIN/1.73 M^2
GLUCOSE SERPL-MCNC: 132 MG/DL (ref 70–110)
HBV SURFACE AB SER-ACNC: NEGATIVE M[IU]/ML
HBV SURFACE AG SERPL QL IA: NEGATIVE
HCT VFR BLD AUTO: 29 % (ref 37–48.5)
HGB BLD-MCNC: 9 G/DL (ref 12–16)
IMM GRANULOCYTES # BLD AUTO: 0.02 K/UL (ref 0–0.04)
IMM GRANULOCYTES NFR BLD AUTO: 0.4 % (ref 0–0.5)
LYMPHOCYTES # BLD AUTO: 1.3 K/UL (ref 1–4.8)
LYMPHOCYTES NFR BLD: 27 % (ref 18–48)
MCH RBC QN AUTO: 29.2 PG (ref 27–31)
MCHC RBC AUTO-ENTMCNC: 31 G/DL (ref 32–36)
MCV RBC AUTO: 94 FL (ref 82–98)
MONOCYTES # BLD AUTO: 0.5 K/UL (ref 0.3–1)
MONOCYTES NFR BLD: 10.1 % (ref 4–15)
NEUTROPHILS # BLD AUTO: 2.8 K/UL (ref 1.8–7.7)
NEUTROPHILS NFR BLD: 56.7 % (ref 38–73)
NRBC BLD-RTO: 0 /100 WBC
PLATELET # BLD AUTO: 178 K/UL (ref 150–350)
PMV BLD AUTO: 10.4 FL (ref 9.2–12.9)
POCT GLUCOSE: 129 MG/DL (ref 70–110)
POCT GLUCOSE: 170 MG/DL (ref 70–110)
POCT GLUCOSE: 177 MG/DL (ref 70–110)
POCT GLUCOSE: 218 MG/DL (ref 70–110)
POTASSIUM SERPL-SCNC: 3.4 MMOL/L (ref 3.5–5.1)
RBC # BLD AUTO: 3.08 M/UL (ref 4–5.4)
SODIUM SERPL-SCNC: 138 MMOL/L (ref 136–145)
WBC # BLD AUTO: 4.96 K/UL (ref 3.9–12.7)

## 2019-11-11 PROCEDURE — 80100016 HC MAINTENANCE HEMODIALYSIS

## 2019-11-11 PROCEDURE — 36415 COLL VENOUS BLD VENIPUNCTURE: CPT

## 2019-11-11 PROCEDURE — 63600175 PHARM REV CODE 636 W HCPCS: Performed by: HOSPITALIST

## 2019-11-11 PROCEDURE — 25000003 PHARM REV CODE 250: Performed by: HOSPITALIST

## 2019-11-11 PROCEDURE — 80048 BASIC METABOLIC PNL TOTAL CA: CPT

## 2019-11-11 PROCEDURE — 27000221 HC OXYGEN, UP TO 24 HOURS

## 2019-11-11 PROCEDURE — 85025 COMPLETE CBC W/AUTO DIFF WBC: CPT

## 2019-11-11 PROCEDURE — 94761 N-INVAS EAR/PLS OXIMETRY MLT: CPT

## 2019-11-11 PROCEDURE — 21400001 HC TELEMETRY ROOM

## 2019-11-11 PROCEDURE — 86580 TB INTRADERMAL TEST: CPT | Performed by: HOSPITALIST

## 2019-11-11 PROCEDURE — 30200315 PPD INTRADERMAL TEST REV CODE 302: Performed by: HOSPITALIST

## 2019-11-11 PROCEDURE — 63600175 PHARM REV CODE 636 W HCPCS: Performed by: INTERNAL MEDICINE

## 2019-11-11 RX ADMIN — POLYETHYLENE GLYCOL 3350 17 G: 17 POWDER, FOR SOLUTION ORAL at 02:11

## 2019-11-11 RX ADMIN — METOPROLOL SUCCINATE 50 MG: 50 TABLET, EXTENDED RELEASE ORAL at 09:11

## 2019-11-11 RX ADMIN — ESCITALOPRAM OXALATE 10 MG: 10 TABLET ORAL at 02:11

## 2019-11-11 RX ADMIN — FERROUS SULFATE TAB EC 325 MG (65 MG FE EQUIVALENT) 325 MG: 325 (65 FE) TABLET DELAYED RESPONSE at 02:11

## 2019-11-11 RX ADMIN — HYDRALAZINE HYDROCHLORIDE 50 MG: 25 TABLET ORAL at 06:11

## 2019-11-11 RX ADMIN — QUETIAPINE FUMARATE 25 MG: 25 TABLET ORAL at 09:11

## 2019-11-11 RX ADMIN — AMLODIPINE BESYLATE 10 MG: 5 TABLET ORAL at 02:11

## 2019-11-11 RX ADMIN — METOPROLOL SUCCINATE 50 MG: 50 TABLET, EXTENDED RELEASE ORAL at 02:11

## 2019-11-11 RX ADMIN — HYDRALAZINE HYDROCHLORIDE 50 MG: 25 TABLET ORAL at 04:11

## 2019-11-11 RX ADMIN — INSULIN ASPART 10 UNITS: 100 INJECTION, SUSPENSION SUBCUTANEOUS at 09:11

## 2019-11-11 RX ADMIN — MUPIROCIN: 20 OINTMENT TOPICAL at 02:11

## 2019-11-11 RX ADMIN — HEPARIN SODIUM 5000 UNITS: 5000 INJECTION, SOLUTION INTRAVENOUS; SUBCUTANEOUS at 09:11

## 2019-11-11 RX ADMIN — LOSARTAN POTASSIUM 25 MG: 25 TABLET, FILM COATED ORAL at 02:11

## 2019-11-11 RX ADMIN — HEPARIN SODIUM 5000 UNITS: 5000 INJECTION INTRAVENOUS; SUBCUTANEOUS at 01:11

## 2019-11-11 RX ADMIN — FUROSEMIDE 80 MG: 40 TABLET ORAL at 02:11

## 2019-11-11 RX ADMIN — Medication 5 UNITS: at 04:11

## 2019-11-11 RX ADMIN — INSULIN ASPART 1 UNITS: 100 INJECTION, SOLUTION INTRAVENOUS; SUBCUTANEOUS at 09:11

## 2019-11-11 RX ADMIN — PRAVASTATIN SODIUM 10 MG: 10 TABLET ORAL at 02:11

## 2019-11-11 RX ADMIN — HYDRALAZINE HYDROCHLORIDE 50 MG: 25 TABLET ORAL at 09:11

## 2019-11-11 RX ADMIN — EPOETIN ALFA-EPBX 10000 UNITS: 10000 INJECTION, SOLUTION INTRAVENOUS; SUBCUTANEOUS at 01:11

## 2019-11-11 RX ADMIN — MUPIROCIN: 20 OINTMENT TOPICAL at 09:11

## 2019-11-11 RX ADMIN — HEPARIN SODIUM 5000 UNITS: 5000 INJECTION, SOLUTION INTRAVENOUS; SUBCUTANEOUS at 04:11

## 2019-11-11 RX ADMIN — FERROUS SULFATE TAB EC 325 MG (65 MG FE EQUIVALENT) 325 MG: 325 (65 FE) TABLET DELAYED RESPONSE at 09:11

## 2019-11-11 NOTE — SUBJECTIVE & OBJECTIVE
Interval History: pt reports breathing easier and feeling better since admission    Review of Systems   Constitutional: Positive for activity change.   HENT: Negative.    Eyes: Negative.    Respiratory: Positive for cough and shortness of breath.    Cardiovascular: Positive for leg swelling. Negative for chest pain.   Gastrointestinal: Negative.    Endocrine: Negative.    Genitourinary: Positive for difficulty urinating.   Musculoskeletal: Negative.    Neurological: Positive for weakness.   Psychiatric/Behavioral: Negative.      Objective:     Vital Signs (Most Recent):  Temp: 97.1 °F (36.2 °C) (11/11/19 0820)  Pulse: 65 (11/11/19 0820)  Resp: 17 (11/11/19 0820)  BP: (!) 169/77 (11/11/19 0820)  SpO2: 98 % (11/11/19 0820) Vital Signs (24h Range):  Temp:  [97.1 °F (36.2 °C)-98.3 °F (36.8 °C)] 97.1 °F (36.2 °C)  Pulse:  [65-71] 65  Resp:  [17-20] 17  SpO2:  [95 %-98 %] 98 %  BP: (132-178)/(60-77) 169/77     Weight: 98 kg (216 lb 0.8 oz)  Body mass index is 31.91 kg/m².    Intake/Output Summary (Last 24 hours) at 11/11/2019 0954  Last data filed at 11/10/2019 2000  Gross per 24 hour   Intake 240 ml   Output 1350 ml   Net -1110 ml      Physical Exam   Constitutional: She is oriented to person, place, and time. She appears well-developed and well-nourished. No distress.   HENT:   Head: Normocephalic and atraumatic.   Eyes: Pupils are equal, round, and reactive to light. EOM are normal.   Neck: Normal range of motion. Neck supple. JVD present.   Cardiovascular: Normal rate, regular rhythm, normal heart sounds and intact distal pulses.   Pulmonary/Chest: She is in respiratory distress. She has no wheezes. She has rales.   Abdominal: Soft. Bowel sounds are normal. She exhibits distension.   Musculoskeletal: She exhibits edema and tenderness.   Neurological: She is alert and oriented to person, place, and time.   Skin: Capillary refill takes less than 2 seconds.   Psychiatric: She has a normal mood and affect. Her behavior is  normal.       Significant Labs:   BMP:   Recent Labs   Lab 11/11/19  0600   *      K 3.4*      CO2 27   BUN 27*   CREATININE 3.0*   CALCIUM 7.9*     CBC:   Recent Labs   Lab 11/10/19  1608 11/11/19  0600   WBC 6.54 4.96   HGB 9.6* 9.0*   HCT 31.3* 29.0*    178       Significant Imaging: I have reviewed and interpreted all pertinent imaging results/findings within the past 24 hours.

## 2019-11-11 NOTE — PT/OT/SLP PROGRESS
Occupational Therapy   Treatment    Name: Magaly Epstein  MRN: [id number]  Admitting Diagnosis:  Acute on chronic combined systolic and diastolic congestive heart failure       Recommendations:     Discharge Recommendations: nursing facility, skilled  Discharge Equipment Recommendations:  none  Barriers to discharge:  Decreased caregiver support(lives alone)    Assessment:     Magaly Epstein is a 69 y.o. female with a medical diagnosis of Acute on chronic combined systolic and diastolic congestive heart failure.  She presents with  independence for self-care and functional transfers. Performance deficits affecting function are weakness, impaired endurance, impaired self care skills, impaired functional mobilty, impaired balance, decreased upper extremity function, decreased safety awareness, pain, impaired cardiopulmonary response to activity.     Rehab Prognosis:  Good; patient would benefit from acute skilled OT services to address these deficits and reach maximum level of function.       Plan:     Patient to be seen 5 x/week to address the above listed problems via self-care/home management, therapeutic activities, therapeutic exercises  · Plan of Care Expires: 19  · Plan of Care Reviewed with: patient    Subjective     Pain/Comfort:  · Pain Rating 1: 0/10    Objective:     Communicated with: nurse prior to session.  Patient found supine with oxygen, telemetry, peripheral IV, bed alarm, PureWick upon OT entry to room.    General Precautions: Standard, diabetic, fall, respiratory   Orthopedic Precautions:N/A   Braces: N/A     Occupational Performance:       [facility] 6 Click ADL: 20    Treatment & Education:  Patient found in supported sit with oxygen at her chin. Patient awakened easily, placed nasal canula in her nose.  Provided handout on energy conservation, discussed for when she returns home, provided exercise handout for BUE exercises. Patient performed 10 reps of BUE exercises in all available  planes of motion. Patient instructed to perform exercises 3 times per day.     Patient left supine with all lines intact, call button in reach and nurse' presentEducation:      GOALS:   Multidisciplinary Problems     Occupational Therapy Goals        Problem: Occupational Therapy Goal    Goal Priority Disciplines Outcome Interventions   Occupational Therapy Goal     OT, PT/OT Ongoing, Progressing    Description:  Goals to be met by: 11/24/2019     Patient will increase functional independence with ADLs by performing:    UE Dressing with Modified [place].  LE Dressing with Modified [place].  Grooming while standing at sink with Modified [place].  Toileting from toilet with Modified [place] for hygiene and clothing management.   Toilet transfer to toilet with Modified [place].  Upper extremity exercise program x 10 reps per handout, with supervision.                      Time Tracking:     OT Date of Treatment: 11/11/19  OT Start Time: 1606  OT Stop Time: 1625  OT Total Time (min): 19 min    Billable Minutes:Therapeutic Activity 19 minutes    ALEAH Gan, MS  11/11/2019

## 2019-11-11 NOTE — PT/OT/SLP PROGRESS
Occupational Therapy      Patient Name:  Magaly Epstein   MRN:  [id number]    Patient not seen today secondary to Dialysis. Will follow-up as able.    ALEAH Gan MS  11/11/2019

## 2019-11-11 NOTE — PROGRESS NOTES
[facility] Hospital Medicine  Progress Note    Patient Name: Magaly Epstein  MRN: [id number]  Patient Class: IP- Inpatient   Admission Date: 11/8/2019  Length of Stay: 3 days  Attending Physician: Phylicia Gastelum MD  Primary Care Provider: Anahy Bradley DO        Subjective:     Principal Problem:Acute on chronic combined systolic and diastolic congestive heart failure        HPI:  69 y.o. female with PMHx of CHF and diabetes mellitus, ESRD on HD who presents to the ED with complaint of shortness of breath. Patient states that she is chronically short of breath; however, much feels much more short of breath for about 1 week, with SpO2 of 87% on EMS arrival today. Patient states being seen by her PCP about 1 week ago for shortness of breath and being diagnosed with bilateral pleural effusions, for which she apparently has an appointment with IR today. Patient reports last being dialyzed 4 days ago and that she is scheduled for dialysis today at 2:40 PM at a facility that is new to her. Patient lives at home alone. Patient denies supplemental oxygen use at home. She reports chronic bilateral leg swelling which is not worse today. Patient denies fevers. She states that she still feels somewhat short of breath while on O2 per nasal cannula in the ED.     Pt placed on bipap in ED and transferred to ICU. Pt underwent thoracentesis with 300 ml fluid removed. Studies pending. Plan to receive HD and wean O2 as tolerated.       Overview/Hospital Course:  Pt admitted with acute hypoxic resp failure secondary to pulmonary edema/CHF,was  on bipap. Pt underwent HD 11/8 with 3 liters removed. HD planned 11/9. Able to wean off bipap to [place] oxygen. Vital stable   Consulted PT,OT,stable on Nc O 2,  PT,OT recommending SNF,consulted SW.    Interval History: pt reports breathing easier and feeling better since admission    Review of Systems   Constitutional: Positive for activity change.   HENT: Negative.     Eyes: Negative.    Respiratory: Positive for cough and shortness of breath.    Cardiovascular: Positive for leg swelling. Negative for chest pain.   Gastrointestinal: Negative.    Endocrine: Negative.    Genitourinary: Positive for difficulty urinating.   Musculoskeletal: Negative.    Neurological: Positive for weakness.   Psychiatric/Behavioral: Negative.      Objective:     Vital Signs (Most Recent):  Temp: 97.1 °F (36.2 °C) (11/11/19 0820)  Pulse: 65 (11/11/19 0820)  Resp: 17 (11/11/19 0820)  BP: (!) 169/77 (11/11/19 0820)  SpO2: 98 % (11/11/19 0820) Vital Signs (24h Range):  Temp:  [97.1 °F (36.2 °C)-98.3 °F (36.8 °C)] 97.1 °F (36.2 °C)  Pulse:  [65-71] 65  Resp:  [17-20] 17  SpO2:  [95 %-98 %] 98 %  BP: (132-178)/(60-77) 169/77     Weight: 98 kg (216 lb 0.8 oz)  Body mass index is 31.91 kg/m².    Intake/Output Summary (Last 24 hours) at 11/11/2019 0954  Last data filed at 11/10/2019 2000  Gross per 24 hour   Intake 240 ml   Output 1350 ml   Net -1110 ml      Physical Exam   Constitutional: She is oriented to person, place, and time. She appears well-developed and well-nourished. No distress.   HENT:   Head: Normocephalic and atraumatic.   Eyes: Pupils are equal, round, and reactive to light. EOM are normal.   Neck: Normal range of motion. Neck supple. JVD present.   Cardiovascular: Normal rate, regular rhythm, normal heart sounds and intact distal pulses.   Pulmonary/Chest: She is in respiratory distress. She has no wheezes. She has rales.   Abdominal: Soft. Bowel sounds are normal. She exhibits distension.   Musculoskeletal: She exhibits edema and tenderness.   Neurological: She is alert and oriented to person, place, and time.   Skin: Capillary refill takes less than 2 seconds.   Psychiatric: She has a normal mood and affect. Her behavior is normal.       Significant Labs:   BMP:   Recent Labs   Lab 11/11/19  0600   *      K 3.4*      CO2 27   BUN 27*   CREATININE 3.0*   CALCIUM 7.9*      CBC:   Recent Labs   Lab 11/10/19  1608 11/11/19  0600   WBC 6.54 4.96   HGB 9.6* 9.0*   HCT 31.3* 29.0*    178       Significant Imaging: I have reviewed and interpreted all pertinent imaging results/findings within the past 24 hours.      Assessment/Plan:      * Acute on chronic combined systolic and diastolic congestive heart failure  With EF of 45%,Lasix given in ED 80 + 80mg   REsume lasix daily + metolazone   bumex held  HD for volume control  BP control        Acute respiratory failure with hypoxia  Duo to CHF exacerbation,Consulted PT,OT,stable on Nc O 2,PT,OT recommending SNF,consulted SW.      ESRD (end stage renal disease) on dialysis  Nephrology consulted ( dialysis patient)      Anasarca  Daily weights  HD for volume control  BP control  Renal-cardiac diet   On IV lasix,  Improving,will have HD.      Depression  Resume home meds      Hyperlipidemia    Resume statin     Type 2 diabetes mellitus with renal complication    Uncontrolled, A1c 7.5%  SSi   Diabetic diet  Resume 70/30 - home insulin regimen     Essential hypertension  Uncontrolled   Resume home meds        VTE Risk Mitigation (From admission, onward)         Ordered     heparin (porcine) injection 5,000 Units  Every 8 hours      11/10/19 0907     heparin (porcine) injection 5,000 Units  As needed (PRN)      11/08/19 1639     IP VTE HIGH RISK PATIENT  Once      11/08/19 1523                      Phylicia Gastelum MD  Department of Hospital Medicine   [facility]

## 2019-11-11 NOTE — PT/OT/SLP PROGRESS
Physical Therapy      Patient Name:  Magaly Epstein   MRN:  [id number]        1st Attempt 1041:Patient not seen today secondary to Dialysis. Will follow-up as able.    2nd Attempt 1336: Pt still in Dialysis at this time.  Will follow up as able.    Tonia Ellsworth, PTA

## 2019-11-11 NOTE — ASSESSMENT & PLAN NOTE
Daily weights  HD for volume control  BP control  Renal-cardiac diet   On IV lasix,  Improving,will have HD.

## 2019-11-11 NOTE — PROGRESS NOTES
Awake alert oriented NAD    Seen while on HD    Denies CNS ENT CP GI  RHEUM OR DERM SX  Past Medical History:   Diagnosis Date    Arthritis     CHF (congestive heart failure)     Diabetes mellitus     ESRD on hemodialysis     S/P hemodialysis catheter insertion 10/10/2019    Right IJ    Unsteady gait     Uses roller walker      Review of patient's allergies indicates:   Allergen Reactions    Ciprofloxacin Anaphylaxis    Codeine Anaphylaxis    Neosporin [benzalkonium chloride] Anaphylaxis       Current Facility-Administered Medications   Medication    0.9%  NaCl infusion    albuterol-ipratropium 2.5 mg-0.5 mg/3 mL nebulizer solution 3 mL    amLODIPine tablet 10 mg    dextrose 50% injection 12.5 g    dextrose 50% injection 25 g    escitalopram oxalate tablet 10 mg    ferrous sulfate EC tablet 325 mg    furosemide tablet 80 mg    glucagon (human recombinant) injection 1 mg    glucose chewable tablet 16 g    glucose chewable tablet 24 g    heparin (porcine) injection 5,000 Units    heparin (porcine) injection 5,000 Units    hydrALAZINE tablet 50 mg    insulin aspart protamine-insulin aspart (NovoLOG 70/30) injection    insulin aspart U-100 pen 0-5 Units    losartan tablet 25 mg    metoprolol succinate (TOPROL-XL) 24 hr tablet 50 mg    mupirocin 2 % ointment    polyethylene glycol packet 17 g    pravastatin tablet 10 mg    QUEtiapine tablet 25 mg    sodium chloride 0.9% flush 10 mL    tuberculin injection 5 Units       LABS    Recent Results (from the past 24 hour(s))   POCT glucose    Collection Time: 11/10/19 11:27 AM   Result Value Ref Range    POCT Glucose 107 70 - 110 mg/dL   POCT glucose    Collection Time: 11/10/19  3:17 PM   Result Value Ref Range    POCT Glucose 165 (H) 70 - 110 mg/dL   CBC auto differential    Collection Time: 11/10/19  4:08 PM   Result Value Ref Range    WBC 6.54 3.90 - 12.70 K/uL    RBC 3.29 (L) 4.00 - 5.40 M/uL    Hemoglobin 9.6 (L) 12.0 - 16.0 g/dL     Hematocrit 31.3 (L) 37.0 - 48.5 %    Mean Corpuscular Volume 95 82 - 98 fL    Mean Corpuscular Hemoglobin 29.2 27.0 - 31.0 pg    Mean Corpuscular Hemoglobin Conc 30.7 (L) 32.0 - 36.0 g/dL    RDW 13.4 11.5 - 14.5 %    Platelets 211 150 - 350 K/uL    MPV 10.2 9.2 - 12.9 fL    Immature Granulocytes 0.6 (H) 0.0 - 0.5 %    Gran # (ANC) 3.7 1.8 - 7.7 K/uL    Immature Grans (Abs) 0.04 0.00 - 0.04 K/uL    Lymph # 1.8 1.0 - 4.8 K/uL    Mono # 0.6 0.3 - 1.0 K/uL    Eos # 0.3 0.0 - 0.5 K/uL    Baso # 0.06 0.00 - 0.20 K/uL    nRBC 0 0 /100 WBC    Gran% 56.2 38.0 - 73.0 %    Lymph% 28.1 18.0 - 48.0 %    Mono% 9.5 4.0 - 15.0 %    Eosinophil% 4.7 0.0 - 8.0 %    Basophil% 0.9 0.0 - 1.9 %    Differential Method Automated    Basic metabolic panel    Collection Time: 11/10/19  4:08 PM   Result Value Ref Range    Sodium 137 136 - 145 mmol/L    Potassium 4.2 3.5 - 5.1 mmol/L    Chloride 106 95 - 110 mmol/L    CO2 29 23 - 29 mmol/L    Glucose 170 (H) 70 - 110 mg/dL    BUN, Bld 25 (H) 8 - 23 mg/dL    Creatinine 3.0 (H) 0.5 - 1.4 mg/dL    Calcium 7.9 (L) 8.7 - 10.5 mg/dL    Anion Gap 2 (L) 8 - 16 mmol/L    eGFR if African American 17 (A) >60 mL/min/1.73 m^2    eGFR if non African American 15 (A) >60 mL/min/1.73 m^2   POCT glucose    Collection Time: 11/10/19  8:14 PM   Result Value Ref Range    POCT Glucose 212 (H) 70 - 110 mg/dL   CBC auto differential    Collection Time: 11/11/19  6:00 AM   Result Value Ref Range    WBC 4.96 3.90 - 12.70 K/uL    RBC 3.08 (L) 4.00 - 5.40 M/uL    Hemoglobin 9.0 (L) 12.0 - 16.0 g/dL    Hematocrit 29.0 (L) 37.0 - 48.5 %    Mean Corpuscular Volume 94 82 - 98 fL    Mean Corpuscular Hemoglobin 29.2 27.0 - 31.0 pg    Mean Corpuscular Hemoglobin Conc 31.0 (L) 32.0 - 36.0 g/dL    RDW 13.4 11.5 - 14.5 %    Platelets 178 150 - 350 K/uL    MPV 10.4 9.2 - 12.9 fL    Immature Granulocytes 0.4 0.0 - 0.5 %    Gran # (ANC) 2.8 1.8 - 7.7 K/uL    Immature Grans (Abs) 0.02 0.00 - 0.04 K/uL    Lymph # 1.3 1.0 - 4.8 K/uL     Mono # 0.5 0.3 - 1.0 K/uL    Eos # 0.3 0.0 - 0.5 K/uL    Baso # 0.04 0.00 - 0.20 K/uL    nRBC 0 0 /100 WBC    Gran% 56.7 38.0 - 73.0 %    Lymph% 27.0 18.0 - 48.0 %    Mono% 10.1 4.0 - 15.0 %    Eosinophil% 5.0 0.0 - 8.0 %    Basophil% 0.8 0.0 - 1.9 %    Differential Method Automated    Basic metabolic panel    Collection Time: 11/11/19  6:00 AM   Result Value Ref Range    Sodium 138 136 - 145 mmol/L    Potassium 3.4 (L) 3.5 - 5.1 mmol/L    Chloride 105 95 - 110 mmol/L    CO2 27 23 - 29 mmol/L    Glucose 132 (H) 70 - 110 mg/dL    BUN, Bld 27 (H) 8 - 23 mg/dL    Creatinine 3.0 (H) 0.5 - 1.4 mg/dL    Calcium 7.9 (L) 8.7 - 10.5 mg/dL    Anion Gap 6 (L) 8 - 16 mmol/L    eGFR if African American 17 (A) >60 mL/min/1.73 m^2    eGFR if non African American 15 (A) >60 mL/min/1.73 m^2   POCT glucose    Collection Time: 11/11/19  7:39 AM   Result Value Ref Range    POCT Glucose 129 (H) 70 - 110 mg/dL   POCT glucose    Collection Time: 11/11/19  9:10 AM   Result Value Ref Range    POCT Glucose 170 (H) 70 - 110 mg/dL   ]    I/O last 3 completed shifts:  In: 960 [P.O.:960]  Out: 1350 [Urine:1350]    Vitals:    11/11/19 0734 11/11/19 0820 11/11/19 0950 11/11/19 1000   BP:  (!) 169/77 131/68 139/66   Pulse: 68 65 80 60   Resp:  17 18    Temp:  97.1 °F (36.2 °C) 98.2 °F (36.8 °C)    TempSrc:  Oral Oral    SpO2: 97% 98%     Weight:       Height:           No Jvd, Thyromegaly or Lymphadenopathy  Lungs: Fairly clear anteriorly and laterally  Cor: RRR no G or rubs  Abd: Soft benign good bowel sounds non tender  Ext: No E C C    A)  Acute on chronic combined systolic and diastolic congestive heart failure   ESRD on dialysis (Q M-F)  Pleural effusion s/p Thoracentesis  Anasarca improving steadily might need HD TIW  Anemia of CKD epo added  Hypoalbuminemia  Hyperlipidemia  Uncontrolled type 2 diabetes mellitus with renal complication  Uncontrolled Essential hypertension     P)  Renal Diet  Home meds  Protect access  HD  EPO    Binders  Adjust all meds to the degree of renal fx  Close follow up I/O and weights  Maintain Hydration

## 2019-11-11 NOTE — PLAN OF CARE
Problem: Occupational Therapy Goal  Goal: Occupational Therapy Goal  Description  Goals to be met by: 11/24/2019     Patient will increase functional independence with ADLs by performing:    UE Dressing with Modified [place].  LE Dressing with Modified [place].  Grooming while standing at sink with Modified [place].  Toileting from toilet with Modified [place] for hygiene and clothing management.   Toilet transfer to toilet with Modified [place].  Upper extremity exercise program x 10 reps per handout, with supervision.     Outcome: Ongoing, Progressing    Patient tolerated treatment well, good participation, demonstrated good understanding of education provided. ALEAH Gan, MS

## 2019-11-11 NOTE — PROGRESS NOTES
1230 TN attempted to meet with pt to introduce role of CM and discuss SNF and get preferences, pt was off the floor. TN will f/u.    1432 TN sent clinicals via [facility] to 5 SNF facilities including [facility]; awaiting and acceptance.

## 2019-11-12 LAB
ANION GAP SERPL CALC-SCNC: 7 MMOL/L (ref 8–16)
BACTERIA SPEC AEROBE CULT: NO GROWTH
BACTERIA SPEC ANAEROBE CULT: NORMAL
BASOPHILS # BLD AUTO: 0.05 K/UL (ref 0–0.2)
BASOPHILS NFR BLD: 0.9 % (ref 0–1.9)
BUN SERPL-MCNC: 21 MG/DL (ref 8–23)
CALCIUM SERPL-MCNC: 8.1 MG/DL (ref 8.7–10.5)
CHLORIDE SERPL-SCNC: 105 MMOL/L (ref 95–110)
CO2 SERPL-SCNC: 24 MMOL/L (ref 23–29)
CREAT SERPL-MCNC: 2.7 MG/DL (ref 0.5–1.4)
DIFFERENTIAL METHOD: ABNORMAL
EOSINOPHIL # BLD AUTO: 0.2 K/UL (ref 0–0.5)
EOSINOPHIL NFR BLD: 3.8 % (ref 0–8)
ERYTHROCYTE [DISTWIDTH] IN BLOOD BY AUTOMATED COUNT: 13.4 % (ref 11.5–14.5)
EST. GFR  (AFRICAN AMERICAN): 20 ML/MIN/1.73 M^2
EST. GFR  (NON AFRICAN AMERICAN): 17 ML/MIN/1.73 M^2
GLUCOSE SERPL-MCNC: 167 MG/DL (ref 70–110)
HCT VFR BLD AUTO: 31.2 % (ref 37–48.5)
HGB BLD-MCNC: 9.5 G/DL (ref 12–16)
IMM GRANULOCYTES # BLD AUTO: 0.02 K/UL (ref 0–0.04)
IMM GRANULOCYTES NFR BLD AUTO: 0.3 % (ref 0–0.5)
LYMPHOCYTES # BLD AUTO: 1.6 K/UL (ref 1–4.8)
LYMPHOCYTES NFR BLD: 27.9 % (ref 18–48)
MCH RBC QN AUTO: 28.6 PG (ref 27–31)
MCHC RBC AUTO-ENTMCNC: 30.4 G/DL (ref 32–36)
MCV RBC AUTO: 94 FL (ref 82–98)
MONOCYTES # BLD AUTO: 0.5 K/UL (ref 0.3–1)
MONOCYTES NFR BLD: 8.1 % (ref 4–15)
NEUTROPHILS # BLD AUTO: 3.4 K/UL (ref 1.8–7.7)
NEUTROPHILS NFR BLD: 59 % (ref 38–73)
NRBC BLD-RTO: 0 /100 WBC
PLATELET # BLD AUTO: 192 K/UL (ref 150–350)
PMV BLD AUTO: 10.7 FL (ref 9.2–12.9)
POCT GLUCOSE: 178 MG/DL (ref 70–110)
POCT GLUCOSE: 185 MG/DL (ref 70–110)
POCT GLUCOSE: 196 MG/DL (ref 70–110)
POCT GLUCOSE: 218 MG/DL (ref 70–110)
POTASSIUM SERPL-SCNC: 3.8 MMOL/L (ref 3.5–5.1)
RBC # BLD AUTO: 3.32 M/UL (ref 4–5.4)
SODIUM SERPL-SCNC: 136 MMOL/L (ref 136–145)
WBC # BLD AUTO: 5.8 K/UL (ref 3.9–12.7)

## 2019-11-12 PROCEDURE — 63600175 PHARM REV CODE 636 W HCPCS: Performed by: HOSPITALIST

## 2019-11-12 PROCEDURE — 94761 N-INVAS EAR/PLS OXIMETRY MLT: CPT

## 2019-11-12 PROCEDURE — 21400001 HC TELEMETRY ROOM

## 2019-11-12 PROCEDURE — 25000003 PHARM REV CODE 250: Performed by: HOSPITALIST

## 2019-11-12 PROCEDURE — 97116 GAIT TRAINING THERAPY: CPT

## 2019-11-12 PROCEDURE — 36415 COLL VENOUS BLD VENIPUNCTURE: CPT

## 2019-11-12 PROCEDURE — 27000221 HC OXYGEN, UP TO 24 HOURS

## 2019-11-12 PROCEDURE — 85025 COMPLETE CBC W/AUTO DIFF WBC: CPT

## 2019-11-12 PROCEDURE — 97110 THERAPEUTIC EXERCISES: CPT

## 2019-11-12 PROCEDURE — 80048 BASIC METABOLIC PNL TOTAL CA: CPT

## 2019-11-12 RX ADMIN — PRAVASTATIN SODIUM 10 MG: 10 TABLET ORAL at 10:11

## 2019-11-12 RX ADMIN — POLYETHYLENE GLYCOL 3350 17 G: 17 POWDER, FOR SOLUTION ORAL at 10:11

## 2019-11-12 RX ADMIN — HYDRALAZINE HYDROCHLORIDE 50 MG: 25 TABLET ORAL at 05:11

## 2019-11-12 RX ADMIN — HEPARIN SODIUM 5000 UNITS: 5000 INJECTION, SOLUTION INTRAVENOUS; SUBCUTANEOUS at 05:11

## 2019-11-12 RX ADMIN — INSULIN ASPART 10 UNITS: 100 INJECTION, SUSPENSION SUBCUTANEOUS at 09:11

## 2019-11-12 RX ADMIN — AMLODIPINE BESYLATE 10 MG: 5 TABLET ORAL at 10:11

## 2019-11-12 RX ADMIN — FUROSEMIDE 80 MG: 40 TABLET ORAL at 10:11

## 2019-11-12 RX ADMIN — ESCITALOPRAM OXALATE 10 MG: 10 TABLET ORAL at 10:11

## 2019-11-12 RX ADMIN — HYDRALAZINE HYDROCHLORIDE 50 MG: 25 TABLET ORAL at 02:11

## 2019-11-12 RX ADMIN — METOPROLOL SUCCINATE 50 MG: 50 TABLET, EXTENDED RELEASE ORAL at 09:11

## 2019-11-12 RX ADMIN — LOSARTAN POTASSIUM 25 MG: 25 TABLET, FILM COATED ORAL at 10:11

## 2019-11-12 RX ADMIN — INSULIN ASPART 10 UNITS: 100 INJECTION, SUSPENSION SUBCUTANEOUS at 10:11

## 2019-11-12 RX ADMIN — INSULIN ASPART 2 UNITS: 100 INJECTION, SOLUTION INTRAVENOUS; SUBCUTANEOUS at 05:11

## 2019-11-12 RX ADMIN — MUPIROCIN: 20 OINTMENT TOPICAL at 09:11

## 2019-11-12 RX ADMIN — FERROUS SULFATE TAB EC 325 MG (65 MG FE EQUIVALENT) 325 MG: 325 (65 FE) TABLET DELAYED RESPONSE at 09:11

## 2019-11-12 RX ADMIN — QUETIAPINE FUMARATE 25 MG: 25 TABLET ORAL at 09:11

## 2019-11-12 RX ADMIN — HEPARIN SODIUM 5000 UNITS: 5000 INJECTION, SOLUTION INTRAVENOUS; SUBCUTANEOUS at 02:11

## 2019-11-12 RX ADMIN — FERROUS SULFATE TAB EC 325 MG (65 MG FE EQUIVALENT) 325 MG: 325 (65 FE) TABLET DELAYED RESPONSE at 10:11

## 2019-11-12 RX ADMIN — HEPARIN SODIUM 5000 UNITS: 5000 INJECTION, SOLUTION INTRAVENOUS; SUBCUTANEOUS at 09:11

## 2019-11-12 RX ADMIN — HYDRALAZINE HYDROCHLORIDE 50 MG: 25 TABLET ORAL at 09:11

## 2019-11-12 RX ADMIN — METOPROLOL SUCCINATE 50 MG: 50 TABLET, EXTENDED RELEASE ORAL at 10:11

## 2019-11-12 NOTE — PLAN OF CARE
Problem: Fall Injury Risk  Goal: Absence of Fall and Fall-Related Injury  Outcome: Ongoing, Progressing  Intervention: Identify and Manage Contributors to Fall Injury Risk  Flowsheets (Taken 11/12/2019 0300)  Self-Care Promotion: independence encouraged;BADL personal objects within reach;BADL personal routines maintained  Medication Review/Management: medications reviewed  Intervention: Promote Injury-Free Environment  Flowsheets (Taken 11/12/2019 0300)  Safety Promotion/Fall Prevention: assistive device/personal item within reach;bed alarm set;Fall Risk reviewed with patient/family;Fall Risk signage in place;lighting adjusted;medications reviewed;side rails raised x 3;room near unit station;instructed to call staff for mobility  Environmental Safety Modification: assistive device/personal items within reach;clutter free environment maintained;room near unit station     Problem: Device-Related Complication Risk (Hemodialysis)  Goal: Safe, Effective Therapy Delivery  Outcome: Ongoing, Progressing  Intervention: Optimize Device Care and Function  Flowsheets (Taken 11/12/2019 0300)  Medication Review/Management: medications reviewed     Problem: Diabetes Comorbidity  Goal: Blood Glucose Level Within Desired Range  Outcome: Ongoing, Progressing  Intervention: Maintain Glycemic Control  Flowsheets (Taken 11/12/2019 0300)  Glycemic Management: blood glucose monitoring;supplemental insulin given

## 2019-11-12 NOTE — NURSING
1330- no acute distress or changes on telemetry . Patient tolerated sitting upright in chair assisted back to bed rails up x 3 head of bed up , bed alarm on . Oxygen in use at 2 liters by nasal canula. No changes on telemetry , diaper changed too. No acute distress . No novolog coverage required.

## 2019-11-12 NOTE — PLAN OF CARE
Problem: Physical Therapy Goal  Goal: Physical Therapy Goal  Description  Goals to be met by: 2019     Patient will increase functional independence with mobility by performin. Supine to sit with Modified [place]  2. Sit to supine with Modified [place]  3. Sit to stand transfer with Modified [place]  4. Gait  x 350 feet with Modified [place] using Rolling Walker.    Recommend: SNF at time of discharge.         Outcome: Ongoing, Progressing   Pt will benefit from further skilled therapy in order to return to PLOF.

## 2019-11-12 NOTE — PT/OT/SLP PROGRESS
Physical Therapy Treatment    Patient Name:  Magaly Epstein   MRN:  [id number]    Recommendations:     Discharge Recommendations:  nursing facility, skilled   Discharge Equipment Recommendations: none   Barriers to discharge: Decreased caregiver support and pt with decreased mobility     Assessment:     Magaly Epstein is a 69 y.o. female admitted with a medical diagnosis of Acute on chronic combined systolic and diastolic congestive heart failure.  She presents with the following impairments/functional limitations:  weakness, impaired endurance, impaired self care skills, gait instability, impaired balance, decreased upper extremity function, decreased lower extremity function, edema, decreased ROM, pain, decreased safety awareness, impaired cognition, impaired cardiopulmonary response to activity . Pt will benefit from further skilled therapy in order to return to PLOF. There is expectation of returning to prior level of function to maintain independence avoiding readmission.  Pt is at high risk of unplanned readmission, decline functional status due to fall risk and lack of 24 hour caregiver support at home .  Pt is pleasant and motivated to return to prior level of function.     Rehab Prognosis: Good; patient would benefit from acute skilled PT services to address these deficits and reach maximum level of function.    Recent Surgery: * No surgery found *      Plan:     During this hospitalization, patient to be seen 5 x/week to address the identified rehab impairments via gait training, therapeutic activities, therapeutic exercises and progress toward the following goals:    · Plan of Care Expires:  11/23/19    Subjective     Chief Complaint: weakness  Patient/Family Comments/goals: pt agreeable to treatment   Pain/Comfort:  · Pain Rating 1: 0/10  · Pain Rating Post-Intervention 1: 0/10      Objective:     Communicated with nurse  prior to session.  Patient found HOB elevated with bed alarm, telemetry, oxygen  upon PT entry to room.     General Precautions: Standard, fall, respiratory   Orthopedic Precautions:N/A   Braces: N/A     Functional Mobility:  · Bed Mobility:     · Rolling Left:  stand by assistance  · Scooting: contact guard assistance  · Supine to Sit: minimum assistance, HOB elevated, bedside rail.   · Transfers:   ·  Sit to Stand:  minimum assistance with rolling walker. V/T cues for safety technique and walker management.   · Gait: Patient ambulated ~ 20 ft and 10 ft  on level tile with Rolling Walker with Minimal Assistance (chair followed and 2L O2NC) .  Pt with demonstarting a  3-point gait with decreased hi, increased time in double stance, decreased velocity of limb motion, decreased step length, decreased swing-to-stance ratio, decreased toe-to-floor clearance and decreased weight-shifting ability.Impairments contributing to gait deviations include impaired balance, decreased flexibility, pain, impaired postural control, decreased ROM and decreased strength. V/T cues for safety technique and walker management.    · Balance : Fair-    AM-PAC 6 CLICK MOBILITY  Turning over in bed (including adjusting bedclothes, sheets and blankets)?: 4  Sitting down on and standing up from a chair with arms (e.g., wheelchair, bedside commode, etc.): 3  Moving from lying on back to sitting on the side of the bed?: 3  Moving to and from a bed to a chair (including a wheelchair)?: 3  Need to walk in hospital room?: 3  Climbing 3-5 steps with a railing?: 2  Basic Mobility Total Score: 18       Therapeutic Activities and Exercises:   Pt performed seated BLE 10 reps x 2 trials:   AP, LAQ, HS,  Hip abd/add with pillow , Hip flexion. V/T's cues for technique and sequence. Pt tolerated well.   Educated pt on safety awareness with all OOB mobility , transfer and gait training.     Patient left up in chair with lunch table set up  all lines intact, call button in reach and nurse La notified..    GOALS:   Multidisciplinary  Problems     Physical Therapy Goals        Problem: Physical Therapy Goal    Goal Priority Disciplines Outcome Goal Variances Interventions   Physical Therapy Goal     PT, PT/OT Ongoing, Progressing     Description:  Goals to be met by: 2019     Patient will increase functional independence with mobility by performin. Supine to sit with Modified [place]  2. Sit to supine with Modified [place]  3. Sit to stand transfer with Modified [place]  4. Gait  x 350 feet with Modified [place] using Rolling Walker.    Recommend: SNF at time of discharge.                          Time Tracking:     PT Received On: 19  PT Start Time: 1115     PT Stop Time: 1140  PT Total Time (min): 25 min     Billable Minutes: Gait Training 14 and Therapeutic Exercise 11    Treatment Type: Treatment  PT/PTA: PTA     PTA Visit Number: 1     Anastacia Roper PTA  2019

## 2019-11-12 NOTE — ASSESSMENT & PLAN NOTE
Duo to CHF exacerbation,Consulted PT,OT,stable on Nc O 2,PT,OT recommending SNF,consulted SW.  SOB is improved with HD.

## 2019-11-12 NOTE — NURSING
Patient ate her supper meal covered with correction dose novolog insulin . Denies pain when asked. No changes on telemetry. Bed alarm on in bed . Dressing to right chest wall clean dry and intact. Head of bed up rails up x 3 bed alarm on . No distress. Call light in reach.

## 2019-11-12 NOTE — PROGRESS NOTES
[facility] Hospital Medicine  Progress Note    Patient Name: Magaly Epstein  MRN: [id number]  Patient Class: IP- Inpatient   Admission Date: 11/8/2019  Length of Stay: 4 days  Attending Physician: Phylicia Gastelum MD  Primary Care Provider: Anahy Bradley DO        Subjective:     Principal Problem:Acute on chronic combined systolic and diastolic congestive heart failure        HPI:  69 y.o. female with PMHx of CHF and diabetes mellitus, ESRD on HD who presents to the ED with complaint of shortness of breath. Patient states that she is chronically short of breath; however, much feels much more short of breath for about 1 week, with SpO2 of 87% on EMS arrival today. Patient states being seen by her PCP about 1 week ago for shortness of breath and being diagnosed with bilateral pleural effusions, for which she apparently has an appointment with IR today. Patient reports last being dialyzed 4 days ago and that she is scheduled for dialysis today at 2:40 PM at a facility that is new to her. Patient lives at home alone. Patient denies supplemental oxygen use at home. She reports chronic bilateral leg swelling which is not worse today. Patient denies fevers. She states that she still feels somewhat short of breath while on O2 per nasal cannula in the ED.     Pt placed on bipap in ED and transferred to ICU. Pt underwent thoracentesis with 300 ml fluid removed. Studies pending. Plan to receive HD and wean O2 as tolerated.       Overview/Hospital Course:  Pt admitted with acute hypoxic resp failure secondary to pulmonary edema/CHF,was  on bipap. Pt underwent HD 11/8 with 3 liters removed. HD planned 11/9. Able to wean off bipap to NC oxygen. Vital stable   Consulted PT,OT,stable on Nc O 2,  PT,OT recommending SNF,consulted SW.  SOB is improved with HD.    Interval History: pt reports breathing easier and feeling better since admission    Review of Systems   Constitutional: Positive for activity  change.   HENT: Negative.    Eyes: Negative.    Respiratory: Negative for cough and shortness of breath.    Cardiovascular: Negative for chest pain and leg swelling.   Gastrointestinal: Negative.    Endocrine: Negative.    Musculoskeletal: Negative.    Neurological: Positive for weakness.   Psychiatric/Behavioral: Negative.      Objective:     Vital Signs (Most Recent):  Temp: 98.4 °F (36.9 °C) (11/12/19 0752)  Pulse: 66 (11/12/19 0752)  Resp: 18 (11/12/19 0752)  BP: (!) 152/65 (11/12/19 0752)  SpO2: 97 % (11/12/19 0752) Vital Signs (24h Range):  Temp:  [97.6 °F (36.4 °C)-98.4 °F (36.9 °C)] 98.4 °F (36.9 °C)  Pulse:  [60-87] 66  Resp:  [17-20] 18  SpO2:  [92 %-100 %] 97 %  BP: (106-196)/() 152/65     Weight: 96.1 kg (211 lb 13.8 oz)  Body mass index is 31.29 kg/m².    Intake/Output Summary (Last 24 hours) at 11/12/2019 0855  Last data filed at 11/11/2019 1330  Gross per 24 hour   Intake 500 ml   Output 4500 ml   Net -4000 ml      Physical Exam   Constitutional: She is oriented to person, place, and time. She appears well-developed and well-nourished. No distress.   HENT:   Head: Normocephalic and atraumatic.   Eyes: Pupils are equal, round, and reactive to light. EOM are normal.   Neck: Normal range of motion. Neck supple. JVD present.   Cardiovascular: Normal rate, regular rhythm, normal heart sounds and intact distal pulses.   Pulmonary/Chest: She is in respiratory distress. She has no wheezes. She has rales.   Abdominal: Soft. Bowel sounds are normal. She exhibits distension.   Musculoskeletal: She exhibits edema and tenderness.   Neurological: She is alert and oriented to person, place, and time.   Skin: Capillary refill takes less than 2 seconds.   Psychiatric: She has a normal mood and affect. Her behavior is normal.       Significant Labs:   BMP:   Recent Labs   Lab 11/12/19  0540   *      K 3.8      CO2 24   BUN 21   CREATININE 2.7*   CALCIUM 8.1*     CBC:   Recent Labs   Lab  11/10/19  1608 11/11/19  0600 11/12/19  0540   WBC 6.54 4.96 5.80   HGB 9.6* 9.0* 9.5*   HCT 31.3* 29.0* 31.2*    178 192       Significant Imaging: I have reviewed and interpreted all pertinent imaging results/findings within the past 24 hours.      Assessment/Plan:      * Acute on chronic combined systolic and diastolic congestive heart failure  With EF of 45%,Lasix given in ED 80 + 80mg   REsume lasix daily + metolazone   bumex held  HD for volume control  BP control        Acute respiratory failure with hypoxia  Duo to CHF exacerbation,Consulted PT,OT,stable on Nc O 2,PT,OT recommending SNF,consulted SW.  SOB is improved with HD.    ESRD (end stage renal disease) on dialysis  Nephrology consulted ( dialysis patient)      Anasarca  Daily weights  HD for volume control  BP control  Renal-cardiac diet   On IV lasix,  Improving,will have HD.      Depression  Resume home meds      Hyperlipidemia    Resume statin     Type 2 diabetes mellitus with renal complication    Uncontrolled, A1c 7.5%  SSi   Diabetic diet  Resume 70/30 - home insulin regimen     Essential hypertension  Uncontrolled   Resume home meds        VTE Risk Mitigation (From admission, onward)         Ordered     heparin (porcine) injection 5,000 Units  Every 8 hours      11/10/19 0907     heparin (porcine) injection 5,000 Units  As needed (PRN)      11/08/19 1639     IP VTE HIGH RISK PATIENT  Once      11/08/19 1523                      Phylicia Gastelum MD  Department of Hospital Medicine   [facility]

## 2019-11-12 NOTE — PROGRESS NOTES
3922 TN spoke with Tammy of the state to complete LOC[place]; completed, will fax PASSR.    6224 TN faxed PASSR to [phone]; awaiting 142.

## 2019-11-12 NOTE — SUBJECTIVE & OBJECTIVE
Interval History: pt reports breathing easier and feeling better since admission    Review of Systems   Constitutional: Positive for activity change.   HENT: Negative.    Eyes: Negative.    Respiratory: Negative for cough and shortness of breath.    Cardiovascular: Negative for chest pain and leg swelling.   Gastrointestinal: Negative.    Endocrine: Negative.    Musculoskeletal: Negative.    Neurological: Positive for weakness.   Psychiatric/Behavioral: Negative.      Objective:     Vital Signs (Most Recent):  Temp: 98.4 °F (36.9 °C) (11/12/19 0752)  Pulse: 66 (11/12/19 0752)  Resp: 18 (11/12/19 0752)  BP: (!) 152/65 (11/12/19 0752)  SpO2: 97 % (11/12/19 0752) Vital Signs (24h Range):  Temp:  [97.6 °F (36.4 °C)-98.4 °F (36.9 °C)] 98.4 °F (36.9 °C)  Pulse:  [60-87] 66  Resp:  [17-20] 18  SpO2:  [92 %-100 %] 97 %  BP: (106-196)/() 152/65     Weight: 96.1 kg (211 lb 13.8 oz)  Body mass index is 31.29 kg/m².    Intake/Output Summary (Last 24 hours) at 11/12/2019 0855  Last data filed at 11/11/2019 1330  Gross per 24 hour   Intake 500 ml   Output 4500 ml   Net -4000 ml      Physical Exam   Constitutional: She is oriented to person, place, and time. She appears well-developed and well-nourished. No distress.   HENT:   Head: Normocephalic and atraumatic.   Eyes: Pupils are equal, round, and reactive to light. EOM are normal.   Neck: Normal range of motion. Neck supple. JVD present.   Cardiovascular: Normal rate, regular rhythm, normal heart sounds and intact distal pulses.   Pulmonary/Chest: She is in respiratory distress. She has no wheezes. She has rales.   Abdominal: Soft. Bowel sounds are normal. She exhibits distension.   Musculoskeletal: She exhibits edema and tenderness.   Neurological: She is alert and oriented to person, place, and time.   Skin: Capillary refill takes less than 2 seconds.   Psychiatric: She has a normal mood and affect. Her behavior is normal.       Significant Labs:   BMP:   Recent Labs    Lab 11/12/19  0540   *      K 3.8      CO2 24   BUN 21   CREATININE 2.7*   CALCIUM 8.1*     CBC:   Recent Labs   Lab 11/10/19  1608 11/11/19  0600 11/12/19  0540   WBC 6.54 4.96 5.80   HGB 9.6* 9.0* 9.5*   HCT 31.3* 29.0* 31.2*    178 192       Significant Imaging: I have reviewed and interpreted all pertinent imaging results/findings within the past 24 hours.

## 2019-11-12 NOTE — NURSING
Bedside rounding report received from donte oconnor rn and updated handoff report sheet. Assessment done plan of care updated on board . Bed alarm on call light in reach , rails up x 3. She does have a diaper on .

## 2019-11-12 NOTE — NURSING
1000- ate her breakfast tray , she is incontinent of urine , she refused to take either of her laxatives this morning. She is one person assistance . She used bedside commode. Call light in reach head of bed in use. Bed alarm on. Linen and gown clean and dry. Rails up x 3.     1100- watching tv show , call light in reach head of bed up rails up x 3 no acute events or changes on telemetry.

## 2019-11-12 NOTE — PROGRESS NOTES
"1230 TN met with pt at the bedside to discuss SNF and outpatient HD. Pt stated had HD at [facility], confirmed by d/c assessment. Pt then stated she was changed to [facility] but has not yet attended due to current hospitalization.    Pt stated was in [facility] before and would like to go back to [facility]. TN will f/u.    1330 TN received a call from [facility] from [place] stating pt was discharged due to was in copay days; was at [facility] for 20 days.    1430 TN received a call from [facility] of [facility] stating pt has been accepted. TN informed pt receives HD; will confirm via [facility] of current HD unit. TN inquired if [facility] would transport to that unit. [facility] stated, "Yes"; will confirm with the DON and contact TN. TN sent HD unit via [facility].       "

## 2019-11-12 NOTE — NURSING
1536- patient assisted back in bed after she got a washoff per pct sadi pct tolerated very well. Rails up x 3 head of bed up , bed alarm on , call light in reach . Watching tv show she looks sleepy encouraged her to rest too. No changes on telemetry . Bed linen and gown changed too.

## 2019-11-13 LAB
ANION GAP SERPL CALC-SCNC: 6 MMOL/L (ref 8–16)
BASOPHILS # BLD AUTO: 0.04 K/UL (ref 0–0.2)
BASOPHILS NFR BLD: 0.7 % (ref 0–1.9)
BUN SERPL-MCNC: 32 MG/DL (ref 8–23)
CALCIUM SERPL-MCNC: 8.2 MG/DL (ref 8.7–10.5)
CHLORIDE SERPL-SCNC: 106 MMOL/L (ref 95–110)
CO2 SERPL-SCNC: 25 MMOL/L (ref 23–29)
CREAT SERPL-MCNC: 4 MG/DL (ref 0.5–1.4)
DIFFERENTIAL METHOD: ABNORMAL
EOSINOPHIL # BLD AUTO: 0.2 K/UL (ref 0–0.5)
EOSINOPHIL NFR BLD: 4.3 % (ref 0–8)
ERYTHROCYTE [DISTWIDTH] IN BLOOD BY AUTOMATED COUNT: 13.7 % (ref 11.5–14.5)
EST. GFR  (AFRICAN AMERICAN): 12 ML/MIN/1.73 M^2
EST. GFR  (NON AFRICAN AMERICAN): 11 ML/MIN/1.73 M^2
GLUCOSE SERPL-MCNC: 143 MG/DL (ref 70–110)
HCT VFR BLD AUTO: 30.7 % (ref 37–48.5)
HGB BLD-MCNC: 9.4 G/DL (ref 12–16)
IMM GRANULOCYTES # BLD AUTO: 0.02 K/UL (ref 0–0.04)
IMM GRANULOCYTES NFR BLD AUTO: 0.4 % (ref 0–0.5)
LYMPHOCYTES # BLD AUTO: 1.8 K/UL (ref 1–4.8)
LYMPHOCYTES NFR BLD: 33 % (ref 18–48)
MCH RBC QN AUTO: 29.3 PG (ref 27–31)
MCHC RBC AUTO-ENTMCNC: 30.6 G/DL (ref 32–36)
MCV RBC AUTO: 96 FL (ref 82–98)
MONOCYTES # BLD AUTO: 0.6 K/UL (ref 0.3–1)
MONOCYTES NFR BLD: 10.2 % (ref 4–15)
NEUTROPHILS # BLD AUTO: 2.8 K/UL (ref 1.8–7.7)
NEUTROPHILS NFR BLD: 51.4 % (ref 38–73)
NRBC BLD-RTO: 0 /100 WBC
PLATELET # BLD AUTO: 193 K/UL (ref 150–350)
PMV BLD AUTO: 10.3 FL (ref 9.2–12.9)
POCT GLUCOSE: 188 MG/DL (ref 70–110)
POCT GLUCOSE: 219 MG/DL (ref 70–110)
POCT GLUCOSE: 260 MG/DL (ref 70–110)
POTASSIUM SERPL-SCNC: 3.8 MMOL/L (ref 3.5–5.1)
RBC # BLD AUTO: 3.21 M/UL (ref 4–5.4)
SODIUM SERPL-SCNC: 137 MMOL/L (ref 136–145)
WBC # BLD AUTO: 5.37 K/UL (ref 3.9–12.7)

## 2019-11-13 PROCEDURE — 85025 COMPLETE CBC W/AUTO DIFF WBC: CPT

## 2019-11-13 PROCEDURE — 80100016 HC MAINTENANCE HEMODIALYSIS

## 2019-11-13 PROCEDURE — 94761 N-INVAS EAR/PLS OXIMETRY MLT: CPT

## 2019-11-13 PROCEDURE — 63600175 PHARM REV CODE 636 W HCPCS: Performed by: INTERNAL MEDICINE

## 2019-11-13 PROCEDURE — 21400001 HC TELEMETRY ROOM

## 2019-11-13 PROCEDURE — 80048 BASIC METABOLIC PNL TOTAL CA: CPT

## 2019-11-13 PROCEDURE — 63600175 PHARM REV CODE 636 W HCPCS: Performed by: HOSPITALIST

## 2019-11-13 PROCEDURE — 36415 COLL VENOUS BLD VENIPUNCTURE: CPT

## 2019-11-13 PROCEDURE — 27000221 HC OXYGEN, UP TO 24 HOURS

## 2019-11-13 PROCEDURE — 86706 HEP B SURFACE ANTIBODY: CPT

## 2019-11-13 PROCEDURE — 99900035 HC TECH TIME PER 15 MIN (STAT)

## 2019-11-13 PROCEDURE — 25000003 PHARM REV CODE 250: Performed by: HOSPITALIST

## 2019-11-13 RX ORDER — INSULIN ASPART 100 [IU]/ML
15 INJECTION, SUSPENSION SUBCUTANEOUS 2 TIMES DAILY
Status: DISCONTINUED | OUTPATIENT
Start: 2019-11-13 | End: 2019-11-14 | Stop reason: HOSPADM

## 2019-11-13 RX ORDER — HEPARIN SODIUM 5000 [USP'U]/ML
5000 INJECTION, SOLUTION INTRAVENOUS; SUBCUTANEOUS
Status: DISCONTINUED | OUTPATIENT
Start: 2019-11-13 | End: 2019-11-14 | Stop reason: HOSPADM

## 2019-11-13 RX ADMIN — HEPARIN SODIUM 5000 UNITS: 5000 INJECTION INTRAVENOUS; SUBCUTANEOUS at 03:11

## 2019-11-13 RX ADMIN — FUROSEMIDE 80 MG: 40 TABLET ORAL at 09:11

## 2019-11-13 RX ADMIN — MUPIROCIN: 20 OINTMENT TOPICAL at 09:11

## 2019-11-13 RX ADMIN — ESCITALOPRAM OXALATE 10 MG: 10 TABLET ORAL at 09:11

## 2019-11-13 RX ADMIN — INSULIN ASPART 10 UNITS: 100 INJECTION, SUSPENSION SUBCUTANEOUS at 09:11

## 2019-11-13 RX ADMIN — METOPROLOL SUCCINATE 50 MG: 50 TABLET, EXTENDED RELEASE ORAL at 09:11

## 2019-11-13 RX ADMIN — EPOETIN ALFA-EPBX 10000 UNITS: 10000 INJECTION, SOLUTION INTRAVENOUS; SUBCUTANEOUS at 12:11

## 2019-11-13 RX ADMIN — QUETIAPINE FUMARATE 25 MG: 25 TABLET ORAL at 09:11

## 2019-11-13 RX ADMIN — INSULIN ASPART 2 UNITS: 100 INJECTION, SOLUTION INTRAVENOUS; SUBCUTANEOUS at 05:11

## 2019-11-13 RX ADMIN — FERROUS SULFATE TAB EC 325 MG (65 MG FE EQUIVALENT) 325 MG: 325 (65 FE) TABLET DELAYED RESPONSE at 09:11

## 2019-11-13 RX ADMIN — HYDRALAZINE HYDROCHLORIDE 50 MG: 25 TABLET ORAL at 09:11

## 2019-11-13 RX ADMIN — POLYETHYLENE GLYCOL 3350 17 G: 17 POWDER, FOR SOLUTION ORAL at 08:11

## 2019-11-13 RX ADMIN — HEPARIN SODIUM 5000 UNITS: 5000 INJECTION, SOLUTION INTRAVENOUS; SUBCUTANEOUS at 09:11

## 2019-11-13 RX ADMIN — INSULIN ASPART 15 UNITS: 100 INJECTION, SUSPENSION SUBCUTANEOUS at 09:11

## 2019-11-13 RX ADMIN — HEPARIN SODIUM 5000 UNITS: 5000 INJECTION, SOLUTION INTRAVENOUS; SUBCUTANEOUS at 05:11

## 2019-11-13 RX ADMIN — PRAVASTATIN SODIUM 10 MG: 10 TABLET ORAL at 09:11

## 2019-11-13 RX ADMIN — HYDRALAZINE HYDROCHLORIDE 50 MG: 25 TABLET ORAL at 05:11

## 2019-11-13 NOTE — NURSING
Patient transported by her bed to dialysis . Report given to Yefri OBRIEN prior by phone . Blood pressure medications have been held til patient returns from dialysis today. Patient did get her insulin 70/30 after blood sugar checked by pct and vitals too . Good appetite . Rails up x 3 no changes on telemetry.

## 2019-11-13 NOTE — NURSING
Presented to the dialysis Acutes room for treatment. Report rec'd previously from the primary RN labs & Epic charting reviewed, pt is noted on o2 via n/c, NAD noted , Awake and alert. Prepared for dialysis .

## 2019-11-13 NOTE — NURSING
Report on patient received from collin ivey in dialysis no acute events vitals stable. Removed 2.9 ml of fluid. No changes on telemetry . Patient back in assigned room , on oxygen at 2 liters. Head of bed up rails up x 3 call light in reach. Bed alarm on .

## 2019-11-13 NOTE — PLAN OF CARE
11/13/19 1719   Discharge Reassessment   Assessment Type Discharge Planning Reassessment   Provided patient/caregiver education on the expected discharge date and the discharge plan No   Do you have any problems affording any of your prescribed medications? No   Discharge Plan A Skilled Nursing Facility   Patient choice form signed by patient/caregiver No   Anticipated Discharge Disposition SNF  ([facility])   Can the patient answer the patient profile reliably? Yes, cognitively intact   How does the patient rate their overall health at the present time? Fair   Describe the patient's ability to walk at the present time. Major restrictions/daily assistance from another person   How often would a person be available to care for the patient? Infrequently   Number of comorbid conditions (as recorded on the chart) Three   During the past month, has the patient often been bothered by feeling down, depressed or hopeless? No   During the past month, has the patient often been bothered by little interest or pleasure in doing things? No   Post-Acute Status   Post-Acute Authorization Placement  (SNF)   Post-Acute Placement Status Pending Payor Review

## 2019-11-13 NOTE — PT/OT/SLP PROGRESS
Physical Therapy      Patient Name:  Magaly Epstein   MRN:  [id number]    Patient not seen today secondary to Unavailable (transport present to take patient to Dialysis ). Will follow-up as able later hour/day .  Second attempt, 1530 PM , pt is still in Dialysis .   Anastacia Roper, PTA

## 2019-11-13 NOTE — NURSING
Noted during bed bath, yeasty looking rash underneath right breast, site has been cleaned and dried.

## 2019-11-13 NOTE — NURSING
Report on patient received from Elo RN and updated handoff report sheet. Assessment done plan updated on board. No acute distress.

## 2019-11-13 NOTE — PROGRESS NOTES
1856 TN received a call from [facility] of [facility] stating she came to admit the pt. Inquired if pt is medically stable. TN contacted attending, asking if medically stable; [facility]. Attending stated to submit for authorization. TN contacted [facility] to inform to submit for authorization. [facility] confirmed can transport pt to [facility].

## 2019-11-13 NOTE — PROGRESS NOTES
[facility] Hospital Medicine  Progress Note    Patient Name: Magaly Epstein  MRN: [id number]  Patient Class: IP- Inpatient   Admission Date: 11/8/2019  Length of Stay: 5 days  Attending Physician: Phylicia Gastelum MD  Primary Care Provider: Anahy Bradley DO        Subjective:     Principal Problem:Acute on chronic combined systolic and diastolic congestive heart failure        HPI:  69 y.o. female with PMHx of CHF and diabetes mellitus, ESRD on HD who presents to the ED with complaint of shortness of breath. Patient states that she is chronically short of breath; however, much feels much more short of breath for about 1 week, with SpO2 of 87% on EMS arrival today. Patient states being seen by her PCP about 1 week ago for shortness of breath and being diagnosed with bilateral pleural effusions, for which she apparently has an appointment with IR today. Patient reports last being dialyzed 4 days ago and that she is scheduled for dialysis today at 2:40 PM at a facility that is new to her. Patient lives at home alone. Patient denies supplemental oxygen use at home. She reports chronic bilateral leg swelling which is not worse today. Patient denies fevers. She states that she still feels somewhat short of breath while on O2 per nasal cannula in the ED.     Pt placed on bipap in ED and transferred to ICU. Pt underwent thoracentesis with 300 ml fluid removed. Studies pending. Plan to receive HD and wean O2 as tolerated.       Overview/Hospital Course:  Pt admitted with acute hypoxic resp failure secondary to pulmonary edema/CHF,was  on bipap. Pt underwent HD 11/8 with 3 liters removed. HD planned 11/9. Able to wean off bipap to NC oxygen. Vital stable   Consulted PT,OT,stable on Nc O 2,  PT,OT recommending SNF,consulted SW.  SOB is improved with HD.  Pending placement     Interval History: pt reports breathing easier and feeling better since admission    Review of Systems   Constitutional:  Positive for activity change.   HENT: Negative.    Eyes: Negative.    Respiratory: Negative for cough and shortness of breath.    Cardiovascular: Negative for chest pain and leg swelling.   Gastrointestinal: Negative.    Endocrine: Negative.    Musculoskeletal: Negative.    Neurological: Positive for weakness.   Psychiatric/Behavioral: Negative.      Objective:     Vital Signs (Most Recent):  Temp: 97.9 °F (36.6 °C) (11/13/19 0736)  Pulse: (!) 48 (11/13/19 0736)  Resp: 18 (11/13/19 0736)  BP: 132/61 (11/13/19 0736)  SpO2: 95 % (11/13/19 0736) Vital Signs (24h Range):  Temp:  [97.9 °F (36.6 °C)-99.2 °F (37.3 °C)] 97.9 °F (36.6 °C)  Pulse:  [48-71] 48  Resp:  [18] 18  SpO2:  [94 %-99 %] 95 %  BP: (122-159)/(60-75) 132/61     Weight: 94.5 kg (208 lb 5.4 oz)  Body mass index is 30.77 kg/m².    Intake/Output Summary (Last 24 hours) at 11/13/2019 1026  Last data filed at 11/12/2019 1702  Gross per 24 hour   Intake 590 ml   Output --   Net 590 ml      Physical Exam   Constitutional: She is oriented to person, place, and time. She appears well-developed and well-nourished. No distress.   HENT:   Head: Normocephalic and atraumatic.   Eyes: Pupils are equal, round, and reactive to light. EOM are normal.   Neck: Normal range of motion. Neck supple. JVD present.   Cardiovascular: Normal rate, regular rhythm, normal heart sounds and intact distal pulses.   Pulmonary/Chest: She is in respiratory distress. She has no wheezes. She has rales.   Abdominal: Soft. Bowel sounds are normal. She exhibits distension.   Musculoskeletal: She exhibits edema and tenderness.   Neurological: She is alert and oriented to person, place, and time.   Skin: Capillary refill takes less than 2 seconds.   Psychiatric: She has a normal mood and affect. Her behavior is normal.       Significant Labs:   BMP:   Recent Labs   Lab 11/13/19  0451   *      K 3.8      CO2 25   BUN 32*   CREATININE 4.0*   CALCIUM 8.2*     CBC:   Recent Labs    Lab 11/12/19  0540 11/13/19  0451   WBC 5.80 5.37   HGB 9.5* 9.4*   HCT 31.2* 30.7*    193       Significant Imaging: I have reviewed and interpreted all pertinent imaging results/findings within the past 24 hours.      Assessment/Plan:      * Acute on chronic combined systolic and diastolic congestive heart failure  With EF of 45%,Lasix given in ED 80 + 80mg   REsume lasix daily + metolazone   bumex held  HD for volume control  BP control        Acute respiratory failure with hypoxia  Duo to CHF exacerbation,Consulted PT,OT,stable on Nc O 2,PT,OT recommending SNF,consulted SW.  SOB is improved with HD.  Much better  at this time.    ESRD (end stage renal disease) on dialysis  Nephrology consulted ( dialysis patient)      Anasarca  Daily weights  HD for volume control  BP control  Renal-cardiac diet   On IV lasix,  Improving,will have HD.      Depression  Resume home meds      Hyperlipidemia    Resume statin     Type 2 diabetes mellitus with renal complication    Uncontrolled, A1c 7.5%  SSi   Diabetic diet  Resume 70/30 - home insulin regimen     Essential hypertension  Uncontrolled   Resume home meds        VTE Risk Mitigation (From admission, onward)         Ordered     heparin (porcine) injection 5,000 Units  Every 8 hours      11/10/19 0907     heparin (porcine) injection 5,000 Units  As needed (PRN)      11/08/19 1639     IP VTE HIGH RISK PATIENT  Once      11/08/19 1523                      Phylicia Gastelum MD  Department of Hospital Medicine   [facility]

## 2019-11-13 NOTE — SUBJECTIVE & OBJECTIVE
Interval History: pt reports breathing easier and feeling better since admission    Review of Systems   Constitutional: Positive for activity change.   HENT: Negative.    Eyes: Negative.    Respiratory: Negative for cough and shortness of breath.    Cardiovascular: Negative for chest pain and leg swelling.   Gastrointestinal: Negative.    Endocrine: Negative.    Musculoskeletal: Negative.    Neurological: Positive for weakness.   Psychiatric/Behavioral: Negative.      Objective:     Vital Signs (Most Recent):  Temp: 97.9 °F (36.6 °C) (11/13/19 0736)  Pulse: (!) 48 (11/13/19 0736)  Resp: 18 (11/13/19 0736)  BP: 132/61 (11/13/19 0736)  SpO2: 95 % (11/13/19 0736) Vital Signs (24h Range):  Temp:  [97.9 °F (36.6 °C)-99.2 °F (37.3 °C)] 97.9 °F (36.6 °C)  Pulse:  [48-71] 48  Resp:  [18] 18  SpO2:  [94 %-99 %] 95 %  BP: (122-159)/(60-75) 132/61     Weight: 94.5 kg (208 lb 5.4 oz)  Body mass index is 30.77 kg/m².    Intake/Output Summary (Last 24 hours) at 11/13/2019 1026  Last data filed at 11/12/2019 1702  Gross per 24 hour   Intake 590 ml   Output --   Net 590 ml      Physical Exam   Constitutional: She is oriented to person, place, and time. She appears well-developed and well-nourished. No distress.   HENT:   Head: Normocephalic and atraumatic.   Eyes: Pupils are equal, round, and reactive to light. EOM are normal.   Neck: Normal range of motion. Neck supple. JVD present.   Cardiovascular: Normal rate, regular rhythm, normal heart sounds and intact distal pulses.   Pulmonary/Chest: She is in respiratory distress. She has no wheezes. She has rales.   Abdominal: Soft. Bowel sounds are normal. She exhibits distension.   Musculoskeletal: She exhibits edema and tenderness.   Neurological: She is alert and oriented to person, place, and time.   Skin: Capillary refill takes less than 2 seconds.   Psychiatric: She has a normal mood and affect. Her behavior is normal.       Significant Labs:   BMP:   Recent Labs   Lab  11/13/19 0451   *      K 3.8      CO2 25   BUN 32*   CREATININE 4.0*   CALCIUM 8.2*     CBC:   Recent Labs   Lab 11/12/19  0540 11/13/19 0451   WBC 5.80 5.37   HGB 9.5* 9.4*   HCT 31.2* 30.7*    193       Significant Imaging: I have reviewed and interpreted all pertinent imaging results/findings within the past 24 hours.

## 2019-11-13 NOTE — ASSESSMENT & PLAN NOTE
Duo to CHF exacerbation,Consulted PT,OT,stable on Nc O 2,PT,OT recommending SNF,consulted SW.  SOB is improved with HD.  Much better  at this time.

## 2019-11-13 NOTE — NURSING
Right chest wall tunneled dialysis Permacath drsg changed according to hospital SOP, biopatch disc in use. Dialysis catheter accessed according to P&P, 3.5 hour intermittent hemodialysis tx initiated, planned UF = up to 3L as tolerated AEB b/p trends and MAP. Per MT , pt is currently in SR, will continue to monitor for changes and trends.

## 2019-11-13 NOTE — NURSING
1230- patient in dialysis . No telemetry changes.     1330- patient in dialysis no changes on telemetry.

## 2019-11-13 NOTE — PROGRESS NOTES
Awake alert oriented NAD    Denies CNS ENT CP GI  RHEUM OR DERM SX  Past Medical History:   Diagnosis Date    Arthritis     CHF (congestive heart failure)     Diabetes mellitus     ESRD on hemodialysis     S/P hemodialysis catheter insertion 10/10/2019    Right IJ    Unsteady gait     Uses roller walker      Review of patient's allergies indicates:   Allergen Reactions    Ciprofloxacin Anaphylaxis    Codeine Anaphylaxis    Neosporin [benzalkonium chloride] Anaphylaxis       Current Facility-Administered Medications   Medication    0.9%  NaCl infusion    albuterol-ipratropium 2.5 mg-0.5 mg/3 mL nebulizer solution 3 mL    amLODIPine tablet 10 mg    dextrose 50% injection 12.5 g    dextrose 50% injection 25 g    epoetin cortes-epbx injection 10,000 Units    escitalopram oxalate tablet 10 mg    ferrous sulfate EC tablet 325 mg    furosemide tablet 80 mg    glucagon (human recombinant) injection 1 mg    glucose chewable tablet 16 g    glucose chewable tablet 24 g    heparin (porcine) injection 5,000 Units    heparin (porcine) injection 5,000 Units    hydrALAZINE tablet 50 mg    insulin aspart protamine-insulin aspart (NovoLOG 70/30) injection    insulin aspart U-100 pen 0-5 Units    losartan tablet 25 mg    metoprolol succinate (TOPROL-XL) 24 hr tablet 50 mg    mupirocin 2 % ointment    polyethylene glycol packet 17 g    pravastatin tablet 10 mg    QUEtiapine tablet 25 mg    sodium chloride 0.9% flush 10 mL       LABS    Recent Results (from the past 24 hour(s))   POCT glucose    Collection Time: 11/12/19  5:07 PM   Result Value Ref Range    POCT Glucose 218 (H) 70 - 110 mg/dL   POCT glucose    Collection Time: 11/12/19  7:26 PM   Result Value Ref Range    POCT Glucose 178 (H) 70 - 110 mg/dL   CBC auto differential    Collection Time: 11/13/19  4:51 AM   Result Value Ref Range    WBC 5.37 3.90 - 12.70 K/uL    RBC 3.21 (L) 4.00 - 5.40 M/uL    Hemoglobin 9.4 (L) 12.0 - 16.0 g/dL     Hematocrit 30.7 (L) 37.0 - 48.5 %    Mean Corpuscular Volume 96 82 - 98 fL    Mean Corpuscular Hemoglobin 29.3 27.0 - 31.0 pg    Mean Corpuscular Hemoglobin Conc 30.6 (L) 32.0 - 36.0 g/dL    RDW 13.7 11.5 - 14.5 %    Platelets 193 150 - 350 K/uL    MPV 10.3 9.2 - 12.9 fL    Immature Granulocytes 0.4 0.0 - 0.5 %    Gran # (ANC) 2.8 1.8 - 7.7 K/uL    Immature Grans (Abs) 0.02 0.00 - 0.04 K/uL    Lymph # 1.8 1.0 - 4.8 K/uL    Mono # 0.6 0.3 - 1.0 K/uL    Eos # 0.2 0.0 - 0.5 K/uL    Baso # 0.04 0.00 - 0.20 K/uL    nRBC 0 0 /100 WBC    Gran% 51.4 38.0 - 73.0 %    Lymph% 33.0 18.0 - 48.0 %    Mono% 10.2 4.0 - 15.0 %    Eosinophil% 4.3 0.0 - 8.0 %    Basophil% 0.7 0.0 - 1.9 %    Differential Method Automated    Basic metabolic panel    Collection Time: 11/13/19  4:51 AM   Result Value Ref Range    Sodium 137 136 - 145 mmol/L    Potassium 3.8 3.5 - 5.1 mmol/L    Chloride 106 95 - 110 mmol/L    CO2 25 23 - 29 mmol/L    Glucose 143 (H) 70 - 110 mg/dL    BUN, Bld 32 (H) 8 - 23 mg/dL    Creatinine 4.0 (H) 0.5 - 1.4 mg/dL    Calcium 8.2 (L) 8.7 - 10.5 mg/dL    Anion Gap 6 (L) 8 - 16 mmol/L    eGFR if African American 12 (A) >60 mL/min/1.73 m^2    eGFR if non African American 11 (A) >60 mL/min/1.73 m^2   POCT glucose    Collection Time: 11/13/19  9:14 AM   Result Value Ref Range    POCT Glucose 260 (H) 70 - 110 mg/dL   ]    I/O last 3 completed shifts:  In: 836 [P.O.:830; I.V.:6]  Out: -     Vitals:    11/12/19 1926 11/12/19 2313 11/13/19 0508 11/13/19 0736   BP: 139/60 134/62 122/69 132/61   Pulse: 71 65 67 (!) 48   Resp: 18 18 18 18   Temp: 98.1 °F (36.7 °C) 98.4 °F (36.9 °C) 99.2 °F (37.3 °C) 97.9 °F (36.6 °C)   TempSrc: Oral Oral Oral Oral   SpO2: (!) 94% 97% 99% 95%   Weight:   94.5 kg (208 lb 5.4 oz)    Height:           No Jvd, Thyromegaly or Lymphadenopathy  Lungs: Fairly clear anteriorly and laterally  Cor: RRR no G or rubs  Abd: Soft benign good bowel sounds non tender  Ext: No E C C    A)    Acute on chronic combined  systolic and diastolic congestive heart failure   ESRD on dialysis (Q M-F)  Pleural effusion s/p Thoracentesis  Anasarca improving steadily might need HD TIW  Anemia of CKD epo added  Hypoalbuminemia  Hyperlipidemia  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

## 2019-11-13 NOTE — PT/OT/SLP PROGRESS
Occupational Therapy      Patient Name:  Magaly Epstein   MRN:  [id number]    Patient not seen today secondary to Dialysis(11:00). Will follow-up as able.    ALEAH Gan MS  11/13/2019

## 2019-11-14 VITALS
HEART RATE: 63 BPM | WEIGHT: 205.69 LBS | SYSTOLIC BLOOD PRESSURE: 135 MMHG | DIASTOLIC BLOOD PRESSURE: 64 MMHG | HEIGHT: 69 IN | OXYGEN SATURATION: 97 % | RESPIRATION RATE: 17 BRPM | TEMPERATURE: 98 F | BODY MASS INDEX: 30.47 KG/M2

## 2019-11-14 LAB
ANION GAP SERPL CALC-SCNC: 5 MMOL/L (ref 8–16)
BASOPHILS # BLD AUTO: 0.04 K/UL (ref 0–0.2)
BASOPHILS NFR BLD: 0.7 % (ref 0–1.9)
BUN SERPL-MCNC: 16 MG/DL (ref 8–23)
CALCIUM SERPL-MCNC: 8.6 MG/DL (ref 8.7–10.5)
CHLORIDE SERPL-SCNC: 104 MMOL/L (ref 95–110)
CO2 SERPL-SCNC: 28 MMOL/L (ref 23–29)
CREAT SERPL-MCNC: 2.6 MG/DL (ref 0.5–1.4)
DIFFERENTIAL METHOD: ABNORMAL
EOSINOPHIL # BLD AUTO: 0.2 K/UL (ref 0–0.5)
EOSINOPHIL NFR BLD: 3.1 % (ref 0–8)
ERYTHROCYTE [DISTWIDTH] IN BLOOD BY AUTOMATED COUNT: 13.7 % (ref 11.5–14.5)
EST. GFR  (AFRICAN AMERICAN): 21 ML/MIN/1.73 M^2
EST. GFR  (NON AFRICAN AMERICAN): 18 ML/MIN/1.73 M^2
GLUCOSE SERPL-MCNC: 138 MG/DL (ref 70–110)
HCT VFR BLD AUTO: 30.8 % (ref 37–48.5)
HGB BLD-MCNC: 9.4 G/DL (ref 12–16)
IMM GRANULOCYTES # BLD AUTO: 0.01 K/UL (ref 0–0.04)
IMM GRANULOCYTES NFR BLD AUTO: 0.2 % (ref 0–0.5)
LYMPHOCYTES # BLD AUTO: 1.6 K/UL (ref 1–4.8)
LYMPHOCYTES NFR BLD: 29 % (ref 18–48)
MCH RBC QN AUTO: 28.2 PG (ref 27–31)
MCHC RBC AUTO-ENTMCNC: 30.5 G/DL (ref 32–36)
MCV RBC AUTO: 93 FL (ref 82–98)
MONOCYTES # BLD AUTO: 0.5 K/UL (ref 0.3–1)
MONOCYTES NFR BLD: 9.7 % (ref 4–15)
NEUTROPHILS # BLD AUTO: 3.2 K/UL (ref 1.8–7.7)
NEUTROPHILS NFR BLD: 57.3 % (ref 38–73)
NRBC BLD-RTO: 0 /100 WBC
PLATELET # BLD AUTO: 211 K/UL (ref 150–350)
PMV BLD AUTO: 10.6 FL (ref 9.2–12.9)
POCT GLUCOSE: 155 MG/DL (ref 70–110)
POCT GLUCOSE: 164 MG/DL (ref 70–110)
POTASSIUM SERPL-SCNC: 3.8 MMOL/L (ref 3.5–5.1)
RBC # BLD AUTO: 3.33 M/UL (ref 4–5.4)
SODIUM SERPL-SCNC: 137 MMOL/L (ref 136–145)
WBC # BLD AUTO: 5.49 K/UL (ref 3.9–12.7)

## 2019-11-14 PROCEDURE — 97110 THERAPEUTIC EXERCISES: CPT

## 2019-11-14 PROCEDURE — 97535 SELF CARE MNGMENT TRAINING: CPT

## 2019-11-14 PROCEDURE — 85025 COMPLETE CBC W/AUTO DIFF WBC: CPT

## 2019-11-14 PROCEDURE — 63600175 PHARM REV CODE 636 W HCPCS: Performed by: HOSPITALIST

## 2019-11-14 PROCEDURE — 94761 N-INVAS EAR/PLS OXIMETRY MLT: CPT

## 2019-11-14 PROCEDURE — 25000003 PHARM REV CODE 250: Performed by: HOSPITALIST

## 2019-11-14 PROCEDURE — 36415 COLL VENOUS BLD VENIPUNCTURE: CPT

## 2019-11-14 PROCEDURE — 97116 GAIT TRAINING THERAPY: CPT

## 2019-11-14 PROCEDURE — 97530 THERAPEUTIC ACTIVITIES: CPT

## 2019-11-14 PROCEDURE — 80048 BASIC METABOLIC PNL TOTAL CA: CPT

## 2019-11-14 RX ORDER — AMLODIPINE BESYLATE 10 MG/1
10 TABLET ORAL DAILY
Qty: 30 TABLET | Refills: 11
Start: 2019-11-15 | End: 2021-05-28

## 2019-11-14 RX ORDER — LOSARTAN POTASSIUM 25 MG/1
25 TABLET ORAL DAILY
Qty: 90 TABLET | Refills: 3
Start: 2019-11-15 | End: 2021-03-26

## 2019-11-14 RX ADMIN — HEPARIN SODIUM 5000 UNITS: 5000 INJECTION, SOLUTION INTRAVENOUS; SUBCUTANEOUS at 05:11

## 2019-11-14 RX ADMIN — INSULIN ASPART 15 UNITS: 100 INJECTION, SUSPENSION SUBCUTANEOUS at 08:11

## 2019-11-14 RX ADMIN — FERROUS SULFATE TAB EC 325 MG (65 MG FE EQUIVALENT) 325 MG: 325 (65 FE) TABLET DELAYED RESPONSE at 08:11

## 2019-11-14 RX ADMIN — PRAVASTATIN SODIUM 10 MG: 10 TABLET ORAL at 08:11

## 2019-11-14 RX ADMIN — ESCITALOPRAM OXALATE 10 MG: 10 TABLET ORAL at 08:11

## 2019-11-14 RX ADMIN — FUROSEMIDE 80 MG: 40 TABLET ORAL at 08:11

## 2019-11-14 RX ADMIN — LOSARTAN POTASSIUM 25 MG: 25 TABLET, FILM COATED ORAL at 08:11

## 2019-11-14 RX ADMIN — HYDRALAZINE HYDROCHLORIDE 50 MG: 25 TABLET ORAL at 05:11

## 2019-11-14 RX ADMIN — METOPROLOL SUCCINATE 50 MG: 50 TABLET, EXTENDED RELEASE ORAL at 08:11

## 2019-11-14 RX ADMIN — POLYETHYLENE GLYCOL 3350 17 G: 17 POWDER, FOR SOLUTION ORAL at 08:11

## 2019-11-14 RX ADMIN — AMLODIPINE BESYLATE 10 MG: 5 TABLET ORAL at 08:11

## 2019-11-14 NOTE — DISCHARGE SUMMARY
[facility] Hospital Medicine  Discharge Summary      Patient Name: Magaly Epstein  MRN: [id number]  Admission Date: 11/8/2019  Hospital Length of Stay: 6 days  Discharge Date and Time:  11/14/2019 5:20 PM  Attending Physician: Rosa att. providers found   Discharging Provider: Phylicia Gastelum MD  Primary Care Provider: Anahy Brdaley DO      HPI:   69 y.o. female with PMHx of CHF and diabetes mellitus, ESRD on HD who presents to the ED with complaint of shortness of breath. Patient states that she is chronically short of breath; however, much feels much more short of breath for about 1 week, with SpO2 of 87% on EMS arrival today. Patient states being seen by her PCP about 1 week ago for shortness of breath and being diagnosed with bilateral pleural effusions, for which she apparently has an appointment with IR today. Patient reports last being dialyzed 4 days ago and that she is scheduled for dialysis today at 2:40 PM at a facility that is new to her. Patient lives at home alone. Patient denies supplemental oxygen use at home. She reports chronic bilateral leg swelling which is not worse today. Patient denies fevers. She states that she still feels somewhat short of breath while on O2 per nasal cannula in the ED.     Pt placed on bipap in ED and transferred to ICU. Pt underwent thoracentesis with 300 ml fluid removed. Studies pending. Plan to receive HD and wean O2 as tolerated.       * No surgery found *      Hospital Course:   69 y.o. female with PMHx of CHF and diabetes mellitus, ESRD on HD who presents to the ED with complaint of shortness of breath. Patient states that she is chronically short of breath; however, much feels much more short of breath for about 1 week, with SpO2 of 87% on EMS arrival,. Patient states being seen by her PCP about 1 week ago for shortness of breath and being diagnosed with bilateral pleural effusions, for which she apparently had an appointment with IR .  Patient reports last being dialyzed 4 days ago and that she is scheduled for dialysis on day of admission, at a facility that is new to her. Patient lives at home alone. Patient denies supplemental oxygen use at home. She reports chronic bilateral leg swelling which is not worse . Patient denies fevers.patient still was SOB on NC O 2,Pt, was placed on bipap in ED and transferred to ICU. Pt underwent thoracentesis with 300 ml fluid removed by IR,nephrology was consulted for HD and wean O2 as tolerated.. Pt underwent HD 11/8 with 3 liters removed.laso had  HD next days on 11/9. Able to wean off bipap to NC oxygen. Vitals was  stable ,she was transferred out of ICU to Telemetry,  Consulted PT,OT,was stable on Nc O 2,  PT,OT recommending SNF,consulted SW.  SOB is resolved with HD she was stable on RA at DC time.  Patient has been discharged to SNF with plan with PCP and nephrology follow up.       Consults:   Consults (From admission, onward)        Status Ordering Provider     Inpatient consult to Interventional Radiology  Once     Provider:  (Not yet assigned)    Completed SHAWN SAVAGE     Inpatient consult to Nephrology  Once     Provider:  Lamont Stephenson MD    Completed SHAWN SAVAGE     IP consult to case management  Once     Provider:  (Not yet assigned)    Completed LIBIA ODEN          No new Assessment & Plan notes have been filed under this hospital service since the last note was generated.  Service: Hospital Medicine    Final Active Diagnoses:    Diagnosis Date Noted POA    PRINCIPAL PROBLEM:  Acute on chronic combined systolic and diastolic congestive heart failure [I50.43] 11/08/2019 Yes    Acute respiratory failure with hypoxia [J96.01] 11/10/2019 Yes    Anasarca [R60.1] 11/08/2019 Yes    ESRD (end stage renal disease) on dialysis [N18.6, Z99.2] 11/08/2019 Not Applicable    Depression [F32.9] 04/11/2019 Yes     Chronic    Essential hypertension [I10] 04/11/2019 Yes      Chronic    Hyperlipidemia [E78.5] 04/11/2019 Yes     Chronic    Type 2 diabetes mellitus with renal complication [E11.29] 04/11/2019 Yes     Chronic      Problems Resolved During this Admission:       Discharged Condition: stable    Disposition: Skilled Nursing Facility    Follow Up:  Follow-up Information     Anahy Bradley DO In 1 week.    Specialty:  Family Medicine  Contact information:  [address]  [facility] [address]  [phone]             [facility]    Specialties:  Nursing Home Agency, SNF Agency, LTAC  Why:  Nursing Home   Contact information:  [address]  [facility] [address]  [phone]             [facility].    Why:  Outpatient Services dialysis Unit [facility]  Contact information:  [address]  [facility] [address] [phone]           [facility].    Why:  Outpatient Services   Contact information:  [address]  [facility] [address] [phone]                 Patient Instructions:      Activity as tolerated       Significant Diagnostic Studies: Labs:   BMP:   Recent Labs   Lab 11/13/19  0451 11/14/19  0549   * 138*    137   K 3.8 3.8    104   CO2 25 28   BUN 32* 16   CREATININE 4.0* 2.6*   CALCIUM 8.2* 8.6*   , CMP   Recent Labs   Lab 11/13/19  0451 11/14/19  0549    137   K 3.8 3.8    104   CO2 25 28   * 138*   BUN 32* 16   CREATININE 4.0* 2.6*   CALCIUM 8.2* 8.6*   ANIONGAP 6* 5*   ESTGFRAFRICA 12* 21*   EGFRNONAA 11* 18*    and CBC   Recent Labs   Lab 11/13/19  0451 11/14/19  0549   WBC 5.37 5.49   HGB 9.4* 9.4*   HCT 30.7* 30.8*    211     Radiology: X-Ray: CXR: X-Ray Chest 1 View (CXR): No results found for this visit on 11/08/19. and X-Ray Chest PA and Lateral (CXR): No results found for this visit on 11/08/19.    Pending Diagnostic Studies:     Procedure Component Value Units Date/Time    Freeze and Hold,  [[id number]] Collected:  11/08/19 0764     Order Status:  Sent Lab Status:  No result     Specimen:  Body Fluid from Pleural Fluid     Freeze and Hold, [facility] [[id number]] Collected:  11/08/19 1400    Order Status:  Sent Lab Status:  No result     Specimen:  Body Fluid from Pleural Fluid     Hepatitis B Surface Antibody, Qual/Quant [[id number]] Collected:  11/13/19 1334    Order Status:  Sent Lab Status:  In process Updated:  11/13/19 1342    Specimen:  Blood     IR Thoracentesis with Imaging [[id number]] Resulted:  11/08/19 1446    Order Status:  Sent Lab Status:  In process Updated:  11/08/19 1455         Medications:  Reconciled Home Medications:      Medication List      START taking these medications    losartan 25 MG tablet  Commonly known as:  COZAAR  Take 1 tablet (25 mg total) by mouth once daily.  Start taking on:  November 15, 2019        CHANGE how you take these medications    amLODIPine 10 MG tablet  Commonly known as:  NORVASC  Take 1 tablet (10 mg total) by mouth once daily.  Start taking on:  November 15, 2019  What changed:    · medication strength  · how much to take        CONTINUE taking these medications    acetaminophen 500 MG tablet  Commonly known as:  TYLENOL  Take 1 tablet (500 mg total) by mouth every 6 (six) hours as needed.     albuterol-ipratropium 2.5 mg-0.5 mg/3 mL nebulizer solution  Commonly known as:  DUO-NEB  Take 3 mLs by nebulization every 4 (four) hours as needed for Wheezing or Shortness of Breath. Rescue     bumetanide 2 MG tablet  Commonly known as:  BUMEX  Take 1 tablet (2 mg total) by mouth once daily.     epoetin crotes-epbx 10,000 unit/mL imjection  Commonly known as:  RETACRIT  Inject 0.6 mLs (6,000 Units total) into the skin every 7 days.     escitalopram oxalate 10 MG tablet  Commonly known as:  LEXAPRO  Take 10 mg by mouth once daily.     ferrous sulfate 325 (65 FE) MG EC tablet  Take 1 tablet (325 mg total) by mouth 2 (two) times daily.     insulin aspart protamine-insulin aspart 100 unit/mL (70-30) Inpn  pen  Commonly known as:  NovoLOG 70/30  Inject 10 Units into the skin 2 (two) times daily.     insulin aspart U-100 100 unit/mL (3 mL) Inpn pen  Commonly known as:  NovoLOG  Inject 1-10 Units into the skin before meals and at bedtime as needed (Hyperglycemia).     metOLazone 10 MG tablet  Commonly known as:  ZAROXOLYN  Take 1 tablet (10 mg total) by mouth once daily.     metoprolol succinate 50 MG 24 hr tablet  Commonly known as:  TOPROL-XL  Take 1 tablet (50 mg total) by mouth 2 (two) times daily.     polyethylene glycol 17 gram Pwpk  Commonly known as:  GLYCOLAX  Take 17 g by mouth 2 (two) times daily as needed.     pravastatin 10 MG tablet  Commonly known as:  PRAVACHOL  Take 10 mg by mouth once daily.     QUEtiapine 25 MG Tab  Commonly known as:  SEROQUEL  Take 1 tablet (25 mg total) by mouth every evening.     senna-docusate 8.6-50 mg 8.6-50 mg per tablet  Commonly known as:  PERICOLACE  Take 1 tablet by mouth 2 (two) times daily as needed for Constipation.        STOP taking these medications    hydrALAZINE 25 MG tablet  Commonly known as:  APRESOLINE            Indwelling Lines/Drains at time of discharge:   Lines/Drains/Airways     Central Venous Catheter Line                 Hemodialysis Catheter 10/10/19 1453 right internal jugular 35 days                Time spent on the discharge of patient: over 30  minutes  Patient was seen and examined on the date of discharge and determined to be suitable for discharge.         Phylicia Gastelum MD  Department of Hospital Medicine  [facility]

## 2019-11-14 NOTE — PLAN OF CARE
11/14/19 1144   Post-Acute Status   Post-Acute Authorization Placement  (SNF)   Post-Acute Placement Status Authorization Obtained   Discharge Delays (!) Patient Transportation

## 2019-11-14 NOTE — ASSESSMENT & PLAN NOTE
Duo to CHF exacerbation,Consulted PT,OT,stable on Nc O 2,PT,OT recommending SNF,consulted SW.  SOB is improved with HD.  Much better  at this time.at this time patient is stable on RA.

## 2019-11-14 NOTE — PT/OT/SLP PROGRESS
Occupational Therapy   Treatment    Name: Magaly Epstein  MRN: [id number]  Admitting Diagnosis:  Acute on chronic combined systolic and diastolic congestive heart failure       Recommendations:     Discharge Recommendations: nursing facility, skilled  Discharge Equipment Recommendations:  none  Barriers to discharge:  Decreased caregiver support(lives alone)    Assessment:     Magaly Epstein is a 69 y.o. female with a medical diagnosis of Acute on chronic combined systolic and diastolic congestive heart failure. Performance deficits affecting function are weakness, impaired functional mobilty, decreased safety awareness, impaired cognition, impaired cardiopulmonary response to activity, impaired endurance, gait instability, impaired balance, decreased upper extremity function, impaired self care skills, decreased lower extremity function, decreased ROM.     Pt required 1 prolonged seated rest break in between functional mobility within the room and completing ADLs standing at the sink. OT rec. SNF at d/c to regain PLOF.     Room air vitals:   Supine at rest: 95%, 65 bpm; sitting EOB: 93-94%, 67 bpm; after ambulating to the chair: 89-93%, 72 bpm. After standing 2.5 min at the sink: 90%, 69 bpm. End of session at rest: 95%, 69 bpm     Rehab Prognosis:  Good; patient would benefit from acute skilled OT services to address these deficits and reach maximum level of function.       Plan:     Patient to be seen 5 x/week to address the above listed problems via self-care/home management, therapeutic activities, therapeutic exercises  · Plan of Care Expires: 11/24/19  · Plan of Care Reviewed with: patient    Subjective     Chief complaint: weak   Patient's comments/goals: motivated to stand at the sink to complete grooming after a seated rest break     Pain/Comfort:  · Pain Rating 1: 0/10    Objective:     Communicated with: nurseTammy, prior to session.  Patient found HOB elevated with bed alarm, telemetry, peripheral  IV upon OT entry to room.    General Precautions: Standard, fall, respiratory   Orthopedic Precautions:N/A   Braces: N/A     Occupational Performance:     Bed Mobility:    · Patient completed Scooting/Bridging with stand by assistance  · Patient completed Supine to Sit with stand by assistance and HOB elevated     Functional Mobility/Transfers:  · Patient completed Sit <> Stand Transfer with minimum assistance  with  rolling walker   · Patient completed Bed <> Chair Transfer using Step Transfer technique with minimum assistance with rolling walker  · Functional Mobility: Pt ambulated from the bed>chair with MIN A with forward flexed posture within RW; pt able to correct with verbal/tactile cueing. Pt required a prolonged seated rest break ~5 min d/t SOB and weakness prior to standing at the sink for 2.5 min to complete grooming task. Pt then needed to sit down quickly d/t B/L LE weakness.     Activities of Daily Living:  · Feeding:  independence with cup of water  · Grooming: contact guard assistance while standing at the sink brushing her teeth; pt required cueing for pursed lip breathing with some SOB noted (vitals noted above)  · Upper Body Dressing: minimum assistance to help latricia back hospital gown   · Lower Body Dressing: total assistance with R sock       [facility] 6 Click ADL: 17    Treatment & Education:  Pt re-educated on OT role/POC.   Importance of OOB activity with staff assistance.  Importance of sitting up in the chair throughout the day, especially for meals and pt agreed. OT encouraged pt to sit up for 1-2 hours at a time   Safety during functional t/f and mobility with RW  White board updated   Multiple self-care tasks/functional mobility completed- assistance level noted above   All questions/concerns answered within OT scope of practice       Patient left up in chair with all lines intact, call button in reach, chair alarm on, nurse, Tammy, notified and door open with lap tray positioned in front of  pt with all needs metEducation:      GOALS:   Multidisciplinary Problems     Occupational Therapy Goals        Problem: Occupational Therapy Goal    Goal Priority Disciplines Outcome Interventions   Occupational Therapy Goal     OT, PT/OT Ongoing, Progressing    Description:  Goals to be met by: 11/24/2019     Patient will increase functional independence with ADLs by performing:    UE Dressing with Modified [place].  LE Dressing with Modified [place].  Grooming while standing at sink with Modified [place].  Toileting from toilet with Modified [place] for hygiene and clothing management.   Toilet transfer to toilet with Modified [place].  Upper extremity exercise program x 10 reps per handout, with supervision.                      Time Tracking:     OT Date of Treatment: 11/14/19  OT Start Time: 0957  OT Stop Time: 1027  OT Total Time (min): 30 min    Billable Minutes:Self Care/Home Management 20 min  Therapeutic Activity 10 min  Total Time 30 min    Akanksha White OT  11/14/2019

## 2019-11-14 NOTE — SUBJECTIVE & OBJECTIVE
Interval History: pt reports breathing easier and feeling better since admission    Review of Systems   Constitutional: Positive for activity change.   HENT: Negative.    Eyes: Negative.    Respiratory: Negative for cough and shortness of breath.    Cardiovascular: Negative for chest pain and leg swelling.   Gastrointestinal: Negative.    Endocrine: Negative.    Musculoskeletal: Negative.    Neurological: Positive for weakness.   Psychiatric/Behavioral: Negative.      Objective:     Vital Signs (Most Recent):  Temp: 98.2 °F (36.8 °C) (11/14/19 0729)  Pulse: 62 (11/14/19 0729)  Resp: 18 (11/14/19 0729)  BP: 132/64 (11/14/19 0729)  SpO2: (!) 93 % (11/14/19 0857) Vital Signs (24h Range):  Temp:  [97.6 °F (36.4 °C)-98.8 °F (37.1 °C)] 98.2 °F (36.8 °C)  Pulse:  [58-72] 62  Resp:  [17-20] 18  SpO2:  [93 %-100 %] 93 %  BP: (119-168)/(57-82) 132/64     Weight: 93.3 kg (205 lb 11 oz)  Body mass index is 30.38 kg/m².    Intake/Output Summary (Last 24 hours) at 11/14/2019 0926  Last data filed at 11/13/2019 1800  Gross per 24 hour   Intake 980 ml   Output 3480 ml   Net -2500 ml      Physical Exam   Constitutional: She is oriented to person, place, and time. She appears well-developed and well-nourished. No distress.   HENT:   Head: Normocephalic and atraumatic.   Eyes: Pupils are equal, round, and reactive to light. EOM are normal.   Neck: Normal range of motion. Neck supple. JVD present.   Cardiovascular: Normal rate, regular rhythm, normal heart sounds and intact distal pulses.   Pulmonary/Chest: She is in respiratory distress. She has no wheezes. She has rales.   Abdominal: Soft. Bowel sounds are normal. She exhibits distension.   Musculoskeletal: She exhibits edema and tenderness.   Neurological: She is alert and oriented to person, place, and time.   Skin: Capillary refill takes less than 2 seconds.   Psychiatric: She has a normal mood and affect. Her behavior is normal.       Significant Labs:   BMP:   Recent Labs   Lab  11/14/19  0549   *      K 3.8      CO2 28   BUN 16   CREATININE 2.6*   CALCIUM 8.6*     CBC:   Recent Labs   Lab 11/13/19  0451 11/14/19  0549   WBC 5.37 5.49   HGB 9.4* 9.4*   HCT 30.7* 30.8*    211       Significant Imaging: I have reviewed and interpreted all pertinent imaging results/findings within the past 24 hours.

## 2019-11-14 NOTE — PLAN OF CARE
Problem: Occupational Therapy Goal  Goal: Occupational Therapy Goal  Description  Goals to be met by: 11/24/2019     Patient will increase functional independence with ADLs by performing:    UE Dressing with Modified [place].  LE Dressing with Modified [place].  Grooming while standing at sink with Modified [place].  Toileting from toilet with Modified [place] for hygiene and clothing management.   Toilet transfer to toilet with Modified [place].  Upper extremity exercise program x 10 reps per handout, with supervision.     Outcome: Ongoing, Progressing  Pt required 1 prolonged seated rest break in between functional mobility within the room and completing ADLs standing at the sink.   OT rec. SNF at d/c to regain PLOF.   Room air vitals:   Supine at rest: 95%, 65 bpm; sitting EOB: 93-94%, 67 bpm; after ambulating to the chair: 89-93%, 72 bpm. After standing 2.5 min at the sink: 90%, 69 bpm. End of session at rest: 95%, 69 bpm    No deformity or limitation of movement

## 2019-11-14 NOTE — PROGRESS NOTES
Follow-up Information     Anahy Bradley DO In 1 week.    Specialty:  Family Medicine  Contact information:  [address]  [facility] [address]  [phone]             [facility]    Specialties:  Nursing Home Agency, SNF Agency, LTAC  Why:  Nursing Home   Contact information:  [address]  [place] [address]  [phone]             [facility].    Why:  Outpatient Services dialysis Unit [facility]  Contact information:  [address]  [facility] [address] [phone]           [facility].    Why:  Outpatient Services   Contact information:  [address]  [facility] [address] [phone]               PLEASE BRING TO ALL FOLLOW UP APPOINTMENTS:   1) A COPY YOUR DISCHARGE INSTRUCTIONS   2) ALL MEDICINES YOU ARE CURRENTLY TAKING IN THEIR ORIGINAL BOTTLES   3) IDENTIFICATION CARD   4) INSURANCE CARD    **PLEASE ARRIVE 15 MINUTES AHEAD OF SCHEDULED APPOINTMENT TIME   ++PLEASE CALL 24 HOURS IN ADVANCE IF YOU MUST RESCHEDULE YOUR APPOINTMENT DAY AND/OR TIME     [facility] MANAGEMENT WRITTEN DISCHARGE INFORMATION    APPOINTMENTS AND RESOURCES TO HELP YOU MANAGE YOUR CARE AT HOME BASED ON YOUR PREFERENCES:  (If an appointment is not scheduled for you when you leave the hospital, call your doctor to schedule a follow up visit within a week)        Healthy Living Instructions to HELP MANAGE YOUR CARE AT HOME:  Things You are responsible for:  1.    Getting your prescriptions filled   2.    Taking your medications as directed, DO NOT MISS ANY DOSES!  3.    Following the diet and exercise recommended by your doctor  4.    Going to your follow-up doctor appointment. This is important because it allows the doctor to monitor your progress and determine if any changes need to made to your treatment plan.  5. If you have any questions about MANAGING YOUR CARE AT HOME Call the Nurse Care Line for 24/7 Assistance [phone]        Please answer any calls you may receive from [facility]. We want to continue to support you as you manage your healthcare needs. [facility] is happy to have the opportunity to serve you.      Thank you for choosing [facility] for your healthcare needs!  Your [facility] Case Management Team,    Maria M Parmar RN [place]  Registered Nurse Transition Navigator  [phone]

## 2019-11-14 NOTE — PROGRESS NOTES
Awake alert oriented NAD    Denies CNS ENT CP GI  RHEUM OR DERM SX  Past Medical History:   Diagnosis Date    Arthritis     CHF (congestive heart failure)     Diabetes mellitus     ESRD on hemodialysis     S/P hemodialysis catheter insertion 10/10/2019    Right IJ    Unsteady gait     Uses roller walker      Review of patient's allergies indicates:   Allergen Reactions    Ciprofloxacin Anaphylaxis    Codeine Anaphylaxis    Neosporin [benzalkonium chloride] Anaphylaxis       Current Facility-Administered Medications   Medication    0.9%  NaCl infusion    albuterol-ipratropium 2.5 mg-0.5 mg/3 mL nebulizer solution 3 mL    amLODIPine tablet 10 mg    dextrose 50% injection 12.5 g    dextrose 50% injection 25 g    epoetin cortes-epbx injection 10,000 Units    escitalopram oxalate tablet 10 mg    ferrous sulfate EC tablet 325 mg    furosemide tablet 80 mg    glucagon (human recombinant) injection 1 mg    glucose chewable tablet 16 g    glucose chewable tablet 24 g    heparin (porcine) injection 5,000 Units    heparin (porcine) injection 5,000 Units    hydrALAZINE tablet 50 mg    insulin aspart protamine-insulin aspart (NovoLOG 70/30) injection    insulin aspart U-100 pen 0-5 Units    losartan tablet 25 mg    metoprolol succinate (TOPROL-XL) 24 hr tablet 50 mg    polyethylene glycol packet 17 g    pravastatin tablet 10 mg    QUEtiapine tablet 25 mg    sodium chloride 0.9% flush 10 mL       LABS    Recent Results (from the past 24 hour(s))   POCT glucose    Collection Time: 11/13/19  4:46 PM   Result Value Ref Range    POCT Glucose 219 (H) 70 - 110 mg/dL   POCT glucose    Collection Time: 11/13/19  8:30 PM   Result Value Ref Range    POCT Glucose 188 (H) 70 - 110 mg/dL   CBC auto differential    Collection Time: 11/14/19  5:49 AM   Result Value Ref Range    WBC 5.49 3.90 - 12.70 K/uL    RBC 3.33 (L) 4.00 - 5.40 M/uL    Hemoglobin 9.4 (L) 12.0 - 16.0 g/dL    Hematocrit 30.8 (L) 37.0 - 48.5 %     Mean Corpuscular Volume 93 82 - 98 fL    Mean Corpuscular Hemoglobin 28.2 27.0 - 31.0 pg    Mean Corpuscular Hemoglobin Conc 30.5 (L) 32.0 - 36.0 g/dL    RDW 13.7 11.5 - 14.5 %    Platelets 211 150 - 350 K/uL    MPV 10.6 9.2 - 12.9 fL    Immature Granulocytes 0.2 0.0 - 0.5 %    Gran # (ANC) 3.2 1.8 - 7.7 K/uL    Immature Grans (Abs) 0.01 0.00 - 0.04 K/uL    Lymph # 1.6 1.0 - 4.8 K/uL    Mono # 0.5 0.3 - 1.0 K/uL    Eos # 0.2 0.0 - 0.5 K/uL    Baso # 0.04 0.00 - 0.20 K/uL    nRBC 0 0 /100 WBC    Gran% 57.3 38.0 - 73.0 %    Lymph% 29.0 18.0 - 48.0 %    Mono% 9.7 4.0 - 15.0 %    Eosinophil% 3.1 0.0 - 8.0 %    Basophil% 0.7 0.0 - 1.9 %    Differential Method Automated    Basic metabolic panel    Collection Time: 11/14/19  5:49 AM   Result Value Ref Range    Sodium 137 136 - 145 mmol/L    Potassium 3.8 3.5 - 5.1 mmol/L    Chloride 104 95 - 110 mmol/L    CO2 28 23 - 29 mmol/L    Glucose 138 (H) 70 - 110 mg/dL    BUN, Bld 16 8 - 23 mg/dL    Creatinine 2.6 (H) 0.5 - 1.4 mg/dL    Calcium 8.6 (L) 8.7 - 10.5 mg/dL    Anion Gap 5 (L) 8 - 16 mmol/L    eGFR if African American 21 (A) >60 mL/min/1.73 m^2    eGFR if non African American 18 (A) >60 mL/min/1.73 m^2   POCT glucose    Collection Time: 11/14/19  7:28 AM   Result Value Ref Range    POCT Glucose 155 (H) 70 - 110 mg/dL   ]    I/O last 3 completed shifts:  In: 1336 [P.O.:830; I.V.:6; Other:500]  Out: 3480 [Other:3480]    Vitals:    11/13/19 2325 11/14/19 0519 11/14/19 0729 11/14/19 0857   BP: (!) 119/58 138/63 132/64    Pulse: 64 70 62    Resp: 18 20 18    Temp: 98 °F (36.7 °C) 97.6 °F (36.4 °C) 98.2 °F (36.8 °C)    TempSrc: Oral Oral Oral    SpO2: (!) 93% (!) 94% (!) 94% (!) 93%   Weight:  93.3 kg (205 lb 11 oz)     Height:           No Jvd, Thyromegaly or Lymphadenopathy  Lungs: Fairly clear anteriorly and laterally  Cor: RRR no G or rubs  Abd: Soft benign good bowel sounds non tender  Ext: No E C C ( sore legs)    A)    Acute on chronic combined systolic and diastolic  congestive heart failure   ESRD on dialysis (Q MWF)  Pleural effusion s/p Thoracentesis  Anasarca improving steadily might need HD TIW  Anemia of CKD epo added  Hypoalbuminemia  Hyperlipidemia  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

## 2019-11-14 NOTE — NURSING
Given report to CHATO Clark at [facility]. Pt discharge teaching was implemented along with paperwork. Dialysis port dressing clean dry and intact. D/c both IV sites, catheter intact. Pt AAOx4 VS were stable. Pt verbalizes understanding and no questions were asked.

## 2019-11-14 NOTE — PT/OT/SLP PROGRESS
Physical Therapy Treatment    Patient Name:  Magaly Epstein   MRN:  [id number]    Recommendations:     Discharge Recommendations:  nursing facility, skilled   Discharge Equipment Recommendations: none   Barriers to discharge: Decreased caregiver support    Assessment:     Magaly Epstein is a 69 y.o. female admitted with a medical diagnosis of Acute on chronic combined systolic and diastolic congestive heart failure.  She presents with the following impairments/functional limitations:  weakness, impaired endurance, gait instability, impaired balance, decreased coordination, decreased lower extremity function, decreased upper extremity function, decreased safety awareness, pain, decreased ROM .    Rehab Prognosis: Good; patient would benefit from acute skilled PT services to address these deficits and reach maximum level of function.    Recent Surgery: * No surgery found *      Plan:     During this hospitalization, patient to be seen 5 x/week to address the identified rehab impairments via gait training, therapeutic activities, therapeutic exercises and progress toward the following goals:    · Plan of Care Expires:  11/23/19    Subjective     Chief Complaint: none   Patient/Family Comments/goals: to get stronger  Pain/Comfort:  · Pain Rating 1: 0/10  · Pain Rating Post-Intervention 1: 0/10      Objective:     Communicated with nurse prior to session.  Patient found up in chair with chair alarm, telemetry upon PT entry to room.     General Precautions: Standard, fall   Orthopedic Precautions:N/A   Braces: N/A     Functional Mobility:  · Transfers:     · Sit to Stand:  minimum assistance with rolling walker. VC's for hand placement on RW.  · Gait: Patient ambulated ~  25 ft x 2  on level tile with Rolling Walker with CGA  . O2 SATS maintain from 94%-97% on RA with ambulation .   Pt with demonstarting a  3-point gait with decreased hi, increased time in double stance, decreased velocity of limb motion, decreased  step length, decreased swing-to-stance ratio, decreased toe-to-floor clearance and decreased weight-shifting ability.Impairments contributing to gait deviations include impaired balance, decreased flexibility, pain, impaired postural control, decreased ROM and decreased strength. V/T cues for safety technique and walker management.    · Balance:  Fair       AM-PAC 6 CLICK MOBILITY  Turning over in bed (including adjusting bedclothes, sheets and blankets)?: 4  Sitting down on and standing up from a chair with arms (e.g., wheelchair, bedside commode, etc.): 3  Moving from lying on back to sitting on the side of the bed?: 4  Moving to and from a bed to a chair (including a wheelchair)?: 3  Need to walk in hospital room?: 3  Climbing 3-5 steps with a railing?: 2  Basic Mobility Total Score: 19       Therapeutic Activities and Exercises:   Pt performed seated BLE 10 reps x 2 trials:   AP, LAQ, HS,  Hip abd/add with pillow , Hip flexion. V/T's cues for technique and sequence. Pt tolerated well.   Educated pt on safety awareness with all OOB mobility , transfer and gait training.     Patient left up in chair with all lines intact, call button in reach, chair alarm on, NURSE Tammy notified and PCT  present..    GOALS:   Multidisciplinary Problems     Physical Therapy Goals        Problem: Physical Therapy Goal    Goal Priority Disciplines Outcome Goal Variances Interventions   Physical Therapy Goal     PT, PT/OT Ongoing, Progressing     Description:  Goals to be met by: 2019     Patient will increase functional independence with mobility by performin. Supine to sit with Modified [place]  2. Sit to supine with Modified [place]  3. Sit to stand transfer with Modified [place]  4. Gait  x 350 feet with Modified [place] using Rolling Walker.    Recommend: SNF at time of discharge.                          Time Tracking:     PT Received On: 19  PT Start Time: 1059     PT Stop Time: 1124  PT  Total Time (min): 25 min     Billable Minutes: Gait Training 14 and Therapeutic Exercise 11    Treatment Type: Treatment  PT/PTA: PTA     PTA Visit Number: 2     Anastacia Roper PTA  11/14/2019

## 2019-11-14 NOTE — PLAN OF CARE
Problem: Diabetes Comorbidity  Goal: Blood Glucose Level Within Desired Range  Outcome: Ongoing, Progressing  Intervention: Maintain Glycemic Control  Flowsheets (Taken 11/14/2019 6060)  Glycemic Management: blood glucose monitoring; supplemental insulin given

## 2019-11-14 NOTE — NURSING
Bedside report given to elsie andrews rn and updated handoff report sheet . No acute events or changes .

## 2019-11-14 NOTE — PT/OT/SLP DISCHARGE
Occupational Therapy Discharge Summary    Magaly Epstein  MRN: [id number]   Principal Problem: Acute on chronic combined systolic and diastolic congestive heart failure      Patient Discharged from acute Occupational Therapy on 11/14/19.  Please refer to prior OT notes for functional status.    Assessment:      Patient appropriate for care in another setting.    Objective:     GOALS:   Multidisciplinary Problems     Occupational Therapy Goals        Problem: Occupational Therapy Goal    Goal Priority Disciplines Outcome Interventions   Occupational Therapy Goal     OT, PT/OT Ongoing, Progressing    Description:  Goals to be met by: 11/24/2019     Patient will increase functional independence with ADLs by performing:    UE Dressing with Modified [place].  LE Dressing with Modified [place].  Grooming while standing at sink with Modified [place].  Toileting from toilet with Modified [place] for hygiene and clothing management.   Toilet transfer to toilet with Modified [place].  Upper extremity exercise program x 10 reps per handout, with supervision.                      Reasons for Discontinuation of Therapy Services  Transfer to alternate level of care.      Plan:     Patient Discharged to: Skilled Nursing Facility    Akanksha White OT  11/14/2019

## 2019-11-14 NOTE — PROGRESS NOTES
Bedside report given to oncoming nurse, NAD noted. Pt lying in bed, Respirations even and unlabored. Safety maintained, Call light within reach.     24 hr chart check completed.

## 2019-11-14 NOTE — PLAN OF CARE
"   11/14/19 1145   Final Note   Assessment Type Final Discharge Note   Anticipated Discharge Disposition SNF   What phone number can be called within the next 1-3 days to see how you are doing after discharge?   (Listed in chart)   Hospital Follow Up  Appt(s) scheduled? Yes  (Bette Charles, MWANILA; arrive at 12:00 PM; HD at 1245)   Discharge plans and expectations educations in teach back method with documentation complete? Yes   Right Care Referral Info   Post Acute Recommendation SNF / Sub-Acute Rehab     EDUCATION:  TN provided with educational information on CHF.  Information reviewed and placed in :My Healthcare Packet" to be brought home for pt to use as resource after discharge.  Information included:  signs and symptoms to look for and call the doctor if experiencing, and symptoms that may indicate a medical emergency: CALL 911.  All questions answered.  Teach back method used.  Patient stated, "I will talk with staff at [facility]".    TN informed floor nurseTammy, care management is complete and can proceed with discharge teaching.        "

## 2019-11-14 NOTE — PLAN OF CARE
Problem: Physical Therapy Goal  Goal: Physical Therapy Goal  Description  Goals to be met by: 2019     Patient will increase functional independence with mobility by performin. Supine to sit with Modified [place]  2. Sit to supine with Modified [place]  3. Sit to stand transfer with Modified [place]  4. Gait  x 350 feet with Modified [place] using Rolling Walker.    Recommend: SNF at time of discharge.         Outcome: Ongoing, Progressing   Pt ambulated ~ 25 ft x 2 trials with RW, CGA .

## 2019-11-14 NOTE — PROGRESS NOTES
[facility] Hospital Medicine  Progress Note    Patient Name: Magaly Epstein  MRN: [id number]  Patient Class: IP- Inpatient   Admission Date: 11/8/2019  Length of Stay: 6 days  Attending Physician: Phylicia Gastelum MD  Primary Care Provider: Anahy Bradley DO        Subjective:     Principal Problem:Acute on chronic combined systolic and diastolic congestive heart failure        HPI:  69 y.o. female with PMHx of CHF and diabetes mellitus, ESRD on HD who presents to the ED with complaint of shortness of breath. Patient states that she is chronically short of breath; however, much feels much more short of breath for about 1 week, with SpO2 of 87% on EMS arrival today. Patient states being seen by her PCP about 1 week ago for shortness of breath and being diagnosed with bilateral pleural effusions, for which she apparently has an appointment with IR today. Patient reports last being dialyzed 4 days ago and that she is scheduled for dialysis today at 2:40 PM at a facility that is new to her. Patient lives at home alone. Patient denies supplemental oxygen use at home. She reports chronic bilateral leg swelling which is not worse today. Patient denies fevers. She states that she still feels somewhat short of breath while on O2 per nasal cannula in the ED.     Pt placed on bipap in ED and transferred to ICU. Pt underwent thoracentesis with 300 ml fluid removed. Studies pending. Plan to receive HD and wean O2 as tolerated.       Overview/Hospital Course:  Pt admitted with acute hypoxic resp failure secondary to pulmonary edema/CHF,was  on bipap. Pt underwent HD 11/8 with 3 liters removed. HD planned 11/9. Able to wean off bipap to [place] oxygen. Vital stable   Consulted PT,OT,stable on Nc O 2,  PT,OT recommending SNF,consulted SW.  SOB is improved with HD.  Pending placement ,stable on RA.    Interval History: pt reports breathing easier and feeling better since admission    Review of Systems    Constitutional: Positive for activity change.   HENT: Negative.    Eyes: Negative.    Respiratory: Negative for cough and shortness of breath.    Cardiovascular: Negative for chest pain and leg swelling.   Gastrointestinal: Negative.    Endocrine: Negative.    Musculoskeletal: Negative.    Neurological: Positive for weakness.   Psychiatric/Behavioral: Negative.      Objective:     Vital Signs (Most Recent):  Temp: 98.2 °F (36.8 °C) (11/14/19 0729)  Pulse: 62 (11/14/19 0729)  Resp: 18 (11/14/19 0729)  BP: 132/64 (11/14/19 0729)  SpO2: (!) 93 % (11/14/19 0857) Vital Signs (24h Range):  Temp:  [97.6 °F (36.4 °C)-98.8 °F (37.1 °C)] 98.2 °F (36.8 °C)  Pulse:  [58-72] 62  Resp:  [17-20] 18  SpO2:  [93 %-100 %] 93 %  BP: (119-168)/(57-82) 132/64     Weight: 93.3 kg (205 lb 11 oz)  Body mass index is 30.38 kg/m².    Intake/Output Summary (Last 24 hours) at 11/14/2019 0926  Last data filed at 11/13/2019 1800  Gross per 24 hour   Intake 980 ml   Output 3480 ml   Net -2500 ml      Physical Exam   Constitutional: She is oriented to person, place, and time. She appears well-developed and well-nourished. No distress.   HENT:   Head: Normocephalic and atraumatic.   Eyes: Pupils are equal, round, and reactive to light. EOM are normal.   Neck: Normal range of motion. Neck supple. JVD present.   Cardiovascular: Normal rate, regular rhythm, normal heart sounds and intact distal pulses.   Pulmonary/Chest: She is in respiratory distress. She has no wheezes. She has rales.   Abdominal: Soft. Bowel sounds are normal. She exhibits distension.   Musculoskeletal: She exhibits edema and tenderness.   Neurological: She is alert and oriented to person, place, and time.   Skin: Capillary refill takes less than 2 seconds.   Psychiatric: She has a normal mood and affect. Her behavior is normal.       Significant Labs:   BMP:   Recent Labs   Lab 11/14/19  0549   *      K 3.8      CO2 28   BUN 16   CREATININE 2.6*   CALCIUM 8.6*      CBC:   Recent Labs   Lab 11/13/19  0451 11/14/19  0549   WBC 5.37 5.49   HGB 9.4* 9.4*   HCT 30.7* 30.8*    211       Significant Imaging: I have reviewed and interpreted all pertinent imaging results/findings within the past 24 hours.      Assessment/Plan:      * Acute on chronic combined systolic and diastolic congestive heart failure  With EF of 45%,Lasix given in ED 80 + 80mg   REsume lasix daily + metolazone   bumex held  HD for volume control  BP control        Acute respiratory failure with hypoxia  Duo to CHF exacerbation,Consulted PT,OT,stable on Nc O 2,PT,OT recommending SNF,consulted SW.  SOB is improved with HD.  Much better  at this time.at this time patient is stable on RA.    ESRD (end stage renal disease) on dialysis  Nephrology consulted ( dialysis patient)      Anasarca  Daily weights  HD for volume control  BP control  Renal-cardiac diet   On IV lasix,  Improving,will have HD.      Depression  Resume home meds      Hyperlipidemia    Resume statin     Type 2 diabetes mellitus with renal complication    Uncontrolled, A1c 7.5%  SSi   Diabetic diet  Resume 70/30 - home insulin regimen     Essential hypertension  Uncontrolled   Resume home meds        VTE Risk Mitigation (From admission, onward)         Ordered     heparin (porcine) injection 5,000 Units  As needed (PRN)      11/13/19 1200     heparin (porcine) injection 5,000 Units  Every 8 hours      11/10/19 0907     IP VTE HIGH RISK PATIENT  Once      11/08/19 1523                      Phylicia Gastelum MD  Department of Hospital Medicine   [facility]

## 2019-11-14 NOTE — PLAN OF CARE
[facility]     Department of Hospital Medicine     [address] [place] [address]     [phone] after hours  [phone] fax       NURSING HOME ORDERS    11/14/2019    Admit to Nursing Home:   Skilled Bed                                              Diagnoses:  Active Hospital Problems    Diagnosis  POA    *Acute on chronic combined systolic and diastolic congestive heart failure [I50.43]  Yes    Acute respiratory failure with hypoxia [J96.01]  Yes    Anasarca [R60.1]  Yes    ESRD (end stage renal disease) on dialysis [N18.6, Z99.2]  Not Applicable    Depression [F32.9]  Yes     Chronic    Essential hypertension [I10]  Yes     Chronic    Hyperlipidemia [E78.5]  Yes     Chronic    Type 2 diabetes mellitus with renal complication [E11.29]  Yes     Chronic      Resolved Hospital Problems   No resolved problems to display.       Patient is homebound due to:  Acute on chronic combined systolic and diastolic congestive heart failure    Allergies:  Review of patient's allergies indicates:   Allergen Reactions    Ciprofloxacin Anaphylaxis    Codeine Anaphylaxis    Neosporin [benzalkonium chloride] Anaphylaxis       Vitals:       Every shift (Skilled Nursing patients)    Diet: renal,2000 jaquan. ADA     Acitivities:     - Up in a chair each morning as tolerated   - Ambulate with assistance to bathroom     - May use walker, cane, or self-propelled wheelchair   - Weight bearing:as tolerated      LABS:  Per facility protocol    Nursing Precautions:    - Aspiration precautions:                    -  Upright 90 degrees befor during and after meals                   - Fall precautions per nursing home protocol     - Decubitus precautions:        -  for positioning   - Pressure reducing foam mattress   - Turn patient every two hours. Use wedge pillows to anchor patient    CONSULTS:     Physical Therapy to evaluate and treat     Occupational Therapy to evaluate  and treat         MISCELLANEOUS CARE:                   Routine Skin for Bedridden Patients:  Apply moisture barrier cream to all    skin folds and wet areas in perineal area daily and after baths and                           all bowel movements.                                  DIABETES CARE:      Check blood sugar:     Fingerstick blood sugar a.m and p.m.    Fingerstick blood sugar AC and HS   Fingerstick blood sugar every 6 hours if unable to eat      Report CBG < 60 or > 400 to physician.                                          Insulin Sliding Scale          Glucose  Novolog Insulin Subcutaneous        0 - 60   Orange juice or glucose tablet, hold insulin      No insulin   201-250  2 units   251-300  4 units   301-350  6 units   351-400  8 units   >400   10 units then call physician      Medications: Discontinue all previous medication orders, if any. See new list below.     Tete Magaly   Home Medication Instructions CARMENCITA:[id number]    Printed on:11/14/19 9591   Medication Information                      acetaminophen (TYLENOL) 500 MG tablet  Take 1 tablet (500 mg total) by mouth every 6 (six) hours as needed.             albuterol-ipratropium (DUO-NEB) 2.5 mg-0.5 mg/3 mL nebulizer solution  Take 3 mLs by nebulization every 4 (four) hours as needed for Wheezing or Shortness of Breath. Rescue             amLODIPine (NORVASC) 10 MG tablet  Take 1 tablet (10 mg total) by mouth once daily.             bumetanide (BUMEX) 2 MG tablet  Take 1 tablet (2 mg total) by mouth once daily.                          escitalopram oxalate (LEXAPRO) 10 MG tablet  Take 10 mg by mouth once daily.             ferrous sulfate 325 (65 FE) MG EC tablet  Take 1 tablet (325 mg total) by mouth 2 (two) times daily.             insulin aspart protamine-insulin aspart (NOVOLOG 70/30) 100 unit/mL (70-30) InPn pen  Inject 10 Units into the skin 2 (two) times daily.                          losartan (COZAAR) 25 MG tablet  Take 1  tablet (25 mg total) by mouth once daily.             metOLazone (ZAROXOLYN) 10 MG tablet  Take 1 tablet (10 mg total) by mouth once daily.             metoprolol succinate (TOPROL-XL) 50 MG 24 hr tablet  Take 1 tablet (50 mg total) by mouth 2 (two) times daily.             polyethylene glycol (GLYCOLAX) 17 gram PwPk  Take 17 g by mouth 2 (two) times daily as needed.             pravastatin (PRAVACHOL) 10 MG tablet  Take 10 mg by mouth once daily.             QUEtiapine (SEROQUEL) 25 MG Tab  Take 1 tablet (25 mg total) by mouth every evening.             senna-docusate 8.6-50 mg (PERICOLACE) 8.6-50 mg per tablet  Take 1 tablet by mouth 2 (two) times daily as needed for Constipation.    Hydralazine 25 mg po TID                        _________________________________  Phylicia Gastelum MD  11/14/2019

## 2019-11-14 NOTE — PROGRESS NOTES
1107 TN received a call from [facility] stating pt has been accepted. TN sent PASSR, 142, Chest x-ray, PPD via [facility]. TN called [facility] to f/u.    TN contacted [facility] to confirm HD unit; pt was discharged and transferred to [facility]. TN contacted at [phone] to confirm unit, days, and chair times, 4 times. TN had to leave a message.    1119 TN contacted [facility] at [place] provided HD information; [facility] at [address] [place], [place]; [facility] at 1245; arrive at 12 noon.    [facility] provided call report: 400 Pod Nurse; room 408B. [facility] is the contracted provider. TN will arrange transportation at 1:30 PM.    1132 ADT 30 order placed for transportation.  Requested  time: 1:30 PM. If transportation does not arrive at ETA time nurse, Tammy, will be instructed to follow protocol for transportation below:   How can I get in touch directly with dispatch, if needed?                 Non-emergent dispatch: [phone]                                    Emergent dispatch: [phone]  Non-emergent (stretcher): [phone]  Escalation Needs (PFC Lead): [phone]    Tn conveyed to floor nurseTammy.    1138 TN contacted [facility] at [phone]; spoke with Maribel informed pt is discharged and  Transferred to [facility] today; will inform the physician.

## 2019-11-15 LAB
HBV SURFACE AB SER QL IA: NEGATIVE
HBV SURFACE AB SERPL IA-ACNC: 4 MIU/ML

## 2020-01-27 ENCOUNTER — HOSPITAL ENCOUNTER (OUTPATIENT)
Facility: HOSPITAL | Age: 70
Discharge: HOME | End: 2020-01-28
Attending: EMERGENCY MEDICINE | Admitting: INTERNAL MEDICINE
Payer: MEDICARE

## 2020-01-27 DIAGNOSIS — J96.01 ACUTE RESPIRATORY FAILURE WITH HYPOXIA: ICD-10-CM

## 2020-01-27 DIAGNOSIS — R06.02 SOB (SHORTNESS OF BREATH): Primary | ICD-10-CM

## 2020-01-27 DIAGNOSIS — I16.1 HYPERTENSIVE EMERGENCY: ICD-10-CM

## 2020-01-27 DIAGNOSIS — N18.6 ESRD (END STAGE RENAL DISEASE) ON DIALYSIS: ICD-10-CM

## 2020-01-27 DIAGNOSIS — E11.22 TYPE 2 DIABETES MELLITUS WITH DIABETIC CHRONIC KIDNEY DISEASE, UNSPECIFIED CKD STAGE, UNSPECIFIED WHETHER LONG TERM INSULIN USE: Chronic | ICD-10-CM

## 2020-01-27 DIAGNOSIS — I10 ESSENTIAL HYPERTENSION: Chronic | ICD-10-CM

## 2020-01-27 DIAGNOSIS — E87.70 HYPERVOLEMIA, UNSPECIFIED HYPERVOLEMIA TYPE: ICD-10-CM

## 2020-01-27 DIAGNOSIS — I50.43 ACUTE ON CHRONIC COMBINED SYSTOLIC AND DIASTOLIC CONGESTIVE HEART FAILURE: ICD-10-CM

## 2020-01-27 DIAGNOSIS — E78.2 MIXED HYPERLIPIDEMIA: Chronic | ICD-10-CM

## 2020-01-27 DIAGNOSIS — Z99.2 ESRD (END STAGE RENAL DISEASE) ON DIALYSIS: ICD-10-CM

## 2020-01-27 LAB
ALBUMIN SERPL BCP-MCNC: 3 G/DL (ref 3.5–5.2)
ALP SERPL-CCNC: 95 U/L (ref 55–135)
ALT SERPL W/O P-5'-P-CCNC: 20 U/L (ref 10–44)
ANION GAP SERPL CALC-SCNC: 11 MMOL/L (ref 8–16)
AST SERPL-CCNC: 19 U/L (ref 10–40)
BASOPHILS # BLD AUTO: 0.04 K/UL (ref 0–0.2)
BASOPHILS NFR BLD: 0.6 % (ref 0–1.9)
BILIRUB SERPL-MCNC: 0.3 MG/DL (ref 0.1–1)
BNP SERPL-MCNC: 356 PG/ML (ref 0–99)
BUN SERPL-MCNC: 35 MG/DL (ref 8–23)
CALCIUM SERPL-MCNC: 8.2 MG/DL (ref 8.7–10.5)
CHLORIDE SERPL-SCNC: 100 MMOL/L (ref 95–110)
CO2 SERPL-SCNC: 25 MMOL/L (ref 23–29)
CREAT SERPL-MCNC: 3.9 MG/DL (ref 0.5–1.4)
DIFFERENTIAL METHOD: ABNORMAL
EOSINOPHIL # BLD AUTO: 0.3 K/UL (ref 0–0.5)
EOSINOPHIL NFR BLD: 5.1 % (ref 0–8)
ERYTHROCYTE [DISTWIDTH] IN BLOOD BY AUTOMATED COUNT: 16 % (ref 11.5–14.5)
EST. GFR  (AFRICAN AMERICAN): 13 ML/MIN/1.73 M^2
EST. GFR  (NON AFRICAN AMERICAN): 11 ML/MIN/1.73 M^2
GLUCOSE SERPL-MCNC: 147 MG/DL (ref 70–110)
HCT VFR BLD AUTO: 29.7 % (ref 37–48.5)
HGB BLD-MCNC: 9.2 G/DL (ref 12–16)
IMM GRANULOCYTES # BLD AUTO: 0.02 K/UL (ref 0–0.04)
IMM GRANULOCYTES NFR BLD AUTO: 0.3 % (ref 0–0.5)
LYMPHOCYTES # BLD AUTO: 1.3 K/UL (ref 1–4.8)
LYMPHOCYTES NFR BLD: 21 % (ref 18–48)
MAGNESIUM SERPL-MCNC: 2 MG/DL (ref 1.6–2.6)
MCH RBC QN AUTO: 30.1 PG (ref 27–31)
MCHC RBC AUTO-ENTMCNC: 31 G/DL (ref 32–36)
MCV RBC AUTO: 97 FL (ref 82–98)
MONOCYTES # BLD AUTO: 0.4 K/UL (ref 0.3–1)
MONOCYTES NFR BLD: 7 % (ref 4–15)
NEUTROPHILS # BLD AUTO: 4.1 K/UL (ref 1.8–7.7)
NEUTROPHILS NFR BLD: 66 % (ref 38–73)
NRBC BLD-RTO: 0 /100 WBC
PHOSPHATE SERPL-MCNC: 5.9 MG/DL (ref 2.7–4.5)
PLATELET # BLD AUTO: 292 K/UL (ref 150–350)
PMV BLD AUTO: 9.8 FL (ref 9.2–12.9)
POCT GLUCOSE: 181 MG/DL (ref 70–110)
POCT GLUCOSE: 215 MG/DL (ref 70–110)
POCT GLUCOSE: 300 MG/DL (ref 70–110)
POTASSIUM SERPL-SCNC: 4.3 MMOL/L (ref 3.5–5.1)
PROT SERPL-MCNC: 6.4 G/DL (ref 6–8.4)
RBC # BLD AUTO: 3.06 M/UL (ref 4–5.4)
SODIUM SERPL-SCNC: 136 MMOL/L (ref 136–145)
WBC # BLD AUTO: 6.25 K/UL (ref 3.9–12.7)

## 2020-01-27 PROCEDURE — G0378 HOSPITAL OBSERVATION PER HR: HCPCS

## 2020-01-27 PROCEDURE — 80053 COMPREHEN METABOLIC PANEL: CPT

## 2020-01-27 PROCEDURE — 84100 ASSAY OF PHOSPHORUS: CPT

## 2020-01-27 PROCEDURE — 83880 ASSAY OF NATRIURETIC PEPTIDE: CPT

## 2020-01-27 PROCEDURE — 85025 COMPLETE CBC W/AUTO DIFF WBC: CPT

## 2020-01-27 PROCEDURE — 63600175 PHARM REV CODE 636 W HCPCS: Performed by: INTERNAL MEDICINE

## 2020-01-27 PROCEDURE — 96375 TX/PRO/DX INJ NEW DRUG ADDON: CPT

## 2020-01-27 PROCEDURE — 96374 THER/PROPH/DIAG INJ IV PUSH: CPT | Mod: 59

## 2020-01-27 PROCEDURE — 25000242 PHARM REV CODE 250 ALT 637 W/ HCPCS: Performed by: PHYSICIAN ASSISTANT

## 2020-01-27 PROCEDURE — 25000003 PHARM REV CODE 250: Performed by: INTERNAL MEDICINE

## 2020-01-27 PROCEDURE — 99285 EMERGENCY DEPT VISIT HI MDM: CPT | Mod: 25

## 2020-01-27 PROCEDURE — 25000003 PHARM REV CODE 250: Performed by: NURSE PRACTITIONER

## 2020-01-27 PROCEDURE — 83735 ASSAY OF MAGNESIUM: CPT

## 2020-01-27 PROCEDURE — 63600175 PHARM REV CODE 636 W HCPCS: Performed by: EMERGENCY MEDICINE

## 2020-01-27 PROCEDURE — 80100016 HC MAINTENANCE HEMODIALYSIS

## 2020-01-27 PROCEDURE — 63600175 PHARM REV CODE 636 W HCPCS: Performed by: PHYSICIAN ASSISTANT

## 2020-01-27 PROCEDURE — 94761 N-INVAS EAR/PLS OXIMETRY MLT: CPT

## 2020-01-27 PROCEDURE — G0257 UNSCHED DIALYSIS ESRD PT HOS: HCPCS

## 2020-01-27 PROCEDURE — 25000003 PHARM REV CODE 250: Performed by: PHYSICIAN ASSISTANT

## 2020-01-27 RX ORDER — FUROSEMIDE 40 MG/1
80 TABLET ORAL DAILY
Status: DISCONTINUED | OUTPATIENT
Start: 2020-01-28 | End: 2020-01-28 | Stop reason: HOSPADM

## 2020-01-27 RX ORDER — LORAZEPAM 2 MG/ML
1 INJECTION INTRAMUSCULAR ONCE
Status: COMPLETED | OUTPATIENT
Start: 2020-01-27 | End: 2020-01-27

## 2020-01-27 RX ORDER — FUROSEMIDE 10 MG/ML
120 INJECTION INTRAMUSCULAR; INTRAVENOUS ONCE
Status: COMPLETED | OUTPATIENT
Start: 2020-01-27 | End: 2020-01-27

## 2020-01-27 RX ORDER — AMLODIPINE BESYLATE 5 MG/1
10 TABLET ORAL DAILY
Status: DISCONTINUED | OUTPATIENT
Start: 2020-01-27 | End: 2020-01-28 | Stop reason: HOSPADM

## 2020-01-27 RX ORDER — IBUPROFEN 200 MG
16 TABLET ORAL
Status: DISCONTINUED | OUTPATIENT
Start: 2020-01-27 | End: 2020-01-28 | Stop reason: HOSPADM

## 2020-01-27 RX ORDER — HYDRALAZINE HYDROCHLORIDE 20 MG/ML
10 INJECTION INTRAMUSCULAR; INTRAVENOUS
Status: COMPLETED | OUTPATIENT
Start: 2020-01-27 | End: 2020-01-27

## 2020-01-27 RX ORDER — LOSARTAN POTASSIUM 25 MG/1
25 TABLET ORAL DAILY
Status: DISCONTINUED | OUTPATIENT
Start: 2020-01-27 | End: 2020-01-28 | Stop reason: HOSPADM

## 2020-01-27 RX ORDER — INSULIN ASPART 100 [IU]/ML
0-5 INJECTION, SOLUTION INTRAVENOUS; SUBCUTANEOUS
Status: DISCONTINUED | OUTPATIENT
Start: 2020-01-27 | End: 2020-01-28 | Stop reason: HOSPADM

## 2020-01-27 RX ORDER — SODIUM CHLORIDE 9 MG/ML
INJECTION, SOLUTION INTRAVENOUS
Status: DISCONTINUED | OUTPATIENT
Start: 2020-01-27 | End: 2020-01-28 | Stop reason: HOSPADM

## 2020-01-27 RX ORDER — HYDROXYZINE HYDROCHLORIDE 25 MG/1
25 TABLET, FILM COATED ORAL 3 TIMES DAILY PRN
Status: DISCONTINUED | OUTPATIENT
Start: 2020-01-27 | End: 2020-01-28 | Stop reason: HOSPADM

## 2020-01-27 RX ORDER — SODIUM CHLORIDE 0.9 % (FLUSH) 0.9 %
10 SYRINGE (ML) INJECTION
Status: DISCONTINUED | OUTPATIENT
Start: 2020-01-27 | End: 2020-01-28 | Stop reason: HOSPADM

## 2020-01-27 RX ORDER — IBUPROFEN 200 MG
24 TABLET ORAL
Status: DISCONTINUED | OUTPATIENT
Start: 2020-01-27 | End: 2020-01-28 | Stop reason: HOSPADM

## 2020-01-27 RX ORDER — FERROUS SULFATE 325(65) MG
325 TABLET, DELAYED RELEASE (ENTERIC COATED) ORAL 2 TIMES DAILY
Status: DISCONTINUED | OUTPATIENT
Start: 2020-01-27 | End: 2020-01-28 | Stop reason: HOSPADM

## 2020-01-27 RX ORDER — TALC
6 POWDER (GRAM) TOPICAL NIGHTLY PRN
Status: DISCONTINUED | OUTPATIENT
Start: 2020-01-27 | End: 2020-01-28 | Stop reason: HOSPADM

## 2020-01-27 RX ORDER — MUPIROCIN 20 MG/G
OINTMENT TOPICAL 2 TIMES DAILY
Status: DISCONTINUED | OUTPATIENT
Start: 2020-01-27 | End: 2020-01-28 | Stop reason: HOSPADM

## 2020-01-27 RX ORDER — PRAVASTATIN SODIUM 10 MG/1
10 TABLET ORAL DAILY
Status: DISCONTINUED | OUTPATIENT
Start: 2020-01-28 | End: 2020-01-28 | Stop reason: HOSPADM

## 2020-01-27 RX ORDER — SODIUM CHLORIDE 9 MG/ML
INJECTION, SOLUTION INTRAVENOUS ONCE
Status: DISCONTINUED | OUTPATIENT
Start: 2020-01-27 | End: 2020-01-28

## 2020-01-27 RX ORDER — METOPROLOL SUCCINATE 50 MG/1
50 TABLET, EXTENDED RELEASE ORAL 2 TIMES DAILY
Status: DISCONTINUED | OUTPATIENT
Start: 2020-01-27 | End: 2020-01-28 | Stop reason: HOSPADM

## 2020-01-27 RX ORDER — GLUCAGON 1 MG
1 KIT INJECTION
Status: DISCONTINUED | OUTPATIENT
Start: 2020-01-27 | End: 2020-01-28 | Stop reason: HOSPADM

## 2020-01-27 RX ORDER — HEPARIN SODIUM 5000 [USP'U]/ML
5000 INJECTION, SOLUTION INTRAVENOUS; SUBCUTANEOUS
Status: DISCONTINUED | OUTPATIENT
Start: 2020-01-27 | End: 2020-01-28 | Stop reason: HOSPADM

## 2020-01-27 RX ORDER — ACETAMINOPHEN 500 MG
500 TABLET ORAL EVERY 6 HOURS PRN
Status: DISCONTINUED | OUTPATIENT
Start: 2020-01-27 | End: 2020-01-28 | Stop reason: HOSPADM

## 2020-01-27 RX ORDER — ESCITALOPRAM OXALATE 10 MG/1
10 TABLET ORAL DAILY
Status: DISCONTINUED | OUTPATIENT
Start: 2020-01-27 | End: 2020-01-28 | Stop reason: HOSPADM

## 2020-01-27 RX ORDER — FLUTICASONE PROPIONATE 50 MCG
2 SPRAY, SUSPENSION (ML) NASAL DAILY
Status: DISCONTINUED | OUTPATIENT
Start: 2020-01-27 | End: 2020-01-28 | Stop reason: HOSPADM

## 2020-01-27 RX ADMIN — HYDRALAZINE HYDROCHLORIDE 10 MG: 20 INJECTION INTRAMUSCULAR; INTRAVENOUS at 08:01

## 2020-01-27 RX ADMIN — METOPROLOL SUCCINATE 50 MG: 50 TABLET, FILM COATED, EXTENDED RELEASE ORAL at 06:01

## 2020-01-27 RX ADMIN — LOSARTAN POTASSIUM 25 MG: 25 TABLET ORAL at 06:01

## 2020-01-27 RX ADMIN — FUROSEMIDE 120 MG: 10 INJECTION, SOLUTION INTRAVENOUS at 05:01

## 2020-01-27 RX ADMIN — ESCITALOPRAM OXALATE 10 MG: 10 TABLET ORAL at 06:01

## 2020-01-27 RX ADMIN — HEPARIN SODIUM 5000 UNITS: 5000 INJECTION, SOLUTION INTRAVENOUS; SUBCUTANEOUS at 12:01

## 2020-01-27 RX ADMIN — FERROUS SULFATE TAB EC 325 MG (65 MG FE EQUIVALENT) 325 MG: 325 (65 FE) TABLET DELAYED RESPONSE at 08:01

## 2020-01-27 RX ADMIN — AMLODIPINE BESYLATE 10 MG: 5 TABLET ORAL at 06:01

## 2020-01-27 RX ADMIN — MUPIROCIN: 20 OINTMENT TOPICAL at 08:01

## 2020-01-27 RX ADMIN — LORAZEPAM 1 MG: 2 INJECTION INTRAMUSCULAR; INTRAVENOUS at 11:01

## 2020-01-27 RX ADMIN — HYDROXYZINE HYDROCHLORIDE 25 MG: 25 TABLET, FILM COATED ORAL at 09:01

## 2020-01-27 RX ADMIN — FLUTICASONE PROPIONATE 100 MCG: 50 SPRAY, METERED NASAL at 08:01

## 2020-01-27 NOTE — ASSESSMENT & PLAN NOTE
Uncontrolled. Does not know name of medications. Lives alone and no family per patient.  Continue home list -amlodipine, losartan, metoprolol. Fluid removal should help. Hydralazine prn.

## 2020-01-27 NOTE — SUBJECTIVE & OBJECTIVE
Past Medical History:   Diagnosis Date    Arthritis     CHF (congestive heart failure)     Diabetes mellitus     ESRD on hemodialysis     S/P hemodialysis catheter insertion 10/10/2019    Right IJ    Unsteady gait     Uses roller walker        Past Surgical History:   Procedure Laterality Date    APPENDECTOMY      INSERTION OF TUNNELED CENTRAL VENOUS HEMODIALYSIS CATHETER Right 10/10/2019       Review of patient's allergies indicates:   Allergen Reactions    Ciprofloxacin Anaphylaxis    Codeine Anaphylaxis    Neosporin [benzalkonium chloride] Anaphylaxis       No current facility-administered medications on file prior to encounter.      Current Outpatient Medications on File Prior to Encounter   Medication Sig    acetaminophen (TYLENOL) 500 MG tablet Take 1 tablet (500 mg total) by mouth every 6 (six) hours as needed.    albuterol-ipratropium (DUO-NEB) 2.5 mg-0.5 mg/3 mL nebulizer solution Take 3 mLs by nebulization every 4 (four) hours as needed for Wheezing or Shortness of Breath. Rescue    amLODIPine (NORVASC) 10 MG tablet Take 1 tablet (10 mg total) by mouth once daily.    bumetanide (BUMEX) 2 MG tablet Take 1 tablet (2 mg total) by mouth once daily.    epoetin cortes-epbx (RETACRIT) 10,000 unit/mL imjection Inject 0.6 mLs (6,000 Units total) into the skin every 7 days.    escitalopram oxalate (LEXAPRO) 10 MG tablet Take 10 mg by mouth once daily.    ferrous sulfate 325 (65 FE) MG EC tablet Take 1 tablet (325 mg total) by mouth 2 (two) times daily.    insulin aspart protamine-insulin aspart (NOVOLOG 70/30) 100 unit/mL (70-30) InPn pen Inject 10 Units into the skin 2 (two) times daily.    insulin aspart U-100 (NOVOLOG) 100 unit/mL (3 mL) InPn pen Inject 1-10 Units into the skin before meals and at bedtime as needed (Hyperglycemia).    losartan (COZAAR) 25 MG tablet Take 1 tablet (25 mg total) by mouth once daily.    metOLazone (ZAROXOLYN) 10 MG tablet Take 1 tablet (10 mg total) by mouth once  daily.    metoprolol succinate (TOPROL-XL) 50 MG 24 hr tablet Take 1 tablet (50 mg total) by mouth 2 (two) times daily.    polyethylene glycol (GLYCOLAX) 17 gram PwPk Take 17 g by mouth 2 (two) times daily as needed.    pravastatin (PRAVACHOL) 10 MG tablet Take 10 mg by mouth once daily.    QUEtiapine (SEROQUEL) 25 MG Tab Take 1 tablet (25 mg total) by mouth every evening.    senna-docusate 8.6-50 mg (PERICOLACE) 8.6-50 mg per tablet Take 1 tablet by mouth 2 (two) times daily as needed for Constipation.     Family History     None        Tobacco Use    Smoking status: Former Smoker    Smokeless tobacco: Never Used   Substance and Sexual Activity    Alcohol use: Not Currently    Drug use: No    Sexual activity: Not Currently     Partners: Male     Review of Systems   Constitutional: Positive for activity change. Negative for diaphoresis, fatigue and fever.   HENT: Negative.    Eyes: Negative.    Respiratory: Positive for shortness of breath. Negative for chest tightness.    Cardiovascular: Positive for leg swelling. Negative for chest pain.   Gastrointestinal: Negative.    Endocrine: Negative.    Genitourinary:        Per pt, Not sure how much urine makes    Musculoskeletal: Negative.    Neurological: Negative.    Hematological: Negative.    Psychiatric/Behavioral: Negative.      Objective:     Vital Signs (Most Recent):  Temp: 97.6 °F (36.4 °C) (01/27/20 0922)  Pulse: 78 (01/27/20 1230)  Resp: 20 (01/27/20 0922)  BP: (!) 165/97 (01/27/20 1230)  SpO2: 100 % (01/27/20 0905) Vital Signs (24h Range):  Temp:  [97.6 °F (36.4 °C)-97.8 °F (36.6 °C)] 97.6 °F (36.4 °C)  Pulse:  [70-82] 78  Resp:  [15-20] 20  SpO2:  [97 %-100 %] 100 %  BP: (132-192)/() 165/97     Weight: 93 kg (205 lb)  Body mass index is 30.27 kg/m².    Physical Exam   Constitutional: She is oriented to person, place, and time. She appears well-developed and well-nourished. No distress.   HENT:   Head: Atraumatic.   Eyes: EOM are normal.    Neck: Neck supple.   Cardiovascular: Normal rate and regular rhythm.   Pulmonary/Chest: Effort normal and breath sounds normal.   Diminished at bases otherwise clear.    Abdominal: Soft. Bowel sounds are normal.   Musculoskeletal: Normal range of motion. She exhibits edema (BLE pitting 3 + edema).   Neurological: She is alert and oriented to person, place, and time.   Poor historian   Skin: Skin is warm and dry.   R IJ TDC   Psychiatric:   Anxious         CRANIAL NERVES     CN III, IV, VI   Extraocular motions are normal.        Significant Labs: All pertinent labs within the past 24 hours have been reviewed.    Significant Imaging: I have reviewed and interpreted all pertinent imaging results/findings within the past 24 hours.

## 2020-01-27 NOTE — ASSESSMENT & PLAN NOTE
Hgb consistent with previous labs. Denies history of or current melena, hematochezia, hemoptysis, or hematemesis.   Iron and GERMÁN per Nephrology

## 2020-01-27 NOTE — ASSESSMENT & PLAN NOTE
Lab Results   Component Value Date    HGBA1C 7.5 (H) 10/03/2019   Patient does not check blood sugars at home.   SSI and add basal prandial accordingly

## 2020-01-27 NOTE — ASSESSMENT & PLAN NOTE
ESRD on HD MWF since 10/2010 via L [facility] at [facility] under direction of  Dr. Grajeda. Does not know her time, EDW or UOP  Nephrology following -HD today goal 4 L, May need UF tomorrow  Lab Results   Component Value Date    CALCIUM 8.2 (L) 01/27/2020    PHOS 5.9 (H) 01/27/2020   Add phos binder-defer Nephrology  Lasix 120 x 1 IV and then 80 mg daily-added by Nephrology-no UOP so far documented  Renal/DM diet.   Strict IO

## 2020-01-27 NOTE — HPI
"Mrs. Bai is a 69 female who was admitted for depression, hyperlipidema, hypertension, DMT2 pleural effusion requiring thoracentesis 11/8/2019, and ESRD on HD since 10/10/19 via  L  TDC who came to hospital for shortness of breath and BLE edema. She reports going to dialysis Friday but not sure how long,v volume removed or her pre wt post wt. Patient is a poor historian. She reports on 7 medications but does know the names. She states "dont know" to how much urine she makes. She does not know home blood glucose as she does not check. Denies chest pain. Denies smoking or use of illicit drug.     10/3/2020 2 D echo showed EF 45%, grade I diastolic function.     Seen by Nephrology in ED-emergent HD, add lasix.     "

## 2020-01-27 NOTE — ED TRIAGE NOTES
Pt to ER with c/o SOB upon waking up this morning. Pt is dialysis pt M/W/F and due for dialysis today at 12. Pt denies N/V, fever or chills. No increase in swelling. Pt with hx of childhood asthma.

## 2020-01-27 NOTE — ED NOTES
Attempted to call report x 2 with no answer. Called Charge nurse and was informed she would call me back. spectralink # [phone] given. awaiting call back.    Pt off floor to dialysis

## 2020-01-27 NOTE — CONSULTS
Dictation #1  MRN:[id number]  CSN:[id number]    Consult dictated # [id number]    HD today and UF as tolerated  Add Lasix  HD q MWF  We'll follow for dialysis  Thanks

## 2020-01-27 NOTE — PROGRESS NOTES
Magaly Epstein is a 69 y.o. female patient.    Follow for ESRD, fluid overload, emergent dialysis    Patient seen while on dialysis  Reportedly feeling better  Still in mild to moderate distress    Scheduled Meds:   sodium chloride 0.9%   Intravenous Once    furosemide  120 mg Intravenous Once    [START ON 1/28/2020] furosemide  80 mg Oral Daily    mupirocin   Nasal BID       Review of patient's allergies indicates:   Allergen Reactions    Ciprofloxacin Anaphylaxis    Codeine Anaphylaxis    Neosporin [benzalkonium chloride] Anaphylaxis         Vital Signs Range (Last 24H):  Temp:  [97.8 °F (36.6 °C)]   Pulse:  [75-80]   Resp:  [15-18]   BP: (150-192)/(71-90)   SpO2:  [97 %-100 %]     I & O (Last 24H):No intake or output data in the 24 hours ending 01/27/20 0952        Physical Exam:  General appearance: well developed, well nourished, moderate distress  Lungs:  diminished breath sounds bilaterally and rales bilaterally  Heart: regular rate and rhythm  Abdomen: soft, non-tender non-distented; bowel sounds normal; no masses,  no organomegaly  Extremities: edema (+)    Laboratory:  CBC:   Recent Labs   Lab 01/27/20  0709   WBC 6.25   RBC 3.06*   HGB 9.2*   HCT 29.7*      MCV 97   MCH 30.1   MCHC 31.0*     CMP:   Recent Labs   Lab 01/27/20  0709   *   CALCIUM 8.2*   ALBUMIN 3.0*   PROT 6.4      K 4.3   CO2 25      BUN 35*   CREATININE 3.9*   ALKPHOS 95   ALT 20   AST 19   BILITOT 0.3       Imp/Plan    ESRD - on dialysis, tolerated well so far  Fluid overload - continue UF as tolerated  HTN  DM type 2  Anemia of CKD    Continue present Rx  HD q MWF for now  If necessary will HD again in am  We'll follow for dialysis      Bc Grajeda  1/27/2020

## 2020-01-27 NOTE — ED PROVIDER NOTES
"Encounter Date: 1/27/2020       History     Chief Complaint   Patient presents with    Shortness of Breath     69 y.o. female Past Medical History:  No date: Arthritis  No date: CHF (congestive heart failure)  No date: Diabetes mellitus  No date: ESRD on hemodialysis  10/10/2019: S/P hemodialysis catheter insertion      Comment:  Right IJ  No date: Unsteady gait  No date: Uses roller walker     Notes that she awoke from sleep sob. Has not been taking her bp/dm meds. States she "forgets". esrd on dialysis M, W, F. Dr Walsh/Guillermo are her doctors.         Review of patient's allergies indicates:   Allergen Reactions    Ciprofloxacin Anaphylaxis    Codeine Anaphylaxis    Neosporin [benzalkonium chloride] Anaphylaxis     Past Medical History:   Diagnosis Date    Arthritis     CHF (congestive heart failure)     Diabetes mellitus     ESRD on hemodialysis     S/P hemodialysis catheter insertion 10/10/2019    Right IJ    Unsteady gait     Uses roller walker      Past Surgical History:   Procedure Laterality Date    APPENDECTOMY      INSERTION OF TUNNELED CENTRAL VENOUS HEMODIALYSIS CATHETER Right 10/10/2019     No family history on file.  Social History     Tobacco Use    Smoking status: Former Smoker    Smokeless tobacco: Never Used   Substance Use Topics    Alcohol use: Not Currently    Drug use: No     Review of Systems   Constitutional: Negative for fever.   HENT: Negative for sore throat.    Respiratory: Positive for shortness of breath.    Cardiovascular: Negative for chest pain.   Gastrointestinal: Negative for nausea.   Genitourinary: Negative for dysuria.   Musculoskeletal: Negative for back pain.   Skin: Negative for rash.   Neurological: Negative for weakness.   Hematological: Does not bruise/bleed easily.   All other systems reviewed and are negative.      Physical Exam     Initial Vitals [01/27/20 0647]   BP Pulse Resp Temp SpO2   (!) 192/90 80 15 -- 99 %      MAP       --         Physical " Exam    Nursing note and vitals reviewed.  Constitutional: She appears well-developed and well-nourished.   HENT:   Head: Normocephalic and atraumatic.   Eyes: Conjunctivae and EOM are normal. Pupils are equal, round, and reactive to light.   Neck: Normal range of motion.   Cardiovascular: Normal rate.   Pulmonary/Chest: No respiratory distress.   Abdominal: She exhibits no distension.   Musculoskeletal: Normal range of motion.   Neurological: She is alert. No cranial nerve deficit. GCS score is 15. GCS eye subscore is 4. GCS verbal subscore is 5. GCS motor subscore is 6.   Skin: Skin is warm and dry.   Psychiatric: She has a normal mood and affect. Thought content normal.     crackles at bases  Pitting b/l LE  tachpneic on RA  ED Course   Procedures  Labs Reviewed - No data to display       Imaging Results    None                               Plan: screening labs/dialysis     Labs Reviewed   CBC W/ AUTO DIFFERENTIAL - Abnormal; Notable for the following components:       Result Value    RBC 3.06 (*)     Hemoglobin 9.2 (*)     Hematocrit 29.7 (*)     Mean Corpuscular Hemoglobin Conc 31.0 (*)     RDW 16.0 (*)     All other components within normal limits   COMPREHENSIVE METABOLIC PANEL - Abnormal; Notable for the following components:    Glucose 147 (*)     BUN, Bld 35 (*)     Creatinine 3.9 (*)     Calcium 8.2 (*)     Albumin 3.0 (*)     eGFR if  13 (*)     eGFR if non  11 (*)     All other components within normal limits   PHOSPHORUS - Abnormal; Notable for the following components:    Phosphorus 5.9 (*)     All other components within normal limits   B-TYPE NATRIURETIC PEPTIDE - Abnormal; Notable for the following components:     (*)     All other components within normal limits   MAGNESIUM   B-TYPE NATRIURETIC PEPTIDE       X-Ray Chest AP Portable   Final Result      1. Moderate pulmonary edema   2. Right pleural fluid, decreased in volume from the 11/08/2019 exam.          Electronically signed by: Berenice Briscoe MD   Date:    01/27/2020   Time:    08:10          I have consulted Dr. Bc Grajeda from renal who will consult on the patient. Will place in obs to facilitate dialysis.      Clinical Impression:       ICD-10-CM ICD-9-CM   1. SOB (shortness of breath) R06.02 786.05   2. Hypervolemia, unspecified hypervolemia type E87.70 276.69                             Violet Heredia MD  01/27/20 0821

## 2020-01-27 NOTE — PLAN OF CARE
"   01/27/20 1109   Discharge Assessment   Assessment Type Discharge Planning Assessment   Assessment information obtained from? Medical Record   Prior to hospitilization cognitive status: Alert/Oriented   Prior to hospitalization functional status: Assistive Equipment   Current cognitive status: Alert/Oriented   Current Functional Status: Assistive Equipment   Lives With   ([facility])   Able to Return to Prior Arrangements yes   Is patient able to care for self after discharge? Yes   Who are your caregiver(s) and their phone number(s)? Usha [phone]   Patient's perception of discharge disposition home or selfcare   Readmission Within the Last 30 Days no previous admission in last 30 days   Patient currently being followed by outpatient case management? No   Patient currently receives any other outside agency services? No   Is it the patient/care giver preference to resume care with the current outside agency? Yes   Equipment Currently Used at Home rollator;cane, straight;wheelchair;glucometer   Do you have any problems affording any of your prescribed medications? No   Is the patient taking medications as prescribed? yes   Does the patient have transportation home? Yes   Transportation Anticipated other (see comments)   Dialysis Name and Scheduled days [facility] M/W/F   Discharge Plan A Independent living facility   Discharge Plan B   (TBD)   DME Needed Upon Discharge    (TBD)   Patient/Family in Agreement with Plan yes     [address] [address]  Phone: [phone] Fax: [phone]    EDUCATION:   provided with educational information on SOB.  Information reviewed and placed in :My Healthcare Packet" to be brought home  to use as resource after discharge.  Information included:  signs and symptoms to look for and call the doctor if experiencing, and symptoms that may indicate a medical emergency: CALL 911.   "    All questions answered.  Teach back method used.

## 2020-01-27 NOTE — H&P
"[facility] Medicine  History & Physical    Patient Name: Magaly Epstein  MRN: [id number]  Admission Date: 1/27/2020  Attending Physician: Lenka Cesar MD   Primary Care Provider: Anahy Bradley DO         Patient information was obtained from patient and ER records.     Subjective:     Principal Problem:SOB (shortness of breath)    Chief Complaint:   Chief Complaint   Patient presents with    Shortness of Breath     since waking up this morning, hx of asthma as a teenager         HPI: Mrs. Bai is a 69 female who was admitted for depression, hyperlipidema, hypertension, DMT2 pleural effusion requiring thoracentesis 11/8/2019, and ESRD on HD since 10/10/19 via  L  TDC who came to hospital for shortness of breath and BLE edema. She reports going to dialysis Friday but not sure how long,v volume removed or her pre wt post wt. Patient is a poor historian. She reports on 7 medications but does know the names. She states "dont know" to how much urine she makes. She does not know home blood glucose as she does not check. Denies chest pain. Denies smoking or use of illicit drug.     10/3/2020 2 D echo showed EF 45%, grade I diastolic function.     Seen by Nephrology in ED-emergent HD, add lasix.       Past Medical History:   Diagnosis Date    Arthritis     CHF (congestive heart failure)     Diabetes mellitus     ESRD on hemodialysis     S/P hemodialysis catheter insertion 10/10/2019    Right IJ    Unsteady gait     Uses roller walker        Past Surgical History:   Procedure Laterality Date    APPENDECTOMY      INSERTION OF TUNNELED CENTRAL VENOUS HEMODIALYSIS CATHETER Right 10/10/2019       Review of patient's allergies indicates:   Allergen Reactions    Ciprofloxacin Anaphylaxis    Codeine Anaphylaxis    Neosporin [benzalkonium chloride] Anaphylaxis       No current facility-administered medications on file prior to encounter.      Current Outpatient Medications on " File Prior to Encounter   Medication Sig    acetaminophen (TYLENOL) 500 MG tablet Take 1 tablet (500 mg total) by mouth every 6 (six) hours as needed.    albuterol-ipratropium (DUO-NEB) 2.5 mg-0.5 mg/3 mL nebulizer solution Take 3 mLs by nebulization every 4 (four) hours as needed for Wheezing or Shortness of Breath. Rescue    amLODIPine (NORVASC) 10 MG tablet Take 1 tablet (10 mg total) by mouth once daily.    bumetanide (BUMEX) 2 MG tablet Take 1 tablet (2 mg total) by mouth once daily.    epoetin cortes-epbx (RETACRIT) 10,000 unit/mL imjection Inject 0.6 mLs (6,000 Units total) into the skin every 7 days.    escitalopram oxalate (LEXAPRO) 10 MG tablet Take 10 mg by mouth once daily.    ferrous sulfate 325 (65 FE) MG EC tablet Take 1 tablet (325 mg total) by mouth 2 (two) times daily.    insulin aspart protamine-insulin aspart (NOVOLOG 70/30) 100 unit/mL (70-30) InPn pen Inject 10 Units into the skin 2 (two) times daily.    insulin aspart U-100 (NOVOLOG) 100 unit/mL (3 mL) InPn pen Inject 1-10 Units into the skin before meals and at bedtime as needed (Hyperglycemia).    losartan (COZAAR) 25 MG tablet Take 1 tablet (25 mg total) by mouth once daily.    metOLazone (ZAROXOLYN) 10 MG tablet Take 1 tablet (10 mg total) by mouth once daily.    metoprolol succinate (TOPROL-XL) 50 MG 24 hr tablet Take 1 tablet (50 mg total) by mouth 2 (two) times daily.    polyethylene glycol (GLYCOLAX) 17 gram PwPk Take 17 g by mouth 2 (two) times daily as needed.    pravastatin (PRAVACHOL) 10 MG tablet Take 10 mg by mouth once daily.    QUEtiapine (SEROQUEL) 25 MG Tab Take 1 tablet (25 mg total) by mouth every evening.    senna-docusate 8.6-50 mg (PERICOLACE) 8.6-50 mg per tablet Take 1 tablet by mouth 2 (two) times daily as needed for Constipation.     Family History     None        Tobacco Use    Smoking status: Former Smoker    Smokeless tobacco: Never Used   Substance and Sexual Activity    Alcohol use: Not  Currently    Drug use: No    Sexual activity: Not Currently     Partners: Male     Review of Systems   Constitutional: Positive for activity change. Negative for diaphoresis, fatigue and fever.   HENT: Negative.    Eyes: Negative.    Respiratory: Positive for shortness of breath. Negative for chest tightness.    Cardiovascular: Positive for leg swelling. Negative for chest pain.   Gastrointestinal: Negative.    Endocrine: Negative.    Genitourinary:        Per pt, Not sure how much urine makes    Musculoskeletal: Negative.    Neurological: Negative.    Hematological: Negative.    Psychiatric/Behavioral: Negative.      Objective:     Vital Signs (Most Recent):  Temp: 97.6 °F (36.4 °C) (01/27/20 0922)  Pulse: 78 (01/27/20 1230)  Resp: 20 (01/27/20 0922)  BP: (!) 165/97 (01/27/20 1230)  SpO2: 100 % (01/27/20 0905) Vital Signs (24h Range):  Temp:  [97.6 °F (36.4 °C)-97.8 °F (36.6 °C)] 97.6 °F (36.4 °C)  Pulse:  [70-82] 78  Resp:  [15-20] 20  SpO2:  [97 %-100 %] 100 %  BP: (132-192)/() 165/97     Weight: 93 kg (205 lb)  Body mass index is 30.27 kg/m².    Physical Exam   Constitutional: She is oriented to person, place, and time. She appears well-developed and well-nourished. No distress.   HENT:   Head: Atraumatic.   Eyes: EOM are normal.   Neck: Neck supple.   Cardiovascular: Normal rate and regular rhythm.   Pulmonary/Chest: Effort normal and breath sounds normal.   Diminished at bases otherwise clear.    Abdominal: Soft. Bowel sounds are normal.   Musculoskeletal: Normal range of motion. She exhibits edema (BLE pitting 3 + edema).   Neurological: She is alert and oriented to person, place, and time.   Poor historian   Skin: Skin is warm and dry.   R IJ TDC   Psychiatric:   Anxious         CRANIAL NERVES     CN III, IV, VI   Extraocular motions are normal.        Significant Labs: All pertinent labs within the past 24 hours have been reviewed.    Significant Imaging: I have reviewed and interpreted all  pertinent imaging results/findings within the past 24 hours.    Assessment/Plan:     * SOB (shortness of breath)  Patient is a poor historian. CXR and exam evidence of volume excess.   Denies chest pain  HD today with goal 4 L. As tolerated.   Wean oxygen to keep O2 >92      Nephrotic syndrome and end-stage renal disease  ESRD on HD MWF since 10/2010 via L CW TDC at [facility] under direction of  Dr. Grajeda. Does not know her time, EDW or UOP  Nephrology following -HD today goal 4 L, May need UF tomorrow  Lab Results   Component Value Date    CALCIUM 8.2 (L) 01/27/2020    PHOS 5.9 (H) 01/27/2020   Add phos binder-defer Nephrology  Lasix 120 x 1 IV and then 80 mg daily-added by Nephrology-no UOP so far documented  Renal/DM diet.   Strict IO        Depression  Continue lexapro      Anemia of chronic disease  Hgb consistent with previous labs. Denies history of or current melena, hematochezia, hemoptysis, or hematemesis.   Iron and GERMÁN per Nephrology        Hyperlipidemia  No results found for: LDLCALC  Add lipid panel  Continue home statin      Type 2 diabetes mellitus with renal complication  Lab Results   Component Value Date    HGBA1C 7.5 (H) 10/03/2019   Patient does not check blood sugars at home.   SSI and add basal prandial accordingly         Essential hypertension  Uncontrolled. Does not know name of medications. Lives alone and no family per patient.  Continue home list -amlodipine, losartan, metoprolol. Fluid removal should help. Hydralazine prn.         VTE Risk Mitigation (From admission, onward)         Ordered     IP VTE HIGH RISK PATIENT  Once      01/27/20 1326     Place sequential compression device  Until discontinued      01/27/20 1326     heparin (porcine) injection 5,000 Units  As needed (PRN)      01/27/20 1221                   Shona Somers NP  Department of Hospital Medicine   [facility]

## 2020-01-27 NOTE — ASSESSMENT & PLAN NOTE
Patient is a poor historian. CXR and exam evidence of volume excess.   Denies chest pain  HD today with goal 4 L. As tolerated.   Wean oxygen to keep O2 >92

## 2020-01-28 VITALS
SYSTOLIC BLOOD PRESSURE: 178 MMHG | DIASTOLIC BLOOD PRESSURE: 90 MMHG | WEIGHT: 205 LBS | HEART RATE: 77 BPM | TEMPERATURE: 98 F | BODY MASS INDEX: 30.36 KG/M2 | OXYGEN SATURATION: 95 % | RESPIRATION RATE: 18 BRPM | HEIGHT: 69 IN

## 2020-01-28 LAB
ESTIMATED AVG GLUCOSE: 146 MG/DL (ref 68–131)
HBA1C MFR BLD HPLC: 6.7 % (ref 4–5.6)
POCT GLUCOSE: 183 MG/DL (ref 70–110)
POCT GLUCOSE: 220 MG/DL (ref 70–110)

## 2020-01-28 PROCEDURE — 36415 COLL VENOUS BLD VENIPUNCTURE: CPT

## 2020-01-28 PROCEDURE — G0378 HOSPITAL OBSERVATION PER HR: HCPCS

## 2020-01-28 PROCEDURE — 25000003 PHARM REV CODE 250: Performed by: NURSE PRACTITIONER

## 2020-01-28 PROCEDURE — 97161 PT EVAL LOW COMPLEX 20 MIN: CPT

## 2020-01-28 PROCEDURE — 25000003 PHARM REV CODE 250: Performed by: INTERNAL MEDICINE

## 2020-01-28 PROCEDURE — 83036 HEMOGLOBIN GLYCOSYLATED A1C: CPT

## 2020-01-28 RX ADMIN — FUROSEMIDE 80 MG: 40 TABLET ORAL at 11:01

## 2020-01-28 RX ADMIN — FERROUS SULFATE TAB EC 325 MG (65 MG FE EQUIVALENT) 325 MG: 325 (65 FE) TABLET DELAYED RESPONSE at 11:01

## 2020-01-28 RX ADMIN — ESCITALOPRAM OXALATE 10 MG: 10 TABLET ORAL at 11:01

## 2020-01-28 RX ADMIN — LOSARTAN POTASSIUM 25 MG: 25 TABLET ORAL at 11:01

## 2020-01-28 RX ADMIN — METOPROLOL SUCCINATE 50 MG: 50 TABLET, FILM COATED, EXTENDED RELEASE ORAL at 11:01

## 2020-01-28 RX ADMIN — AMLODIPINE BESYLATE 10 MG: 5 TABLET ORAL at 11:01

## 2020-01-28 RX ADMIN — PRAVASTATIN SODIUM 10 MG: 10 TABLET ORAL at 11:01

## 2020-01-28 NOTE — PLAN OF CARE
Problem: Fall Injury Risk  Goal: Absence of Fall and Fall-Related Injury  Intervention: Identify and Manage Contributors to Fall Injury Risk  Flowsheets (Taken 1/28/2020 0635)  Self-Care Promotion: independence encouraged  Medication Review/Management: medications reviewed  Intervention: Promote Injury-Free Environment  Flowsheets (Taken 1/28/2020 0635)  Safety Promotion/Fall Prevention: --  Environmental Safety Modification: clutter free environment maintained; lighting adjusted; room organization consistent; room near unit station; mobility aid in reach     Problem: Adult Inpatient Plan of Care  Goal: Plan of Care Review  Flowsheets (Taken 1/28/2020 0635)  Plan of Care Reviewed With: patient  Goal: Absence of Hospital-Acquired Illness or Injury  Intervention: Identify and Manage Fall Risk  Flowsheets (Taken 1/28/2020 0635)  Safety Promotion/Fall Prevention: bed alarm set; lighting adjusted; medications reviewed; side rails raised x 2; room near unit station  Intervention: Prevent VTE (venous thromboembolism)  Flowsheets (Taken 1/28/2020 0635)  VTE Prevention/Management: ROM (active) performed     Problem: Fluid Volume Excess  Goal: Fluid Balance  Intervention: Monitor and Manage Hypervolemia  Flowsheets (Taken 1/28/2020 0635)  Fluid/Electrolyte Management: fluids restricted

## 2020-01-28 NOTE — PROGRESS NOTES
Magaly Epstein is a 69 y.o. female patient.    Follow for ESRD, dialysis    Received hD yesterday, tolerated well  No c/o  SOB resolved    Scheduled Meds:   sodium chloride 0.9%   Intravenous Once    amLODIPine  10 mg Oral Daily    escitalopram oxalate  10 mg Oral Daily    ferrous sulfate  325 mg Oral BID    fluticasone propionate  2 spray Each Nostril Daily    furosemide  80 mg Oral Daily    losartan  25 mg Oral Daily    metoprolol succinate  50 mg Oral BID    mupirocin   Nasal BID    pravastatin  10 mg Oral Daily       Review of patient's allergies indicates:   Allergen Reactions    Ciprofloxacin Anaphylaxis    Codeine Anaphylaxis    Neosporin [benzalkonium chloride] Anaphylaxis         Vital Signs Range (Last 24H):  Temp:  [97.3 °F (36.3 °C)-98.2 °F (36.8 °C)]   Pulse:  [72-87]   Resp:  [17-18]   BP: (139-186)/(62-97)   SpO2:  [91 %-100 %]     I & O (Last 24H):    Intake/Output Summary (Last 24 hours) at 1/28/2020 1029  Last data filed at 1/27/2020 2230  Gross per 24 hour   Intake 1210 ml   Output 3431 ml   Net -2221 ml           Physical Exam:  General appearance: well developed, well nourished, no distress  Lungs:  clear to auscultation bilaterally and normal respiratory effort  Heart: regular rate and rhythm  Abdomen: soft, non-tender non-distented; bowel sounds normal; no masses,  no organomegaly  Extremities: edema trace    Laboratory:  CBC:   Recent Labs   Lab 01/27/20  0709   WBC 6.25   RBC 3.06*   HGB 9.2*   HCT 29.7*      MCV 97   MCH 30.1   MCHC 31.0*     CMP:   Recent Labs   Lab 01/27/20  0709   *   CALCIUM 8.2*   ALBUMIN 3.0*   PROT 6.4      K 4.3   CO2 25      BUN 35*   CREATININE 3.9*   ALKPHOS 95   ALT 20   AST 19   BILITOT 0.3       Imp/Plan    ESRD - continue HD q MWF  Fluid overload - resolved w/ UF  HTN  DM type 2  Anemia of CKD    Patient doing well  OK for d/c on renal standpoint  Keep Lasix at 80 mg po daily  Continue HD q MWF as  outpatient          Trac T Le  1/28/2020

## 2020-01-28 NOTE — NURSING
Feet and legs washed and dried, lotion applied, provided toiletry items to make her feel more comfortable. She is very anxious about having to stay overnight. Requesting something to help her sleep.

## 2020-01-28 NOTE — PLAN OF CARE
Problem: Physical Therapy Goal  Goal: Physical Therapy Goal  Outcome: Met    Pt mobilizing at baseline LOF. Encouraged to amb c/ RW instead of her broken rollator. No HHPT indicated. Signing off.

## 2020-01-28 NOTE — HOSPITAL COURSE
Placed in observation for evaluation of acute onset of SOB thought to be related to FVO. She was seen by nephrology and dialyzed. This morning the patient is laying flat in bed without evidence of FVO; oxygen saturation 95%. Seen by nephrology this morning who cleared the patient for discharge to follow up in her regular dialysis unit. Discharge to home.

## 2020-01-28 NOTE — NURSING
Report received from Maxwell EMERY RN. Patient care assumed. Patient is AAOx4 and observed sitting in bed. Vital signs stable and found in flow sheets with complete patient assessment. Skin dry and intact with a 22g LAC PIV noted saline locked. No complaints of pain and no signs of respiratory distress noted. Nephrology consulted. Plan to continue with IV diuresis and plan for dialysis in the am. Patient updated on plan of care and verbalized understanding. Call light in reach and patient instructed to inform the nurse if anything is needed. Patient stable and will continue to be monitored.

## 2020-01-28 NOTE — DISCHARGE SUMMARY
"[facility] Medicine  Discharge Summary      Patient Name: Magaly Epstein  MRN: [id number]  Admission Date: 1/27/2020  Hospital Length of Stay: 0 days  Discharge Date and Time:  01/28/2020 11:37 AM  Attending Physician: Shanita Pettit MD   Discharging Provider: ABEL Hurst  Primary Care Provider: Anahy Bradley DO      HPI:   Mrs. Bai is a 69 female who was admitted for depression, hyperlipidema, hypertension, DMT2 pleural effusion requiring thoracentesis 11/8/2019, and ESRD on HD since 10/10/19 via  L CW TDC who came to hospital for shortness of breath and BLE edema. She reports going to dialysis Friday but not sure how long,v volume removed or her pre wt post wt. Patient is a poor historian. She reports on 7 medications but does know the names. She states "dont know" to how much urine she makes. She does not know home blood glucose as she does not check. Denies chest pain. Denies smoking or use of illicit drug.     10/3/2020 2 D echo showed EF 45%, grade I diastolic function.     Seen by Nephrology in ED-emergent HD, add lasix.       * No surgery found *      Hospital Course:   Placed in observation for evaluation of acute onset of SOB thought to be related to FVO. She was seen by nephrology and dialyzed. This morning the patient is laying flat in bed without evidence of FVO; oxygen saturation 95%. Seen by nephrology this morning who cleared the patient for discharge to follow up in her regular dialysis unit. Discharge to home.     Consults:   Consults (From admission, onward)        Status Ordering Provider     Inpatient consult to Nephrology  Once     Provider:  Bc Grajeda MD    Completed ARLETTE VILLAFANA     Inpatient consult to Nephrology  Once     Provider:  Bc Grajeda MD    Completed ARLETTE VILLAFANA          No new Assessment & Plan notes have been filed under this hospital service since the last note was generated.  Service: Hospital " Medicine    Final Active Diagnoses:    Diagnosis Date Noted POA    PRINCIPAL PROBLEM:  SOB (shortness of breath) [R06.02] 01/27/2020 Yes    Nephrotic syndrome and end-stage renal disease [N04.9] 04/15/2019 Yes     Chronic    Essential hypertension [I10] 04/11/2019 Yes     Chronic    Type 2 diabetes mellitus with renal complication [E11.29] 04/11/2019 Yes     Chronic    Hyperlipidemia [E78.5] 04/11/2019 Yes     Chronic    Anemia of chronic disease [D63.8] 04/11/2019 Yes     Chronic    Depression [F32.9] 04/11/2019 Yes     Chronic      Problems Resolved During this Admission:       Discharged Condition: stable    Disposition: Home or Self Care    Follow Up:  Follow-up Information     Anahy Bradley DO In 2 weeks.    Specialty:  Family Medicine  Why:  Call the office to schedule appointment, For outpatient follow-up/post hospitalizaton  Contact information:  [address]  [facility] [address]  [phone]                 Patient Instructions:      Diet Cardiac     Activity as tolerated       Significant Diagnostic Studies: Labs:   CMP   Recent Labs   Lab 01/27/20  0709      K 4.3      CO2 25   *   BUN 35*   CREATININE 3.9*   CALCIUM 8.2*   PROT 6.4   ALBUMIN 3.0*   BILITOT 0.3   ALKPHOS 95   AST 19   ALT 20   ANIONGAP 11   ESTGFRAFRICA 13*   EGFRNONAA 11*   , CBC   Recent Labs   Lab 01/27/20  0709   WBC 6.25   HGB 9.2*   HCT 29.7*      , INR   Lab Results   Component Value Date    INR 1.0 11/08/2019    INR 1.0 10/10/2019    INR 0.9 10/03/2019   , Lipid Panel No results found for: CHOL, HDL, LDLCALC, TRIG, CHOLHDL, Troponin No results for input(s): TROPONINI in the last 168 hours. and A1C:   Recent Labs   Lab 10/03/19  0742 01/28/20  0359   HGBA1C 7.5* 6.7*       Pending Diagnostic Studies:     None         Medications:  Reconciled Home Medications:      Medication List      CONTINUE taking these medications    acetaminophen 500 MG tablet  Commonly known as:   TYLENOL  Take 1 tablet (500 mg total) by mouth every 6 (six) hours as needed.     albuterol-ipratropium 2.5 mg-0.5 mg/3 mL nebulizer solution  Commonly known as:  DUO-NEB  Take 3 mLs by nebulization every 4 (four) hours as needed for Wheezing or Shortness of Breath. Rescue     amLODIPine 10 MG tablet  Commonly known as:  NORVASC  Take 1 tablet (10 mg total) by mouth once daily.     bumetanide 2 MG tablet  Commonly known as:  BUMEX  Take 1 tablet (2 mg total) by mouth once daily.     epoetin cortes-epbx 10,000 unit/mL imjection  Commonly known as:  RETACRIT  Inject 0.6 mLs (6,000 Units total) into the skin every 7 days.     escitalopram oxalate 10 MG tablet  Commonly known as:  LEXAPRO  Take 10 mg by mouth once daily.     ferrous sulfate 325 (65 FE) MG EC tablet  Take 1 tablet (325 mg total) by mouth 2 (two) times daily.     insulin aspart protamine-insulin aspart 100 unit/mL (70-30) Inpn pen  Commonly known as:  NovoLOG 70/30  Inject 10 Units into the skin 2 (two) times daily.     insulin aspart U-100 100 unit/mL (3 mL) Inpn pen  Commonly known as:  NovoLOG  Inject 1-10 Units into the skin before meals and at bedtime as needed (Hyperglycemia).     losartan 25 MG tablet  Commonly known as:  COZAAR  Take 1 tablet (25 mg total) by mouth once daily.     metOLazone 10 MG tablet  Commonly known as:  ZAROXOLYN  Take 1 tablet (10 mg total) by mouth once daily.     metoprolol succinate 50 MG 24 hr tablet  Commonly known as:  TOPROL-XL  Take 1 tablet (50 mg total) by mouth 2 (two) times daily.     polyethylene glycol 17 gram Pwpk  Commonly known as:  GLYCOLAX  Take 17 g by mouth 2 (two) times daily as needed.     pravastatin 10 MG tablet  Commonly known as:  PRAVACHOL  Take 10 mg by mouth once daily.     QUEtiapine 25 MG Tab  Commonly known as:  SEROQUEL  Take 1 tablet (25 mg total) by mouth every evening.     senna-docusate 8.6-50 mg 8.6-50 mg per tablet  Commonly known as:  PERICOLACE  Take 1 tablet by mouth 2 (two) times  daily as needed for Constipation.            Indwelling Lines/Drains at time of discharge:   Lines/Drains/Airways     Central Venous Catheter Line                 Hemodialysis Catheter 10/10/19 1453 right internal jugular 109 days                Time spent on the discharge of patient: 35 minutes  Patient was seen and examined on the date of discharge and determined to be suitable for discharge.         ADELINE Pickard, FNP-C  Hospitalist - Department of Hospital Medicine  [address], [place] [address]  Office [phone]; Pager [phone]

## 2020-01-28 NOTE — PT/OT/SLP EVAL
Physical Therapy Evaluation and Discharge Note    Patient Name:  Magaly Epstein   MRN:  [id number]    Recommendations:     Discharge Recommendations:  home   Discharge Equipment Recommendations: (encouraged pt to call [employer] to have a vendor assist her in repairing her rollator since it was, according to her, issued to her < 1 yr ago)   Barriers to discharge: None    Assessment:     Magaly Epstein is a 69 y.o. female admitted with a medical diagnosis of SOB (shortness of breath). .  At this time, patient is functioning at their prior level of function and does not require further acute PT services.   Pt mobilizing at PLOF; safe for home. No HHPT indicated.     Recent Surgery: * No surgery found *      Plan:     During this hospitalization, patient does not require further acute PT services.  Please re-consult if situation changes.      Subjective     Chief Complaint: needs to see a podiatrist  Patient/Family Comments/goals: above  Pain/Comfort:  · Pain Rating 1: (valderrama baker 4; B knees sit> stand; pt reports arthritiis)    Patients cultural, spiritual, Hinduism conflicts given the current situation: no    Living Environment:  [facility]; [employer] drives her to get groceries on Wednesday's. [employer] provides transportation to MD appts. Otherwise pt is homebound without any social support (no family)  Prior to admission, patients level of function was amb mod Ind c/ rollator; denies recent falls.  Equipment used at home: rollator, walker, rolling(rollator handle has missing pin and knob; handle on L is at very low height compared to R).  DME owned (not currently used): rolling walker.  Upon discharge, patient will have assistance from n/a.    Objective:     Communicated with RN prior to session.  Patient found HOB elevated with   upon PT entry to room.    General Precautions: Standard,     Orthopedic Precautions:    Braces:       Exams:  · Cognitive Exam:  Patient is oriented to Person, Place,  Time, Situation and flat affect; endorses memory problems  · Gross Motor Coordination:  WFL  · Postural Exam:  Patient presented with the following abnormalities:    · -       Rounded shoulders  · -       Forward head  · Sensation:    · -       Impaired  light/touch B feet  · Skin Integrity/Edema:      · -       Edema: Mild B lower legs  · RLE ROM: WFL  · RLE Strength: WFL  · LLE ROM: WFL  · LLE Strength: WFL     BUEs WFL    Functional Mobility:  · Bed Mobility:     · Supine to Sit: modified independence  · Transfers:     · Sit to Stand:  modified independence with no AD  · Gait: c/ rollator; asymmetrical handle height (broken) causing rotated trunk; gait slow but without LOB or SOB on RA O2  · Balance: good standing dynamic c/ rollator    AM-PAC 6 CLICK MOBILITY  Total Score:24       Therapeutic Activities and Exercises:   n/a    AM-PAC 6 CLICK MOBILITY  Total Score:24     Patient left [facility] with RN present.    GOALS:   Multidisciplinary Problems     Physical Therapy Goals     Not on file          Multidisciplinary Problems (Resolved)        Problem: Physical Therapy Goal    Goal Priority Disciplines Outcome Goal Variances Interventions   Physical Therapy Goal   (Resolved)     PT, PT/OT Met                     History:     Past Medical History:   Diagnosis Date    Arthritis     CHF (congestive heart failure)     Diabetes mellitus     ESRD on hemodialysis     S/P hemodialysis catheter insertion 10/10/2019    Right IJ    Unsteady gait     Uses roller walker        Past Surgical History:   Procedure Laterality Date    APPENDECTOMY      INSERTION OF TUNNELED CENTRAL VENOUS HEMODIALYSIS CATHETER Right 10/10/2019       Time Tracking:     PT Received On: 01/28/20  PT Start Time: 1112     PT Stop Time: 1129  PT Total Time (min): 17 min     Billable Minutes: Evaluation 17      Joann Avalos PT  01/28/2020

## 2020-01-28 NOTE — PROGRESS NOTES
WRITTEN HEALTHCARE DISCHARGE INFORMATION      Things that YOU are RESPONSIBLE for to Manage Your Care At Home:     1. Getting your prescriptions filled.  2. Taking you medications as directed. DO NOT MISS ANY DOSES!  3. Going to your follow-up doctor appointments. This is important because it allows the doctor to monitor your progress and to determine if any changes need to be made to your treatment plan.     If you are unable to make your follow up appointments, please call the number listed and reschedule this appointment.      ____________HELP AT HOME____________________     Experiencing any SIGNS or SYMPTOMS: YOU CAN     Schedule a same day appopintment with your Primary Care Doctor or  you can call [facility] On Call Nurse Care Line for 24/7 assistance at [phone]     If you are experience any signs or symptoms that have become severe, Call 911 and come to your nearest Emergency Room.     Thank you for choosing [facility] and allowing us to care for you.   From your care management team:      You should receive a call from [facility] Discharge Department within 48-72 hours to help manage your care after discharge. Please try to make sure that you answer your phone for this important phone call.    Follow-up Information     Viviane De Jesus On 1/29/2020.    Why:  Appointment scheduled for January 29th at 1:10pm  Contact information:  [place] [id number]  [phone]

## 2020-01-28 NOTE — PLAN OF CARE
01/28/20 1150   Final Note   Assessment Type Final Discharge Note   Anticipated Discharge Disposition Home   Hospital Follow Up  Appt(s) scheduled? Yes   Discharge plans and expectations educations in teach back method with documentation complete? Yes   Right Care Referral Info   Post Acute Recommendation No Care   Post-Acute Status   Post-Acute Authorization Other   Other Status No Post-Acute Service Needs   pts nurse Maxwell notified that pt can d/c from CM standpoint.

## 2020-02-03 ENCOUNTER — HOSPITAL ENCOUNTER (EMERGENCY)
Facility: HOSPITAL | Age: 70
Discharge: HOME | End: 2020-02-04
Attending: EMERGENCY MEDICINE
Payer: MEDICARE

## 2020-02-03 DIAGNOSIS — R06.00 DYSPNEA, UNSPECIFIED TYPE: Primary | ICD-10-CM

## 2020-02-03 DIAGNOSIS — F41.9 ANXIETY: ICD-10-CM

## 2020-02-03 LAB
ALBUMIN SERPL BCP-MCNC: 3.2 G/DL (ref 3.5–5.2)
ALP SERPL-CCNC: 86 U/L (ref 55–135)
ALT SERPL W/O P-5'-P-CCNC: 10 U/L (ref 10–44)
ANION GAP SERPL CALC-SCNC: 13 MMOL/L (ref 8–16)
AST SERPL-CCNC: 14 U/L (ref 10–40)
BASOPHILS # BLD AUTO: 0.03 K/UL (ref 0–0.2)
BASOPHILS NFR BLD: 0.4 % (ref 0–1.9)
BILIRUB SERPL-MCNC: 0.3 MG/DL (ref 0.1–1)
BNP SERPL-MCNC: 177 PG/ML (ref 0–99)
BUN SERPL-MCNC: 54 MG/DL (ref 8–23)
CALCIUM SERPL-MCNC: 8.4 MG/DL (ref 8.7–10.5)
CHLORIDE SERPL-SCNC: 96 MMOL/L (ref 95–110)
CO2 SERPL-SCNC: 23 MMOL/L (ref 23–29)
CREAT SERPL-MCNC: 4.3 MG/DL (ref 0.5–1.4)
DIFFERENTIAL METHOD: ABNORMAL
EOSINOPHIL # BLD AUTO: 0.2 K/UL (ref 0–0.5)
EOSINOPHIL NFR BLD: 2.6 % (ref 0–8)
ERYTHROCYTE [DISTWIDTH] IN BLOOD BY AUTOMATED COUNT: 15.1 % (ref 11.5–14.5)
EST. GFR  (AFRICAN AMERICAN): 11 ML/MIN/1.73 M^2
EST. GFR  (NON AFRICAN AMERICAN): 10 ML/MIN/1.73 M^2
GLUCOSE SERPL-MCNC: 157 MG/DL (ref 70–110)
HCT VFR BLD AUTO: 31.3 % (ref 37–48.5)
HGB BLD-MCNC: 9.9 G/DL (ref 12–16)
IMM GRANULOCYTES # BLD AUTO: 0.02 K/UL (ref 0–0.04)
IMM GRANULOCYTES NFR BLD AUTO: 0.3 % (ref 0–0.5)
LYMPHOCYTES # BLD AUTO: 1.7 K/UL (ref 1–4.8)
LYMPHOCYTES NFR BLD: 21.3 % (ref 18–48)
MCH RBC QN AUTO: 29.7 PG (ref 27–31)
MCHC RBC AUTO-ENTMCNC: 31.6 G/DL (ref 32–36)
MCV RBC AUTO: 94 FL (ref 82–98)
MONOCYTES # BLD AUTO: 0.7 K/UL (ref 0.3–1)
MONOCYTES NFR BLD: 8.6 % (ref 4–15)
NEUTROPHILS # BLD AUTO: 5.2 K/UL (ref 1.8–7.7)
NEUTROPHILS NFR BLD: 66.8 % (ref 38–73)
NRBC BLD-RTO: 0 /100 WBC
PLATELET # BLD AUTO: 254 K/UL (ref 150–350)
PMV BLD AUTO: 9.6 FL (ref 9.2–12.9)
POTASSIUM SERPL-SCNC: 3.9 MMOL/L (ref 3.5–5.1)
PROT SERPL-MCNC: 6.5 G/DL (ref 6–8.4)
RBC # BLD AUTO: 3.33 M/UL (ref 4–5.4)
SODIUM SERPL-SCNC: 132 MMOL/L (ref 136–145)
TROPONIN I SERPL DL<=0.01 NG/ML-MCNC: 0.02 NG/ML (ref 0–0.03)
WBC # BLD AUTO: 7.79 K/UL (ref 3.9–12.7)

## 2020-02-03 PROCEDURE — 93005 ELECTROCARDIOGRAM TRACING: CPT

## 2020-02-03 PROCEDURE — 85025 COMPLETE CBC W/AUTO DIFF WBC: CPT

## 2020-02-03 PROCEDURE — 83880 ASSAY OF NATRIURETIC PEPTIDE: CPT

## 2020-02-03 PROCEDURE — 84484 ASSAY OF TROPONIN QUANT: CPT

## 2020-02-03 PROCEDURE — 80053 COMPREHEN METABOLIC PANEL: CPT

## 2020-02-03 PROCEDURE — 93010 ELECTROCARDIOGRAM REPORT: CPT | Mod: ,,, | Performed by: INTERNAL MEDICINE

## 2020-02-03 PROCEDURE — 99285 EMERGENCY DEPT VISIT HI MDM: CPT | Mod: 25

## 2020-02-03 PROCEDURE — 93010 EKG 12-LEAD: ICD-10-PCS | Mod: ,,, | Performed by: INTERNAL MEDICINE

## 2020-02-03 RX ORDER — FUROSEMIDE 10 MG/ML
80 INJECTION INTRAMUSCULAR; INTRAVENOUS
Status: COMPLETED | OUTPATIENT
Start: 2020-02-03 | End: 2020-02-04

## 2020-02-04 VITALS
SYSTOLIC BLOOD PRESSURE: 159 MMHG | OXYGEN SATURATION: 100 % | HEART RATE: 72 BPM | BODY MASS INDEX: 30.36 KG/M2 | DIASTOLIC BLOOD PRESSURE: 74 MMHG | WEIGHT: 205 LBS | RESPIRATION RATE: 15 BRPM | TEMPERATURE: 98 F | HEIGHT: 69 IN

## 2020-02-04 PROCEDURE — 63600175 PHARM REV CODE 636 W HCPCS: Performed by: EMERGENCY MEDICINE

## 2020-02-04 PROCEDURE — 96374 THER/PROPH/DIAG INJ IV PUSH: CPT

## 2020-02-04 RX ADMIN — FUROSEMIDE 80 MG: 10 INJECTION, SOLUTION INTRAVENOUS at 12:02

## 2020-02-04 NOTE — ED TRIAGE NOTES
"Pt presents to ED via EMS with c/o shortness of breath that started about an hour ago. Pt reports her shortness of breath is due to anxiety. She has a cough but states it's a "regular cough." Denies any pain. She has bilateral leg swelling but states this is chronic.  Pt is a M/W/F dialysis pt but was unable to attend her session today as she had an important errand to run; last attended Friday. Pt appears breathless during HPI and intake when answering questions but in no distress.  "

## 2020-02-04 NOTE — ED PROVIDER NOTES
Encounter Date: 2/3/2020    SCRIBE #1 NOTE: I, Mann Alegria, am scribing for, and in the presence of,  Hawk Lancaster MD. I have scribed the following portions of the note - Other sections scribed: HPI, ROS, PE.       History     Chief Complaint   Patient presents with    Fatigue     x 30 minutes prior to EMS arrival, pt called EMS for SOB initally, RA sats 99%, she reports feeling anxious and weak tonight, pt also complains of increased thirst as well      CC: Fatigue    HPI: This 69 y.o. Female with a past medical history of arthritis, chronic leg swelling, congestive heart failure, diabetes mellitus and ESRD on hemodialysis presents to the ED via EMS for an evaluation of fatigue with associated shortness of breath, anxiety and mild dry cough since earlier today. EMS brought her to the ED 1 hour ago b/c she has shortness of breath. She denies any pain or fever. Pt usually complies with her medications but has not taken her meds today. She is a MWF dialysis pt but did not receive it today (Monday). Pt denies recent travel or hx of DVT.    PSHx: appendectomy, hysterectomy    The history is provided by the patient. No  was used.     Review of patient's allergies indicates:   Allergen Reactions    Ciprofloxacin Anaphylaxis    Codeine Anaphylaxis    Neosporin [benzalkonium chloride] Anaphylaxis     Past Medical History:   Diagnosis Date    Arthritis     CHF (congestive heart failure)     Diabetes mellitus     ESRD on hemodialysis     S/P hemodialysis catheter insertion 10/10/2019    Right IJ    Unsteady gait     Uses roller walker      Past Surgical History:   Procedure Laterality Date    APPENDECTOMY      INSERTION OF TUNNELED CENTRAL VENOUS HEMODIALYSIS CATHETER Right 10/10/2019     History reviewed. No pertinent family history.  Social History     Tobacco Use    Smoking status: Former Smoker    Smokeless tobacco: Never Used   Substance Use Topics    Alcohol use: Not Currently     Drug use: No     Review of Systems   Constitutional: Positive for fatigue. Negative for fever.   HENT: Negative for sore throat.    Respiratory: Positive for cough (dry) and shortness of breath.    Cardiovascular: Negative for chest pain.   Gastrointestinal: Negative for abdominal pain and nausea.   Genitourinary: Negative for dysuria.   Musculoskeletal: Negative for back pain and neck pain.   Skin: Negative for rash.   Neurological: Negative for weakness.   Hematological: Does not bruise/bleed easily.   Psychiatric/Behavioral: The patient is nervous/anxious.        Physical Exam     Initial Vitals [02/03/20 2145]   BP Pulse Resp Temp SpO2   130/62 72 19 98.4 °F (36.9 °C) 99 %      MAP       --         Physical Exam    Nursing note and vitals reviewed.  Constitutional: She appears well-developed and well-nourished. She is not diaphoretic. No distress.   HENT:   Head: Normocephalic and atraumatic.   Nose: Nose normal.   Mouth/Throat: Oropharynx is clear and moist.   Eyes: EOM are normal. Pupils are equal, round, and reactive to light. No scleral icterus.   Neck: Normal range of motion. Neck supple. No JVD present.   Cardiovascular: Normal rate, regular rhythm, normal heart sounds and intact distal pulses. Exam reveals no gallop and no friction rub.    No murmur heard.  Pulmonary/Chest: Breath sounds normal. No stridor. No respiratory distress. She has no wheezes. She has no rhonchi. She has no rales.   Abdominal: Soft. Normal appearance and bowel sounds are normal. She exhibits no distension. There is no tenderness. There is no rebound and no guarding.   Musculoskeletal: Normal range of motion. She exhibits edema. She exhibits no tenderness.   Neurological: She is alert and oriented to person, place, and time. She has normal strength. No cranial nerve deficit or sensory deficit. GCS score is 15.   Skin: Skin is warm and dry. No rash noted.   Psychiatric: She has a normal mood and affect. Her behavior is normal.          ED Course   Procedures  Labs Reviewed   CBC W/ AUTO DIFFERENTIAL - Abnormal; Notable for the following components:       Result Value    RBC 3.33 (*)     Hemoglobin 9.9 (*)     Hematocrit 31.3 (*)     Mean Corpuscular Hemoglobin Conc 31.6 (*)     RDW 15.1 (*)     All other components within normal limits   COMPREHENSIVE METABOLIC PANEL - Abnormal; Notable for the following components:    Sodium 132 (*)     Glucose 157 (*)     BUN, Bld 54 (*)     Creatinine 4.3 (*)     Calcium 8.4 (*)     Albumin 3.2 (*)     eGFR if  11 (*)     eGFR if non  10 (*)     All other components within normal limits   B-TYPE NATRIURETIC PEPTIDE - Abnormal; Notable for the following components:     (*)     All other components within normal limits   TROPONIN I     EKG Readings: (Independently Interpreted)   Initial Reading: No STEMI. Rhythm: Normal Sinus Rhythm. Heart Rate: 69. Conduction: Normal.   No ST segment elevation or depression concerning for acute ischemia.          Imaging Results          X-Ray Chest AP Portable (Final result)  Result time 02/03/20 22:50:48    Final result by Deloris Boone MD (02/03/20 22:50:48)                 Impression:      No acute cardiopulmonary process identified.      Electronically signed by: Deloris Boone MD  Date:    02/03/2020  Time:    22:50             Narrative:    EXAMINATION:  XR CHEST AP PORTABLE    CLINICAL HISTORY:  Chest Pain;    TECHNIQUE:  Single frontal view of the chest was performed.    COMPARISON:  01/27/2020.    FINDINGS:  Cardiac silhouette is stable in size.  Right-sided central venous catheter is seen in stable position.  Lungs are symmetrically expanded.  No evidence of new focal consolidative process, pneumothorax, or significant effusion.  No acute osseous abnormality identified.                                 Medical Decision Making:   Initial Assessment:   69-year-old female with end-stage renal disease and anxiety  presenting with shortness of breath. On exam well appearing no acute distress. No tachypnea, increased work of breathing, or other complaints.  Satting 100%.  Lungs clear to auscultation.  Some lower extremity edema though chronic per patient.  Patient missed dialysis on Monday.  States she had an errant to run.  Has no further complaints this time.  She states she feels like her shortness of breath was due to anxiety.  Of note, when discussing her anxiety related issues the patient becomes tachypneic.  When she is not discussing them, she is breathing completely normal. Her vitals are otherwise unremarkable. She has no JVD in her lung exam is without significant inspiratory crackles or other evidence of volume overload.  Her labs are at baseline, no elevated troponin, chest x-ray clear, no risk factors for DVT or PE at this time.  EKG without evidence of acute ischemia.  Patient given Lasix and able to void significantly.  She reports symptoms resolved.  Believe she is safe for discharge home.  Discussed need to have dialysis tomorrow.  No further evidence of respiratory issues at this time.            Scribe Attestation:   Scribe #1: I performed the above scribed service and the documentation accurately describes the services I performed. I attest to the accuracy of the note.                          Clinical Impression:       ICD-10-CM ICD-9-CM   1. Dyspnea, unspecified type R06.00 786.09   2. Anxiety F41.9 300.00         Disposition:   Disposition: Discharged  Condition: Stable    Scribe attestation: I, Hawk Lancaster, personally performed the services described in this documentation. All medical record entries made by the scribe were at my direction and in my presence.  I have reviewed the chart and agree that the record reflects my personal performance and is accurate and complete.               Hawk Lancaster MD  02/04/20 0264

## 2020-02-04 NOTE — ED NOTES
Report received from MICAH Dinero; pt resting calmly in bed; no distress noted; will continue to monitor

## 2020-02-04 NOTE — DISCHARGE INSTRUCTIONS
You were seen in the emergency department for shortness of breath.  Your EKG, labs, exam, are all reassuring.  You stated you missed dialysis today.  You do not have evidence of severe volume overload at this time.  We have given you Lasix and you felt better.  Please go to dialysis tomorrow morning.  Please follow-up with your primary care provider early this week.  Please return for any new or worsening chest pain, nausea, vomiting, difficulty breathing, coughing up blood, profuse sweating, dizziness, lightheadedness, numbness, weakness, or any other new or worsening concerns.

## 2020-02-14 ENCOUNTER — HOSPITAL ENCOUNTER (EMERGENCY)
Facility: HOSPITAL | Age: 70
Discharge: HOME | End: 2020-02-14
Attending: EMERGENCY MEDICINE
Payer: MEDICARE

## 2020-02-14 VITALS
RESPIRATION RATE: 25 BRPM | HEART RATE: 74 BPM | TEMPERATURE: 98 F | OXYGEN SATURATION: 97 % | DIASTOLIC BLOOD PRESSURE: 82 MMHG | HEIGHT: 71 IN | BODY MASS INDEX: 30.9 KG/M2 | SYSTOLIC BLOOD PRESSURE: 175 MMHG | WEIGHT: 220.69 LBS

## 2020-02-14 DIAGNOSIS — R07.9 ACUTE CHEST PAIN: ICD-10-CM

## 2020-02-14 DIAGNOSIS — R06.02 SHORTNESS OF BREATH: Primary | ICD-10-CM

## 2020-02-14 DIAGNOSIS — F41.9 ANXIETY: ICD-10-CM

## 2020-02-14 LAB
ALBUMIN SERPL BCP-MCNC: 3.5 G/DL (ref 3.5–5.2)
ALP SERPL-CCNC: 91 U/L (ref 55–135)
ALT SERPL W/O P-5'-P-CCNC: 13 U/L (ref 10–44)
ANION GAP SERPL CALC-SCNC: 12 MMOL/L (ref 8–16)
APTT BLDCRRT: 34.6 SEC (ref 21–32)
AST SERPL-CCNC: 16 U/L (ref 10–40)
BASOPHILS # BLD AUTO: 0.03 K/UL (ref 0–0.2)
BASOPHILS NFR BLD: 0.4 % (ref 0–1.9)
BILIRUB SERPL-MCNC: 0.3 MG/DL (ref 0.1–1)
BNP SERPL-MCNC: 271 PG/ML (ref 0–99)
BUN SERPL-MCNC: 49 MG/DL (ref 8–23)
CALCIUM SERPL-MCNC: 8.8 MG/DL (ref 8.7–10.5)
CHLORIDE SERPL-SCNC: 100 MMOL/L (ref 95–110)
CO2 SERPL-SCNC: 27 MMOL/L (ref 23–29)
CREAT SERPL-MCNC: 3.2 MG/DL (ref 0.5–1.4)
DIFFERENTIAL METHOD: ABNORMAL
EOSINOPHIL # BLD AUTO: 0.2 K/UL (ref 0–0.5)
EOSINOPHIL NFR BLD: 2.5 % (ref 0–8)
ERYTHROCYTE [DISTWIDTH] IN BLOOD BY AUTOMATED COUNT: 15 % (ref 11.5–14.5)
EST. GFR  (AFRICAN AMERICAN): 16 ML/MIN/1.73 M^2
EST. GFR  (NON AFRICAN AMERICAN): 14 ML/MIN/1.73 M^2
GLUCOSE SERPL-MCNC: 131 MG/DL (ref 70–110)
HCT VFR BLD AUTO: 35.1 % (ref 37–48.5)
HGB BLD-MCNC: 11.6 G/DL (ref 12–16)
IMM GRANULOCYTES # BLD AUTO: 0.02 K/UL (ref 0–0.04)
IMM GRANULOCYTES NFR BLD AUTO: 0.3 % (ref 0–0.5)
INR PPP: 0.9 (ref 0.8–1.2)
LYMPHOCYTES # BLD AUTO: 1.2 K/UL (ref 1–4.8)
LYMPHOCYTES NFR BLD: 18 % (ref 18–48)
MAGNESIUM SERPL-MCNC: 2 MG/DL (ref 1.6–2.6)
MCH RBC QN AUTO: 30.8 PG (ref 27–31)
MCHC RBC AUTO-ENTMCNC: 33 G/DL (ref 32–36)
MCV RBC AUTO: 93 FL (ref 82–98)
MONOCYTES # BLD AUTO: 0.5 K/UL (ref 0.3–1)
MONOCYTES NFR BLD: 7.6 % (ref 4–15)
NEUTROPHILS # BLD AUTO: 4.9 K/UL (ref 1.8–7.7)
NEUTROPHILS NFR BLD: 71.2 % (ref 38–73)
NRBC BLD-RTO: 0 /100 WBC
PHOSPHATE SERPL-MCNC: 4.9 MG/DL (ref 2.7–4.5)
PLATELET # BLD AUTO: 273 K/UL (ref 150–350)
PMV BLD AUTO: 9.6 FL (ref 9.2–12.9)
POTASSIUM SERPL-SCNC: 3.3 MMOL/L (ref 3.5–5.1)
PROT SERPL-MCNC: 7 G/DL (ref 6–8.4)
PROTHROMBIN TIME: 10.3 SEC (ref 9–12.5)
RBC # BLD AUTO: 3.77 M/UL (ref 4–5.4)
SODIUM SERPL-SCNC: 139 MMOL/L (ref 136–145)
TROPONIN I SERPL DL<=0.01 NG/ML-MCNC: 0.02 NG/ML (ref 0–0.03)
TROPONIN I SERPL DL<=0.01 NG/ML-MCNC: 0.04 NG/ML (ref 0–0.03)
WBC # BLD AUTO: 6.83 K/UL (ref 3.9–12.7)

## 2020-02-14 PROCEDURE — 93010 ELECTROCARDIOGRAM REPORT: CPT | Mod: ,,, | Performed by: INTERNAL MEDICINE

## 2020-02-14 PROCEDURE — 63600175 PHARM REV CODE 636 W HCPCS: Performed by: EMERGENCY MEDICINE

## 2020-02-14 PROCEDURE — 93010 EKG 12-LEAD: ICD-10-PCS | Mod: ,,, | Performed by: INTERNAL MEDICINE

## 2020-02-14 PROCEDURE — 84100 ASSAY OF PHOSPHORUS: CPT

## 2020-02-14 PROCEDURE — 85730 THROMBOPLASTIN TIME PARTIAL: CPT

## 2020-02-14 PROCEDURE — 83880 ASSAY OF NATRIURETIC PEPTIDE: CPT

## 2020-02-14 PROCEDURE — 83735 ASSAY OF MAGNESIUM: CPT

## 2020-02-14 PROCEDURE — 99285 EMERGENCY DEPT VISIT HI MDM: CPT | Mod: 25

## 2020-02-14 PROCEDURE — 84484 ASSAY OF TROPONIN QUANT: CPT | Mod: 91

## 2020-02-14 PROCEDURE — 96374 THER/PROPH/DIAG INJ IV PUSH: CPT

## 2020-02-14 PROCEDURE — 80053 COMPREHEN METABOLIC PANEL: CPT

## 2020-02-14 PROCEDURE — 85025 COMPLETE CBC W/AUTO DIFF WBC: CPT

## 2020-02-14 PROCEDURE — 93005 ELECTROCARDIOGRAM TRACING: CPT

## 2020-02-14 PROCEDURE — 85610 PROTHROMBIN TIME: CPT

## 2020-02-14 RX ORDER — LORAZEPAM 2 MG/ML
1 INJECTION INTRAMUSCULAR
Status: COMPLETED | OUTPATIENT
Start: 2020-02-14 | End: 2020-02-14

## 2020-02-14 RX ADMIN — LORAZEPAM 1 MG: 2 INJECTION INTRAMUSCULAR; INTRAVENOUS at 04:02

## 2020-02-14 NOTE — ED TRIAGE NOTES
Pt presents to ED via EMS from dialysis center complaining of anxiety, SOB, 4/10 CP midsternal 4/10 starting during her dialysis session approx. 40 min ago. Per EMS, pt had missed her dialysis on Wednesday and had 1.5 hours left.     Pt denies N/V/D    PMHx DMII     Pt is AAOx4, able to verbalize and follow commands, ambulates using a walker

## 2020-02-14 NOTE — ED PROVIDER NOTES
Encounter Date: 2/14/2020    SCRIBE #1 NOTE: I, Naman Dickinson, am scribing for, and in the presence of,  Lloyd Meng MD. I have scribed the following portions of the note - Other sections scribed: HPI and ROS.       History     Chief Complaint   Patient presents with    Shortness of Breath     missed dialysis on Wednesday.  Went today at 100.1Kg (dry weight 94Kg).  today they took off 1.2L of fluid.  pt began with sob, leg cramping, and anxious.     CC: Shortness of Breath    HPI: This 69 y.o female with a past medical history of Arthritis, CHF, Diabetes mellitus, ESRD on hemodialysis, S/P hemodialysis catheter insertion, Unsteady gait, hypertension, and Uses roller walker, presents to the ED for evaluation of mild, acute shortness of breath PTA. Pt was seen at Dialysis when symptoms began and the pt reports that the duration of the symptoms were less than an hour. Pt reports associated chest tightness (due to anxiety). Pt denies headache, fever, chills, diaphoresis, ear pain, sore throat, cough, chest pain, abdominal pain, nausea, vomiting, diarrhea, dysuria, eye problem, arm and leg pain, and rash. Pt reports that she constantly has been experiencing bilateral leg swelling. Pt denies a past medical history of congestive heart failure, pulmonary edema, chronic obstructive pulmonary disease, and blood clots in extremities. NKDA. Pt reports a surgical history of appendectomy, hysterectomy, and right subclavian catheter. Pt states that she did not take any medications today. The pt does note her Nephrologist is Dr. Brigida Li. Pt denies drug use, alcohol consumption, and cigarette smoking. No other associated symptoms. No alleviating or exacerbating factors.       The history is provided by the patient.     Review of patient's allergies indicates:   Allergen Reactions    Ciprofloxacin Anaphylaxis    Codeine Anaphylaxis    Neosporin [benzalkonium chloride] Anaphylaxis     Past Medical History:   Diagnosis  Date    Arthritis     CHF (congestive heart failure)     Diabetes mellitus     ESRD on hemodialysis     S/P hemodialysis catheter insertion 10/10/2019    Right IJ    Unsteady gait     Uses roller walker      Past Surgical History:   Procedure Laterality Date    APPENDECTOMY      HYSTERECTOMY      INSERTION OF TUNNELED CENTRAL VENOUS HEMODIALYSIS CATHETER Right 10/10/2019     History reviewed. No pertinent family history.  Social History     Tobacco Use    Smoking status: Former Smoker    Smokeless tobacco: Never Used   Substance Use Topics    Alcohol use: Not Currently    Drug use: No     Review of Systems   Constitutional: Negative for chills, diaphoresis and fever.   HENT: Negative for ear pain and sore throat.    Eyes: Negative for pain.   Respiratory: Positive for chest tightness (associated with anxiety) and shortness of breath. Negative for cough.    Cardiovascular: Negative for chest pain.   Gastrointestinal: Negative for abdominal pain, diarrhea, nausea and vomiting.   Genitourinary: Negative for dysuria.   Musculoskeletal:        (-) arm pain  (-) leg pain   Skin: Negative for rash.   Neurological: Negative for headaches.       Physical Exam     Initial Vitals [02/14/20 1608]   BP Pulse Resp Temp SpO2   (!) 158/92 70 20 97.8 °F (36.6 °C) 100 %      MAP       --         Physical Exam  The patient was examined specifically for the following:   General:No significant distress, Good color, Warm and dry. Head and neck:Scalp atraumatic, Neck supple. Neurological:Appropriate conversation, Gross motor deficits. Eyes:Conjugate gaze, Clear corneas. ENT: No epistaxis. Cardiac: Regular rate and rhythm, Grossly normal heart tones. Pulmonary: Wheezing, Rales. Gastrointestinal: Abdominal tenderness, Abdominal distention. Musculoskeletal: Extremity deformity, Apparent pain with range of motion of the joints. Skin: Rash.   The findings on examination were normal except for the following:  The patient seems  extremely anxious.  She seems tremulous.  Oxygen saturations are 100%.  The respiratory rate is 20.  The heart rate is 70.  The patient is afebrile.  Her blood pressure is 158/92.  The lungs are clear and free of wheezing rales rubs or rhonchi.  The patient is not coughing.  The abdomen is nontender.  Extremities are nontender.  There is no pain with range of motion of any joints.  The patient has no rash.  ED Course   Procedures  Labs Reviewed   COMPREHENSIVE METABOLIC PANEL - Abnormal; Notable for the following components:       Result Value    Potassium 3.3 (*)     Glucose 131 (*)     BUN, Bld 49 (*)     Creatinine 3.2 (*)     eGFR if  16 (*)     eGFR if non  14 (*)     All other components within normal limits   CBC W/ AUTO DIFFERENTIAL - Abnormal; Notable for the following components:    RBC 3.77 (*)     Hemoglobin 11.6 (*)     Hematocrit 35.1 (*)     RDW 15.0 (*)     All other components within normal limits   APTT - Abnormal; Notable for the following components:    aPTT 34.6 (*)     All other components within normal limits   B-TYPE NATRIURETIC PEPTIDE - Abnormal; Notable for the following components:     (*)     All other components within normal limits   PHOSPHORUS - Abnormal; Notable for the following components:    Phosphorus 4.9 (*)     All other components within normal limits   TROPONIN I - Abnormal; Notable for the following components:    Troponin I 0.040 (*)     All other components within normal limits   PROTIME-INR   TROPONIN I   MAGNESIUM          Imaging Results          X-Ray Chest AP Portable (Final result)  Result time 02/14/20 16:53:11    Final result by Kavon Carson MD (02/14/20 16:53:11)                 Impression:      1. No acute cardiopulmonary process appreciated.      Electronically signed by: Kavon Carson  Date:    02/14/2020  Time:    16:53             Narrative:    EXAMINATION:  XR CHEST AP PORTABLE    CLINICAL HISTORY:  chest  pain;    TECHNIQUE:  Single frontal portable view of the chest was performed.    COMPARISON:  Chest radiograph 02/03/2020    FINDINGS:  Right internal jugular vein approach tunneled HD catheter is stable.  No pneumothorax.    Cardiomediastinal silhouette is within normal limits.    No focal consolidation, overt interstitial edema or sizable pleural effusion.    Multilevel degenerative changes of the imaged spine.                              Medical decision making:  Given the above I believe this patient had an anxiety reaction.  She reports that she felt like she was going to die on dialysis.  She developed chest tightness and shortness of breath this is resolved.  She responded nicely to Ativan.  She is asymptomatic at 6:00 p.m..  I will repeat a 2nd troponin at 7:38 p.m..  If it is negative I will discharge her to outpatient evaluation and treatment.  Chest x-ray fails to reveal pneumothorax pneumonia pleural effusion.  I called Dr. Walsh and he will arrange for dialysis tomorrow.  Patient has no shortness of breath or respiratory distress.  Chest x-ray is unremarkable.  The patient's potassium is 3.3.  There is no pulmonary edema on chest x-ray.   A 2nd troponin was drawn.  The value was 0.040.  This is elevated relative to the 1st troponin of 0.023, but I do not feel that this is high enough to cause concern in this patient with renal failure.  I reviewed this with Dr. Dowd, who agrees that this patient can go home to outpatient evaluation and treatment.  We do not feel that this elevation is significant in this setting.                  Scribe Attestation:   Scribe #1: I performed the above scribed service and the documentation accurately describes the services I performed. I attest to the accuracy of the note.                          Clinical Impression:       ICD-10-CM ICD-9-CM   1. Shortness of breath R06.02 786.05   2. Acute chest pain R07.9 786.50   3. Anxiety F41.9 300.00            I personally  performed the services described in this documentation.  All medical record  entries made by the scribe are at my direction and in my presence.  Signed, Dr. Taqueria Meng MD  02/14/20 1822       Lloyd Meng MD  02/14/20 2047       Lloyd Meng MD  02/14/20 2048

## 2020-02-15 NOTE — DISCHARGE INSTRUCTIONS
Please go to dialysis tomorrow.  Dr. Walsh will call to make an arrangement.  Return immediately if you get worse or if new problems develop.  Usual medicines.  Rest.

## 2020-10-13 NOTE — ASSESSMENT & PLAN NOTE
Patient with recent admission and evaluated by Nephrology.  Creat much higher than baseline.  Patient with kidney biopsy on last admit.  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  Given dose of Bumex in ER.    Appreciate Nephrology input.  Continue IV Bumex and monitor renal function.       Pt arrives to ED c/o cellulitis in her left leg. States she was in the hospital for it about a month ago, but it has been getting worse and is having a hard time walking on it. Wears 4L O2 baseline. Extensive medical hx. A&Ox4

## 2021-02-19 NOTE — PLAN OF CARE
Problem: Diabetes Comorbidity  Goal: Blood Glucose Level Within Desired Range  Intervention: Maintain Glycemic Control  Flowsheets (Taken 10/13/2019 9451)  Glycemic Management: blood glucose monitoring; supplemental insulin given; oral hydration promoted        Progress Note - Surgery Entered On:  7/13/2018 11:49     Performed On:  7/13/2018 11:47  by Rossi Chan               Progress Note - Surgery   Progress Note - Surgery :   Post-op phone call. Doing well. All questions and concerns answered. Pain controlled.     Rossi Chan - 7/13/2018 11:47    Learning Style Preference Adult Grid   Patient :   Printed materials, Verbal explanation   Rossi Chan - 7/13/2018 11:47    Barriers to Learning :   None evident   Preferred Communication Mode :   Verbal   Language for Written Material :   English   Rossi Chan - 7/13/2018 11:47

## 2022-01-28 PROBLEM — T82.868A THROMBOSIS OF DIALYSIS VASCULAR ACCESS: Status: ACTIVE | Noted: 2022-01-28

## 2022-02-28 NOTE — NURSING
Received report from CHATO Mike. Patient sleepy - just had phenergan for nausea. Open eyes when called. No pain. No sign of distress. IV line intact - saline locked. Call bell given on her reach, instructed to call for assistance all the time. Bed alarm on. Bed on lowest position. Safety maintained.    Belongings taken with patient to L/D department

## 2022-10-19 PROBLEM — R59.0 SUPRACLAVICULAR LYMPHADENOPATHY: Status: ACTIVE | Noted: 2022-10-19

## 2022-10-19 PROBLEM — E83.39 HYPOPHOSPHATEMIA: Status: ACTIVE | Noted: 2022-10-19

## 2022-10-19 PROBLEM — S70.01XA HEMATOMA OF RIGHT HIP: Status: ACTIVE | Noted: 2022-10-19

## 2022-10-19 PROBLEM — R79.89 HIGH SERUM LACTATE: Status: ACTIVE | Noted: 2022-10-19

## 2022-10-19 PROBLEM — S00.03XA HEMATOMA OF RIGHT PARIETAL SCALP: Status: ACTIVE | Noted: 2022-10-19

## 2022-10-19 PROBLEM — E87.6 HYPOKALEMIA: Status: ACTIVE | Noted: 2022-10-19

## 2022-10-19 PROBLEM — Z79.01 ON APIXABAN THERAPY: Status: ACTIVE | Noted: 2022-10-19
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
## 2022-10-19 PROBLEM — R59.0 MEDIASTINAL LYMPHADENOPATHY: Status: ACTIVE | Noted: 2022-10-19

## 2022-10-19 PROBLEM — I50.42 CHRONIC COMBINED SYSTOLIC AND DIASTOLIC HEART FAILURE: Status: ACTIVE | Noted: 2022-10-19

## 2022-10-19 PROBLEM — N18.6 ESRD (END STAGE RENAL DISEASE): Status: ACTIVE | Noted: 2022-10-19

## 2022-10-19 PROBLEM — S72.101K: Status: ACTIVE | Noted: 2022-10-19

## 2022-10-19 PROBLEM — R54 FRAIL ELDERLY: Status: ACTIVE | Noted: 2022-10-19

## 2022-10-19 PROBLEM — D69.6 THROMBOCYTOPENIA: Status: ACTIVE | Noted: 2022-10-19

## 2022-10-19 PROBLEM — J90 PLEURAL EFFUSION ON RIGHT: Status: ACTIVE | Noted: 2022-10-19

## 2022-10-19 PROBLEM — R19.7 DIARRHEA: Status: ACTIVE | Noted: 2022-10-19

## 2022-10-19 PROBLEM — S72.101A CLOSED FRACTURE OF TROCHANTER OF RIGHT FEMUR: Status: ACTIVE | Noted: 2022-10-19

## 2022-10-19 PROBLEM — R94.31 QT PROLONGATION: Status: ACTIVE | Noted: 2022-10-19

## 2022-10-19 PROBLEM — E07.9 THYROID MASS: Status: ACTIVE | Noted: 2022-10-19

## 2022-10-19 PROBLEM — E88.09 HYPOALBUMINEMIA: Status: ACTIVE | Noted: 2022-10-19
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
## 2022-10-21 PROBLEM — N25.81 SECONDARY HYPERPARATHYROIDISM OF RENAL ORIGIN: Status: ACTIVE | Noted: 2022-10-21

## 2022-10-21 PROBLEM — E87.20 METABOLIC ACIDOSIS: Status: ACTIVE | Noted: 2022-10-21

## 2022-10-22 PROBLEM — Z87.448 HISTORY OF END STAGE RENAL DISEASE: Status: ACTIVE | Noted: 2022-10-22

## 2022-12-30 PROBLEM — T82.49XA CLOTTED DIALYSIS ACCESS: Status: ACTIVE | Noted: 2022-12-30

## 2022-12-31 PROBLEM — Z71.89 ACP (ADVANCE CARE PLANNING): Status: ACTIVE | Noted: 2022-12-31

## 2022-12-31 PROBLEM — L03.115 CELLULITIS OF RIGHT LOWER EXTREMITY: Status: ACTIVE | Noted: 2022-12-31

## 2022-12-31 PROBLEM — S72.101A: Status: ACTIVE | Noted: 2022-12-31

## 2022-12-31 PROBLEM — N18.6 ESRD (END STAGE RENAL DISEASE): Status: RESOLVED | Noted: 2022-10-19 | Resolved: 2022-12-31

## 2022-12-31 PROBLEM — D63.8 ANEMIA OF CHRONIC DISEASE: Status: ACTIVE | Noted: 2019-04-11

## 2022-12-31 PROBLEM — L03.115 CELLULITIS OF RIGHT LOWER EXTREMITY: Status: RESOLVED | Noted: 2022-12-31 | Resolved: 2022-12-31

## 2022-12-31 PROBLEM — F03.90 DEMENTIA: Status: ACTIVE | Noted: 2022-12-31

## 2022-12-31 PROBLEM — I10 HYPERTENSION: Status: ACTIVE | Noted: 2019-04-11

## 2022-12-31 PROBLEM — I48.91 A-FIB: Status: ACTIVE | Noted: 2022-12-31

## 2022-12-31 PROBLEM — I48.19 PERSISTENT ATRIAL FIBRILLATION: Status: ACTIVE | Noted: 2022-12-31

## 2023-01-01 PROBLEM — T82.868A THROMBOSIS OF DIALYSIS VASCULAR ACCESS: Status: RESOLVED | Noted: 2022-01-28 | Resolved: 2023-01-01

## 2023-01-01 PROBLEM — R60.0 LEG EDEMA: Status: ACTIVE | Noted: 2023-01-01

## 2023-01-01 PROBLEM — T82.49XA CLOTTED DIALYSIS ACCESS: Status: ACTIVE | Noted: 2023-01-01

## 2023-01-01 PROBLEM — T82.868A THROMBOSIS OF DIALYSIS VASCULAR ACCESS: Status: ACTIVE | Noted: 2022-12-30

## 2024-07-30 NOTE — ASSESSMENT & PLAN NOTE
"Chief Complaint   Patient presents with    Dizziness     Pt BIB to triage by wheelchair s/o dizziness this morning that resulting in a syncopal event. No injury but now experiencing pain in her left foot. Unable to walk due to pain.   No dizziness now or blurry vision.   Hx of syncope events in the past - being following with cardiology regarding this symptoms.      Pt recently had back surgery, minimal pain lower back when she was helped getting up off the floor.     /78   Pulse 75   Temp 36.3 °C (97.4 °F)   Resp 16   Ht 1.6 m (5' 3\")   Wt 49.9 kg (110 lb)   SpO2 98%   BMI 19.49 kg/m²     " Nephrology consult as above.